# Patient Record
Sex: FEMALE | Race: WHITE | Employment: FULL TIME | URBAN - METROPOLITAN AREA
[De-identification: names, ages, dates, MRNs, and addresses within clinical notes are randomized per-mention and may not be internally consistent; named-entity substitution may affect disease eponyms.]

---

## 2017-01-20 ENCOUNTER — OFFICE VISIT (OUTPATIENT)
Dept: INTERNAL MEDICINE | Facility: CLINIC | Age: 52
End: 2017-01-20

## 2017-01-20 VITALS
TEMPERATURE: 98.1 F | BODY MASS INDEX: 24.42 KG/M2 | WEIGHT: 151.2 LBS | DIASTOLIC BLOOD PRESSURE: 79 MMHG | HEART RATE: 78 BPM | SYSTOLIC BLOOD PRESSURE: 125 MMHG | OXYGEN SATURATION: 94 %

## 2017-01-20 DIAGNOSIS — J20.8 ACUTE VIRAL BRONCHITIS: ICD-10-CM

## 2017-01-20 DIAGNOSIS — R05.9 COUGH: Primary | ICD-10-CM

## 2017-01-20 RX ORDER — CODEINE PHOSPHATE AND GUAIFENESIN 10; 100 MG/5ML; MG/5ML
1 SOLUTION ORAL EVERY 4 HOURS PRN
Qty: 120 ML | Refills: 0 | Status: SHIPPED | OUTPATIENT
Start: 2017-01-20 | End: 2017-08-01

## 2017-01-20 RX ORDER — ALBUTEROL SULFATE 90 UG/1
2 AEROSOL, METERED RESPIRATORY (INHALATION)
COMMUNITY
Start: 2017-01-15 | End: 2017-09-06

## 2017-01-20 RX ORDER — DEXTROMETHORPHAN HBR 15 MG/1
30 CAPSULE, LIQUID FILLED ORAL EVERY 8 HOURS PRN
Qty: 28 CAPSULE | Refills: 0 | Status: SHIPPED | OUTPATIENT
Start: 2017-01-20 | End: 2017-08-01

## 2017-01-20 ASSESSMENT — PAIN SCALES - GENERAL: PAINLEVEL: MILD PAIN (2)

## 2017-01-20 NOTE — PATIENT INSTRUCTIONS
Primary Care Center Medication Refill Request Information:  * Please contact your pharmacy regarding ANY request for medication refills.  ** [facility] Prescription Fax = [phone]  * Please allow 3 business days for routine medication refills.  * Please allow 5 business days for controlled substance medication refills.     Primary Care Center Test Result notification information:  *You will be notified with in 7-10 days of your appointment day regarding the results of your test.  If you are on MyChart you will be notified as soon as the provider has reviewed the results and signed off on them.    Acute Bronchitis  Your healthcare provider has told you that you have acute bronchitis. Bronchitis is infection or inflammation of the bronchial tubes (airways in the lungs). Normally, air moves easily in and out of the airways. Bronchitis narrows the airways, making it harder for air to flow in and out of the lungs. This causes symptoms such as shortness of breath, coughing, and wheezing. Bronchitis can be  acute  or  chronic.  Acute means the condition comes on quickly and goes away in a short time. Chronic means a condition lasts a long time and often comes back. Read on to learn more about acute bronchitis.    What causes acute bronchitis?  Acute bronchitis almost always starts as a viral respiratory infection, such as a cold or the flu. Certain factors make it more likely for a cold or flu to turn into bronchitis. These include being very young or very old or having a heart or lung problem. Cigarette smoking also makes bronchitis more likely.  When bronchitis develops, the airways become swollen. The airways may also become infected with bacteria. This is known as a secondary infection.  Diagnosing acute bronchitis  Your healthcare provider will examine you and ask about your symptoms and health history. You may also have a sputum culture to test the fluid in your lungs. Chest X-rays may be done to look for infection in  the lungs.  Treating acute bronchitis  Bronchitis usually clears up as the cold or flu goes away. You can help feel better faster by doing the following:    Take medicine as directed. You may be told to take ibuprofen or other over-the-counter medicines. These help relieve inflammation in your bronchial tubes. Your doctor may prescribe an inhaler to help open up the bronchial tubes. Most of the time, acute bronchitis is caused by a viral infection. Antibiotics are usually not prescribed for viral infections.    Drink plenty of fluids, such as water, juice, or warm soup. Fluids loosen mucus so that you can cough it up. This helps you breathe more easily. Fluids also prevent dehydration.    Make sure you get plenty of rest.    Do not smoke. Do not allow anyone else to smoke in your home.  Recovery and follow-up  Follow up with your doctor as you are told. You will likely feel better in a week or two. But a dry cough can linger beyond that time. Let your doctor know if you still have symptoms (other than a dry cough) after 2 weeks. If you re prone to getting bronchial infections, let your doctor know. And take steps to protect yourself from future infections. These steps include stopping smoking and avoiding tobacco smoke, washing your hands often, and getting a yearly flu shot.  When to call the doctor  Call the doctor if you have any of the following:    Fever of 100.4 F (38.0 C) higher    Symptoms that get worse, or new symptoms    Trouble breathing    Symptoms that don t start to improve within a week, or within 3 days of taking antibiotics     0364-0717 [employer]. [address] [place] [address], [place] [address]. All rights reserved. This information is not intended as a substitute for professional medical care. Always follow your healthcare professional's instructions.

## 2017-01-20 NOTE — NURSING NOTE
Chief Complaint   Patient presents with     Cough     Patient is here to discuss cough, went to urgent care on 1/15/17. Patient recieved antibiotics and still not feeling any better. Duration 2 weeks.      Leidy Severino LPN at 3:18 PM on 1/20/2017.

## 2017-01-20 NOTE — MR AVS SNAPSHOT
After Visit Summary   1/20/2017    Tatiana Paredes    MRN: [id number]           Patient Information     Date Of Birth          1965        Visit Information        Provider Department      1/20/2017 3:30 PM Jagruti Vela [facility]        Today's Diagnoses     Cough    -  1       Care Instructions    Primary Care Center Medication Refill Request Information:  * Please contact your pharmacy regarding ANY request for medication refills.  ** [facility] Prescription Fax = [phone]  * Please allow 3 business days for routine medication refills.  * Please allow 5 business days for controlled substance medication refills.     Primary Care Center Test Result notification information:  *You will be notified with in 7-10 days of your appointment day regarding the results of your test.  If you are on MyChart you will be notified as soon as the provider has reviewed the results and signed off on them.    Acute Bronchitis  Your healthcare provider has told you that you have acute bronchitis. Bronchitis is infection or inflammation of the bronchial tubes (airways in the lungs). Normally, air moves easily in and out of the airways. Bronchitis narrows the airways, making it harder for air to flow in and out of the lungs. This causes symptoms such as shortness of breath, coughing, and wheezing. Bronchitis can be  acute  or  chronic.  Acute means the condition comes on quickly and goes away in a short time. Chronic means a condition lasts a long time and often comes back. Read on to learn more about acute bronchitis.    What causes acute bronchitis?  Acute bronchitis almost always starts as a viral respiratory infection, such as a cold or the flu. Certain factors make it more likely for a cold or flu to turn into bronchitis. These include being very young or very old or having a heart or lung problem. Cigarette smoking also makes bronchitis more likely.  When bronchitis  develops, the airways become swollen. The airways may also become infected with bacteria. This is known as a secondary infection.  Diagnosing acute bronchitis  Your healthcare provider will examine you and ask about your symptoms and health history. You may also have a sputum culture to test the fluid in your lungs. Chest X-rays may be done to look for infection in the lungs.  Treating acute bronchitis  Bronchitis usually clears up as the cold or flu goes away. You can help feel better faster by doing the following:    Take medicine as directed. You may be told to take ibuprofen or other over-the-counter medicines. These help relieve inflammation in your bronchial tubes. Your doctor may prescribe an inhaler to help open up the bronchial tubes. Most of the time, acute bronchitis is caused by a viral infection. Antibiotics are usually not prescribed for viral infections.    Drink plenty of fluids, such as water, juice, or warm soup. Fluids loosen mucus so that you can cough it up. This helps you breathe more easily. Fluids also prevent dehydration.    Make sure you get plenty of rest.    Do not smoke. Do not allow anyone else to smoke in your home.  Recovery and follow-up  Follow up with your doctor as you are told. You will likely feel better in a week or two. But a dry cough can linger beyond that time. Let your doctor know if you still have symptoms (other than a dry cough) after 2 weeks. If you re prone to getting bronchial infections, let your doctor know. And take steps to protect yourself from future infections. These steps include stopping smoking and avoiding tobacco smoke, washing your hands often, and getting a yearly flu shot.  When to call the doctor  Call the doctor if you have any of the following:    Fever of 100.4 F (38.0 C) higher    Symptoms that get worse, or new symptoms    Trouble breathing    Symptoms that don t start to improve within a week, or within 3 days of taking antibiotics     [id number]  [employer]. [address] [place] [address], [place] [address]. All rights reserved. This information is not intended as a substitute for professional medical care. Always follow your healthcare professional's instructions.              Follow-ups after your visit        Your next 10 appointments already scheduled     Jan 20, 2017  6:35 PM   (Arrive by 6:20 PM)   XR CHEST 2 VIEWS with UCXR1   [facility] ([facility])    [address]  1st Floor  [place] [address] [phone]           Please bring a list of your current medicines to your exam. (Include vitamins, minerals and over-thecounter medicines.) Leave your valuables at home.  Tell your doctor if there is a chance you may be pregnant.  You do not need to do anything special for this exam.              Future tests that were ordered for you today     Open Future Orders        Priority Expected Expires Ordered    X-ray Chest 2 vws* Routine 1/20/2017 1/20/2018 1/20/2017            Who to contact     Please call your clinic at [phone] to:    Ask questions about your health    Make or cancel appointments    Discuss your medicines    Learn about your test results    Speak to your doctor   If you have compliments or concerns about an experience at your clinic, or if you wish to file a complaint, please contact [facility] Patient Relations at [phone] or email us at [email or web address]         Additional Information About Your Visit        MyChart Information     [employer] is an electronic gateway that provides easy, online access to your medical records. With [employer], you can request a clinic appointment, read your test results, renew a prescription or communicate with your care team.     To sign up for [employer] visit the website at [email or web address]   You will be asked to enter the access code listed below, as well as some personal information.  Please follow the directions to create your username and password.     Your access code is: [email or web address]  Expires: 3/5/2017  6:31 AM     Your access code will  in 90 days. If you need help or a new code, please contact your [facility] or call [phone] for assistance.        Care EveryWhere ID     This is your Care EveryWhere ID. This could be used by other organizations to access your [facility] medical records  [id number]        Your Vitals Were     Pulse Temperature Pulse Oximetry Breastfeeding?          78 98.1  F (36.7  C) (Oral) 94% No         Blood Pressure from Last 3 Encounters:   17 125/79   16 111/77   16 103/69    Weight from Last 3 Encounters:   17 68.584 kg (151 lb 3.2 oz)   16 64.864 kg (143 lb)   16 65.772 kg (145 lb)                 Today's Medication Changes          These changes are accurate as of: 17  3:52 PM.  If you have any questions, ask your nurse or doctor.               Start taking these medicines.        Dose/Directions    Dextromethorphan HBr 15 MG Caps   Used for:  Cough   Started by:  Jagruti Vela APRN CNP        Dose:  30 mg   Take 30 mg by mouth every 8 hours as needed   Quantity:  28 capsule   Refills:  0       guaiFENesin-codeine 100-10 MG/5ML Soln solution   Commonly known as:  ROBITUSSIN AC   Used for:  Cough   Started by:  Jagruti Vela APRN CNP        Dose:  1 tsp.   Take 5 mLs by mouth every 4 hours as needed for cough   Quantity:  120 mL   Refills:  0            Where to get your medicines      These medications were sent to [place] [address] [place] [address]     Phone:  [phone]    - Dextromethorphan HBr 15 MG Caps      Some of these will need a paper prescription and others can be bought over the counter.  Ask your nurse if you have questions.     Bring a paper prescription for each of these medications     - guaiFENesin-codeine 100-10 MG/5ML Soln solution             Primary Care Provider Office Phone # Fax #    Cruz Prather [phone]       [facility] [address] [address]        Thank you!     Thank you for choosing [facility]  for your care. Our goal is always to provide you with excellent care. Hearing back from our patients is one way we can continue to improve our services. Please take a few minutes to complete the written survey that you may receive in the mail after your visit with us. Thank you!             Your Updated Medication List - Protect others around you: Learn how to safely use, store and throw away your medicines at www.disposemymeds.org.          This list is accurate as of: 1/20/17  3:52 PM.  Always use your most recent med list.                   Brand Name Dispense Instructions for use    albuterol 108 (90 BASE) MCG/ACT Inhaler   Generic drug:  albuterol      Inhale 2 puffs into the lungs       Dextromethorphan HBr 15 MG Caps     28 capsule    Take 30 mg by mouth every 8 hours as needed       guaiFENesin-codeine 100-10 MG/5ML Soln solution    ROBITUSSIN AC    120 mL    Take 5 mLs by mouth every 4 hours as needed for cough       nitrofurantoin (macrocrystal-monohydrate) 100 MG capsule    MACROBID    14 capsule    Take 1 capsule (100 mg) by mouth daily       omeprazole 20 MG CR capsule    priLOSEC    180 capsule    Take 1 capsule (20 mg) by mouth 2 times daily       valACYclovir 500 MG tablet    VALTREX    28 tablet    Take 1 tablet (500 mg) by mouth 2 times daily

## 2017-01-20 NOTE — PROGRESS NOTES
Tatiana Paredes is a 51 year old female who comes in for    CC: Cough  HPI:    Had a fever to 100.8 on 1/15/17, went to Urgent Care for cough. Was taking Tylenol for sore throat last week, then once chest congestion began, was concerned she was masking a fever. Took Tylenol x3 days, did not take yesterday or today. Prescribed 5 days of prednisone--no improvement; albuterol prescribed, has not used this.  Still has cough with congestion and rattling in chest.  Only when breathing deeply--still coughing up large amounts of sputum, clear now (had been green).   Severe coughing overnight, keeping her awake. Takes an hour in the morning to clear up the cough. Better throughout the day.Still feels very tired, but unable to sleep because of the prednisone. No OTC meds.  Missed 4 days of work this week. Is drinking a lot of water/tea, almost 1 cup an hour. Denies ear pain, sore throat, +nasal congestion.    Other issues discussed today:     Patient Active Problem List   Diagnosis     External hemorrhoids     Dysphonia     Choroidal nevus of right eye     Myopia       Current Outpatient Prescriptions   Medication Sig Dispense Refill     omeprazole (PRILOSEC) 20 MG CR capsule Take 1 capsule (20 mg) by mouth 2 times daily 180 capsule 2     nitrofurantoin, macrocrystal-monohydrate, (MACROBID) 100 MG capsule Take 1 capsule (100 mg) by mouth daily 14 capsule 3     valACYclovir (VALTREX) 500 MG tablet Take 1 tablet (500 mg) by mouth 2 times daily 28 tablet 3     albuterol (ALBUTEROL) 108 (90 BASE) MCG/ACT Inhaler Inhale 2 puffs into the lungs           ALLERGIES: Review of patient's allergies indicates no known allergies.    PAST MEDICAL HX:   Past Medical History   Diagnosis Date     Menorrhagia      Hemorrhoids      Constipation      Breast pain, left 11/2013     Choroidal nevus of right eye        PAST SURGICAL HX:   Past Surgical History   Procedure Laterality Date     Hc hysteroscopy, surgical; w/ endometrial ablation,  any method  2011       IMMUNIZATION HX:   Immunization History   Administered Date(s) Administered     Hepatitis A Vac Ped/Adol-2 Dose 06/20/2008     Hepatitis B 02/21/2001, 03/28/2001, 08/16/2001     Influenza (H1N1) 02/04/2010     Influenza (IIV3) 11/17/2003, 11/18/2005, 10/30/2013, 10/03/2016     TDAP (BOOSTRIX AGES 10-64) 11/13/2013     Tetanus 01/07/2003     Varicella 02/10/1999, 03/17/1999       SOCIAL HX:   Social History     Social History Narrative    How much exercise per week? Not enough    How much calcium per day? Some in her diet       How much caffeine per day? 2 cups     How much vitamin D per day? Some in diet    Do you/your family wear seatbelts?  Yes    Do you/your family use safety helmets? N/A    Do you/your family use sunscreen? Yes    Do you/your family keep firearms in the home? No    Do you/your family have a smoke detector(s)? Yes        Do you feel safe in your home? Yes    Has anyone ever touched you in an unwanted manner? No                06/17/2015        Works at [employer].     .    Natali Powell MD                       ROS:   CONSTITUTIONAL: no fatigue, no unexpected change in weight  ENT: no ear problems, no mouth problems, no throat problems  RESP: +cough, no shortness of breath, no wheezing  CV: no chest pain, no palpitations, no new or worsening peripheral edema  GI: no nausea, no vomiting, no constipation, no diarrhea    OBJECTIVE:  /79 mmHg  Pulse 78  Temp(Src) 98.1  F (36.7  C) (Oral)  Wt 68.584 kg (151 lb 3.2 oz)  SpO2 94%  Breastfeeding? No   Wt Readings from Last 1 Encounters:   01/20/17 68.584 kg (151 lb 3.2 oz)     Constitutional: no distress, comfortable, pleasant, well-groomed  Eyes: anicteric, conjunctiva pink, normal extra-ocular movements   Ears, Nose and Throat: tympanic membranes pearly gray with positive light reflex, EACs clear bilaterally, nose clear and free of lesions, throat clear, mucosa pink and moist.   Neck: supple with full  range of motion, no thyromegaly, no lymphadenopathy   Cardiovascular: regular rate and rhythm, normal S1 and S2, no murmurs, rubs or gallops  Respiratory: good air movement bilaterally, expiratory wheezes in bilateral LL, fine crackles LLL, non-labored      ASSESSMENT/PLAN:    1. Cough    2. Acute viral bronchitis      Encouraged pt to use albuterol for cough and wheeze, will Xray to evaluate for pneumonia, as crackles present on exam. Recommended Robitussin AC and dextromethorphan to help with cough. Encouraged pushing fluids, rest, and good hand hygiene. Reviewed natural course of bronchitis, may take up to 4 weeks for cough to fully resolve. To seek emergency care if difficulty breathing or any CP.     FOLLOW UP: If not improving or if worsening    SERA Doll CNP

## 2017-07-22 ENCOUNTER — HEALTH MAINTENANCE LETTER (OUTPATIENT)
Age: 52
End: 2017-07-22

## 2017-08-01 ENCOUNTER — OFFICE VISIT (OUTPATIENT)
Dept: OTOLARYNGOLOGY | Facility: CLINIC | Age: 52
End: 2017-08-01

## 2017-08-01 DIAGNOSIS — J38.00 VOCAL CORD PARESIS: ICD-10-CM

## 2017-08-01 DIAGNOSIS — R49.0 DYSPHONIA: Primary | ICD-10-CM

## 2017-08-01 ASSESSMENT — PAIN SCALES - GENERAL: PAINLEVEL: NO PAIN (0)

## 2017-08-01 NOTE — LETTER
"8/1/2017     RE: Tatiana Paredes  [address] [facility] [address]     Dear Colleague,    Thank you for referring your patient, Tatiana Paredes, to the [facility] VOICE at [facility]. Please see a copy of my visit note below.    [facility] VOICE CLINIC  Jose Ramon Pisano Jr., M.D., F.A.C.S.  Amparo Brower M.D., M.P.H.  Radha Alford, Ph.D., CCC/SLP  Jada Lynn M.M. (voice), M.CARLOS., CCC/SLP  Calvin Tobias M.M. (voice), MFELIX., CCC/SLP    Patient: Tatiana Paredes  Date of Visit: 8/1/2017    CHIEF COMPLAINT: dysphonia    HISTORY  Tatiana Paredes was seen for initial voice evaluation and laryngeal examination in conjunction with a visit to Dr. Pisano.  Please refer to Dr. Pisano s dictation for additional history and impressions. Salient details of her history are as follows:    Ms. Paredes is a 51 year old female with a history of dysphonia.  She was also treated in 2014 for other dysphonia symptoms that appeared related to reflux and muscle tension dysphonia.    Symptoms began in January 2017, when she was experiencing a viral bronchitis with a severe cough and total voice loss for approx 1 week.      She was provided with an albuterol inhaler at the time of her bronchitis, which improved all of her symptoms except her voice quality.    CURRENT SYMPTOMS INCLUDE  VOICE    Rough    Pitch instability    Moments of an improved voice; voice \"comes and goes\"  COUGH/THROAT CLEARING    N/a; now resolved  SWALLOWING/THROAT SYMPTOMS    n/a  BREATHING    WNL    OTHER PERTINENT HISTORY    Please also refer to Dr. Pisano s dictation for additional history and impressions.  Past Medical History:   Diagnosis Date     Breast pain, left 11/2013     Choroidal nevus of right eye      Constipation      Hemorrhoids      Menorrhagia      Past Surgical History:   Procedure Laterality Date     HC HYSTEROSCOPY, SURGICAL; W/ ENDOMETRIAL ABLATION, ANY METHOD  2011     OBJECTIVE  PATIENT " "REPORTED MEASURES  Patient Supplied Answers To VHI Questionnaire  Voice Handicap Index (VHI-10) 8/1/2017   How often do you have any of the following symptoms:  Indigestion, heartburn, chest pain, stomach acid coming up, and/or tasting acid in your mouth or throat? Rarely   (F1) My voice makes it difficult for people to hear me. Sometimes   (F2) People have difficulty understanding me in a noisy room. Sometimes   (F8) My voice difficulties restrict my personal and social life. Sometimes   (F9) I feel left out of conversations because of my voice. Never   (F10) My voice problem causes me to lose income. Never   (P5) I feel as though I have to strain to produce voice. Sometimes   (P6) The clarity of my voice is unpredictable. Sometimes   (E4) My voice problem upsets me. Sometimes   (E6) My voice makes me feel handicapped. Sometimes   (P3) People ask, \"What's wrong with your voice?\" Sometimes   VHI Total Score 16       Patient Supplied Answers To CSI Questionnaire  Cough Severity Index (CSI) 8/1/2017   My cough is worse when I lie down. Never   My coughing problem causes me to restrict my personal and social life. Never   I tend to avoid places because of my cough problem. Never   I feel embarrassed because of my coughing problem. Never   People ask, \"What's wrong?\" because I cough a lot. Never   I run out of air when I cough. Never   My coughing problem affects my voice. Sometimes   My coughing problem limits my physical activity. Never   My coughing problem upsets me. Never   People ask me if I am sick because I cough a lot. Never   CSI Total Score 2   My cough is worse when I lie down -   My coughing problem causes me to restrict my personal and social life -   I tend to avoid places because of my cough problem -   I feel embarrassed because of my coughing problem -   People ask, ''What's wrong?'' because I cough a lot -   I run out of air when I cough -   My coughing problem affects my voice -   My coughing problem " limits my physical activity -   My coughing problem upsets me -   People ask me if I am sick because I cough a lot -   CSI Score -       Patient Supplied Answers To EAT Questionnaire  Eating Assessment Tool (EAT-10) 8/1/2017   My swallowing problem has caused me to lose weight. 0   My swallowing problem interferes with my ability to go out for meals. 0   Swallowing solids takes extra effort. 0   Swallowing pills takes extra effort. 0   Swallowing is painful. 0   The pleasure of eating is affected by my swallowing. 0   When I swallow food sticks in my throat. 2   I cough when I eat. 0   Swallowing is stressful. 0   How often do things go down the wrong way when you swallow? Never   Have you had pneumonia, bronchitis, or another severe lung infection in the last 6 months? Yes   EAT Total Score 2   My swallowing problem has caused me to lose weight -   My swallowing problem interferes with my ability to go out for meals -   Swallowing liquids takes extra effort -   Swallowing solids takes extra effort -   Swallowing pills takes extra effort -   Swallowing is painful -   The pleasure of eating is affected by my swallowing -   When I swallow food sticks in my throat -   I cough when I eat -   Swallowing is stressful -   EAT-10 -     PERCEPTUAL EVALUATION (CPT 70868)  POSTURE / TENSION:     upper body    neck and shoulders    BREATHING:     appears within normal limits and adequate     LARYNGEAL PALPATION:     firm musculature    tenderness of the thyrohyoid area    reduced thyrohyoid space    VOICE:    Roughness: Severe    Breathiness: Severe    Strain: Moderate to severe    Loudness    Conversational speech:  Mildly reduced    Projected speech:  Moderately reduced    Pitch:    Conversational speech:  Mild to moderately elevated due to strain    Pitch glide: limited upper and lower range    Resonance:    Conversational speech:  laryngeal pharyngeal resonance    Singing vs. Speech:  n/a    CAPE-V Overall Severity:   62/100;  Global Dysphonia Severity:  62/100    COUGH/THROAT CLEARING:    Not observed    THERAPY PROBES: Mild improvement was elicited with use of forward resonant stimuli, coordination of respiration and phonation and use of yawn sigh    LARYNGEAL EXAMINATION  Procedure: Flexible endoscopy with chip-tip technology with stroboscopy, right nostril; topical anesthesia with 3% Lidocaine and 0.25% phenylephrine was applied.   Performed by: Dr. Pisano   The laryngeal and pharyngeal structures were evaluated for gross appearance, mobility, function, and focal lesions / abnormalities of the associated mucosa.  Stroboscopy was warranted to evaluate closure, symmetry, and vibratory characteristics of the vocal folds.  All findings were within normal limits with the exception of the following salient features:     Mild pooling noted in the right piriform sinus.    The right vocal fold appears mildly reduced/sluggish in mobility and slightly bowed, with the right arytenoid cartilage resting in an anterior position; left vocal fold is WNL    Moderate to severe anterior-posterior constriction noted during connected speech tasks    Therapeutic probes: inhalation phonation improved glottic adduction.    Stroboscopy demonstrated:    Amplitude: mildly reduced on the right    Symmetry: chasing asymmetry    Phase: irregular    Primarily open phase dominant      Abducted view with reduced mobility of the right vocal fold and mild bowing.    The laryngeal exam was reviewed with Ms. Paredes, and I provided pertinent explanations, as well as written and oral information.    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  Review of the 2014 laryngeal exam demonstrated normal mobility of the right vocal fold.  We suspect that the onset of this weakness is connected to her history of  bronchitis in January.    Dysphonia/discomfort is compounded by the hyperfunction and imbalanced function of the intrinsic and extrinsic laryngeal musculature      Dysphonia is accounted for by the reduced mobility of the vocal fold, and resulting poor glottic closure  STIMULABILITY: results of therapy probes during perceptual and laryngeal evaluation demonstrate improvement with coordination of respiration and phonation, use of yawn sigh and use of inhalation phonation     Based on the results of the therapy probes, Dr. Pisano recommended a trial course of speech therapy.  He would like her to follow up in 3 months.  RECOMMENDATIONS:     A course of speech therapy is recommended to optimize vocal technique, improve voice quality and promote reduced discomfort, effort and fatigue.    She demonstrates a Good prognosis for improvement given adherence to therapeutic recommendations. Therapeutic     Positive indicators: motivation    Negative indicators: none    DURATION / FREQUENCY: 2 one-hour weekly sessions, followed by 2 bi-weekly sessions.  A total of 6-8 sessions may be necessary to resolve symptoms to within normal limits.    GOALS:  Patient goal:   1. To improve and maintain a healthy voice quality  2. To understand the problem and fix it as much as possible  3. To have a normal and acceptable voice quality    Long-term goal(s): In 6 months, Ms. Paredes will:  1. Report a week of typical vocal activities, in which dysphonia and discomfort do not exceed a level of 3 out of 10, 80% of the time     This treatment plan was developed with the patient who agreed with the recommendations.    TOTAL SERVICE:  EVALUATION OF VOICE AND RESONANCE: (10887): 60 minutes    NO CHARGE FACILITY FEE (31945)    Jada Lynn M.M. (voice), M.A., CCC/SLP  Speech-Language Pathologist  [facility]  [phone]

## 2017-08-01 NOTE — PROGRESS NOTES
HISTORY OF PRESENT ILLNESS: Tatiana Paredes is a 51 year old female with a history of Dysphonia.  I saw her last on 6/10/2014. At that time she had dysphonia following a prolonged use of her voice. We felt she had gastroesophageal reflux and a muscle tension dysphonia. She did quite well after therapy and did not have any voice problems until a bout of bronchitis in January and February 2017. She had a month of coughing. She lost her voice for a week then slowly got better in February 2017. Since that time she has had intermittent voice problems with 1 day her voice being normal and the next having a rough quality to her voice. She has had no changes in her swallow but is always had some difficulty with the last swallow of a medial. This has been going on for several years. Last month she noted that her voice worsened after extensive use of her voice and it has maintained its roughness since that time. She has vocal fatigue which is unchanged from the entirety of this year. She  does better at higher pitches and with improved breath support. With extended talking she does have anterior neck pain which will resolve in an hour to a day.      Last 2 Scores for Patient-Answered VHI Questionnaire  VHI Total Score 8/1/2017   VHI Total Score 16       Last 2 Scores for Patient-Answered CSI Questionnaire  CSI Total Score 8/1/2017   CSI Total Score 2         Last 2 Scores for Patient-Answered EAT Questionnaire  EAT Total Score 8/1/2017   EAT Total Score 2           PAST MEDICAL HISTORY:   Past Medical History:   Diagnosis Date     Breast pain, left 11/2013     Choroidal nevus of right eye      Constipation      Hemorrhoids      Menorrhagia        PAST SURGICAL HISTORY:   Past Surgical History:   Procedure Laterality Date     HC HYSTEROSCOPY, SURGICAL; W/ ENDOMETRIAL ABLATION, ANY METHOD  2011       FAMILY HISTORY:   Family History   Problem Relation Age of Onset     Breast Cancer Sister 52     Cancer - colorectal  Maternal Grandmother 70       SOCIAL HISTORY:   Social History   Substance Use Topics     Smoking status: Never Smoker     Smokeless tobacco: Never Used     Alcohol use 0.5 - 2.5 oz/week     1 - 5 Standard drinks or equivalent per week      Comment: 1-3 times a week       REVIEW OF SYSTEMS: Ten point review of systems was performed and is negative except for:   [facility] E[facility] ROS 8/1/2017   Constitutional Problems with sleep   Ears, Nose, Throat Hoarseness        ALLERGIES: Review of patient's allergies indicates no known allergies.    MEDICATIONS:   Current Outpatient Prescriptions   Medication Sig Dispense Refill     albuterol (ALBUTEROL) 108 (90 BASE) MCG/ACT Inhaler Inhale 2 puffs into the lungs       omeprazole (PRILOSEC) 20 MG CR capsule Take 1 capsule (20 mg) by mouth 2 times daily 180 capsule 2     nitrofurantoin, macrocrystal-monohydrate, (MACROBID) 100 MG capsule Take 1 capsule (100 mg) by mouth daily 14 capsule 3     valACYclovir (VALTREX) 500 MG tablet Take 1 tablet (500 mg) by mouth 2 times daily 28 tablet 3         PHYSICAL EXAMINATION:  She  is awake, alert and in no apparent distress.    Her tympanic membranes are clear and intact bilaterally. External auditory canals are clear.  Nasal exam shows a mild septal deviation without obstruction.  Examination of the oral cavity shows no suspicious lesions.  There is symmetric movement of the tongue and soft palate.    The oropharynx is clear.  Her neck is supple without significant adenopathy. There is no thyrohyoid muscle tenderness. Pulse is regular.  Upper airway is clear.  Cranial nerves II-XII are grossly intact.       PROCEDURE: A flexible laryngoscopy  was performed.  Informed consent was obtained and a time out was performed. 3% lidocaine and 0.25% phenylephrine was sprayed into the nasal cavity and allowed 3 to 5 minutes for effect. The scope was passed through the right sided nostril. Examination showed the vocal folds to be mobile and meet in the  midline.  No nodules, polyps or ulcerations are seen. There is consistent but mild bowing of the right vocal fold. This results in a persistent glottic gap at medium to low pitch. Her vocal quality improves at higher pitches.  There is mild inflammation or erythema of the supraglottic or glottic larynx.  With phonation there is moderatecontraction of the supraglottic larynx.  The hypopharynx is otherwise clear as is the subglottis. There is asymmetric pooling of saliva in the hypopharynx with the right sided pooling being greater than that seen on the left. Both sides clear fairly well with a swallow.    Video stroboscopy was done at varying pitches. A clear and consistent tone could not be obtained at low and medium pitches. At higher pitches a consistent tone could be obtained.  This showed good periodicity, but an asymmetric amplitude with the right vocal fold having a decreased amplitude compared to the left. There are no distinct mucosal breaks.                IMPRESSION: Right vocal fold weakness. This is new since the June 2014 exam which was reviewed today with the patient. There is also some mild right hypopharyngeal weakness based on salivary collection.     PLAN:At this point we are going to try a brief trial of speech therapy. Because of her symptoms returning in January I am going to date the onset of this difficulty at that time rather than a month ago. I would like to see her back in 3 months after completion of therapy to see if there has been any progression in either a good or bad direction in her exam or symptoms.

## 2017-08-01 NOTE — MR AVS SNAPSHOT
After Visit Summary   2017    Tatiana Paredes    MRN: [id number]           Patient Information     Date Of Birth          1965        Visit Information        Provider Department      2017 8:00 AM Jose Ramon Pisano MD [facility]        Today's Diagnoses     Dysphonia    -  1      Care Instructions    Plan of care:  Follow up with [facility] as discussed  Clinic contact information:  1. To schedule an appointment call [phone], option 1  2. To talk to the Triage RN call [phone], option 3  3. If you need to speak to Leila or get a message to your doctor on a Friday, call the triage RN  4. LeilaRN: [phone]  5. Surgery scheduling:      Mary Guerin: [phone]      Aurelia Walker: [phone]  6. Fax: [phone]  7. [phone]            Follow-ups after your visit        Follow-up notes from your care team     Return in about 3 months (around 2017).      Your next 10 appointments already scheduled     Aug 04, 2017  3:00 PM CDT   (Arrive by 2:45 PM)   RETURN SLP VOICE with ERIN Dan   [facility] ([facility])    [address] [place] [address] [phone]            Aug 08, 2017  8:00 AM CDT   (Arrive by 7:45 AM)   MA SCREENING BILATERAL W/ CHUCK with [email or web address]   [facility] ([facility] [place])    [address] [place] [address] [phone]           Do not use any powder, lotion or deodorant under your arms or on your breast. If you do, we will ask you to remove it before your exam.  Wear comfortable, two-piece clothing.  If you have any allergies, tell your care team.  Bring any previous mammograms from other facilities or have them mailed to the breast center.  Three-dimensional (3D) mammograms are available at [place] locations in [facility],  "and [place]. [facility] locations include [place] and [facility] [place] in [place].   Benefits of 3D mammograms include: - Improved rate of cancer detection - Decreases your chance of having to go back for more tests, which means fewer: - \"False-positive\" results (This means that there is an abnormal area but it isn't cancer.) - Invasive testing procedures, such as a biopsy or surgery - Can provide clearer images of the breast if you have dense breast tissue. 3D mammography is an optional exam that anyone can have with a 2D mammogram. It doesn't replace or take the place of a 2D mammogram. 2D mammograms remain an effective screening test for all women.  Not all insurance companies cover the cost of a 3D mammogram. Check with your insurance.            Aug 09, 2017 10:05 AM CDT   (Arrive by 9:50 AM)   Return Visit with Cruz Prather MD   [facility] ([facility])    [address] [place] [id number] [phone]            Aug 15, 2017  8:00 AM CDT   (Arrive by 7:45 AM)   RETURN SLP VOICE with ERIN Dan    [facility] ([facility])    [address] [place] [id number] [phone]            Aug 22, 2017  8:05 AM CDT   (Arrive by 7:50 AM)   PHYSICAL with Cruz Prather MD   [facility] ([facility])    [address] [place] [id number] [phone]            Aug 29, 2017  8:00 AM CDT   (Arrive by 7:45 AM)   RETURN SLP VOICE with ERIN Dan    [facility] ([facility])    [address] [place] [id number] [phone]            Sep 25, 2017  8:00 AM CDT   (Arrive by 7:45 AM)   RETURN SLP VOICE with ERIN Dan    [facility] ([facility])    [address] [place] [id number] "   [phone]            Oct 06, 2017  2:15 PM CDT   Annual Visit with Natail Powell MD   [facility] ([facility])    [facility] [address],[place] [address]  [address]  [facility] [address] [phone]            Oct 31, 2017  8:00 AM CDT   (Arrive by 7:45 AM)   Return Voice with Jose Ramon Pisano MD   [facility] ([facility] [place])    [address]  [address]  [facility] [address] [phone]              Who to contact     Please call your clinic at [phone] to:    Ask questions about your health    Make or cancel appointments    Discuss your medicines    Learn about your test results    Speak to your doctor   If you have compliments or concerns about an experience at your clinic, or if you wish to file a complaint, please contact [facility] Patient Relations at [phone] or email us at [email or web address]         Additional Information About Your Visit        [employer] Information     [employer] is an electronic gateway that provides easy, online access to your medical records. With [employer], you can request a clinic appointment, read your test results, renew a prescription or communicate with your care team.     To sign up for [employer] visit the website at [email or web address]   You will be asked to enter the access code listed below, as well as some personal information. Please follow the directions to create your username and password.     Your access code is: [email or web address]  Expires: 10/16/2017  6:31 AM     Your access code will  in 90 days. If you need help or a new code, please contact your [facility] or call [phone] for assistance.        Care EveryWhere ID     This is your Care EveryWhere ID. This could be used by other organizations to access your [place] medical records  [id number]         Blood  Pressure from Last 3 Encounters:   01/20/17 125/79   08/31/16 111/77   07/20/16 103/69    Weight from Last 3 Encounters:   01/20/17 68.6 kg (151 lb 3.2 oz)   08/31/16 64.9 kg (143 lb)   07/20/16 65.8 kg (145 lb)              We Performed the Following     IMAGESTREAM RECORDING ORDER     IMAGESTREAM RECORDING ORDER     LARYNGOSCOPY FLEX FIBEROPTIC, DIAGNOSTIC        Primary Care Provider Office Phone # Fax #    Cruz GAGNON MD Crescencio [phone]       [facility] [address] [facility] [address]        Equal Access to Services     [facility] HospitalCAITLYN : Hadii aad ku hadasho Soomaali, waaxda luqadaha, qaybta kaalmada adeegyada, waxerika damiann tunde moura. So [facility] [phone].    ATENCIÓN: Si habla español, tiene a cazares disposición servicios gratuitos de asistencia lingüística. Llame al [phone].    We comply with applicable federal civil rights laws and [place] laws. We do not discriminate on the basis of race, color, national origin, age, disability sex, sexual orientation or gender identity.            Thank you!     Thank you for choosing [facility]  for your care. Our goal is always to provide you with excellent care. Hearing back from our patients is one way we can continue to improve our services. Please take a few minutes to complete the written survey that you may receive in the mail after your visit with us. Thank you!             Your Updated Medication List - Protect others around you: Learn how to safely use, store and throw away your medicines at www.disposemymeds.org.          This list is accurate as of: 8/1/17  3:32 PM.  Always use your most recent med list.                   Brand Name Dispense Instructions for use Diagnosis    albuterol 108 (90 BASE) MCG/ACT Inhaler   Generic drug:  albuterol      Inhale 2 puffs into the lungs        nitroFURantoin (macrocrystal-monohydrate) 100 MG capsule    MACROBID    14 capsule    Take 1 capsule (100 mg)  by mouth daily    Frequent UTI       omeprazole 20 MG CR capsule    priLOSEC    180 capsule    Take 1 capsule (20 mg) by mouth 2 times daily    Gastroesophageal reflux disease with esophagitis       valACYclovir 500 MG tablet    VALTREX    28 tablet    Take 1 tablet (500 mg) by mouth 2 times daily    HSV (herpes simplex virus) anogenital infection

## 2017-08-01 NOTE — MR AVS SNAPSHOT
After Visit Summary   8/1/2017    Tatiana Paredes    MRN: [id number]           Patient Information     Date Of Birth          1965        Visit Information        Provider Department      8/1/2017 8:00 AM Jada Lynn SLP  [facility]        Today's Diagnoses     Dysphonia    -  1    Vocal cord paresis           Follow-ups after your visit        Your next 10 appointments already scheduled     Aug 09, 2017 10:05 AM CDT   (Arrive by 9:50 AM)   Return Visit with Cruz Prather MD   [facility] ([facility])    [address] [place] [address]   [phone]            Aug 15, 2017  8:00 AM CDT   (Arrive by 7:45 AM)   RETURN SLP VOICE with [facility] ([facility])    [address] [place] [address]   [phone]            Aug 22, 2017  8:05 AM CDT   (Arrive by 7:50 AM)   PHYSICAL with Cruz Prather MD   [facility] ([facility])    [address] [place] [address]   [phone]            Aug 29, 2017  8:00 AM CDT   (Arrive by 7:45 AM)   RETURN SLP VOICE with [facility] ([facility])    [address] [place] [address]   [phone]            Sep 25, 2017  8:00 AM CDT   (Arrive by 7:45 AM)   RETURN SLP VOICE with [facility] ([facility])    [address] [place] [address]   [phone]            Oct 06, 2017  2:15 PM CDT   Annual Visit with Natali Powell MD   [facility] ([facility])    [place] [facility] [address]  [address]  [facility] [address]   [phone]            Oct 31, 2017  8:00 AM CDT   (Arrive by 7:45 AM)   Return Voice with Jose Ramon  Gamal Pisano MD   [facility] ([facility])    [address] [address] [phone]              Who to contact     Please call your clinic at [phone] to:    Ask questions about your health    Make or cancel appointments    Discuss your medicines    Learn about your test results    Speak to your doctor   If you have compliments or concerns about an experience at your clinic, or if you wish to file a complaint, please contact [facility] Patient Relations at [phone] or email us at [email or web address]         Additional Information About Your Visit        ThumbAdhart Information     The Society[employer] is an electronic gateway that provides easy, online access to your medical records. With Courtan[employer], you can request a clinic appointment, read your test results, renew a prescription or communicate with your care team.     To sign up for [employer]anet visit the website at [email or web address]   You will be asked to enter the access code listed below, as well as some personal information. Please follow the directions to create your username and password.     Your access code is: [email or web address]  Expires: 10/16/2017  6:31 AM     Your access code will  in 90 days. If you need help or a new code, please contact your [facility] or call [phone] for assistance.        Care EveryWhere ID     This is your Care EveryWhere ID. This could be used by other organizations to access your [place] medical records  [id number]         Blood Pressure from Last 3 Encounters:   17 125/79   16 111/77   16 103/69    Weight from Last 3 Encounters:   17 68.6 kg (151 lb 3.2 oz)   16 64.9 kg (143 lb)   16 65.8 kg (145 lb)              We Performed the Following     C BEHAVIORAL & QUALITATIVE ANALYSIS VOICE AND RESONANCE        Primary Care Provider Office  Phone # Fax #    Cruz Prather [phone]       [facility] [address] [address]        Equal Access to Services     ELSIE TELLO : Hadrubia estrellita huffman ezrao Soestrella, waaxda luqadaha, qaybta kaalmada adeviraj, sarahy mujica laJennifercharlie moura. So [facility] [phone].    ATENCIÓN: Si habla español, tiene a cazares disposición servicios gratuitos de asistencia lingüística. Llame al [phone].    We comply with applicable federal civil rights laws and [place] laws. We do not discriminate on the basis of race, color, national origin, age, disability sex, sexual orientation or gender identity.            Thank you!     Thank you for choosing [facility]  for your care. Our goal is always to provide you with excellent care. Hearing back from our patients is one way we can continue to improve our services. Please take a few minutes to complete the written survey that you may receive in the mail after your visit with us. Thank you!             Your Updated Medication List - Protect others around you: Learn how to safely use, store and throw away your medicines at www.disposemymeds.org.          This list is accurate as of: 8/1/17 11:59 PM.  Always use your most recent med list.                   Brand Name Dispense Instructions for use Diagnosis    albuterol 108 (90 BASE) MCG/ACT Inhaler   Generic drug:  albuterol      Inhale 2 puffs into the lungs        nitroFURantoin (macrocrystal-monohydrate) 100 MG capsule    MACROBID    14 capsule    Take 1 capsule (100 mg) by mouth daily    Frequent UTI       omeprazole 20 MG CR capsule    priLOSEC    180 capsule    Take 1 capsule (20 mg) by mouth 2 times daily    Gastroesophageal reflux disease with esophagitis       valACYclovir 500 MG tablet    VALTREX    28 tablet    Take 1 tablet (500 mg) by mouth 2 times daily    HSV (herpes simplex virus) anogenital infection

## 2017-08-01 NOTE — LETTER
8/1/2017       RE: Tatiana Paredes  [address]  [facility] [address]     Dear Colleague,    Thank you for referring your patient, Tatiana Paredes, to the [facility] at [facility]. Please see a copy of my visit note below.      HISTORY OF PRESENT ILLNESS: Tatiana Paredes is a 51 year old female with a history of Dysphonia.  I saw her last on 6/10/2014. At that time she had dysphonia following a prolonged use of her voice. We felt she had gastroesophageal reflux and a muscle tension dysphonia. She did quite well after therapy and did not have any voice problems until a bout of bronchitis in January and February 2017. She had a month of coughing. She lost her voice for a week then slowly got better in February 2017. Since that time she has had intermittent voice problems with 1 day her voice being normal and the next having a rough quality to her voice. She has had no changes in her swallow but is always had some difficulty with the last swallow of a medial. This has been going on for several years. Last month she noted that her voice worsened after extensive use of her voice and it has maintained its roughness since that time. She has vocal fatigue which is unchanged from the entirety of this year. She  does better at higher pitches and with improved breath support. With extended talking she does have anterior neck pain which will resolve in an hour to a day.      Last 2 Scores for Patient-Answered VHI Questionnaire  VHI Total Score 8/1/2017   VHI Total Score 16       Last 2 Scores for Patient-Answered CSI Questionnaire  CSI Total Score 8/1/2017   CSI Total Score 2         Last 2 Scores for Patient-Answered EAT Questionnaire  EAT Total Score 8/1/2017   EAT Total Score 2           PAST MEDICAL HISTORY:   Past Medical History:   Diagnosis Date     Breast pain, left 11/2013     Choroidal nevus of right eye      Constipation       Hemorrhoids      Menorrhagia        PAST SURGICAL HISTORY:   Past Surgical History:   Procedure Laterality Date     HC HYSTEROSCOPY, SURGICAL; W/ ENDOMETRIAL ABLATION, ANY METHOD  2011       FAMILY HISTORY:   Family History   Problem Relation Age of Onset     Breast Cancer Sister 52     Cancer - colorectal Maternal Grandmother 70       SOCIAL HISTORY:   Social History   Substance Use Topics     Smoking status: Never Smoker     Smokeless tobacco: Never Used     Alcohol use 0.5 - 2.5 oz/week     1 - 5 Standard drinks or equivalent per week      Comment: 1-3 times a week       REVIEW OF SYSTEMS: Ten point review of systems was performed and is negative except for:   [facility] ENT ROS 8/1/2017   Constitutional Problems with sleep   Ears, Nose, Throat Hoarseness        ALLERGIES: Review of patient's allergies indicates no known allergies.    MEDICATIONS:   Current Outpatient Prescriptions   Medication Sig Dispense Refill     albuterol (ALBUTEROL) 108 (90 BASE) MCG/ACT Inhaler Inhale 2 puffs into the lungs       omeprazole (PRILOSEC) 20 MG CR capsule Take 1 capsule (20 mg) by mouth 2 times daily 180 capsule 2     nitrofurantoin, macrocrystal-monohydrate, (MACROBID) 100 MG capsule Take 1 capsule (100 mg) by mouth daily 14 capsule 3     valACYclovir (VALTREX) 500 MG tablet Take 1 tablet (500 mg) by mouth 2 times daily 28 tablet 3         PHYSICAL EXAMINATION:  She  is awake, alert and in no apparent distress.    Her tympanic membranes are clear and intact bilaterally. External auditory canals are clear.  Nasal exam shows a mild septal deviation without obstruction.  Examination of the oral cavity shows no suspicious lesions.  There is symmetric movement of the tongue and soft palate.    The oropharynx is clear.  Her neck is supple without significant adenopathy. There is no thyrohyoid muscle tenderness. Pulse is regular.  Upper airway is clear.  Cranial nerves II-XII are grossly intact.       PROCEDURE: A flexible laryngoscopy   was performed.  Informed consent was obtained and a time out was performed. 3% lidocaine and 0.25% phenylephrine was sprayed into the nasal cavity and allowed 3 to 5 minutes for effect. The scope was passed through the right sided nostril. Examination showed the vocal folds to be mobile and meet in the midline.  No nodules, polyps or ulcerations are seen. There is consistent but mild bowing of the right vocal fold. This results in a persistent glottic gap at medium to low pitch. Her vocal quality improves at higher pitches.  There is mild inflammation or erythema of the supraglottic or glottic larynx.  With phonation there is moderatecontraction of the supraglottic larynx.  The hypopharynx is otherwise clear as is the subglottis. There is asymmetric pooling of saliva in the hypopharynx with the right sided pooling being greater than that seen on the left. Both sides clear fairly well with a swallow.    Video stroboscopy was done at varying pitches. A clear and consistent tone could not be obtained at low and medium pitches. At higher pitches a consistent tone could be obtained.  This showed good periodicity, but an asymmetric amplitude with the right vocal fold having a decreased amplitude compared to the left. There are no distinct mucosal breaks.                IMPRESSION: Right vocal fold weakness. This is new since the June 2014 exam which was reviewed today with the patient. There is also some mild right hypopharyngeal weakness based on salivary collection.     PLAN:At this point we are going to try a brief trial of speech therapy. Because of her symptoms returning in January I am going to date the onset of this difficulty at that time rather than a month ago. I would like to see her back in 3 months after completion of therapy to see if there has been any progression in either a good or bad direction in her exam or symptoms.        Again, thank you for allowing me to participate in the care of your patient.       Sincerely,    Jose Ramon Pisano MD

## 2017-08-04 ENCOUNTER — OFFICE VISIT (OUTPATIENT)
Dept: OTOLARYNGOLOGY | Facility: CLINIC | Age: 52
End: 2017-08-04

## 2017-08-04 DIAGNOSIS — J38.00 VOCAL CORD PARESIS: ICD-10-CM

## 2017-08-04 DIAGNOSIS — R49.0 DYSPHONIA: Primary | ICD-10-CM

## 2017-08-04 NOTE — PROGRESS NOTES
After Visit Summary    Patient: Tatiana Paredes  Date of Visit: 2017    Breathing:    In the morning and evening (twice daily) for 2-5 minutes:   o Breathe while lying on your back with your face and knees up. Hands on tummy and chest.  Take a breath in with rounded lips and exhale with a  Sh    o Inhale  = Inflate; exhale = deflate  o 3x each: try breathin in/8 out, 5/10, 3/4  o Throughout the day (2-3x/day for just a couple minutes) check breathing while keeping shoulders relaxed (riding to and from school, etc.)    Voice:    Bubbles (straw in 1.5 to 2  of water) 4-6x/day for 1-2min:  o blow 10-15 seconds with no voice and keep bubbles consistent.  o 3x: blow bubbles and add a sustained  who  or an  oo  (B3-A3)  o 3x: blow bubbles and vary  who  gliding up and down             Up and down like a sine wave  o 3x: blow bubbles on a sustained/ varied pitch soft to loud to soft (messa di voce)    o 1-2x: Happy birthday bubbles (keep connected)  These exercises are great for:    *warm up / cool down - Part of the morning routing and before and after extended voice use.    *tissue mobilization exercise - Improving the condition and pliability of the vocal folds.    *Abdominal breathing and applying optimal breath flow to speech/singing.   *down load a piano terrell!       u  words (2-3 x per day)  o 5 words with use of arm or finger  o Breathe first and add a  yawn-sigh  shape to sound  o 3-1 (Eb3 to B3)     z  words  o hold onto the  z  at the beginning of the word  o 5-1, 3-1 (Eb3 to B3)    M + vowels - use CD for pitches    Jada Lynn M.M. (voice), M.A., CCC/SLP  Speech-Language Pathologist  [facility]  [phone]

## 2017-08-04 NOTE — MR AVS SNAPSHOT
After Visit Summary   8/4/2017    Tatiana Paredes    MRN: [id number]           Patient Information     Date Of Birth          1965        Visit Information        Provider Department      8/4/2017 3:00 PM Jada Lynn [facility]LP [facility]        Today's Diagnoses     Dysphonia    -  1    Vocal cord paresis           Follow-ups after your visit        Your next 10 appointments already scheduled     Aug 15, 2017  8:00 AM CDT   (Arrive by 7:45 AM)   RETURN SLP VOICE with [facility] ([facility])    [address] [place] [address]   [phone]            Aug 22, 2017  8:05 AM CDT   (Arrive by 7:50 AM)   PHYSICAL with Cruz Prather MD   [facility] ([facility])    [address] [place] [address]   [phone]            Aug 29, 2017  8:00 AM CDT   (Arrive by 7:45 AM)   RETURN SLP VOICE with [facility] ([facility])    [address] [place] [address]   [phone]            Sep 25, 2017  8:00 AM CDT   (Arrive by 7:45 AM)   RETURN SLP VOICE with [facility] ([facility])    [address] [place] [address]   [phone]            Oct 06, 2017  2:15 PM CDT   Annual Visit with Natali Powell MD   [facility] ([facility])    [place] [facility] [address]  [address]  [address]  [facility] [address]   [phone]            Oct 31, 2017  8:00 AM CDT   (Arrive by 7:45 AM)   Return Voice with Jose Ramon Pisano MD   [facility] ([facility])    [address] [place] [address]   [phone]              Future tests that were ordered for you today     Open Future Orders         Priority Expected Expires Ordered    Lipid panel reflex to direct LDL Routine  2018    CBC with platelets differential Routine 2017    Comprehensive metabolic panel Routine 2017            Who to contact     Please call your clinic at [phone] to:    Ask questions about your health    Make or cancel appointments    Discuss your medicines    Learn about your test results    Speak to your doctor   If you have compliments or concerns about an experience at your clinic, or if you wish to file a complaint, please contact [facility] Patient Relations at [phone] or email us at [email or web address]         Additional Information About Your Visit        [employer] Information     [employer] is an electronic gateway that provides easy, online access to your medical records. With [employer], you can request a clinic appointment, read your test results, renew a prescription or communicate with your care team.     To sign up for [employer] visit the website at [email or web address]   You will be asked to enter the access code listed below, as well as some personal information. Please follow the directions to create your username and password.     Your access code is: [email or web address]  Expires: 10/16/2017  6:31 AM     Your access code will  in 90 days. If you need help or a new code, please contact your [facility] or call [phone] for assistance.        Care EveryWhere ID     This is your Care EveryWhere ID. This could be used by other organizations to access your [place] medical records  [id number]         Blood Pressure from Last 3 Encounters:   17 112/74   17 125/79   16 111/77    Weight from Last 3 Encounters:   17 65.2 kg (143 lb 12.8 oz)   17 68.6 kg (151 lb 3.2 oz)   16 64.9 kg (143 lb)              We Performed the Following     SPEECH/HEARING  THERAPY, INDIVIDUAL        Primary Care Provider Office Phone # Fax #    Cruz Prather [phone] [address] [address]        Equal Access to Services     ELSIE TELLO : Hadrubia estrellita huffman ezrao Soestrella, waaxda luqadaha, qaybta kaalmada adeegyada, sarahy mujica laJennifercharlie moura. So [facility] [phone].    ATENCIÓN: Si habla español, tiene a cazares disposición servicios gratuitos de asistencia lingüística. Llame al [phone].    We comply with applicable federal civil rights laws and [place] laws. We do not discriminate on the basis of race, color, national origin, age, disability sex, sexual orientation or gender identity.            Thank you!     Thank you for choosing [facility]  for your care. Our goal is always to provide you with excellent care. Hearing back from our patients is one way we can continue to improve our services. Please take a few minutes to complete the written survey that you may receive in the mail after your visit with us. Thank you!             Your Updated Medication List - Protect others around you: Learn how to safely use, store and throw away your medicines at www.disposemymeds.org.          This list is accurate as of: 8/4/17 11:59 PM.  Always use your most recent med list.                   Brand Name Dispense Instructions for use Diagnosis    albuterol 108 (90 BASE) MCG/ACT Inhaler   Generic drug:  albuterol      Inhale 2 puffs into the lungs        nitroFURantoin (macrocrystal-monohydrate) 100 MG capsule    MACROBID    14 capsule    Take 1 capsule (100 mg) by mouth daily    Frequent UTI       omeprazole 20 MG CR capsule    priLOSEC    180 capsule    Take 1 capsule (20 mg) by mouth 2 times daily    Gastroesophageal reflux disease with esophagitis       valACYclovir 500 MG tablet    VALTREX    28 tablet    Take 1 tablet (500 mg) by mouth 2 times daily    HSV (herpes simplex virus) anogenital infection

## 2017-08-04 NOTE — LETTER
2017    RE: Tatiana Paredes  [address] [facility] [address]     Dear Colleague,    Thank you for referring your patient, Tatiana Paredes, to the [facility] VOICE at [facility]. Please see a copy of my visit note below.    After Visit Summary    Patient: Tatiana Paredes  Date of Visit: 2017    Breathing:    In the morning and evening (twice daily) for 2-5 minutes:   o Breathe while lying on your back with your face and knees up. Hands on tummy and chest.  Take a breath in with rounded lips and exhale with a  Sh    o Inhale  = Inflate; exhale = deflate  o 3x each: try breathin in/8 out, 5/10, 3/4  o Throughout the day (2-3x/day for just a couple minutes) check breathing while keeping shoulders relaxed (riding to and from school, etc.)    Voice:    Bubbles (straw in 1.5 to 2  of water) 4-6x/day for 1-2min:  o blow 10-15 seconds with no voice and keep bubbles consistent.  o 3x: blow bubbles and add a sustained  who  or an  oo  (B3-A3)  o 3x: blow bubbles and vary  who  gliding up and down             Up and down like a sine wave  o 3x: blow bubbles on a sustained/ varied pitch soft to loud to soft (messa di voce)    o 1-2x: Happy birthday bubbles (keep connected)  These exercises are great for:    *warm up / cool down - Part of the morning routing and before and after extended voice use.    *tissue mobilization exercise - Improving the condition and pliability of the vocal folds.    *Abdominal breathing and applying optimal breath flow to speech/singing.   *down load a piano terrell!       u  words (2-3 x per day)  o 5 words with use of arm or finger  o Breathe first and add a  yawn-sigh  shape to sound  o 3-1 (Eb3 to B3)     z  words  o hold onto the  z  at the beginning of the word  o 5-1, 3-1 (Eb3 to B3)    M + vowels - use CD for pitches    Jada Lynn M.M. (voice) MAshlynAAshlyn, CCC/SLP  Speech-Language Pathologist  [facility]  [facility]  [phone]              [facility]  Jose Ramon Pisano Jr., M.D., F.A.C.S.  Amparo Brower M.D., M.P.H.  Radha Alford, Ph.D., CCC/SLP  Jada Lynn M.M. (voice), M.A., CCC/SLP  Calvin Tobias M.M. (voice), M.A., CCC/SLP    [facility]  VOICE/SPEECH/BREATHING THERAPY PROGRESS REPORT    Patient: Tatiana Paredes  Date of Service: 8/4/2017    PROGRESS SINCE LAST SESSION  Ms. Paredes was seen for evaluation on 8/1.  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  Review of the 2014 laryngeal exam demonstrated normal mobility of the right vocal fold.  We suspect that the onset of this weakness is connected to her history of bronchitis in January.    Dysphonia/discomfort is compounded by the hyperfunction and imbalanced function of the intrinsic and extrinsic laryngeal musculature      Dysphonia is accounted for by the reduced mobility of the vocal fold, and resulting poor glottic closure  STIMULABILITY: results of therapy probes during perceptual and laryngeal evaluation demonstrate improvement with coordination of respiration and phonation, use of yawn sigh and use of inhalation phonation     Based on the results of the therapy probes, Dr. Pisano recommended a trial course of speech therapy.  He would like her to follow up in 3 months.  RECOMMENDATIONS:     A course of speech therapy is recommended to optimize vocal technique, improve voice quality and promote reduced discomfort, effort and fatigue.    She demonstrates a Good prognosis for improvement given adherence to therapeutic recommendations. Therapeutic     Positive indicators: motivation    Negative indicators: none    DURATION / FREQUENCY: 2 one-hour weekly sessions, followed by 2 bi-weekly sessions.  A total of 6-8 sessions may be necessary to resolve  symptoms to within normal limits.    Ms. Paredes presents today with the following:  VOICE:    Roughness: Severe    Breathiness: Severe    Strain: Moderate to severe    Loudness    Conversational speech:  Mildly reduced    Projected speech:  Moderately reduced    Pitch:    Conversational speech:  Mild to moderately elevated due to strain    Pitch glide: limited upper and lower range    THERAPEUTIC ACTIVITIES  Today Ms. Paredes participated in the following therapeutic activities:    [place] exercises for optimal respiratory mechanics for speech and singing.  o I provided explanation of the anatomy and physiology of respiration for speech and singing; she found this to be helpful  o she demonstrated clavicular/neck/shoulder involvement in inhalation  o demonstrated difficulty allowing abdominal relaxation for inhalation  o demonstrated acceptable abdominal relaxation for inhalation  o with guidance, learned improved abdominal relaxation for inhalation  o learned techniques for optimal airflow on exhalation  o practiced in prone and supine positions on the massage table; this was helpful  o [place] a respiratory pacing exercise; this was helpful.  o good learning, but will need practice  o acceptable  minimal  improvement in airflow and respiratory mechanics    [place] exercises to add phonation to the optimal flowing airstream.  o Semi-occluded vocal tract exercises with straw phonation with water resistance were most facilitating  o Instructed to use as a voice warm up, cool down, coordination of breath flow with phonation, and for tissue mobilization.  o good learning, but will need practice     [place] exercises for improved airflow during phonation.  o speech material with glides was facilitating  o Instructed at the word level.  o Instructed with a descending 3rd.  o learned techniques to reduce glottal rios and improve breath flow    [place] exercises to experience a more forward sensation during  phonation.  o speech material with /z/ and /m/ was facilitating  o able to recognize improvement in quality and comfort  o Instructed with a descending 5th, as well as a 3rd  o good learning, but will need practice    [place] concepts of an optimal regimen for practice.  o she should use an interval schedule of practice, with brief periods of practice frequently throughout each day  o [place] concepts of volitional practice to facilitate motor learning.    I provided an after visit summary to help facilitate practice.    An audio recording of today's therapeutic activities was provided, to facilitate practice.    IMPRESSIONS/GOALS/PLAN  Ms. Paredes had a productive session of therapy today, working on techniques/strategies/exercises that will help her achieve her goal of acceptable and comfortable voice use, secondary to a (right) vocal fold paresis, and dysphonia; allowing her to meet personal and professional vocal demands and fully engage in activities of daily living. She will continue to work on her exercises on a daily basis, and work on incorporating the techniques into her daily vocal activities.    Progress toward long-term goals:   Minimal at this point, as this is first session, but good learning today    Goals for this practice period:     practice all exercises according to instructions    incorporate techniques into daily vocal activities    maintain vigilance for vocal technique    Plan: I will see Ms. Paredes in 2 weeks to work on education, modification, and carryover of therapeutic activities to more complex phonatory tasks.    IMPRESSIONS: R49.0 (Dysphonia) and J38.00 (Vocal Cord Paresis)     TOTAL SERVICE TIME: 60 minutes  TREATMENT (11060): 60 minutes  NO CHARGE FACILITY FEE (32518)    Jada Lynn M.M. (voice) MAshlynA., CCC/SLP  Speech-Language Pathologist  [facility]  [phone]

## 2017-08-08 ENCOUNTER — RADIANT APPOINTMENT (OUTPATIENT)
Dept: MAMMOGRAPHY | Facility: CLINIC | Age: 52
End: 2017-08-08

## 2017-08-08 DIAGNOSIS — Z12.31 VISIT FOR SCREENING MAMMOGRAM: ICD-10-CM

## 2017-08-08 NOTE — PROGRESS NOTES
"[facility] VOICE CLINIC  Jose Ramon Pisano Jr., M.D., F.A.C.S.  Amparo Brower M.D., M.P.H.  Radha Alford, Ph.D., CCC/SLP  Jada Lynn M.M. (voice), MFELIX., CCC/SLP  Calvin Tobias M.M. (voice), MFELIX., CCC/SLP    Patient: Tatiana Paredes  Date of Visit: 8/1/2017    CHIEF COMPLAINT: dysphonia    HISTORY  Tatiana Paredes was seen for initial voice evaluation and laryngeal examination in conjunction with a visit to Dr. Pisano.  Please refer to Dr. Pisano s dictation for additional history and impressions. Salient details of her history are as follows:    Ms. Paredes is a 51 year old female with a history of dysphonia.  She was also treated in 2014 for other dysphonia symptoms that appeared related to reflux and muscle tension dysphonia.    Symptoms began in January 2017, when she was experiencing a viral bronchitis with a severe cough and total voice loss for approx 1 week.      She was provided with an albuterol inhaler at the time of her bronchitis, which improved all of her symptoms except her voice quality.    CURRENT SYMPTOMS INCLUDE  VOICE    Rough    Pitch instability    Moments of an improved voice; voice \"comes and goes\"  COUGH/THROAT CLEARING    N/a; now resolved  SWALLOWING/THROAT SYMPTOMS    n/a  BREATHING    WNL    OTHER PERTINENT HISTORY    Please also refer to Dr. Pisano s dictation for additional history and impressions.  Past Medical History:   Diagnosis Date     Breast pain, left 11/2013     Choroidal nevus of right eye      Constipation      Hemorrhoids      Menorrhagia      Past Surgical History:   Procedure Laterality Date      HYSTEROSCOPY, SURGICAL; W/ ENDOMETRIAL ABLATION, ANY METHOD  2011     OBJECTIVE  PATIENT REPORTED MEASURES  Patient Supplied Answers To VHI Questionnaire  Voice Handicap Index (VHI-10) 8/1/2017   How often do you have any of the following symptoms:  Indigestion, heartburn, chest pain, stomach acid coming up, and/or tasting acid in your mouth or throat? Rarely   (F1) My " "voice makes it difficult for people to hear me. Sometimes   (F2) People have difficulty understanding me in a noisy room. Sometimes   (F8) My voice difficulties restrict my personal and social life. Sometimes   (F9) I feel left out of conversations because of my voice. Never   (F10) My voice problem causes me to lose income. Never   (P5) I feel as though I have to strain to produce voice. Sometimes   (P6) The clarity of my voice is unpredictable. Sometimes   (E4) My voice problem upsets me. Sometimes   (E6) My voice makes me feel handicapped. Sometimes   (P3) People ask, \"What's wrong with your voice?\" Sometimes   VHI Total Score 16       Patient Supplied Answers To CSI Questionnaire  Cough Severity Index (CSI) 8/1/2017   My cough is worse when I lie down. Never   My coughing problem causes me to restrict my personal and social life. Never   I tend to avoid places because of my cough problem. Never   I feel embarrassed because of my coughing problem. Never   People ask, \"What's wrong?\" because I cough a lot. Never   I run out of air when I cough. Never   My coughing problem affects my voice. Sometimes   My coughing problem limits my physical activity. Never   My coughing problem upsets me. Never   People ask me if I am sick because I cough a lot. Never   CSI Total Score 2   My cough is worse when I lie down -   My coughing problem causes me to restrict my personal and social life -   I tend to avoid places because of my cough problem -   I feel embarrassed because of my coughing problem -   People ask, ''What's wrong?'' because I cough a lot -   I run out of air when I cough -   My coughing problem affects my voice -   My coughing problem limits my physical activity -   My coughing problem upsets me -   People ask me if I am sick because I cough a lot -   CSI Score -       Patient Supplied Answers To EAT Questionnaire  Eating Assessment Tool (EAT-10) 8/1/2017   My swallowing problem has caused me to lose weight. 0 "   My swallowing problem interferes with my ability to go out for meals. 0   Swallowing solids takes extra effort. 0   Swallowing pills takes extra effort. 0   Swallowing is painful. 0   The pleasure of eating is affected by my swallowing. 0   When I swallow food sticks in my throat. 2   I cough when I eat. 0   Swallowing is stressful. 0   How often do things go down the wrong way when you swallow? Never   Have you had pneumonia, bronchitis, or another severe lung infection in the last 6 months? Yes   EAT Total Score 2   My swallowing problem has caused me to lose weight -   My swallowing problem interferes with my ability to go out for meals -   Swallowing liquids takes extra effort -   Swallowing solids takes extra effort -   Swallowing pills takes extra effort -   Swallowing is painful -   The pleasure of eating is affected by my swallowing -   When I swallow food sticks in my throat -   I cough when I eat -   Swallowing is stressful -   EAT-10 -     PERCEPTUAL EVALUATION (CPT 36135)  POSTURE / TENSION:     upper body    neck and shoulders    BREATHING:     appears within normal limits and adequate     LARYNGEAL PALPATION:     firm musculature    tenderness of the thyrohyoid area    reduced thyrohyoid space    VOICE:    Roughness: Severe    Breathiness: Severe    Strain: Moderate to severe    Loudness    Conversational speech:  Mildly reduced    Projected speech:  Moderately reduced    Pitch:    Conversational speech:  Mild to moderately elevated due to strain    Pitch glide: limited upper and lower range    Resonance:    Conversational speech:  laryngeal pharyngeal resonance    Singing vs. Speech:  n/a    CAPE-V Overall Severity:  62/100;  Global Dysphonia Severity:  62/100    COUGH/THROAT CLEARING:    Not observed    THERAPY PROBES: Mild improvement was elicited with use of forward resonant stimuli, coordination of respiration and phonation and use of yawn sigh    LARYNGEAL EXAMINATION  Procedure: Flexible  endoscopy with chip-tip technology with stroboscopy, right nostril; topical anesthesia with 3% Lidocaine and 0.25% phenylephrine was applied.   Performed by: Dr. Pisano   The laryngeal and pharyngeal structures were evaluated for gross appearance, mobility, function, and focal lesions / abnormalities of the associated mucosa.  Stroboscopy was warranted to evaluate closure, symmetry, and vibratory characteristics of the vocal folds.  All findings were within normal limits with the exception of the following salient features:     Mild pooling noted in the right piriform sinus.    The right vocal fold appears mildly reduced/sluggish in mobility and slightly bowed, with the right arytenoid cartilage resting in an anterior position; left vocal fold is WNL    Moderate to severe anterior-posterior constriction noted during connected speech tasks    Therapeutic probes: inhalation phonation improved glottic adduction.    Stroboscopy demonstrated:    Amplitude: mildly reduced on the right    Symmetry: chasing asymmetry    Phase: irregular    Primarily open phase dominant      Abducted view with reduced mobility of the right vocal fold and mild bowing.    The laryngeal exam was reviewed with Ms. Paredes, and I provided pertinent explanations, as well as written and oral information.    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  Review of the 2014 laryngeal exam demonstrated normal mobility of the right vocal fold.  We suspect that the onset of this weakness is connected to her history of bronchitis in January.    Dysphonia/discomfort is compounded by the hyperfunction and imbalanced function of the intrinsic and extrinsic laryngeal musculature      Dysphonia is accounted for by the reduced mobility of the vocal fold, and resulting poor glottic closure  STIMULABILITY:  results of therapy probes during perceptual and laryngeal evaluation demonstrate improvement with coordination of respiration and phonation, use of yawn sigh and use of inhalation phonation     Based on the results of the therapy probes, Dr. Pisano recommended a trial course of speech therapy.  He would like her to follow up in 3 months.  RECOMMENDATIONS:     A course of speech therapy is recommended to optimize vocal technique, improve voice quality and promote reduced discomfort, effort and fatigue.    She demonstrates a Good prognosis for improvement given adherence to therapeutic recommendations. Therapeutic     Positive indicators: motivation    Negative indicators: none    DURATION / FREQUENCY: 2 one-hour weekly sessions, followed by 2 bi-weekly sessions.  A total of 6-8 sessions may be necessary to resolve symptoms to within normal limits.    GOALS:  Patient goal:   1. To improve and maintain a healthy voice quality  2. To understand the problem and fix it as much as possible  3. To have a normal and acceptable voice quality    Long-term goal(s): In 6 months, Ms. Paredes will:  1. Report a week of typical vocal activities, in which dysphonia and discomfort do not exceed a level of 3 out of 10, 80% of the time     This treatment plan was developed with the patient who agreed with the recommendations.    TOTAL SERVICE:  EVALUATION OF VOICE AND RESONANCE: (48757): 60 minutes    NO CHARGE FACILITY FEE (61404)    Jada Lynn M.M. (voice), M.A., CCC/SLP  Speech-Language Pathologist  [facility]  [phone]

## 2017-08-09 ENCOUNTER — OFFICE VISIT (OUTPATIENT)
Dept: INTERNAL MEDICINE | Facility: CLINIC | Age: 52
End: 2017-08-09

## 2017-08-09 VITALS
WEIGHT: 143.8 LBS | BODY MASS INDEX: 23.21 KG/M2 | HEART RATE: 80 BPM | DIASTOLIC BLOOD PRESSURE: 74 MMHG | SYSTOLIC BLOOD PRESSURE: 112 MMHG

## 2017-08-09 DIAGNOSIS — Z13.220 SCREENING CHOLESTEROL LEVEL: Primary | ICD-10-CM

## 2017-08-09 PROBLEM — J38.00 VOCAL CORD PARESIS: Status: ACTIVE | Noted: 2017-08-09

## 2017-08-09 ASSESSMENT — PAIN SCALES - GENERAL: PAINLEVEL: NO PAIN (0)

## 2017-08-09 NOTE — NURSING NOTE
Chief Complaint   Patient presents with     Hoarse     Patient here for problems with voice.     Tatiana Carvajal LPN at 9:55 AM on 8/9/2017.

## 2017-08-09 NOTE — MR AVS SNAPSHOT
After Visit Summary   8/9/2017    Tatiana Paredes    MRN: [id number]           Patient Information     Date Of Birth          1965        Visit Information        Provider Department      8/9/2017 10:05 AM Cruz Prather MD [facility]        Today's Diagnoses     Screening cholesterol level    -  1       Follow-ups after your visit        Your next 10 appointments already scheduled     Aug 15, 2017  8:00 AM CDT   (Arrive by 7:45 AM)   RETURN SLP VOICE with ERIN Dan    [facility] ([facility])    [address] [place] [address]   [phone]            Aug 22, 2017  8:05 AM CDT   (Arrive by 7:50 AM)   PHYSICAL with Cruz Prather MD   [facility] ([facility])    [address] [place] [address]   [phone]            Aug 29, 2017  8:00 AM CDT   (Arrive by 7:45 AM)   RETURN SLP VOICE with ERIN Dan    [facility] ([facility])    [address] [place] [address]   [phone]            Sep 25, 2017  8:00 AM CDT   (Arrive by 7:45 AM)   RETURN SLP VOICE with ERIN Dan    [facility] ([facility])    [address] [place] [address]   [phone]            Oct 06, 2017  2:15 PM CDT   Annual Visit with Natali Powell MD   [facility] ([facility])    [facility] [address]  [address]  [facility] [address]   [phone]            Oct 31, 2017  8:00 AM CDT   (Arrive by 7:45 AM)   Return Voice with Jose Ramon Pisano MD   [facility] ([facility])    [address] [place] [address]   [phone]              Future tests that were ordered for you today     Open Future  Orders        Priority Expected Expires Ordered    Lipid panel reflex to direct LDL Routine  2018    CBC with platelets differential Routine 2017    Comprehensive metabolic panel Routine 2017            Who to contact     Please call your clinic at [phone] to:    Ask questions about your health    Make or cancel appointments    Discuss your medicines    Learn about your test results    Speak to your doctor   If you have compliments or concerns about an experience at your clinic, or if you wish to file a complaint, please contact [facility] Patient Relations at [phone] or email us at [email or web address]         Additional Information About Your Visit        [employer] Information     Zeb[employer] is an electronic gateway that provides easy, online access to your medical records. With Zebit, you can request a clinic appointment, read your test results, renew a prescription or communicate with your care team.     To sign up for Z[employer] visit the website at [email or web address]   You will be asked to enter the access code listed below, as well as some personal information. Please follow the directions to create your username and password.     Your access code is: [email or web address]  Expires: 10/16/2017  6:31 AM     Your access code will  in 90 days. If you need help or a new code, please contact your [facility] or call [phone] for assistance.        Care EveryWhere ID     This is your Care EveryWhere ID. This could be used by other organizations to access your [place] medical records  [id number]        Your Vitals Were     Pulse Breastfeeding? BMI (Body Mass Index)             80 No 23.21 kg/m2          Blood Pressure from Last 3 Encounters:   17 112/74   17 125/79   16 111/77    Weight from Last 3 Encounters:   17 65.2 kg (143 lb 12.8 oz)   17  68.6 kg (151 lb 3.2 oz)   08/31/16 64.9 kg (143 lb)               Primary Care Provider Office Phone # Fax #    Cruz Prather [phone] [address] [address]        Equal Access to Services     JIMJUDY OMER : Hadii aad ku hadasho Soomaali, waaxda luqadaha, qaybta kaalmada adeegyada, waxay idiin hayaan adeeg guanako laJennifercharlie moura. So [facility] [phone].    ATENCIÓN: Si habla español, tiene a cazares disposición servicios gratuitos de asistencia lingüística. Llame al [phone].    We comply with applicable federal civil rights laws and [place] laws. We do not discriminate on the basis of race, color, national origin, age, disability sex, sexual orientation or gender identity.            Thank you!     Thank you for choosing [facility]  for your care. Our goal is always to provide you with excellent care. Hearing back from our patients is one way we can continue to improve our services. Please take a few minutes to complete the written survey that you may receive in the mail after your visit with us. Thank you!             Your Updated Medication List - Protect others around you: Learn how to safely use, store and throw away your medicines at www.disposemymeds.org.          This list is accurate as of: 8/9/17 10:31 AM.  Always use your most recent med list.                   Brand Name Dispense Instructions for use Diagnosis    albuterol 108 (90 BASE) MCG/ACT Inhaler   Generic drug:  albuterol      Inhale 2 puffs into the lungs        nitroFURantoin (macrocrystal-monohydrate) 100 MG capsule    MACROBID    14 capsule    Take 1 capsule (100 mg) by mouth daily    Frequent UTI       omeprazole 20 MG CR capsule    priLOSEC    180 capsule    Take 1 capsule (20 mg) by mouth 2 times daily    Gastroesophageal reflux disease with esophagitis       valACYclovir 500 MG tablet    VALTREX    28 tablet    Take 1 tablet (500 mg) by mouth 2 times daily    HSV (herpes  simplex virus) anogenital infection

## 2017-08-09 NOTE — PROGRESS NOTES
[facility] VOICE [facility]  Jose Ramon Pisano Jr., M.D., F.A.C.S.  Amparo Brower M.D., M.P.H.  Radha Alford, Ph.D., CCC/SLP  Jada Lynn M.M. (voice), M.CARLOS., CCC/SLP  Calvin Tobias M.M. (voice), M.CARLOS., [facility]/SLP    [facility] VOICE [facility]  VOICE/SPEECH/BREATHING THERAPY PROGRESS REPORT    Patient: Tatiana Paredes  Date of Service: 8/4/2017    PROGRESS SINCE LAST SESSION  Ms. Paredes was seen for evaluation on 8/1.  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  Review of the 2014 laryngeal exam demonstrated normal mobility of the right vocal fold.  We suspect that the onset of this weakness is connected to her history of bronchitis in January.    Dysphonia/discomfort is compounded by the hyperfunction and imbalanced function of the intrinsic and extrinsic laryngeal musculature      Dysphonia is accounted for by the reduced mobility of the vocal fold, and resulting poor glottic closure  STIMULABILITY: results of therapy probes during perceptual and laryngeal evaluation demonstrate improvement with coordination of respiration and phonation, use of yawn sigh and use of inhalation phonation     Based on the results of the therapy probes, Dr. Pisano recommended a trial course of speech therapy.  He would like her to follow up in 3 months.  RECOMMENDATIONS:     A course of speech therapy is recommended to optimize vocal technique, improve voice quality and promote reduced discomfort, effort and fatigue.    She demonstrates a Good prognosis for improvement given adherence to therapeutic recommendations. Therapeutic     Positive indicators: motivation    Negative indicators: none    DURATION / FREQUENCY: 2 one-hour weekly sessions, followed by 2 bi-weekly sessions.  A total of 6-8 sessions may be necessary to resolve symptoms to within normal limits.    Ms.  Reggie presents today with the following:  VOICE:    Roughness: Severe    Breathiness: Severe    Strain: Moderate to severe    Loudness    Conversational speech:  Mildly reduced    Projected speech:  Moderately reduced    Pitch:    Conversational speech:  Mild to moderately elevated due to strain    Pitch glide: limited upper and lower range    THERAPEUTIC ACTIVITIES  Today Ms. Paredes participated in the following therapeutic activities:    [place] exercises for optimal respiratory mechanics for speech and singing.  o I provided explanation of the anatomy and physiology of respiration for speech and singing; she found this to be helpful  o she demonstrated clavicular/neck/shoulder involvement in inhalation  o demonstrated difficulty allowing abdominal relaxation for inhalation  o demonstrated acceptable abdominal relaxation for inhalation  o with guidance, learned improved abdominal relaxation for inhalation  o learned techniques for optimal airflow on exhalation  o practiced in prone and supine positions on the massage table; this was helpful  o [place] a respiratory pacing exercise; this was helpful.  o good learning, but will need practice  o acceptable  minimal  improvement in airflow and respiratory mechanics    [place] exercises to add phonation to the optimal flowing airstream.  o Semi-occluded vocal tract exercises with straw phonation with water resistance were most facilitating  o Instructed to use as a voice warm up, cool down, coordination of breath flow with phonation, and for tissue mobilization.  o good learning, but will need practice     [place] exercises for improved airflow during phonation.  o speech material with glides was facilitating  o Instructed at the word level.  o Instructed with a descending 3rd.  o learned techniques to reduce glottal rios and improve breath flow    [place] exercises to experience a more forward sensation during phonation.  o speech material with /z/ and /m/ was  facilitating  o able to recognize improvement in quality and comfort  o Instructed with a descending 5th, as well as a 3rd  o good learning, but will need practice    [place] concepts of an optimal regimen for practice.  o she should use an interval schedule of practice, with brief periods of practice frequently throughout each day  o [place] concepts of volitional practice to facilitate motor learning.    I provided an after visit summary to help facilitate practice.    An audio recording of today's therapeutic activities was provided, to facilitate practice.    IMPRESSIONS/GOALS/PLAN  Ms. Paredes had a productive session of therapy today, working on techniques/strategies/exercises that will help her achieve her goal of acceptable and comfortable voice use, secondary to a (right) vocal fold paresis, and dysphonia; allowing her to meet personal and professional vocal demands and fully engage in activities of daily living. She will continue to work on her exercises on a daily basis, and work on incorporating the techniques into her daily vocal activities.    Progress toward long-term goals:   Minimal at this point, as this is first session, but good learning today    Goals for this practice period:     practice all exercises according to instructions    incorporate techniques into daily vocal activities    maintain vigilance for vocal technique    Plan: I will see Ms. Paredes in 2 weeks to work on education, modification, and carryover of therapeutic activities to more complex phonatory tasks.    IMPRESSIONS: R49.0 (Dysphonia) and J38.00 (Vocal Cord Paresis)     TOTAL SERVICE TIME: 60 minutes  TREATMENT (48029): 60 minutes  NO CHARGE FACILITY FEE (77958)    Jada Lynn M.M. (voice), M.A., CCC/SLP  Speech-Language Pathologist  [facility]  [phone]

## 2017-08-09 NOTE — PROGRESS NOTES
HPI  Tatiana Paredes is a 49 yo female  Comes in for follow up. She saw Dr. Pisano ENT 8/1/17. She is getting speech therapy. Last endoscopy 7/2015 and was normal.  We spent the visit discussing hoarse voice: post infectious cough (she had normal CXR 1/17), reflux, neurological.    Past Medical History:   Diagnosis Date     Breast pain, left 11/2013     Choroidal nevus of right eye      Constipation      Hemorrhoids      Menorrhagia      Current Outpatient Prescriptions   Medication     albuterol (ALBUTEROL) 108 (90 BASE) MCG/ACT Inhaler     omeprazole (PRILOSEC) 20 MG CR capsule     nitrofurantoin, macrocrystal-monohydrate, (MACROBID) 100 MG capsule     valACYclovir (VALTREX) 500 MG tablet     No current facility-administered medications for this visit.          Physical Exam  There were no vitals taken for this visit.  Exam:  Constitutional: healthy, alert and no distress  Cardiovascular: negative, PMI normal. No lifts, heaves, or thrills. RRR. No murmurs, clicks gallops or rub  Respiratory: negative, Good diaphragmatic excursion. Lungs clear  Gastrointestinal: Abdomen soft, non-tender. BS normal. No masses, organomegaly  : Deferred  Musculoskeletal: extremities normal- no gross deformities noted, gait normal and normal muscle tone  Skin: no suspicious lesions or rashes  Neurologic: Gait normal. Reflexes normal and symmetric. Sensation grossly WNL.  Psychiatric: mentation appears normal and affect normal/bright    Assessment/Plan    1. She had mammogram yesterday 8/8/17; [facility] annual exam 7/20/16 with Dr. Powell; she has follow up 10/6/17  2. Abdominal complaint; CT scan unremarkable 7/2/16. Ordered colonoscopy and MRCP and this was done 12/29/16 as unremarkable.  3. She remains on PPI for reflux; last EGD 7/15.  4. She saw Dr. Pisano ENT 8/1/17 for hoarse voice. We discussed options: repeat EGD with pH and manometry studies. Could alternatively do MRI brain/brain stem imaging (probably low yield). Will wait a  few months and I will see her back 11/17. She is getting speech therapy.   5. Placed future fasting lipids and basic labs.    Total time spent 25 minutes.  More than 50% of the time spent with Ms. Paredes on counseling / coordinating her care

## 2017-08-15 ENCOUNTER — OFFICE VISIT (OUTPATIENT)
Dept: OTOLARYNGOLOGY | Facility: CLINIC | Age: 52
End: 2017-08-15

## 2017-08-15 DIAGNOSIS — R49.0 DYSPHONIA: Primary | ICD-10-CM

## 2017-08-15 DIAGNOSIS — J38.00 VOCAL CORD PARESIS: ICD-10-CM

## 2017-08-15 NOTE — PROGRESS NOTES
[facility] VOICE [facility]  Jose Ramon Pisano Jr., M.D., F.A.C.S.  Amparo Brower M.D., M.P.H.  Radha Alford, Ph.D., CCC/SLP  Jada Lynn M.M. (voice), M.A., CCC/SLP  Calvin Tobias M.M. (voice), M.CARLOS., [facility]/SLP    [facility] VOICE [facility]  VOICE/SPEECH/BREATHING THERAPY PROGRESS REPORT    Patient: Tatiana Paredes  Date of Service: 8/15/2017    PROGRESS SINCE LAST SESSION  Ms. Paredes was last seen on 8/4/17. At that time, she worked on learning therapeutic activities to improve her voice quality and comfort, secondary to dysphonia and right vocal fold paresis; allowing  her to meet personal and professional vocal demands and fully engage in activities of daily living.    Regarding practice, Ms. Paredes reports the following:     Daily practice with and without the recording    The after visit summary is helpful to facilitate practice.    voice is improved during the exercises, but minimal carryover to spontaneous conversation    Voice issues are most noticeable while speaking to larger groups.    Ms. Paredes presents today with the following:  Voice quality:    Moderate variable roughness and breathiness    Mild to moderate intermittent strain    Diplophonia intermittent    THERAPEUTIC ACTIVITIES  Today Ms. Paredes participated in the following therapeutic activities:    Demonstrated previous exercises.  o demonstrated improved technique  o appropriate redirection provided  o instruction provided for increased level of complexity/difficulty    Exercises for optimal respiratory mechanics for speech and singing.  o she demonstrated clavicular/neck/shoulder involvement in inhalation  o with guidance, learned improved abdominal relaxation for inhalation  o learned techniques for optimal airflow on exhalation  o [place] a respiratory pacing exercise; this was helpful.  o acceptable improvement in airflow and respiratory mechanics    [place] exercises to add phonation to the optimal flowing airstream.  o Semi-occluded  vocal tract exercises with a /z/, and straw phonation with water resistance were most facilitating  o Instructed to use as a voice warm up, cool down, coordination of breath flow with phonation, and for tissue mobilization.  o good learning, but will need practice     [place] exercises for improved airflow during phonation.  o speech material with glides, easy onsets, and blending was facilitating  o Instructed at the word and phrase level.  o learned techniques to reduce glottal rios and improve breath flow    [place] exercises to experience a more forward sensation during phonation.  o speech material with nasal continuants was facilitating  o speech material that elicits a high, forward tongue position was facilitating  o able to recognize improvement in quality and comfort  o able to progress to level of sounds, words and phrases  o good learning, but will need practice    [place] concepts of an optimal regimen for practice.  o Discontinued glottic coup exercises for now  o she should use an interval schedule of practice, with brief periods of practice frequently throughout each day  o [place] concepts of volitional practice to facilitate motor learning.    I provided an after visit summary to help facilitate practice.    An audio recording of today's therapeutic activities was provided, to facilitate practice.    IMPRESSIONS/GOALS/PLAN  Ms. Paredes was seen for her 2nd session of therapy today, working on techniques/strategies/exercises that will help her achieve her goal of acceptable and comfortable voice use, secondary to a (right) vocal fold paresis, and dysphonia; allowing her to meet personal and professional vocal demands and fully engage in activities of daily living. She will continue to work on her exercises on a daily basis, and work on incorporating the techniques into her daily vocal activities.       Progress toward long-term goals:   Minimal at this point, as this is first session, but good  learning today     Goals for this practice period:     practice all exercises according to instructions    incorporate techniques into daily vocal activities    maintain vigilance for vocal technique     Plan: I will see Ms. Paredes in 2 weeks to work on education, modification, and carryover of therapeutic activities to more complex phonatory tasks.     IMPRESSIONS: R49.0 (Dysphonia) and J38.00 (Vocal Cord Paresis)      TOTAL SERVICE TIME: 60 minutes  TREATMENT (59733): 60 minutes  NO CHARGE FACILITY FEE (28798)     Jada Lynn M.M. (voice) MAshlynA., CCC/SLP  Speech-Language Pathologist  [facility]  [phone]

## 2017-08-15 NOTE — PROGRESS NOTES
"After Visit Summary    Patient: Tatiana Paredes  Date of Visit: 8/15/2017    Breathing:    In the morning and evening (twice daily) for 2-5 minutes:   o Seated or standing: Take a breath in with rounded lips and exhale with a  Shhh    o Inhale  = Inflate; exhale = deflate  o 3x each: try breathin in/8 out  o Throughout the day (2-3x/day for just a couple minutes) check breathing while keeping shoulders relaxed (riding to and from school, etc.)  Voice:    ZZZZzzzzzzzzzzzzzzzzzzzzzzzzz/Bubbles (straw in 1.5 to 2  of water):  o 3x: sustained  who  or an  oo , or \"ZZZ\" on a comfortable pitch  o 3x: blow bubbles and vary  who  gliding up and down             Up and down like a sine wave  o 3x: blow bubbles on a sustained/ varied pitch soft to loud to soft (messa di voce)    o 1-2x: Happy birthday bubbles (keep connected)  These exercises are great for:    *warm up / cool down - Part of the morning routing and before and after extended voice use.    *tissue mobilization exercise - Improving the condition and pliability of the vocal folds.    *Abdominal breathing and applying optimal breath flow to speech/singing.        u  words (2-3 x per day)  o 5 words with use of arm or finger  o Breathe first and add a  yawn-sigh  shape to sound    B and G Phrases: stop practice for now    Blending  o Complete the longer phrases. Use arm to help improve breath flow (only during practice)     M  + vowels     m  words  o 5 words     m  sentences  o 10-15 sentences       Jada Lynn M.M. (voice) MAshlynA., CCC/SLP  Speech-Language Pathologist  [facility]  [phone]        "

## 2017-08-15 NOTE — LETTER
8/15/2017       RE: Tatiana Paredes  [address] [place] [address]     Dear Colleague,    Thank you for referring your patient, Tatiana Paredes, to the [facility] VOICE at [facility]. Please see a copy of my visit note below.    [facility]  Jose Ramon Pisano Jr., M.D., F.A.C.S.  Amparo Brower M.D., M.P.H.  Radha Alford, Ph.D., CCC/SLP  Jada Lynn M.M. (voice), M.CARLOS., CCC/SLP  Calvin Tobias M.M. (voice), MFELIX., [facility]/SLP    [facility]  VOICE/SPEECH/BREATHING THERAPY PROGRESS REPORT    Patient: Tatiana Paredes  Date of Service: 8/15/2017    PROGRESS SINCE LAST SESSION  Ms. Paredes was last seen on 8/4/17. At that time, she worked on learning therapeutic activities to improve her voice quality and comfort, secondary to dysphonia and right vocal fold paresis; allowing  her to meet personal and professional vocal demands and fully engage in activities of daily living.    Regarding practice, Ms. Paredes reports the following:     Daily practice with and without the recording    The after visit summary is helpful to facilitate practice.    voice is improved during the exercises, but minimal carryover to spontaneous conversation    Voice issues are most noticeable while speaking to larger groups.    Ms. Paredes presents today with the following:  Voice quality:    Moderate variable roughness and breathiness    Mild to moderate intermittent strain    Diplophonia intermittent    THERAPEUTIC ACTIVITIES  Today Ms. Paredes participated in the following therapeutic activities:    Demonstrated previous exercises.  o demonstrated improved technique  o appropriate redirection provided  o instruction provided for increased level of complexity/difficulty    Exercises for optimal respiratory mechanics for speech and singing.  o she demonstrated clavicular/neck/shoulder involvement in inhalation  o with guidance, learned improved abdominal  relaxation for inhalation  o learned techniques for optimal airflow on exhalation  o [place] a respiratory pacing exercise; this was helpful.  o acceptable improvement in airflow and respiratory mechanics    [place] exercises to add phonation to the optimal flowing airstream.  o Semi-occluded vocal tract exercises with a /z/, and straw phonation with water resistance were most facilitating  o Instructed to use as a voice warm up, cool down, coordination of breath flow with phonation, and for tissue mobilization.  o good learning, but will need practice     [place] exercises for improved airflow during phonation.  o speech material with glides, easy onsets, and blending was facilitating  o Instructed at the word and phrase level.  o learned techniques to reduce glottal rios and improve breath flow    [place] exercises to experience a more forward sensation during phonation.  o speech material with nasal continuants was facilitating  o speech material that elicits a high, forward tongue position was facilitating  o able to recognize improvement in quality and comfort  o able to progress to level of sounds, words and phrases  o good learning, but will need practice    [place] concepts of an optimal regimen for practice.  o Discontinued glottic coup exercises for now  o she should use an interval schedule of practice, with brief periods of practice frequently throughout each day  o [place] concepts of volitional practice to facilitate motor learning.    I provided an after visit summary to help facilitate practice.    An audio recording of today's therapeutic activities was provided, to facilitate practice.    IMPRESSIONS/GOALS/PLAN  Ms. Paredes was seen for her 2nd session of therapy today, working on techniques/strategies/exercises that will help her achieve her goal of acceptable and comfortable voice use, secondary to a (right) vocal fold paresis, and dysphonia; allowing her to meet personal and professional vocal  "demands and fully engage in activities of daily living. She will continue to work on her exercises on a daily basis, and work on incorporating the techniques into her daily vocal activities.       Progress toward long-term goals:   Minimal at this point, as this is first session, but good learning today     Goals for this practice period:     practice all exercises according to instructions    incorporate techniques into daily vocal activities    maintain vigilance for vocal technique     Plan: I will see Ms. Paredes in 2 weeks to work on education, modification, and carryover of therapeutic activities to more complex phonatory tasks.     IMPRESSIONS: R49.0 (Dysphonia) and J38.00 (Vocal Cord Paresis)      TOTAL SERVICE TIME: 60 minutes  TREATMENT (21542): 60 minutes  NO CHARGE FACILITY FEE (64035)     Jada Lynn M.M. (voice), M.A., CCC/SLP  Speech-Language Pathologist  [facility]  [phone]      After Visit Summary    Patient: Tatiana Paredes  Date of Visit: 8/15/2017    Breathing:    In the morning and evening (twice daily) for 2-5 minutes:   o Seated or standing: Take a breath in with rounded lips and exhale with a  Shhh    o Inhale  = Inflate; exhale = deflate  o 3x each: try breathin in/8 out  o Throughout the day (2-3x/day for just a couple minutes) check breathing while keeping shoulders relaxed (riding to and from school, etc.)  Voice:    ZZZZzzzzzzzzzzzzzzzzzzzzzzzzz/Bubbles (straw in 1.5 to 2  of water):  o 3x: sustained  who  or an  oo , or \"ZZZ\" on a comfortable pitch  o 3x: blow bubbles and vary  who  gliding up and down             Up and down like a sine wave  o 3x: blow bubbles on a sustained/ varied pitch soft to loud to soft (messa di voce)    o 1-2x: Happy birthday bubbles (keep connected)  These exercises are great for:    *warm up / cool down - Part of the morning routing and before and after extended voice use.    *tissue mobilization exercise - Improving the condition and " pliability of the vocal folds.    *Abdominal breathing and applying optimal breath flow to speech/singing.        u  words (2-3 x per day)  o 5 words with use of arm or finger  o Breathe first and add a  yawn-sigh  shape to sound    B and G Phrases: stop practice for now    Blending  o Complete the longer phrases. Use arm to help improve breath flow (only during practice)     M  + vowels     m  words  o 5 words     m  sentences  o 10-15 sentences       Jada Lynn M.M. (voice), M.A., CCC/SLP  Speech-Language Pathologist  [facility]  [phone]

## 2017-08-15 NOTE — MR AVS SNAPSHOT
After Visit Summary   8/15/2017    Tatiana Paredes    MRN: [id number]           Patient Information     Date Of Birth          1965        Visit Information        Provider Department      8/15/2017 8:00 AM Jada Lynn SLP [facility]        Today's Diagnoses     Dysphonia    -  1    Vocal cord paresis           Follow-ups after your visit        Your next 10 appointments already scheduled     Aug 29, 2017  8:00 AM CDT   (Arrive by 7:45 AM)   RETURN SLP VOICE with ERIN Dan [facility] ([facility])    [address] [place] [id number] [phone]            Sep 25, 2017  8:00 AM CDT   (Arrive by 7:45 AM)   RETURN SLP VOICE with ERIN Dan [facility] ([facility])    [address] [place] [id number] [phone]            Oct 06, 2017  2:15 PM CDT   Annual Visit with Natali Powell MD   [facility] ([facility])    [place] [facility] [address]  [address],[place] [address]  [address]  [facility] [address]   [phone]            Oct 31, 2017  8:00 AM CDT   (Arrive by 7:45 AM)   Return Voice with Jose Ramon Pisano MD   [facility] ([facility] [place])    [address] [place] [id number] [phone]            Nov 06, 2017  8:05 AM CST   (Arrive by 7:50 AM)   PHYSICAL with Cruz Prather MD   [facility] ([facility])    [address] [place] [id number] [phone]              Who to contact     Please call your clinic at [phone] to:    Ask questions about your health    Make or cancel appointments    Discuss your medicines    Learn about your test results    Speak to your doctor   If you have compliments or concerns about an experience at your clinic, or if you wish to  file a complaint, please contact [facility] Patient Relations at [phone] or email us at [email or web address]         Additional Information About Your Visit        Design ClinicalsharHiFiKiddo Information     TrustEgg is an electronic gateway that provides easy, online access to your medical records. With TrustEgg, you can request a clinic appointment, read your test results, renew a prescription or communicate with your care team.     To sign up for TrustEgg visit the website at [email or web address]   You will be asked to enter the access code listed below, as well as some personal information. Please follow the directions to create your username and password.     Your access code is: [email or web address]  Expires: 10/16/2017  6:31 AM     Your access code will  in 90 days. If you need help or a new code, please contact your [facility] Clinic or call [phone] for assistance.        Care EveryWhere ID     This is your Care EveryWhere ID. This could be used by other organizations to access your [place] medical records  [id number]         Blood Pressure from Last 3 Encounters:   17 112/74   17 125/79   16 111/77    Weight from Last 3 Encounters:   17 65.2 kg (143 lb 12.8 oz)   17 68.6 kg (151 lb 3.2 oz)   16 64.9 kg (143 lb)              We Performed the Following     SPEECH/HEARING THERAPY, INDIVIDUAL        Primary Care Provider Office Phone # Fax #    Cruz Prather [phone] [address] [address]        Equal Access to Services     ELSIE TELLO : Hadrubia Spence, aydee olmstead, qatamara rappalsarahy singh. So [facility] [phone].    ATENCIÓN: Si habla español, tiene a cazares disposición servicios gratuitos de asistencia lingüística. Llame al [phone].    We comply with applicable federal civil rights laws and [place]  laws. We do not discriminate on the basis of race, color, national origin, age, disability sex, sexual orientation or gender identity.            Thank you!     Thank you for choosing [facility]  for your care. Our goal is always to provide you with excellent care. Hearing back from our patients is one way we can continue to improve our services. Please take a few minutes to complete the written survey that you may receive in the mail after your visit with us. Thank you!             Your Updated Medication List - Protect others around you: Learn how to safely use, store and throw away your medicines at www.disposemymeds.org.          This list is accurate as of: 8/15/17 11:59 PM.  Always use your most recent med list.                   Brand Name Dispense Instructions for use Diagnosis    albuterol 108 (90 BASE) MCG/ACT Inhaler   Generic drug:  albuterol      Inhale 2 puffs into the lungs        nitroFURantoin (macrocrystal-monohydrate) 100 MG capsule    MACROBID    14 capsule    Take 1 capsule (100 mg) by mouth daily    Frequent UTI       omeprazole 20 MG CR capsule    priLOSEC    180 capsule    Take 1 capsule (20 mg) by mouth 2 times daily    Gastroesophageal reflux disease with esophagitis       valACYclovir 500 MG tablet    VALTREX    28 tablet    Take 1 tablet (500 mg) by mouth 2 times daily    HSV (herpes simplex virus) anogenital infection

## 2017-08-29 ENCOUNTER — OFFICE VISIT (OUTPATIENT)
Dept: OTOLARYNGOLOGY | Facility: CLINIC | Age: 52
End: 2017-08-29

## 2017-08-29 DIAGNOSIS — J38.00 VOCAL CORD PARESIS: ICD-10-CM

## 2017-08-29 DIAGNOSIS — R49.0 DYSPHONIA: Primary | ICD-10-CM

## 2017-08-29 NOTE — MR AVS SNAPSHOT
After Visit Summary   8/29/2017    Tatiana Paredes    MRN: [id number]           Patient Information     Date Of Birth          1965        Visit Information        Provider Department      8/29/2017 8:00 AM Jada Lynn SLP [facility] [employer] [facility]        Today's Diagnoses     Dysphonia    -  1    Vocal cord paresis           Follow-ups after your visit        Your next 10 appointments already scheduled     Sep 25, 2017  8:00 AM CDT   (Arrive by 7:45 AM)   RETURN SLP VOICE with ERIN Dan   [facility] ([facility])    [address] [place] [id number] [phone]            Oct 06, 2017  2:15 PM CDT   Annual Visit with Natali Powell MD   [facility] ([facility])    [place] [facility] [address]  [address],[place] [address]  [address]  [facility] [address]   [phone]            Oct 31, 2017  8:00 AM CDT   (Arrive by 7:45 AM)   Return Voice with Jose Ramon Pisano MD   [facility] ([facility])    [address] [place] [id number] [phone]            Nov 06, 2017  8:05 AM CST   (Arrive by 7:50 AM)   PHYSICAL with Cruz Prather MD   [facility] ([facility])    [address] [place] [id number] [phone]              Who to contact     Please call your clinic at [phone] to:    Ask questions about your health    Make or cancel appointments    Discuss your medicines    Learn about your test results    Speak to your doctor   If you have compliments or concerns about an experience at your clinic, or if you wish to file a complaint, please contact [facility] Patient Relations at [phone] or email us at [email or web address]         Additional Information About Your Visit        MyChart Information      Upward Mobility is an electronic gateway that provides easy, online access to your medical records. With Upward Mobility, you can request a clinic appointment, read your test results, renew a prescription or communicate with your care team.     To sign up for Upward Mobility visit the website at [email or web address]   You will be asked to enter the access code listed below, as well as some personal information. Please follow the directions to create your username and password.     Your access code is: [email or web address]  Expires: 10/16/2017  6:31 AM     Your access code will  in 90 days. If you need help or a new code, please contact your [facility] Clinic or call [phone] for assistance.        Care EveryWhere ID     This is your Care EveryWhere ID. This could be used by other organizations to access your [place] medical records  [id number]         Blood Pressure from Last 3 Encounters:   17 112/74   17 125/79   16 111/77    Weight from Last 3 Encounters:   17 65.2 kg (143 lb 12.8 oz)   17 68.6 kg (151 lb 3.2 oz)   16 64.9 kg (143 lb)              We Performed the Following     SPEECH/HEARING THERAPY, INDIVIDUAL        Primary Care Provider Office Phone # Fax #    Cruz Prather [phone] [address] [address]        Equal Access to Services     ELSIE TELLO : Hadii aad ku hadasho Soomaali, waaxda luqadaha, qaybta kaalmada adeegyada, sarahy moura. So [facility] [phone].    ATENCIÓN: Si habla español, tiene a cazares disposición servicios gratuitos de asistencia lingüística. Llame al [phone].    We comply with applicable federal civil rights laws and [place] laws. We do not discriminate on the basis of race, color, national origin, age, disability sex, sexual orientation or gender identity.            Thank you!     Thank you for choosing [email or web address]  for your care. Our goal is  always to provide you with excellent care. Hearing back from our patients is one way we can continue to improve our services. Please take a few minutes to complete the written survey that you may receive in the mail after your visit with us. Thank you!             Your Updated Medication List - Protect others around you: Learn how to safely use, store and throw away your medicines at www.disposemymeds.org.          This list is accurate as of: 8/29/17 11:59 PM.  Always use your most recent med list.                   Brand Name Dispense Instructions for use Diagnosis    nitroFURantoin (macrocrystal-monohydrate) 100 MG capsule    MACROBID    14 capsule    Take 1 capsule (100 mg) by mouth daily    Frequent UTI       omeprazole 20 MG CR capsule    priLOSEC    180 capsule    Take 1 capsule (20 mg) by mouth 2 times daily    Gastroesophageal reflux disease with esophagitis       PROAIR  (90 BASE) MCG/ACT Inhaler   Generic drug:  albuterol      Inhale 2 puffs into the lungs        valACYclovir 500 MG tablet    VALTREX    28 tablet    Take 1 tablet (500 mg) by mouth 2 times daily    HSV (herpes simplex virus) anogenital infection

## 2017-08-29 NOTE — LETTER
8/29/2017       RE: Tatiana Paredes  [address]  [facility] [address]     Dear Colleague,    Thank you for referring your patient, Tatiana Paredes, to the [facility] VOICE at [facility]. Please see a copy of my visit note below.    [facility] VOICE [facility]  Jose Ramon Pisano Jr., M.D., F.A.C.S.  Amparo Brower M.D., M.P.H.  Radha Alford, Ph.D., CCC/SLP  Jada Lynn M.M. (voice), M.CARLOS., CCC/SLP  Calvin Tobias M.M. (voice), MFELIX., [facility]/SLP    [facility]  VOICE/SPEECH/BREATHING THERAPY PROGRESS REPORT    Patient: Tatiana Paredes  Date of Service: 8/29/2017    PROGRESS SINCE LAST SESSION  Ms. Paredes was last seen on 8/15. At that time, she worked on learning therapeutic activities to improve her voice quality and comfort, secondary to dysphonia and a vocal cord paresis; allowing  her to meet personal and professional vocal demands and fully engage in activities of daily living.    Regarding practice, Ms. Paredes reports the following:     Daily practice with and without the recording    The after visit summary is helpful to facilitate practice.    voice is improved during the exercises, but minimal carryover to spontaneous conversation    Ms. Paredes also states that:    Believes that her voice is a little louder    Using amplification, when needed (10-15 people).    Likes [employer] personal amplifier ($20); rechargable battery and is small.    Practice 1x per day; wants to practice more    No interest currently in pursuing [employer]; she reaches her 1 year elizabeth in     Going to [employer] soon     Ms. Paredes presents today with the following:  Voice quality:    Moderate variable roughness and breathiness    Mild to moderate intermittent strain    Diplophonia intermittent    Moments of a clear voice quality during many voice exercises and chanting.    THERAPEUTIC ACTIVITIES  Today Ms. Paredes participated in the following therapeutic  activities:    Demonstrated previous exercises.  o demonstrated improved technique  o appropriate redirection provided  o instruction provided for increased level of complexity/difficulty    [place] exercises to add phonation to the optimal flowing airstream.  o Semi-occluded vocal tract exercises with a lip or tongue trill, as well as a /m/ were most facilitating, in addition to straw phonation with water resistance.  o Instructed to use as a voice warm up, cool down, coordination of breath flow with phonation, and for tissue mobilization.  o good learning, but will need practice     [place] exercises for improved airflow during phonation.  o speech material with glides was facilitating  o Instructed at the word and phrase level.  o learned techniques to reduce glottal rios and improve breath flow    [place] exercises to experience a more forward sensation during phonation.  o speech material with nasal continuants was facilitating  o speech material that elicits a high, forward tongue position was facilitating  o able to recognize improvement in quality and comfort  o able to progress to level of sentences and a paragraph  o good learning, but will need practice    [place] exercises for improving length of utterance with reduced effort for optimal carryover.  o Instructed with phrases of increasing length, and progressed to paragraphs of increasing length.    [place] concepts of an optimal regimen for practice.  o she should use an interval schedule of practice, with brief periods of practice frequently throughout each day  o [place] concepts of volitional practice to facilitate motor learning.    I provided an after visit summary to help facilitate practice.    An audio recording of today's therapeutic activities was provided, to facilitate practice.    IMPRESSIONS/GOALS/PLAN  Ms. Paredes was seen for her 2nd session of therapy today, working on techniques/strategies/exercises that will help her achieve her goal  of acceptable and comfortable voice use, secondary to a (right) vocal fold paresis, and dysphonia; allowing her to meet personal and professional vocal demands and fully engage in activities of daily living. She will continue to work on her exercises on a daily basis, and work on incorporating the techniques into her daily vocal activities. We discussed her progress today and whether it would be beneficial to proceed with a temporary vocal fold augmentation.  Ms. Paredes does not wish to pursue more invasive options beyond speech therapy at this time, and believes that with increased practice, she will be able to experience further improvement.  We agreed to continue speech therapy.      Progress toward long-term goals:   Minimal at this point, as this is first session, but good learning today      Goals for this practice period:     practice all exercises according to instructions    incorporate techniques into daily vocal activities    maintain vigilance for vocal technique      Plan: I will see Ms. Paredes in 2 weeks to work on education, modification, and carryover of therapeutic activities to more complex phonatory tasks.      IMPRESSIONS: R49.0 (Dysphonia) and J38.00 (Vocal Cord Paresis)       TOTAL SERVICE TIME: 60 minutes  TREATMENT (53826): 60 minutes  NO CHARGE FACILITY FEE (58049)      Jada Lynn M.M. (voice), M.A., CCC/SLP  Speech-Language Pathologist  [facility]  [phone]      After Visit Summary    Patient: Tatiana Paredes  Date of Visit: 8/29/2017    Voice:    Lip or tongue trill/ Zzzz/ Hum/ Bubbles (straw in 1.5 to 2  of water) 4x/day 1-2min:  o 3x: sustained  who  or an  oo  (Bb3 )  o 3x: gliding up and down             Up and down like a sine wave  o 3x: sustained or varied pitch soft to loud to soft (messa di voce)    o 1-2x: Happy birthday bubbles (keep connected)  These exercises are great for:    *warm up / cool down - Part of the morning routing and before and after extended  "voice use.    *tissue mobilization exercise - Improving the condition and pliability of the vocal folds.    *Abdominal breathing and applying optimal breath flow to speech/singing.        u  words (2-3 x per day)  o 5 words with use of arm or finger  o Chant (Bb3)     Spacious speech phrases  (2-3x per day)  o First column  o Chant, then speak with a \"rainbow\" shape for intonation     m  sentences (2-3x per day)    Pick 5 short and 5 longer prases.    Chant, then speak with a \"rainbow\" shape for intonation    Daniella mariner Passage(1x per day): Chant, then speak    Instructions for Life  o 5 short and then 5 longer ones -chant, then speak      I know an old lady - paragraphs 1-3 (chant/singing)    Jada Lynn M.M. (voice) M.A., CCC/SLP  Speech-Language Pathologist  [facility]  [phone]    "

## 2017-08-29 NOTE — PROGRESS NOTES
"After Visit Summary    Patient: Tatiana Paredes  Date of Visit: 8/29/2017    Voice:    Lip or tongue trill/ Zzzz/ Hum/ Bubbles (straw in 1.5 to 2  of water) 4x/day 1-2min:  o 3x: sustained  who  or an  oo  (Bb3 )  o 3x: gliding up and down             Up and down like a sine wave  o 3x: sustained or varied pitch soft to loud to soft (messa di voce)    o 1-2x: Happy birthday bubbles (keep connected)  These exercises are great for:    *warm up / cool down - Part of the morning routing and before and after extended voice use.    *tissue mobilization exercise - Improving the condition and pliability of the vocal folds.    *Abdominal breathing and applying optimal breath flow to speech/singing.        u  words (2-3 x per day)  o 5 words with use of arm or finger  o Chant (Bb3)     Spacious speech phrases  (2-3x per day)  o First column  o Chant, then speak with a \"rainbow\" shape for intonation     m  sentences (2-3x per day)    Pick 5 short and 5 longer prases.    Chant, then speak with a \"rainbow\" shape for intonation    Daniella mariner Passage(1x per day): Chant, then speak    Instructions for Life  o 5 short and then 5 longer ones -chant, then speak      I know an old lady - paragraphs 1-3 (chant/singing)    Jada Lynn M.M. (voice), M.A., CCC/SLP  Speech-Language Pathologist  [facility]  [phone]              "

## 2017-08-29 NOTE — PROGRESS NOTES
[facility] VOICE [facility]  Jose Ramon Pisano Jr., M.D., F.A.C.S.  Amparo Brower M.D., M.P.H.  Radha Alford, Ph.D., CCC/SLP  Jada Lynn M.M. (voice), M.A., CCC/SLP  Calvin Tobias M.M. (voice), M.CARLOS., [facility]/SLP    [facility] VOICE [facility]  VOICE/SPEECH/BREATHING THERAPY PROGRESS REPORT    Patient: Tatiana Paredes  Date of Service: 8/29/2017    PROGRESS SINCE LAST SESSION  Ms. Paredes was last seen on 8/15. At that time, she worked on learning therapeutic activities to improve her voice quality and comfort, secondary to dysphonia and a vocal cord paresis; allowing  her to meet personal and professional vocal demands and fully engage in activities of daily living.    Regarding practice, Ms. Paredes reports the following:     Daily practice with and without the recording    The after visit summary is helpful to facilitate practice.    voice is improved during the exercises, but minimal carryover to spontaneous conversation    Ms. Paredes also states that:    Believes that her voice is a little louder    Using amplification, when needed (10-15 people).    Likes [employer] personal amplifier ($20); rechargable battery and is small.    Practice 1x per day; wants to practice more    No interest currently in pursuing [employer]; she reaches her 1 year elizabeth in     Going to [employer] soon     Ms. Paredes presents today with the following:  Voice quality:    Moderate variable roughness and breathiness    Mild to moderate intermittent strain    Diplophonia intermittent    Moments of a clear voice quality during many voice exercises and chanting.    THERAPEUTIC ACTIVITIES  Today Ms. Paredes participated in the following therapeutic activities:    Demonstrated previous exercises.  o demonstrated improved technique  o appropriate redirection provided  o instruction provided for increased level of complexity/difficulty    [place] exercises to add phonation to the optimal flowing airstream.  o Semi-occluded vocal tract exercises with a lip or  tongue trill, as well as a /m/ were most facilitating, in addition to straw phonation with water resistance.  o Instructed to use as a voice warm up, cool down, coordination of breath flow with phonation, and for tissue mobilization.  o good learning, but will need practice     [place] exercises for improved airflow during phonation.  o speech material with glides was facilitating  o Instructed at the word and phrase level.  o learned techniques to reduce glottal rios and improve breath flow    [place] exercises to experience a more forward sensation during phonation.  o speech material with nasal continuants was facilitating  o speech material that elicits a high, forward tongue position was facilitating  o able to recognize improvement in quality and comfort  o able to progress to level of sentences and a paragraph  o good learning, but will need practice    [place] exercises for improving length of utterance with reduced effort for optimal carryover.  o Instructed with phrases of increasing length, and progressed to paragraphs of increasing length.    [place] concepts of an optimal regimen for practice.  o she should use an interval schedule of practice, with brief periods of practice frequently throughout each day  o [place] concepts of volitional practice to facilitate motor learning.    I provided an after visit summary to help facilitate practice.    An audio recording of today's therapeutic activities was provided, to facilitate practice.    IMPRESSIONS/GOALS/PLAN  Ms. Paredes was seen for her 2nd session of therapy today, working on techniques/strategies/exercises that will help her achieve her goal of acceptable and comfortable voice use, secondary to a (right) vocal fold paresis, and dysphonia; allowing her to meet personal and professional vocal demands and fully engage in activities of daily living. She will continue to work on her exercises on a daily basis, and work on incorporating the techniques  into her daily vocal activities. We discussed her progress today and whether it would be beneficial to proceed with a temporary vocal fold augmentation.  Ms. Paredes does not wish to pursue more invasive options beyond speech therapy at this time, and believes that with increased practice, she will be able to experience further improvement.  We agreed to continue speech therapy.      Progress toward long-term goals:   Minimal at this point, as this is first session, but good learning today      Goals for this practice period:     practice all exercises according to instructions    incorporate techniques into daily vocal activities    maintain vigilance for vocal technique      Plan: I will see Ms. Paredes in 2 weeks to work on education, modification, and carryover of therapeutic activities to more complex phonatory tasks.      IMPRESSIONS: R49.0 (Dysphonia) and J38.00 (Vocal Cord Paresis)       TOTAL SERVICE TIME: 60 minutes  TREATMENT (69381): 60 minutes  NO CHARGE FACILITY FEE (36776)      Jada Lynn M.M. (voice), M.A., CCC/SLP  Speech-Language Pathologist  [facility]  [phone]

## 2017-09-06 ENCOUNTER — OFFICE VISIT (OUTPATIENT)
Dept: OBGYN | Facility: CLINIC | Age: 52
End: 2017-09-06
Attending: STUDENT IN AN ORGANIZED HEALTH CARE EDUCATION/TRAINING PROGRAM
Payer: COMMERCIAL

## 2017-09-06 VITALS
DIASTOLIC BLOOD PRESSURE: 74 MMHG | HEIGHT: 66 IN | HEART RATE: 81 BPM | BODY MASS INDEX: 23.82 KG/M2 | SYSTOLIC BLOOD PRESSURE: 120 MMHG | WEIGHT: 148.2 LBS

## 2017-09-06 DIAGNOSIS — Z01.419 ENCOUNTER FOR GYNECOLOGICAL EXAMINATION WITHOUT ABNORMAL FINDING: ICD-10-CM

## 2017-09-06 DIAGNOSIS — Z12.4 SCREENING FOR MALIGNANT NEOPLASM OF CERVIX: ICD-10-CM

## 2017-09-06 DIAGNOSIS — N89.8 VAGINAL ITCHING: ICD-10-CM

## 2017-09-06 DIAGNOSIS — R10.2 VAGINAL PAIN: Primary | ICD-10-CM

## 2017-09-06 PROCEDURE — 87086 URINE CULTURE/COLONY COUNT: CPT | Performed by: STUDENT IN AN ORGANIZED HEALTH CARE EDUCATION/TRAINING PROGRAM

## 2017-09-06 PROCEDURE — G0145 SCR C/V CYTO,THINLAYER,RESCR: HCPCS | Performed by: STUDENT IN AN ORGANIZED HEALTH CARE EDUCATION/TRAINING PROGRAM

## 2017-09-06 PROCEDURE — 87088 URINE BACTERIA CULTURE: CPT | Performed by: STUDENT IN AN ORGANIZED HEALTH CARE EDUCATION/TRAINING PROGRAM

## 2017-09-06 PROCEDURE — 99212 OFFICE O/P EST SF 10 MIN: CPT | Mod: ZF

## 2017-09-06 PROCEDURE — 87624 HPV HI-RISK TYP POOLED RSLT: CPT | Performed by: STUDENT IN AN ORGANIZED HEALTH CARE EDUCATION/TRAINING PROGRAM

## 2017-09-06 PROCEDURE — 87186 SC STD MICRODIL/AGAR DIL: CPT | Performed by: STUDENT IN AN ORGANIZED HEALTH CARE EDUCATION/TRAINING PROGRAM

## 2017-09-06 NOTE — LETTER
"9/6/2017       RE: Tatiana Paredes  [address]  [facility] [address]     Dear Colleague,    Thank you for referring your patient, Tatiana Paredes, to the [facility] at [facility]. Please see a copy of my visit note below.    S Clinic     S: 52yo w/ PMH of menorrhagia s/p endometrial ablation in 2011, constipation and external hemorrhoids. She is here for complaints of vaginal itching and discomfort. Started Monday, itching external vulva. Going out of country tmr so wanted to get checked out. Has had yeast infection long time ago. Did have some pain with urination yesterday but none today. Has had UTI in past with hematuria and severe dysuria so was nervous it might get worse prior to travel. Overall symptoms are much better today.    LMP: unsure, irreg spotting since ablation in 2011., has had spotting this summer. Menarche age 15  Pap smears:  10/2/2009  11/29/2010  4/11/2012  4/26/2013  5/21/2014   NIL ASC-US (A) NIL NIL NIL   HPV 11/29/2010 - neg    O: /74 (BP Location: Left arm, Patient Position: Chair)  Pulse 81  Ht 1.676 m (5' 6\")  Wt 67.2 kg (148 lb 3.2 oz)  Breastfeeding? No  BMI 23.92 kg/m2    Gen: sitting up, well groomed, NAD  Abd: soft, nontender  Pelvic: Normal appearing external genitalia without lesions. Sitching at posterior fourchette of mucosal surface without excoriation or lesions. Minimal physiologic white discharge, no odor. Vaginal mucosa pink and well rugated. Cervix nulliparous without lesions, small blood at external os.    Wet prep: negative yeast, negative clue cells, negative trich. pH 4.5. Neg whiff test  UA: Neg LE, neg nitrite, trace blood, Sp grav 1.010    A/P: Ms. Paredes is a 52yo with vaginal itching and negative wet prep, may be due to local irritant vs atrophy. Discussed symptomatic treatment with hydrocortisone ointment vs topical estrogen and patient is reassured that she doesn't " have an infection that is going to get worse and therefore doesn't want anything.   - Pap w/ HPV collected, okay with letter for results  - UCx sent    Debo Connors MD  OB Gyn PGY2  09/06/17    The Patient was seen in [facility] by DEBO CONNORS.  I reviewed the history & exam. Assessment and plan were jointly made.    Nano Morley MD

## 2017-09-06 NOTE — PROGRESS NOTES
"[facility]     S: 52yo w/ PMH of menorrhagia s/p endometrial ablation in 2011, constipation and external hemorrhoids. She is here for complaints of vaginal itching and discomfort. Started Monday, itching external vulva. Going out of country tmr so wanted to get checked out. Has had yeast infection long time ago. Did have some pain with urination yesterday but none today. Has had UTI in past with hematuria and severe dysuria so was nervous it might get worse prior to travel. Overall symptoms are much better today.    LMP: unsure, irreg spotting since ablation in 2011., has had spotting this summer. Menarche age 15  Pap smears:  10/2/2009  11/29/2010  4/11/2012  4/26/2013  5/21/2014   NIL ASC-US (A) NIL NIL NIL   HPV 11/29/2010 - neg    O: /74 (BP Location: Left arm, Patient Position: Chair)  Pulse 81  Ht 1.676 m (5' 6\")  Wt 67.2 kg (148 lb 3.2 oz)  Breastfeeding? No  BMI 23.92 kg/m2    Gen: sitting up, well groomed, NAD  Abd: soft, nontender  Pelvic: Normal appearing external genitalia without lesions. Sitching at posterior fourchette of mucosal surface without excoriation or lesions. Minimal physiologic white discharge, no odor. Vaginal mucosa pink and well rugated. Cervix nulliparous without lesions, small blood at external os.    Wet prep: negative yeast, negative clue cells, negative trich. pH 4.5. Neg whiff test  UA: Neg LE, neg nitrite, trace blood, Sp grav 1.010    A/P: Ms. Paredes is a 52yo with vaginal itching and negative wet prep, may be due to local irritant vs atrophy. Discussed symptomatic treatment with hydrocortisone ointment vs topical estrogen and patient is reassured that she doesn't have an infection that is going to get worse and therefore doesn't want anything.   - Pap w/ HPV collected, okay with letter for results  - UCx sent    Debo Connors MD  OB Gyn PGY2  09/06/17    The Patient was seen in [facility] by DEBO CONNORS.  I reviewed the history & exam. " Assessment and plan were jointly made.    Nano Morley MD

## 2017-09-06 NOTE — MR AVS SNAPSHOT
After Visit Summary   9/6/2017    Tatiana Paredes    MRN: [id number]           Patient Information     Date Of Birth          1965        Visit Information        Provider Department      9/6/2017 3:15 PM Evelyne Connors MD [facility]        Today's Diagnoses     Vaginal pain    -  1    Screening for malignant neoplasm of cervix        Encounter for gynecological examination without abnormal finding        Vaginal itching           Follow-ups after your visit        Follow-up notes from your care team     Return if symptoms worsen or fail to improve, for and at scheduled annual appt in October.      Your next 10 appointments already scheduled     Sep 25, 2017  8:00 AM CDT   (Arrive by 7:45 AM)   RETURN SLP VOICE with ERIN Dan   [facility] Voice ([facility])    [address] [place] [address] [id number] [phone]            Oct 06, 2017  2:15 PM CDT   Annual Visit with Natali Powell MD   [facility] ([facility])    [facility] [address]  [address] [address]  [address] [facility] [address] [phone]            Oct 31, 2017  8:00 AM CDT   (Arrive by 7:45 AM)   Return Voice with Jose Ramon Pisano MD   [facility] Ear Nose and Throat ([facility])    [address] [place] [id number] [phone]            Nov 06, 2017  8:05 AM CST   (Arrive by 7:50 AM)   PHYSICAL with Cruz Prather MD   [facility] Primary Care Clinic ([facility])    [address] [place] [id number] [phone]              Who to contact     Please call your clinic at [phone] to:    Ask questions about your health    Make or cancel appointments    Discuss your medicines    Learn about your test results    Speak to your doctor   If you have compliments or concerns about an  "experience at your clinic, or if you wish to file a complaint, please contact [facility] Patient Relations at [phone] or email us at [email or web address]         Additional Information About Your Visit        [employer] Information     [employer] is an electronic gateway that provides easy, online access to your medical records. With [employer], you can request a clinic appointment, read your test results, renew a prescription or communicate with your care team.     To sign up for [employer] visit the website at [email or web address]   You will be asked to enter the access code listed below, as well as some personal information. Please follow the directions to create your username and password.     Your access code is: [email or web address]  Expires: 10/16/2017  6:31 AM     Your access code will  in 90 days. If you need help or a new code, please contact your [facility] or call [phone] for assistance.        Care EveryWhere ID     This is your Care EveryWhere ID. This could be used by other organizations to access your [place] medical records  [id number]        Your Vitals Were     Pulse Height Breastfeeding? BMI (Body Mass Index)          81 1.676 m (5' 6\") No 23.92 kg/m2         Blood Pressure from Last 3 Encounters:   17 120/74   17 112/74   17 125/79    Weight from Last 3 Encounters:   17 67.2 kg (148 lb 3.2 oz)   17 65.2 kg (143 lb 12.8 oz)   17 68.6 kg (151 lb 3.2 oz)              We Performed the Following     HPV High Risk Types DNA Cervical     Pap imaged thin layer screen with HPV - recommended age 30 - 65 years (select HPV order below)     Pap Smear Exam [] Do Not Remove     Urinalysis chemical screen POCT     Urine Culture Aerobic Bacterial        Primary Care Provider Office Phone # Fax #    Cruz Prather [phone]       [address] [address]   "      Equal Access to Services     [facility] Center[facility]N : Hadii aad ku hadrosariojuan diego Spence, wanathaliada ludennisecelestineha, qatamara tamelajiasarahy villagomez. So [facility] [phone].    ATENCIÓN: Si habla español, tiene a cazares disposición servicios gratuitos de asistencia lingüística. Llame al [phone].    We comply with applicable federal civil rights laws and [place] laws. We do not discriminate on the basis of race, color, national origin, age, disability sex, sexual orientation or gender identity.            Thank you!     Thank you for choosing [facility]  for your care. Our goal is always to provide you with excellent care. Hearing back from our patients is one way we can continue to improve our services. Please take a few minutes to complete the written survey that you may receive in the mail after your visit with us. Thank you!             Your Updated Medication List - Protect others around you: Learn how to safely use, store and throw away your medicines at www.disposemymeds.org.          This list is accurate as of: 9/6/17 11:59 PM.  Always use your most recent med list.                   Brand Name Dispense Instructions for use Diagnosis    nitroFURantoin (macrocrystal-monohydrate) 100 MG capsule    MACROBID    14 capsule    Take 1 capsule (100 mg) by mouth daily    Frequent UTI       omeprazole 20 MG CR capsule    priLOSEC    180 capsule    Take 1 capsule (20 mg) by mouth 2 times daily    Gastroesophageal reflux disease with esophagitis       valACYclovir 500 MG tablet    VALTREX    28 tablet    Take 1 tablet (500 mg) by mouth 2 times daily    HSV (herpes simplex virus) anogenital infection

## 2017-09-06 NOTE — NURSING NOTE
Chief Complaint   Patient presents with     Vaginal Problem     Vaginal itching.       See TARUN Strong 9/6/2017

## 2017-09-08 LAB
COPATH REPORT: NORMAL
PAP: NORMAL

## 2017-09-09 LAB
BACTERIA SPEC CULT: ABNORMAL
Lab: ABNORMAL
SPECIMEN SOURCE: ABNORMAL

## 2017-09-12 LAB
FINAL DIAGNOSIS: NORMAL
HPV HR 12 DNA CVX QL NAA+PROBE: NEGATIVE
HPV16 DNA SPEC QL NAA+PROBE: NEGATIVE
HPV18 DNA SPEC QL NAA+PROBE: NEGATIVE
SPECIMEN DESCRIPTION: NORMAL

## 2017-09-25 ENCOUNTER — OFFICE VISIT (OUTPATIENT)
Dept: OTOLARYNGOLOGY | Facility: CLINIC | Age: 52
End: 2017-09-25

## 2017-09-25 DIAGNOSIS — R49.0 DYSPHONIA: Primary | ICD-10-CM

## 2017-09-25 DIAGNOSIS — J38.00 VOCAL CORD PARESIS: ICD-10-CM

## 2017-09-25 NOTE — MR AVS SNAPSHOT
After Visit Summary   9/25/2017    Tatiana Paredes    MRN: [id number]           Patient Information     Date Of Birth          1965        Visit Information        Provider Department      9/25/2017 8:00 AM Jada Lynn SLP [facility] Voice        Today's Diagnoses     Dysphonia    -  1    Vocal cord paresis           Follow-ups after your visit        Your next 10 appointments already scheduled     Oct 06, 2017  2:15 PM CDT   Annual Visit with Natali Powell MD   [facility] ([facility])    [facility] [address]  [address]  [address]  [facility] [address]   [phone]            Oct 31, 2017  8:00 AM CDT   (Arrive by 7:45 AM)   Return Voice with Jose Ramon Pisano MD   [facility] Ear Nose and Throat ([facility])    [address] [place] [address]  [address] [facility] [address] [phone]            Nov 06, 2017  8:05 AM CST   (Arrive by 7:50 AM)   PHYSICAL with Cruz Prather MD   [facility] Primary Care Clinic ([facility])    [address] [place] [address]  [address] [facility] [address] [phone]              Who to contact     Please call your clinic at [phone] to:    Ask questions about your health    Make or cancel appointments    Discuss your medicines    Learn about your test results    Speak to your doctor   If you have compliments or concerns about an experience at your clinic, or if you wish to file a complaint, please contact [facility] Patient Relations at [phone] or email us at [email or web address]         Additional Information About Your Visit        MyChart Information     [employer] is an electronic gateway that provides easy, online access to your medical records. With [employer], you can request a clinic appointment, read your test results, renew a prescription or communicate with your care team.     To sign up  for MyChart visit the website at [email or web address]   You will be asked to enter the access code listed below, as well as some personal information. Please follow the directions to create your username and password.     Your access code is: [email or web address]  Expires: 10/16/2017  6:31 AM     Your access code will  in 90 days. If you need help or a new code, please contact your [facility] or call [phone] for assistance.        Care EveryWhere ID     This is your Care EveryWhere ID. This could be used by other organizations to access your [place] medical records  [id number]         Blood Pressure from Last 3 Encounters:   17 120/74   17 112/74   17 125/79    Weight from Last 3 Encounters:   17 67.2 kg (148 lb 3.2 oz)   17 65.2 kg (143 lb 12.8 oz)   17 68.6 kg (151 lb 3.2 oz)              We Performed the Following     SPEECH/HEARING THERAPY, INDIVIDUAL        Primary Care Provider Office Phone # Fax #    Crzu Prather [phone]       [address] [address]        Equal Access to Services     ELSIE TELLO : Hadii aad ku hadasho Soomaali, waaxda luqadaha, qaybta kaalmada adeegyada, waxay idiin hayjanesn tunde moura. So [facility] [phone].    ATENCIÓN: Si habla español, tiene a cazares disposición servicios gratuitos de asistencia lingüística. Llame al [phone].    We comply with applicable federal civil rights laws and [place] laws. We do not discriminate on the basis of race, color, national origin, age, disability sex, sexual orientation or gender identity.            Thank you!     Thank you for choosing [facility]  for your care. Our goal is always to provide you with excellent care. Hearing back from our patients is one way we can continue to improve our services. Please take a few minutes to complete the written survey that you may receive in the mail after your visit with  us. Thank you!             Your Updated Medication List - Protect others around you: Learn how to safely use, store and throw away your medicines at www.disposemymeds.org.          This list is accurate as of: 9/25/17  9:42 AM.  Always use your most recent med list.                   Brand Name Dispense Instructions for use Diagnosis    nitroFURantoin (macrocrystal-monohydrate) 100 MG capsule    MACROBID    14 capsule    Take 1 capsule (100 mg) by mouth daily    Frequent UTI       omeprazole 20 MG CR capsule    priLOSEC    180 capsule    Take 1 capsule (20 mg) by mouth 2 times daily    Gastroesophageal reflux disease with esophagitis       valACYclovir 500 MG tablet    VALTREX    28 tablet    Take 1 tablet (500 mg) by mouth 2 times daily    HSV (herpes simplex virus) anogenital infection

## 2017-09-25 NOTE — PROGRESS NOTES
[facility] VOICE [facility]  Jose Ramon Pisano Jr., M.D., F.A.C.S.  Amparo Brower M.D., M.P.H.  Rahda Alford, Ph.D., CCC/SLP  Jada Lynn M.M. (voice), M.A., CCC/SLP  Calvin Tobias M.M. (voice), M.A., [facility]/SLP    [facility] VOICE [facility]  VOICE/SPEECH/BREATHING THERAPY PROGRESS REPORT    Patient: Tatiana Paredes  Date of Service: 9/25/2017    IMPRESSIONS/GOALS/PLAN  Ms. Paredes was seen for her 3rd session of therapy today, working on techniques/strategies/exercises that will help her achieve her goal of acceptable and comfortable voice use, secondary to a (right) vocal fold paresis, and dysphonia; allowing her to meet personal and professional vocal demands and fully engage in activities of daily living. She will continue to work on her exercises on a daily basis, and work on incorporating the techniques into her daily vocal activities.  We discussed that based on her progress, she demonstrates good improvement without the need for more invasive treatment.  Ms. Paredes would like to avoid more invasive options, if possible, and hopes that increased practice (now that she is back from her vacation), will help her experience further improvement.          PROGRESS SINCE LAST SESSION  Ms. Paredes was last seen on 8/29. At that time, she worked on learning therapeutic activities to improve her voice quality and comfort, secondary to dysphonia and a vocal cord paresis; allowing  her to meet personal and professional vocal demands and fully engage in activities of daily living.    Regarding practice, Ms. Paredes reports the following:     irregular practice with and without the recording while on a recent trip to [facility]    the recording is not helpful    The after visit summary is helpful to facilitate practice.    voice is improved during the exercises, but minimal carryover to spontaneous conversation    she does not entirely understand how to do the exercises, and would like clarification    she experiences improvement  in fatigue and discomfort while doing the exercises    Ms. Paredes also states that:    She did not notice a change until 3 days ago; voice is louder.    Voice is consistent.    Returned from [facility] on Thursday evening.    Has follow up with Dr. Pisano at the end of October.     Ms. Paredes presents today with the following:  Voice quality:    Mild to moderate roughness; voice quality worsened after 25-30 minutes of speaking    THERAPEUTIC ACTIVITIES  Today Ms. Paredes participated in the following therapeutic activities:    Demonstrated previous exercises.  o demonstrated improved technique  o appropriate redirection provided  o instruction provided for increased level of complexity/difficulty    Exercises for improved airflow during phonation.  o speech material with easy onsets tended to cause a back focus today.  o Instructed at the word and phrase level.  o learned techniques to reduce glottal rios and improve breath flow    Exercises to experience a more forward sensation during phonation.  o speech material with nasal continuants was facilitating  o speech material that elicits a high, forward tongue position was facilitating  o able to recognize improvement in quality and comfort  o able to progress to level of phrases and a paragraph  o good learning, but will need practice    [place] concepts of an optimal regimen for practice.  o she should use an interval schedule of practice, with brief periods of practice frequently throughout each day  o [place]au concepts of volitional practice to facilitate motor learning.    I provided an after visit summary to help facilitate practice.    An audio recording of today's therapeutic activities was provided, to facilitate practice.    IMPRESSIONS/GOALS/PLAN  Ms. Paredes was seen for her 3rd session of therapy today, working on techniques/strategies/exercises that will help her achieve her goal of acceptable and comfortable voice use, secondary to a (right) vocal fold  paresis, and dysphonia; allowing her to meet personal and professional vocal demands and fully engage in activities of daily living. She will continue to work on her exercises on a daily basis, and work on incorporating the techniques into her daily vocal activities.  We discussed that based on her progress, she demonstrates good improvement without the need for more invasive treatment.  Ms. Paredes would like to avoid more invasive options, if possible, and hopes that increased practice (now that she is back from her vacation), will help her experience further improvement.        Progress toward long-term goals:   Minimal at this point, as this is first session, but good learning today      Goals for this practice period:     practice all exercises according to instructions    incorporate techniques into daily vocal activities    maintain vigilance for vocal technique      Plan: I will see Ms. Paredes in 4 weeks during her follow up with Dr. Pisano      IMPRESSIONS: R49.0 (Dysphonia) and J38.00 (Vocal Cord Paresis)       TOTAL SERVICE TIME: 60 minutes  TREATMENT (25783): 60 minutes  NO CHARGE FACILITY FEE (90795)      Jada Lynn M.M. (voice) MFELIX., CCC/SLP  Speech-Language Pathologist  [facility]  [phone]

## 2017-09-25 NOTE — LETTER
9/25/2017       RE: Tatiana Paredes  [address] [facility] [address]     Dear Colleague,    Thank you for referring your patient, Tatiana Paredes, to the [facility] VOICE at [facility]. Please see a copy of my visit note below.    [facility]  Jose Ramon Pisano Jr., M.D., F.A.C.S.  Amparo Brower M.D., M.P.H.  Radha Alford, Ph.D., CCC/SLP  Jada Lynn M.M. (voice), M.A., CCC/SLP  Calvin Tobias M.M. (voice), MFELIX., [facility]/SLP    [facility]  VOICE/SPEECH/BREATHING THERAPY PROGRESS REPORT    Patient: Tatiana Paredes  Date of Service: 9/25/2017    IMPRESSIONS/GOALS/PLAN  Ms. Paredes was seen for her 3rd session of therapy today, working on techniques/strategies/exercises that will help her achieve her goal of acceptable and comfortable voice use, secondary to a (right) vocal fold paresis, and dysphonia; allowing her to meet personal and professional vocal demands and fully engage in activities of daily living. She will continue to work on her exercises on a daily basis, and work on incorporating the techniques into her daily vocal activities.  We discussed that based on her progress, she demonstrates good improvement without the need for more invasive treatment.  Ms. Paredes would like to avoid more invasive options, if possible, and hopes that increased practice (now that she is back from her vacation), will help her experience further improvement.          PROGRESS SINCE LAST SESSION  Ms. Paredes was last seen on 8/29. At that time, she worked on learning therapeutic activities to improve her voice quality and comfort, secondary to dysphonia and a vocal cord paresis; allowing  her to meet personal and professional vocal demands and fully engage in activities of daily living.    Regarding practice, Ms. Paredes reports the following:     irregular practice with and without the recording while on a recent trip to [facility]    the recording  is not helpful    The after visit summary is helpful to facilitate practice.    voice is improved during the exercises, but minimal carryover to spontaneous conversation    she does not entirely understand how to do the exercises, and would like clarification    she experiences improvement in fatigue and discomfort while doing the exercises    Ms. Paredes also states that:    She did not notice a change until 3 days ago; voice is louder.    Voice is consistent.    Returned from [facility] on Thursday evening.    Has follow up with Dr. Pisano at the end of October.     Ms. Paredes presents today with the following:  Voice quality:    Mild to moderate roughness; voice quality worsened after 25-30 minutes of speaking    THERAPEUTIC ACTIVITIES  Today Ms. Paredes participated in the following therapeutic activities:    Demonstrated previous exercises.  o demonstrated improved technique  o appropriate redirection provided  o instruction provided for increased level of complexity/difficulty    Exercises for improved airflow during phonation.  o speech material with easy onsets tended to cause a back focus today.  o Instructed at the word and phrase level.  o learned techniques to reduce glottal rios and improve breath flow    Exercises to experience a more forward sensation during phonation.  o speech material with nasal continuants was facilitating  o speech material that elicits a high, forward tongue position was facilitating  o able to recognize improvement in quality and comfort  o able to progress to level of phrases and a paragraph  o good learning, but will need practice    [place] concepts of an optimal regimen for practice.  o she should use an interval schedule of practice, with brief periods of practice frequently throughout each day  o [place] concepts of volitional practice to facilitate motor learning.    I provided an after visit summary to help facilitate practice.    An audio recording of today's  therapeutic activities was provided, to facilitate practice.    IMPRESSIONS/GOALS/PLAN  Ms. Paredes was seen for her 3rd session of therapy today, working on techniques/strategies/exercises that will help her achieve her goal of acceptable and comfortable voice use, secondary to a (right) vocal fold paresis, and dysphonia; allowing her to meet personal and professional vocal demands and fully engage in activities of daily living. She will continue to work on her exercises on a daily basis, and work on incorporating the techniques into her daily vocal activities.  We discussed that based on her progress, she demonstrates good improvement without the need for more invasive treatment.  Ms. Paredes would like to avoid more invasive options, if possible, and hopes that increased practice (now that she is back from her vacation), will help her experience further improvement.        Progress toward long-term goals:   Minimal at this point, as this is first session, but good learning today      Goals for this practice period:     practice all exercises according to instructions    incorporate techniques into daily vocal activities    maintain vigilance for vocal technique      Plan: I will see Ms. Paredes in 4 weeks during her follow up with Dr. Pisano      IMPRESSIONS: R49.0 (Dysphonia) and J38.00 (Vocal Cord Paresis)       TOTAL SERVICE TIME: 60 minutes  TREATMENT (82657): 60 minutes  NO CHARGE FACILITY FEE (27403)      Jada Lynn M.M. (voice) MTANVI, CCC/SLP  Speech-Language Pathologist  [facility]  [phone]    After Visit Summary    Patient: Tatiana Paredes  Date of Visit: 9/25/2017    Voice:    Lip or tongue trill/ Zzzz/ Hum/ Bubbles (straw in 1.5 to 2  of water) 4x/day 1-2min:    3x: sustained  who  or an  oo  (Bb3 )    3x: gliding up and down             Up and down like a sine wave    3x: sustained or varied pitch soft to loud to soft (messa di voce)      1-2x: Happy birthday bubbles (keep  "connected)  These exercises are great for:                                                    *warm up / cool down - Part of the morning routing and before and after extended voice use.                                                    *tissue mobilization exercise - Improving the condition and pliability of the vocal folds.                                                    *Abdominal breathing and applying optimal breath flow to speech/singing.   2-3x/day     M  + vowels     m  words  o 5-10 words \"Mmmmmm\"     m  sentences  o 5-10 phrases \"shape your phrases\"      Daniella mariner Passage  o 1-2x/day     Jada Lynn M.M. (voice), M.A., CCC/SLP  Speech-Language Pathologist  [facility]  [phone]              "

## 2017-09-25 NOTE — PROGRESS NOTES
"After Visit Summary    Patient: Tatiana Paredes  Date of Visit: 9/25/2017    Voice:    Lip or tongue trill/ Zzzz/ Hum/ Bubbles (straw in 1.5 to 2  of water) 4x/day 1-2min:    3x: sustained  who  or an  oo  (Bb3 )    3x: gliding up and down             Up and down like a sine wave    3x: sustained or varied pitch soft to loud to soft (messa di voce)      1-2x: Happy birthday bubbles (keep connected)  These exercises are great for:                                                    *warm up / cool down - Part of the morning routing and before and after extended voice use.                                                    *tissue mobilization exercise - Improving the condition and pliability of the vocal folds.                                                    *Abdominal breathing and applying optimal breath flow to speech/singing.   2-3x/day     M  + vowels     m  words  o 5-10 words \"Mmmmmm\"     m  sentences  o 5-10 phrases \"shape your phrases\"      Daniella mariner Passage  o 1-2x/day     Jada Lynn M.M. (voice) M.A., CCC/SLP  Speech-Language Pathologist  [facility]  [phone]        "

## 2017-10-06 ENCOUNTER — OFFICE VISIT (OUTPATIENT)
Dept: OBGYN | Facility: CLINIC | Age: 52
End: 2017-10-06
Attending: OBSTETRICS & GYNECOLOGY
Payer: COMMERCIAL

## 2017-10-06 DIAGNOSIS — N60.02 BREAST CYST, LEFT: Primary | ICD-10-CM

## 2017-10-06 NOTE — MR AVS SNAPSHOT
After Visit Summary   10/6/2017    Tatiana Paredes    MRN: [id number]           Patient Information     Date Of Birth          1965        Visit Information        Provider Department      10/6/2017 2:15 PM Natali Powell MD [facility]        Today's Diagnoses     Breast cyst, left    -  1       Follow-ups after your visit        Your next 10 appointments already scheduled     Oct 31, 2017  8:00 AM CDT   (Arrive by 7:45 AM)   Return Voice with Jose Ramon Pisano MD   [facility] Nose [facility] Throat ([facility] [place])    [address] [place] [address] [phone]           This is a multi-disciplinary care team visit as patients with your type of problem are usually seen by a team of an MD and a Speech-Language Pathologist (who is a specialist in disorders of the voice, throat, and breathing).  Please plan about 2 hours for your visit, which will likely include Laryngeal Function Studies, a Voice/Swallow/Breathing Evaluation, and an Endoscopic Laryngeal Examination to provide a comprehensive evaluation.  Please check with your insurance company to ensure you are covered for these services. - It is important to know that if you are evaluated and/or treated by both a physician and a speech pathologist during your visit, your billing will reflect the input that you receive from both providers as separate professionals. Although most insurance plans do cover these services, we encourage you to contact your insurance company prior to your visit to determine whether there are any coverage limitations that might affect you financially. - Billing/procedure codes that are frequently associated with visits to our clinic include (but are not limited to) the ones listed below. Most patients will not need all of these items, but it may be useful to ask your insurance company's patient . 10883: Flexible  fiberoptic laryngoscopy, 73500: Laryngoscopy; flexible or rigid fiberoptic, with stroboscopy, 89208: Flexible endoscopic evaluation of swallowing, 65652: Laryngeal function aerodynamic evaluation, 97410: Evaluation of Voice and Resonance, 18663: Speech pathology treatment for voice, speech, communication, 01291: Speech pathology treatment for swallowing/oral function for feeding - If you have any concerns or questions, or if you would prefer not to have a speech pathologist involved in your visit, please contact our Clinic Coordinator at [phone], at least 24 hours prior to your appointment.            Nov 06, 2017  8:05 AM CST   (Arrive by 7:50 AM)   PHYSICAL with Cruz Prather MD   [facility] ([facility] [place])    [address] [place] [address] [phone]              Who to contact     Please call your clinic at [phone] to:    Ask questions about your health    Make or cancel appointments    Discuss your medicines    Learn about your test results    Speak to your doctor   If you have compliments or concerns about an experience at your clinic, or if you wish to file a complaint, please contact [facility] Patient Relations at [phone] or email us at [email or web address]         Additional Information About Your Visit        [email or web address] Information     Vaxart is an electronic gateway that provides easy, online access to your medical records. With Vaxart, you can request a clinic appointment, read your test results, renew a prescription or communicate with your care team.     To sign up for Vaxart visit the website at [email or web address]   You will be asked to enter the access code listed below, as well as some personal information. Please follow the directions to create your username and password.     Your access code is: [email or web address]  Expires: 10/16/2017  6:31 AM     Your access code  will  in 90 days. If you need help or a new code, please contact your [facility] or call [phone] for assistance.        Care EveryWhere ID     This is your Care EveryWhere ID. This could be used by other organizations to access your [place] medical records  [id number]         Blood Pressure from Last 3 Encounters:   17 120/74   17 112/74   17 125/79    Weight from Last 3 Encounters:   17 67.2 kg (148 lb 3.2 oz)   17 65.2 kg (143 lb 12.8 oz)   17 68.6 kg (151 lb 3.2 oz)               Primary Care Provider Office Phone # Fax #    Cruz Prather [phone] [address] [address]        Equal Access to Services     [facility]: Hadii aad ku hadasho Soomaali, waaxda luqadaha, qaybta kaalmada adeegyada, waxay celsoin hayaan tunde machuca . So [facility] [phone].    ATENCIÓN: Si habla español, tiene a cazares disposición servicios gratuitos de asistencia lingüística. Llame al [phone].    We comply with applicable federal civil rights laws and [place] laws. We do not discriminate on the basis of race, color, national origin, age, disability, sex, sexual orientation, or gender identity.            Thank you!     Thank you for choosing [facility]  for your care. Our goal is always to provide you with excellent care. Hearing back from our patients is one way we can continue to improve our services. Please take a few minutes to complete the written survey that you may receive in the mail after your visit with us. Thank you!             Your Updated Medication List - Protect others around you: Learn how to safely use, store and throw away your medicines at www.disposemymeds.org.          This list is accurate as of: 10/6/17 11:59 PM.  Always use your most recent med list.                   Brand Name Dispense Instructions for use Diagnosis    nitroFURantoin  (macrocrystal-monohydrate) 100 MG capsule    MACROBID    14 capsule    Take 1 capsule (100 mg) by mouth daily    Frequent UTI       omeprazole 20 MG CR capsule    priLOSEC    180 capsule    Take 1 capsule (20 mg) by mouth 2 times daily    Gastroesophageal reflux disease with esophagitis       valACYclovir 500 MG tablet    VALTREX    28 tablet    Take 1 tablet (500 mg) by mouth 2 times daily    HSV (herpes simplex virus) anogenital infection

## 2017-10-06 NOTE — LETTER
10/6/2017       RE: Tatiana Paredes  [address]  [facility] [address]     Dear Colleague,    Thank you for referring your patient, Tatiana Paredes, to the [facility] at [facility]. Please see a copy of my visit note below.    52yo P1 with new onset left breast mass and pain since her annual a few weeks ago.   States has history of breast cysts.     No other symptoms.     Patient Active Problem List   Diagnosis     External hemorrhoids     Dysphonia     Choroidal nevus of right eye     Myopia     Valgus deformity of great toe     Instability of knee joint     Pes planus     Pain of foot     Vocal cord paresis     Past Medical History:   Diagnosis Date     Breast pain, left 11/2013     Choroidal nevus of right eye      Constipation      Hemorrhoids      Menorrhagia      Past Surgical History:   Procedure Laterality Date     HC HYSTEROSCOPY, SURGICAL; W/ ENDOMETRIAL ABLATION, ANY METHOD  2011     Exam:  Constitutional: healthy, alert and no distress  Breast: right breast without mass, lesion or tenderness.   Left breast: 3x4cm mobile tender, centrally located mass consistent with cyst.   No LAD bilaterally.     Recent mammo 8/2017 wnl    A: breast cyst  P: u/s and consult at breast center.         Again, thank you for allowing me to participate in the care of your patient.      Sincerely,    Natali Powell MD

## 2017-10-10 NOTE — PROGRESS NOTES
50yo P1 with new onset left breast mass and pain since her annual a few weeks ago.   States has history of breast cysts.     No other symptoms.     Patient Active Problem List   Diagnosis     External hemorrhoids     Dysphonia     Choroidal nevus of right eye     Myopia     Valgus deformity of great toe     Instability of knee joint     Pes planus     Pain of foot     Vocal cord paresis     Past Medical History:   Diagnosis Date     Breast pain, left 11/2013     Choroidal nevus of right eye      Constipation      Hemorrhoids      Menorrhagia      Past Surgical History:   Procedure Laterality Date     HC HYSTEROSCOPY, SURGICAL; W/ ENDOMETRIAL ABLATION, ANY METHOD  2011     Exam:  Constitutional: healthy, alert and no distress  Breast: right breast without mass, lesion or tenderness.   Left breast: 3x4cm mobile tender, centrally located mass consistent with cyst.   No LAD bilaterally.     Recent mammo 8/2017 wnl    A: breast cyst  P: u/s and consult at breast center.   Natali Powell

## 2017-10-17 DIAGNOSIS — K21.00 GASTROESOPHAGEAL REFLUX DISEASE WITH ESOPHAGITIS: ICD-10-CM

## 2017-10-17 NOTE — TELEPHONE ENCOUNTER
omeprazole (PRILOSEC) 20 MG CR capsule      Last Written Prescription Date:  12/28/2016  Last Fill Quantity: 180,   # refills: 2  Last Office Visit : 8/9/2017  Future Office visit:  11/6/2017

## 2017-10-31 ENCOUNTER — OFFICE VISIT (OUTPATIENT)
Dept: OTOLARYNGOLOGY | Facility: CLINIC | Age: 52
End: 2017-10-31

## 2017-10-31 DIAGNOSIS — R49.0 DYSPHONIA: Primary | ICD-10-CM

## 2017-10-31 DIAGNOSIS — Z13.220 SCREENING CHOLESTEROL LEVEL: ICD-10-CM

## 2017-10-31 DIAGNOSIS — J38.00 VOCAL CORD PARESIS: ICD-10-CM

## 2017-10-31 LAB
ALBUMIN SERPL-MCNC: 4.1 G/DL (ref 3.4–5)
ALP SERPL-CCNC: 58 U/L (ref 40–150)
ALT SERPL W P-5'-P-CCNC: 26 U/L (ref 0–50)
ANION GAP SERPL CALCULATED.3IONS-SCNC: 8 MMOL/L (ref 3–14)
AST SERPL W P-5'-P-CCNC: 20 U/L (ref 0–45)
BASOPHILS # BLD AUTO: 0 10E9/L (ref 0–0.2)
BASOPHILS NFR BLD AUTO: 0.5 %
BILIRUB SERPL-MCNC: 0.4 MG/DL (ref 0.2–1.3)
BUN SERPL-MCNC: 14 MG/DL (ref 7–30)
CALCIUM SERPL-MCNC: 8.7 MG/DL (ref 8.5–10.1)
CHLORIDE SERPL-SCNC: 106 MMOL/L (ref 94–109)
CHOLEST SERPL-MCNC: 165 MG/DL
CO2 SERPL-SCNC: 25 MMOL/L (ref 20–32)
CREAT SERPL-MCNC: 0.65 MG/DL (ref 0.52–1.04)
DIFFERENTIAL METHOD BLD: NORMAL
EOSINOPHIL # BLD AUTO: 0.1 10E9/L (ref 0–0.7)
EOSINOPHIL NFR BLD AUTO: 1.1 %
ERYTHROCYTE [DISTWIDTH] IN BLOOD BY AUTOMATED COUNT: 13 % (ref 10–15)
GFR SERPL CREATININE-BSD FRML MDRD: >90 ML/MIN/1.7M2
GLUCOSE SERPL-MCNC: 96 MG/DL (ref 70–99)
HCT VFR BLD AUTO: 38.2 % (ref 35–47)
HDLC SERPL-MCNC: 71 MG/DL
HGB BLD-MCNC: 12.6 G/DL (ref 11.7–15.7)
IMM GRANULOCYTES # BLD: 0 10E9/L (ref 0–0.4)
IMM GRANULOCYTES NFR BLD: 0.4 %
LDLC SERPL CALC-MCNC: 78 MG/DL
LYMPHOCYTES # BLD AUTO: 2.1 10E9/L (ref 0.8–5.3)
LYMPHOCYTES NFR BLD AUTO: 28.2 %
MCH RBC QN AUTO: 29.9 PG (ref 26.5–33)
MCHC RBC AUTO-ENTMCNC: 33 G/DL (ref 31.5–36.5)
MCV RBC AUTO: 91 FL (ref 78–100)
MONOCYTES # BLD AUTO: 0.7 10E9/L (ref 0–1.3)
MONOCYTES NFR BLD AUTO: 9.3 %
NEUTROPHILS # BLD AUTO: 4.5 10E9/L (ref 1.6–8.3)
NEUTROPHILS NFR BLD AUTO: 60.5 %
NONHDLC SERPL-MCNC: 93 MG/DL
NRBC # BLD AUTO: 0 10*3/UL
NRBC BLD AUTO-RTO: 0 /100
PLATELET # BLD AUTO: 276 10E9/L (ref 150–450)
POTASSIUM SERPL-SCNC: 4 MMOL/L (ref 3.4–5.3)
PROT SERPL-MCNC: 7.2 G/DL (ref 6.8–8.8)
RBC # BLD AUTO: 4.21 10E12/L (ref 3.8–5.2)
SODIUM SERPL-SCNC: 139 MMOL/L (ref 133–144)
TRIGL SERPL-MCNC: 76 MG/DL
WBC # BLD AUTO: 7.4 10E9/L (ref 4–11)

## 2017-10-31 RX ORDER — ALBUTEROL SULFATE 90 UG/1
AEROSOL, METERED RESPIRATORY (INHALATION)
Refills: 0 | COMMUNITY
Start: 2017-01-15 | End: 2017-11-06

## 2017-10-31 NOTE — MR AVS SNAPSHOT
After Visit Summary   10/31/2017    Tatiana Paredes    MRN: [id number]           Patient Information     Date Of Birth          1965        Visit Information        Provider Department      10/31/2017 8:00 AM Jose Ramon Pisano MD [facility] Nose [facility] Thro[facility]        Today's Diagnoses     Dysphonia    -  1    Vocal cord paresis          Care Instructions    Plan of care:  Follow up with Dr Pisano and [facility] as discussed  Clinic contact information:  1. To schedule an appointment call [phone], option 1  2. To talk to the Triage RN call [phone], option 3  3. If you need to speak to Leila or get a message to your doctor on a Friday, call the triage RN  4. LeilaRN: [phone]  5. Surgery scheduling:      Mary Guerin: [phone]      Aurelia Walker: [phone]  6. Fax: [phone]  7. [phone]            Follow-ups after your visit        Follow-up notes from your care team     Return in about 3 months (around 2018).      Your next 10 appointments already scheduled     2017  8:05 AM CST   (Arrive by 7:50 AM)   PHYSICAL with Cruz Prather MD   [facility] ([facility])    [address] [place] [address] [phone]              Who to contact     Please call your clinic at [phone] to:    Ask questions about your health    Make or cancel appointments    Discuss your medicines    Learn about your test results    Speak to your doctor   If you have compliments or concerns about an experience at your clinic, or if you wish to file a complaint, please contact [facility] Patient Relations at [phone] or email us at [email or web address]         Additional Information About Your Visit        MyChart Information     [email or web address] is an electronic gateway that provides easy, online access to your medical records. With [email or web address], you  can request a clinic appointment, read your test results, renew a prescription or communicate with your care team.     To sign up for Scoop[employer]ake visit the website at [email or web address]   You will be asked to enter the access code listed below, as well as some personal information. Please follow the directions to create your username and password.     Your access code is: [email or web address]  Expires: 1/15/2018  6:31 AM     Your access code will  in 90 days. If you need help or a new code, please contact your [facility] or call [phone] for assistance.        Care EveryWhere ID     This is your Care EveryWhere ID. This could be used by other organizations to access your [place] medical records  [id number]         Blood Pressure from Last 3 Encounters:   17 120/74   17 112/74   17 125/79    Weight from Last 3 Encounters:   17 67.2 kg (148 lb 3.2 oz)   17 65.2 kg (143 lb 12.8 oz)   17 68.6 kg (151 lb 3.2 oz)              We Performed the Following     IMAGESTREAM RECORDING ORDER     LARYNGOSCOPY FLEX FIBEROPTIC, DIAGNOSTIC        Primary Care Provider Office Phone # Fax #    Cruz Prather [phone] [address] [address]        Equal Access to Services     [facility]: Hadii aad ku hadasho Soomaali, waaxda luqadaha, qaybta kaalmada adeegyada, sarahy houser haycharlie machuca . So [facility] [phone].    ATENCIÓN: Si habla español, tiene a cazares disposición servicios gratuitos de asistencia lingüística. Llame al [phone].    We comply with applicable federal civil rights laws and [place] laws. We do not discriminate on the basis of race, color, national origin, age, disability, sex, sexual orientation, or gender identity.            Thank you!     Thank you for choosing [facility]  for your care. Our goal is always to provide you with excellent care. Hearing back  from our patients is one way we can continue to improve our services. Please take a few minutes to complete the written survey that you may receive in the mail after your visit with us. Thank you!             Your Updated Medication List - Protect others around you: Learn how to safely use, store and throw away your medicines at www.disposemymeds.org.          This list is accurate as of: 10/31/17 11:59 PM.  Always use your most recent med list.                   Brand Name Dispense Instructions for use Diagnosis    nitroFURantoin (macrocrystal-monohydrate) 100 MG capsule    MACROBID    14 capsule    Take 1 capsule (100 mg) by mouth daily    Frequent UTI       omeprazole 20 MG CR capsule    priLOSEC    180 capsule    Take 1 capsule (20 mg) by mouth 2 times daily    Gastroesophageal reflux disease with esophagitis       valACYclovir 500 MG tablet    VALTREX    28 tablet    Take 1 tablet (500 mg) by mouth 2 times daily    HSV (herpes simplex virus) anogenital infection       VENTOLIN  (90 BASE) MCG/ACT Inhaler   Generic drug:  albuterol      INHALE 2 PUFFS EVERY 4 (FOUR) HOURS AS NEEDED.

## 2017-10-31 NOTE — PROGRESS NOTES
[facility]  Jose Ramon Pisano Jr., M.D., F.A.C.S.  Amparo Brower M.D., M.P.H.  Radha Alford, Ph.D., CCC/SLP  Jada Lynn M.M. (voice), M.A., CCC/SLP  Calvin Tobias M.M. (voice), MFELIX., [facility]/SLP    [facility]  VOICE FOLLOW-UP    Patient: Tatiana Paredes  Date of Service: 10/31/2017    HISTORY  PATIENT INFORMATION  Tatiana Paredes was seen for brief consultation in conjunction with a visit to Dr. Pisano today.  Please refer to Dr. Pisano s dictation for a more complete history and impressions, as skilled speech services were not warranted today.  Her voice symptoms have remained stable, and she is currently satisfied with her status.  She considers July 2017 to be the start date of her worst voice symptoms, rather than February.  Dr. Pisano agrees that no surgical intervention is warranted at this time.  We have agreed that she would benefit from a continued course of speech therapy at an extended intervals.  She will pursue a session in January/ February 2018, just prior to her 3 month follow up with Dr. Pisano.  She was provided with handout related to Laryngopharyngeal Reflux Disease.    DIAGNOSIS/REASON FOR REFERRAL  Dysphonia/ Evaluate, perform laryngeal exam, treat as appropriate    No charge for today s session; charges will be billed at the completion of the evaluation  NO CHARGE FACILITY FEE (38930)    IMPRESSIONS: R49.0 (Dysphonia) and J38.00 (Vocal Cord Paresis)     Jada Lynn M.M. (voice), M.A., CCC/SLP  Speech-Language Pathologist  [facility]  [phone]

## 2017-10-31 NOTE — PATIENT INSTRUCTIONS
Plan of care:  Follow up with Dr Pisano and [facility] as discussed  Clinic contact information:  1. To schedule an appointment call [phone], option 1  2. To talk to the Triage RN call [phone], option 3  3. If you need to speak to Leila or get a message to your doctor on a Friday, call the triage RN  4. LeilaRN: [phone]  5. Surgery scheduling:      Mary Guerin: [phone]      Aurelia Walker: [phone]  6. Fax: [phone]  7. [phone]

## 2017-10-31 NOTE — LETTER
10/31/2017       RE: Tatiana Paredes  [address]  [facility] [address]     Dear Colleague,    Thank you for referring your patient, Tatiana Paredes, to the [facility] EAR NOSE AND THROAT at [facility]. Please see a copy of my visit note below.        HISTORY OF PRESENT ILLNESS: Tatiana Paredes is a 51 year old female with a history of Dysphonia.  I saw her on 6/10/2014. At that time she had dysphonia following a prolonged use of her voice. We felt she had gastroesophageal reflux and a muscle tension dysphonia. She did quite well after therapy and did not have any voice problems until a bout of bronchitis in January and February 2017. She had a month of coughing. She lost her voice for a week then slowly got better in February 2017. Since that time she has had intermittent voice problems with 1 day her voice being normal and the next having a rough quality to her voice. She has had no changes in her swallow but is always had some difficulty with the last swallow of a meal. This has been going on for several years. In July she noted that her voice worsened after extensive use of her voice and it has maintained its roughness since that time. She has vocal fatigue which is unchanged from the entirety of this year. She  does better at higher pitches and with improved breath support. I saw her on August 1, 2017. At that time there was consistent but mild bowing of the right vocal fold and pooling of saliva in the right hypopharynx. Full motion was seen over the right vocal fold and no excessive dilatation or decrease in tone in the right hypopharynx was present. She has undergone speech therapy and she feels her voice has improved. She'll have occasional breaks in her vocal quality but this is short lived and she has been able to talk for an hour at a time without excessive vocal fatigue. She feels her voice has improved from a 2 out of 10 to a 4 out of 10.  She denies any swallowing difficulties except for the last amount of food that she does eat tends to catch. If she lies down after eating she will have a sensation of reflux. She denies any new symptoms of incoordination or difficulty walking or difficulty using her hands. She has no articulation difficulties.    Last 2 Scores for Patient-Answered VHI Questionnaire  VHI Total Score 8/1/2017 10/31/2017   VHI Total Score 16 13       Last 2 Scores for Patient-Answered CSI Questionnaire  CSI Total Score 8/1/2017   CSI Total Score 2         Last 2 Scores for Patient-Answered EAT Questionnaire  EAT Total Score 8/1/2017   EAT Total Score 2           PAST MEDICAL HISTORY:   Past Medical History:   Diagnosis Date     Breast pain, left 11/2013     Choroidal nevus of right eye      Constipation      Dysphonia      Gastroesophageal reflux disease      Hemorrhoids      Hoarseness      Menorrhagia      Migraines        PAST SURGICAL HISTORY:   Past Surgical History:   Procedure Laterality Date     [facility] HYSTEROSCOPY, SURGICAL; W/ ENDOMETRIAL ABLATION, ANY METHOD  2011       FAMILY HISTORY:   Family History   Problem Relation Age of Onset     Breast Cancer Sister 52     Cancer - colorectal Maternal Grandmother 70     Migraines Mother        SOCIAL HISTORY:   Social History   Substance Use Topics     Smoking status: Never Smoker     Smokeless tobacco: Never Used     Alcohol use 0.5 - 2.5 oz/week      Comment: 1-3 times a week       REVIEW OF SYSTEMS: Ten point review of systems was performed and is negative except for:   [facility] ENT ROS 10/31/2017   Constitutional -   Neurology Headache   Ears, Nose, Throat Nasal congestion or drainage        ALLERGIES: Review of patient's allergies indicates no known allergies.    MEDICATIONS:   Current Outpatient Prescriptions   Medication Sig Dispense Refill     VENTOLIN  (90 BASE) MCG/ACT Inhaler INHALE 2 PUFFS EVERY 4 (FOUR) HOURS AS NEEDED.  0     omeprazole (PRILOSEC) 20 MG CR capsule Take 1  capsule (20 mg) by mouth 2 times daily 180 capsule 2     nitrofurantoin, macrocrystal-monohydrate, (MACROBID) 100 MG capsule Take 1 capsule (100 mg) by mouth daily 14 capsule 3     valACYclovir (VALTREX) 500 MG tablet Take 1 tablet (500 mg) by mouth 2 times daily 28 tablet 3         PHYSICAL EXAMINATION:  She  is awake, alert and in no apparent distress.    Her tympanic membranes are clear and intact bilaterally. External auditory canals are clear.  Nasal exam shows a mild septal deviation without obstruction.  Examination of the oral cavity shows no suspicious lesions.  There is symmetric movement of the tongue and soft palate.    The oropharynx is clear.  Her neck is supple without significant adenopathy.  Pulse is regular.  Upper airway is clear.  Cranial nerves II-XII are grossly intact.       PROCEDURE: A flexible laryngoscopy  was performed.  Informed consent was obtained and a time out was performed. 3% lidocaine and 0.25% phenylephrine was sprayed into the nasal cavity and allowed 3 to 5 minutes for effect. The scope was passed through the right sided nostril. Examination showed the vocal folds to be mobile and meet in the midline.  No nodules, polyps or ulcerations are seen. There is consistent but mild bowing of the right vocal fold. This results in a persistent glottic gap at medium to low pitch. Her vocal quality improves at higher pitches.  There is mild inflammation or erythema of the supraglottic or glottic larynx.  With phonation there is moderatecontraction of the supraglottic larynx.  The hypopharynx is otherwise clear as is the subglottis. There is asymmetric pooling of saliva in the hypopharynx with the right sided pooling being greater than that seen on the left.       IMPRESSION: On comparing her exam today and out of 8/1/2017, the 2 exams appear stable. She's had improvement in her vocal quality and no findings of deterioration in her examination.    PLAN: Our plan is to  continue to work with  her voice exercises I will see her back in 3 months time again to monitor her for any deterioration. She is not interested in the vocal fold injection at this time as her symptoms are quite tolerable for her. After thorough discussion she would like to date her deterioration of vocal quality beginning with July 2017.       Again, thank you for allowing me to participate in the care of your patient.      Sincerely,    Jose Ramon Pisano MD

## 2017-10-31 NOTE — MR AVS SNAPSHOT
After Visit Summary   10/31/2017    Tatiana Paredes    MRN: [id number]           Patient Information     Date Of Birth          1965        Visit Information        Provider Department      10/31/2017 8:00 AM Jada Lynn SLP M [facility] Voice        Today's Diagnoses     Dysphonia    -  1    Vocal cord paresis           Follow-ups after your visit        Your next 10 appointments already scheduled     2017  8:05 AM CST   (Arrive by 7:50 AM)   PHYSICAL with MD BARRETT Rivera [facility] ([facility])    [address] [place] [id number] [phone]              Who to contact     Please call your clinic at [phone] to:    Ask questions about your health    Make or cancel appointments    Discuss your medicines    Learn about your test results    Speak to your doctor   If you have compliments or concerns about an experience at your clinic, or if you wish to file a complaint, please contact [facility] Patient Relations at [phone] or email us at [email or web address]         Additional Information About Your Visit        MyChart Information     [employer] is an electronic gateway that provides easy, online access to your medical records. With [employer], you can request a clinic appointment, read your test results, renew a prescription or communicate with your care team.     To sign up for [employer] visit the website at [email or web address]   You will be asked to enter the access code listed below, as well as some personal information. Please follow the directions to create your username and password.     Your access code is: [email or web address]  Expires: 1/15/2018  6:31 AM     Your access code will  in 90 days. If you need help or a new code, please contact your [facility] Clinic or call [phone] for assistance.        Care EveryWhere ID      This is your Care EveryWhere ID. This could be used by other organizations to access your [facility] medical records  [id number]         Blood Pressure from Last 3 Encounters:   09/06/17 120/74   08/09/17 112/74   01/20/17 125/79    Weight from Last 3 Encounters:   09/06/17 67.2 kg (148 lb 3.2 oz)   08/09/17 65.2 kg (143 lb 12.8 oz)   01/20/17 68.6 kg (151 lb 3.2 oz)              Today, you had the following     No orders found for display       Primary Care Provider Office Phone # Fax #    Cruz Prather [phone]       [address] [address]        Equal Access to Services     ELSIE TELLO : Hadii aad ku hadasho Soomaali, waaxda luqadaha, qaybta kaalmada adeegyada, sarahy machuca . So [facility] [phone].    ATENCIÓN: Si habla español, tiene a cazares disposición servicios gratuitos de asistencia lingüística. [facility] [phone].    We comply with applicable federal civil rights laws and [place] laws. We do not discriminate on the basis of race, color, national origin, age, disability, sex, sexual orientation, or gender identity.            Thank you!     Thank you for choosing [facility]  for your care. Our goal is always to provide you with excellent care. Hearing back from our patients is one way we can continue to improve our services. Please take a few minutes to complete the written survey that you may receive in the mail after your visit with us. Thank you!             Your Updated Medication List - Protect others around you: Learn how to safely use, store and throw away your medicines at www.disposemymeds.org.          This list is accurate as of: 10/31/17 10:55 AM.  Always use your most recent med list.                   Brand Name Dispense Instructions for use Diagnosis    nitroFURantoin (macrocrystal-monohydrate) 100 MG capsule    MACROBID    14 capsule    Take 1 capsule (100 mg) by mouth daily    Frequent UTI       omeprazole 20 MG  CR capsule    priLOSEC    180 capsule    Take 1 capsule (20 mg) by mouth 2 times daily    Gastroesophageal reflux disease with esophagitis       valACYclovir 500 MG tablet    VALTREX    28 tablet    Take 1 tablet (500 mg) by mouth 2 times daily    HSV (herpes simplex virus) anogenital infection       VENTOLIN  (90 BASE) MCG/ACT Inhaler   Generic drug:  albuterol      INHALE 2 PUFFS EVERY 4 (FOUR) HOURS AS NEEDED.

## 2017-10-31 NOTE — LETTER
10/31/2017       RE: Tatiana Paredes  [address] [facility] [address]     Dear Colleague,    Thank you for referring your patient, Tatiana Paredes, to the [facility] VOICE at [facility]. Please see a copy of my visit note below.    [facility]  Jose Ramon Pisano Jr., M.D., F.A.C.S.  Amparo Brower M.D., M.P.H.  Radha Alford, Ph.D., CCC/SLP  Jada Lynn M.M. (voice), M.A., CCC/SLP  Calvin Tobias M.M. (voice), M.A., CCC/SLP    [facility]  VOICE FOLLOW-UP    Patient: Tatiana Paredes  Date of Service: 10/31/2017    HISTORY  PATIENT INFORMATION  Tatiana Paredes was seen for brief consultation in conjunction with a visit to Dr. Pisano today.  Please refer to Dr. Pisano s dictation for a more complete history and impressions, as skilled speech services were not warranted today.  Her voice symptoms have remained stable, and she is currently satisfied with her status.  She considers July 2017 to be the start date of her worst voice symptoms, rather than February.  Dr. Pisano agrees that no surgical intervention is warranted at this time.  We have agreed that she would benefit from a continued course of speech therapy at an extended intervals.  She will pursue a session in January/ February 2018, just prior to her 3 month follow up with Dr. Pisano.  She was provided with handout related to Laryngopharyngeal Reflux Disease.    DIAGNOSIS/REASON FOR REFERRAL  Dysphonia/ Evaluate, perform laryngeal exam, treat as appropriate    No charge for today s session; charges will be billed at the completion of the evaluation  NO CHARGE FACILITY FEE (36544)    IMPRESSIONS: R49.0 (Dysphonia) and J38.00 (Vocal Cord Paresis)     Jada Lynn M.M. (voice), MTANVI, CCC/SLP  Speech-Language Pathologist  [facility]  [phone]

## 2017-10-31 NOTE — PROGRESS NOTES
HISTORY OF PRESENT ILLNESS: Tatiana Paredes is a 51 year old female with a history of Dysphonia.  I saw her on 6/10/2014. At that time she had dysphonia following a prolonged use of her voice. We felt she had gastroesophageal reflux and a muscle tension dysphonia. She did quite well after therapy and did not have any voice problems until a bout of bronchitis in January and February 2017. She had a month of coughing. She lost her voice for a week then slowly got better in February 2017. Since that time she has had intermittent voice problems with 1 day her voice being normal and the next having a rough quality to her voice. She has had no changes in her swallow but is always had some difficulty with the last swallow of a meal. This has been going on for several years. In July she noted that her voice worsened after extensive use of her voice and it has maintained its roughness since that time. She has vocal fatigue which is unchanged from the entirety of this year. She  does better at higher pitches and with improved breath support. I saw her on August 1, 2017. At that time there was consistent but mild bowing of the right vocal fold and pooling of saliva in the right hypopharynx. Full motion was seen over the right vocal fold and no excessive dilatation or decrease in tone in the right hypopharynx was present. She has undergone speech therapy and she feels her voice has improved. She'll have occasional breaks in her vocal quality but this is short lived and she has been able to talk for an hour at a time without excessive vocal fatigue. She feels her voice has improved from a 2 out of 10 to a 4 out of 10. She denies any swallowing difficulties except for the last amount of food that she does eat tends to catch. If she lies down after eating she will have a sensation of reflux. She denies any new symptoms of incoordination or difficulty walking or difficulty using her hands. She has no articulation  difficulties.    Last 2 Scores for Patient-Answered VHI Questionnaire  VHI Total Score 8/1/2017 10/31/2017   VHI Total Score 16 13       Last 2 Scores for Patient-Answered CSI Questionnaire  CSI Total Score 8/1/2017   CSI Total Score 2         Last 2 Scores for Patient-Answered EAT Questionnaire  EAT Total Score 8/1/2017   EAT Total Score 2           PAST MEDICAL HISTORY:   Past Medical History:   Diagnosis Date     Breast pain, left 11/2013     Choroidal nevus of right eye      Constipation      Dysphonia      Gastroesophageal reflux disease      Hemorrhoids      Hoarseness      Menorrhagia      Migraines        PAST SURGICAL HISTORY:   Past Surgical History:   Procedure Laterality Date     [facility] HYSTEROSCOPY, SURGICAL; W/ ENDOMETRIAL ABLATION, ANY METHOD  2011       FAMILY HISTORY:   Family History   Problem Relation Age of Onset     Breast Cancer Sister 52     Cancer - colorectal Maternal Grandmother 70     Migraines Mother        SOCIAL HISTORY:   Social History   Substance Use Topics     Smoking status: Never Smoker     Smokeless tobacco: Never Used     Alcohol use 0.5 - 2.5 oz/week      Comment: 1-3 times a week       REVIEW OF SYSTEMS: Ten point review of systems was performed and is negative except for:   [facility] ENT ROS 10/31/2017   Constitutional -   Neurology Headache   Ears, Nose, Throat Nasal congestion or drainage        ALLERGIES: Review of patient's allergies indicates no known allergies.    MEDICATIONS:   Current Outpatient Prescriptions   Medication Sig Dispense Refill     VENTOLIN  (90 BASE) MCG/ACT Inhaler INHALE 2 PUFFS EVERY 4 (FOUR) HOURS AS NEEDED.  0     omeprazole (PRILOSEC) 20 MG CR capsule Take 1 capsule (20 mg) by mouth 2 times daily 180 capsule 2     nitrofurantoin, macrocrystal-monohydrate, (MACROBID) 100 MG capsule Take 1 capsule (100 mg) by mouth daily 14 capsule 3     valACYclovir (VALTREX) 500 MG tablet Take 1 tablet (500 mg) by mouth 2 times daily 28 tablet 3         PHYSICAL  EXAMINATION:  She  is awake, alert and in no apparent distress.    Her tympanic membranes are clear and intact bilaterally. External auditory canals are clear.  Nasal exam shows a mild septal deviation without obstruction.  Examination of the oral cavity shows no suspicious lesions.  There is symmetric movement of the tongue and soft palate.    The oropharynx is clear.  Her neck is supple without significant adenopathy.  Pulse is regular.  Upper airway is clear.  Cranial nerves II-XII are grossly intact.       PROCEDURE: A flexible laryngoscopy  was performed.  Informed consent was obtained and a time out was performed. 3% lidocaine and 0.25% phenylephrine was sprayed into the nasal cavity and allowed 3 to 5 minutes for effect. The scope was passed through the right sided nostril. Examination showed the vocal folds to be mobile and meet in the midline.  No nodules, polyps or ulcerations are seen. There is consistent but mild bowing of the right vocal fold. This results in a persistent glottic gap at medium to low pitch. Her vocal quality improves at higher pitches.  There is mild inflammation or erythema of the supraglottic or glottic larynx.  With phonation there is moderatecontraction of the supraglottic larynx.  The hypopharynx is otherwise clear as is the subglottis. There is asymmetric pooling of saliva in the hypopharynx with the right sided pooling being greater than that seen on the left.       IMPRESSION: On comparing her exam today and out of 8/1/2017, the 2 exams appear stable. She's had improvement in her vocal quality and no findings of deterioration in her examination.    PLAN: Our plan is to  continue to work with her voice exercises I will see her back in 3 months time again to monitor her for any deterioration. She is not interested in the vocal fold injection at this time as her symptoms are quite tolerable for her. After thorough discussion she would like to date her deterioration of vocal quality  beginning with July 2017.

## 2017-11-06 ENCOUNTER — TELEPHONE (OUTPATIENT)
Dept: GASTROENTEROLOGY | Facility: CLINIC | Age: 52
End: 2017-11-06

## 2017-11-06 ENCOUNTER — OFFICE VISIT (OUTPATIENT)
Dept: INTERNAL MEDICINE | Facility: CLINIC | Age: 52
End: 2017-11-06

## 2017-11-06 VITALS
OXYGEN SATURATION: 99 % | HEART RATE: 76 BPM | BODY MASS INDEX: 21.72 KG/M2 | DIASTOLIC BLOOD PRESSURE: 72 MMHG | WEIGHT: 143.3 LBS | SYSTOLIC BLOOD PRESSURE: 109 MMHG | HEIGHT: 68 IN

## 2017-11-06 DIAGNOSIS — R49.0 HOARSE VOICE QUALITY: ICD-10-CM

## 2017-11-06 DIAGNOSIS — K21.9 GASTROESOPHAGEAL REFLUX DISEASE WITHOUT ESOPHAGITIS: Primary | ICD-10-CM

## 2017-11-06 ASSESSMENT — PAIN SCALES - GENERAL: PAINLEVEL: NO PAIN (0)

## 2017-11-06 NOTE — PATIENT INSTRUCTIONS
[facility]: [phone]     [facility] Medication Refill Request Information:  * Please contact your pharmacy regarding ANY request for medication refills.  ** [facility] Prescription Fax = [phone]  * Please allow 3 business days for routine medication refills.  * Please allow 5 business days for controlled substance medication refills.     Primary Care Center Test Result notification information:  *You will be notified with in 7-10 days of your appointment day regarding the results of your test.  If you are on MyChart you will be notified as soon as the provider has reviewed the results and signed off on them.  Radiology (Imaging Center) [phone]  Radiology (Main Hospital) [phone] (2nd Floor [facility])    Gastroenterology [phone]

## 2017-11-06 NOTE — PROGRESS NOTES
"  HPI  Tatiana Paredes is a 51 yo female  Comes in for physical  up. She saw Dr. Pisano, ENT 8/1/17 and again 10/31/17. She is getting speech therapy. Last endoscopy 7/2015 and was normal.  She had normal CXR 1/17. She still has hoarse voice. She does not smoke nor abuse EtOH. No change in family history. No other HEENT, cardiopulmonary, abdominal, , GYN, neurological, systemic, psychiatric, skin, vascular, lymphatic, musculoskeletal complaints.     Past Medical History:   Diagnosis Date     Breast pain, left 11/2013     Choroidal nevus of right eye      Constipation      Dysphonia      Gastroesophageal reflux disease      Hemorrhoids      Hoarseness      Menorrhagia      Migraines      Current Outpatient Prescriptions   Medication     omeprazole (PRILOSEC) 20 MG CR capsule     nitrofurantoin, macrocrystal-monohydrate, (MACROBID) 100 MG capsule     valACYclovir (VALTREX) 500 MG tablet     No current facility-administered medications for this visit.          Physical Exam  Ht 1.715 m (5' 7.5\")  Wt 65 kg (143 lb 4.8 oz)  BMI 22.11 kg/m2  Exam:  Constitutional: healthy, alert and no distress  Cardiovascular: negative, PMI normal. No lifts, heaves, or thrills. RRR. No murmurs, clicks gallops or rub  Respiratory: negative, Good diaphragmatic excursion. Lungs clear  Gastrointestinal: Abdomen soft, non-tender. BS normal. No masses, organomegaly  : Deferred  Musculoskeletal: extremities normal- no gross deformities noted, gait normal and normal muscle tone  Skin: no suspicious lesions or rashes  Neurologic: Gait normal. Reflexes normal and symmetric. Sensation grossly WNL.  Psychiatric: mentation appears normal and affect normal/bright  Breast:  L breast with approx. 1.2 cm x 1 cm resolving bruise. No tenderness, no drainage. Slight induration.     Assessment/Plan    1. She had mammogram  8/8/17; [facility] and follow up breast imaging 10/16/17.  Annual exam 9/6/17. She also saw   Dr. Powell GYN 10/6/17 for breast mass  2. " Abdominal complaint; CT scan unremarkable 7/2/16. Ordered colonoscopy and MRCP and this was done 12/29/16 as unremarkable.  3. She remains on PPI for reflux; last EGD 7/15.  4. She saw Dr. Pisano, ENT 8/1/17 and again 10/31/17 for hoarse voice. She is getting speech therapy. Will repeat EGD and get MRI brain and MRI neck and have her follow up with me in a month or so  5. Fasting lipids done 10/3/17  6. Immunizations up to date.

## 2017-11-06 NOTE — MR AVS SNAPSHOT
After Visit Summary   11/6/2017    Tatiana Paredes    MRN: [id number]           Patient Information     Date Of Birth          1965        Visit Information        Provider Department      11/6/2017 8:05 AM Cruz Prather MD [facility]        Today's Diagnoses     Gastroesophageal reflux disease without esophagitis    -  1    Hoarse voice quality          Care Instructions    Primary [facility] Center: [phone]     Primary Care Center Medication Refill Request Information:  * Please contact your pharmacy regarding ANY request for medication refills.  ** [facility] Prescription Fax = [phone]  * Please allow 3 business days for routine medication refills.  * Please allow 5 business days for controlled substance medication refills.     Primary Care Center Test Result notification information:  *You will be notified with in 7-10 days of your appointment day regarding the results of your test.  If you are on MyChart you will be notified as soon as the provider has reviewed the results and signed off on them.  Radiology ([facility]) [phone]  Radiology ([facility]) [phone] (2nd Floor [facility])    Gastroenterology [phone]            Follow-ups after your visit        Additional Services     GI Procedure Referral       Last Lab Result: Creatinine (mg/dL)       Date                     Value                 10/31/2017               0.65             ----------  Body mass index is 22.11 kg/(m^2).     Needed:  No  Language:  English    Patient will be contacted to schedule procedure.     Please be aware that coverage of these services is subject to the terms and limitations of your health insurance plan.  Call member services at your health plan with any benefit or coverage questions.  Any procedures must be performed at a [facility] facility OR coordinated by your clinic's referral office.    Please bring the following with you to your  appointment:    (1) Any X-Rays, CTs or MRIs which have been performed.  Contact the facility where they were done to arrange for  prior to your scheduled appointment.    (2) List of current medications   (3) This referral request   (4) Any documents/labs given to you for this referral                  Future tests that were ordered for you today     Open Future Orders        Priority Expected Expires Ordered    MRI Brain w & w/o contrast Routine  2018    MR Soft Tissue Neck w/o & w Contrast Routine  2018            Who to contact     Please call your clinic at [phone] to:    Ask questions about your health    Make or cancel appointments    Discuss your medicines    Learn about your test results    Speak to your doctor   If you have compliments or concerns about an experience at your clinic, or if you wish to file a complaint, please contact [facility] Patient Relations at [phone] or email us at [email or web address]         Additional Information About Your Visit        [employer] Information     [employer] is an electronic gateway that provides easy, online access to your medical records. With [employer], you can request a clinic appointment, read your test results, renew a prescription or communicate with your care team.     To sign up for [employer] visit the website at [email or web address]   You will be asked to enter the access code listed below, as well as some personal information. Please follow the directions to create your username and password.     Your access code is: [email or web address]  Expires: 1/15/2018  5:31 AM     Your access code will  in 90 days. If you need help or a new code, please contact your [facility] or call [phone] for assistance.        Care EveryWhere ID     This is your Care EveryWhere ID. This could be used by other organizations to access your [facility]  "records  [id number]        Your Vitals Were     Pulse Height Pulse Oximetry BMI (Body Mass Index)          76 1.715 m (5' 7.5\") 99% 22.11 kg/m2         Blood Pressure from Last 3 Encounters:   11/06/17 109/72   09/06/17 120/74   08/09/17 112/74    Weight from Last 3 Encounters:   11/06/17 65 kg (143 lb 4.8 oz)   09/06/17 67.2 kg (148 lb 3.2 oz)   08/09/17 65.2 kg (143 lb 12.8 oz)              We Performed the Following     GI Procedure Referral          Today's Medication Changes          These changes are accurate as of: 11/6/17  8:52 AM.  If you have any questions, ask your nurse or doctor.               Stop taking these medicines if you haven't already. Please contact your care team if you have questions.     VENTOLIN  (90 BASE) MCG/ACT Inhaler   Generic drug:  albuterol                    Primary Care Provider Office Phone # Fax #    Cruz Prather [phone]        [address] [address]        Equal Access to Services     [facility]: Hadii estrellita almeida Soestrella, waaxda lutwyla, qahernandezta kaalmatomas mathur, sarahy machuca . So [facility] [phone].    ATENCIÓN: Si habla español, tiene a cazares disposición servicios gratuitos de asistencia lingüística. LlSumma [facility] [phone].    We comply with applicable federal civil rights laws and [place] laws. We do not discriminate on the basis of race, color, national origin, age, disability, sex, sexual orientation, or gender identity.            Thank you!     Thank you for choosing [facility]  for your care. Our goal is always to provide you with excellent care. Hearing back from our patients is one way we can continue to improve our services. Please take a few minutes to complete the written survey that you may receive in the mail after your visit with us. Thank you!             Your Updated Medication List - Protect others around you: Learn how to safely use, store and throw " away your medicines at www.disposemymeds.org.          This list is accurate as of: 11/6/17  8:52 AM.  Always use your most recent med list.                   Brand Name Dispense Instructions for use Diagnosis    nitroFURantoin (macrocrystal-monohydrate) 100 MG capsule    MACROBID    14 capsule    Take 1 capsule (100 mg) by mouth daily    Frequent UTI       omeprazole 20 MG CR capsule    priLOSEC    180 capsule    Take 1 capsule (20 mg) by mouth 2 times daily    Gastroesophageal reflux disease with esophagitis       valACYclovir 500 MG tablet    VALTREX    28 tablet    Take 1 tablet (500 mg) by mouth 2 times daily    HSV (herpes simplex virus) anogenital infection

## 2017-11-10 ENCOUNTER — TELEPHONE (OUTPATIENT)
Dept: GASTROENTEROLOGY | Facility: OUTPATIENT CENTER | Age: 52
End: 2017-11-10

## 2017-11-14 ENCOUNTER — TELEPHONE (OUTPATIENT)
Dept: GASTROENTEROLOGY | Facility: OUTPATIENT CENTER | Age: 52
End: 2017-11-14

## 2017-11-14 NOTE — TELEPHONE ENCOUNTER
Patient taking any blood thinners ? no    Heart disease ? denies    Lung disease ?denies      Sleep apnea ?denies    Diabetic ?denies    Kidney disease ?denies    Dialysis ? n/a    Electronic implanted medical devices ?denies    Are you taking any narcotic pain medication ? no  What is your daily dosage ?    PTSD ? n/a    Prep instructions reviewed with patient ? Instructions,  policy, conscious sedation plan reviewed. Advised patient to have someone stay with her post exam    Pharmacy : n/a    Indication for procedure : Gastroesophageal reflux disease without esophagitis [K21.9]     Referring provider :Cruz Prather MD

## 2017-11-17 ENCOUNTER — DOCUMENTATION ONLY (OUTPATIENT)
Dept: GASTROENTEROLOGY | Facility: OUTPATIENT CENTER | Age: 52
End: 2017-11-17

## 2017-11-17 ENCOUNTER — TRANSFERRED RECORDS (OUTPATIENT)
Dept: HEALTH INFORMATION MANAGEMENT | Facility: CLINIC | Age: 52
End: 2017-11-17

## 2017-11-21 LAB — COPATH REPORT: NORMAL

## 2017-12-05 ENCOUNTER — TELEPHONE (OUTPATIENT)
Dept: INTERNAL MEDICINE | Facility: CLINIC | Age: 52
End: 2017-12-05

## 2017-12-05 NOTE — TELEPHONE ENCOUNTER
----- Message from Jose Ramon Pisano MD sent at 12/5/2017  8:50 AM CST -----  Sorry for delay in response. I remember starting a response and then getting interrupted and forgot to follow up.  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 Findings have been consistent over the two visits I have had with her. She has full motion, just bowed vocal folds. I will see her in two months.  She may have progressive disease but I have not seen it yet in her.  ----- Message -----     From: Cruz Prather MD     Sent: 11/30/2017   9:23 AM       To: Jose Ramon Pisano MD    Dear [facility];    Your saw Ms. Paredes 10/31/17. She had MRI neck 11/20/17 with some R sided findings. I doubt this has any clinical significance but please take a look at the MRI.     Thanks, Abebe HYDE

## 2017-12-11 ENCOUNTER — OFFICE VISIT (OUTPATIENT)
Dept: INTERNAL MEDICINE | Facility: CLINIC | Age: 52
End: 2017-12-11

## 2017-12-11 VITALS
HEART RATE: 91 BPM | SYSTOLIC BLOOD PRESSURE: 115 MMHG | DIASTOLIC BLOOD PRESSURE: 73 MMHG | WEIGHT: 144.9 LBS | RESPIRATION RATE: 16 BRPM | BODY MASS INDEX: 22.36 KG/M2

## 2017-12-11 DIAGNOSIS — R49.0 HOARSE VOICE QUALITY: Primary | ICD-10-CM

## 2017-12-11 ASSESSMENT — PAIN SCALES - GENERAL: PAINLEVEL: NO PAIN (0)

## 2017-12-11 NOTE — NURSING NOTE
Chief Complaint   Patient presents with     Results     Patient states she is here to discuss results of her mri     JOSELYNRONAL MARTIN at 1:58 PM on 12/11/2017.

## 2017-12-11 NOTE — MR AVS SNAPSHOT
After Visit Summary   12/11/2017    Tatiana Paredes    MRN: [id number]           Patient Information     Date Of Birth          1965        Visit Information        Provider Department      12/11/2017 2:05 PM Cruz Prather MD [facility]        Today's Diagnoses     Hoarse voice quality    -  1      Care Instructions    [facility]: [phone]     [facility] Medication Refill Request Information:  * Please contact your pharmacy regarding ANY request for medication refills.  ** [facility] Prescription Fax = [phone]  * Please allow 3 business days for routine medication refills.  * Please allow 5 business days for controlled substance medication refills.     Primary Care Center Test Result notification information:  *You will be notified with in 7-10 days of your appointment day regarding the results of your test.  If you are on MyChart you will be notified as soon as the provider has reviewed the results and signed off on them.            Follow-ups after your visit        Your next 10 appointments already scheduled     Jan 02, 2018  8:15 AM CST   (Arrive by 8:00 AM)   Return Visit with Jose Ramon Pisano MD   [facility] Ear Nose and Throat ([facility] [place])    [address] [place] [id number] [phone]           This is a multi-disciplinary care team visit as patients with your type of problem are usually seen by a team of an MD and a Speech-Language Pathologist (who is a specialist in disorders of the voice, throat, and breathing).  Please plan about 2 hours for your visit, which will likely include Laryngeal Function Studies, a Voice/Swallow/Breathing Evaluation, and an Endoscopic Laryngeal Examination to provide a comprehensive evaluation.  Please check with your insurance company to ensure you are covered for these services. - It is important to know that if you are evaluated and/or treated by  both a physician and a speech pathologist during your visit, your billing will reflect the input that you receive from both providers as separate professionals. Although most insurance plans do cover these services, we encourage you to contact your insurance company prior to your visit to determine whether there are any coverage limitations that might affect you financially. - Billing/procedure codes that are frequently associated with visits to our clinic include (but are not limited to) the ones listed below. Most patients will not need all of these items, but it may be useful to ask your insurance company's patient . 20011: Flexible fiberoptic laryngoscopy, 46769: Laryngoscopy; flexible or rigid fiberoptic, with stroboscopy, 26751: Flexible endoscopic evaluation of swallowing, 25766: Laryngeal function aerodynamic evaluation, 93403: Evaluation of Voice and Resonance, 37803: Speech pathology treatment for voice, speech, communication, 96920: Speech pathology treatment for swallowing/oral function for feeding - If you have any concerns or questions, or if you would prefer not to have a speech pathologist involved in your visit, please contact our Clinic Coordinator at [phone], at least 24 hours prior to your appointment.              Who to contact     Please call your clinic at [phone] to:    Ask questions about your health    Make or cancel appointments    Discuss your medicines    Learn about your test results    Speak to your doctor   If you have compliments or concerns about an experience at your clinic, or if you wish to file a complaint, please contact [facility] Patient Relations at [phone] or email us at [email or web address]         Additional Information About Your Visit        [employer] Information     [employer] is an electronic gateway that provides easy, online access to your medical records. With [employer], you can request a  clinic appointment, read your test results, renew a prescription or communicate with your care team.     To sign up for [employer] visit the website at [email or web address]   You will be asked to enter the access code listed below, as well as some personal information. Please follow the directions to create your username and password.     Your access code is: [email or web address]  Expires: 1/15/2018  5:31 AM     Your access code will  in 90 days. If you need help or a new code, please contact your [facility] or call [phone] for assistance.        Care EveryWhere ID     This is your Care EveryWhere ID. This could be used by other organizations to access your [facility] medical records  [id number]        Your Vitals Were     Pulse Respirations BMI (Body Mass Index)             91 16 22.36 kg/m2          Blood Pressure from Last 3 Encounters:   17 115/73   17 109/72   17 120/74    Weight from Last 3 Encounters:   17 65.7 kg (144 lb 14.4 oz)   17 65 kg (143 lb 4.8 oz)   17 67.2 kg (148 lb 3.2 oz)              Today, you had the following     No orders found for display       Primary Care Provider Office Phone # Fax #    Cruz Prather [phone] [address] [address]        Equal Access to Services     [facility] : Hadii estrellita munguiao Soestrella, waaxda luqadaha, qaybta kaalmada tundeyada, sarahy moura. So [facility] [phone].    ATENCIÓN: Si habla español, tiene a cazares disposición servicios gratuitos de asistencia lingüística. Llame al [phone].    We comply with applicable federal civil rights laws and [place] laws. We do not discriminate on the basis of race, color, national origin, age, disability, sex, sexual orientation, or gender identity.            Thank you!     Thank you for choosing [facility]  for your care. Our goal is always to  provide you with excellent care. Hearing back from our patients is one way we can continue to improve our services. Please take a few minutes to complete the written survey that you may receive in the mail after your visit with us. Thank you!             Your Updated Medication List - Protect others around you: Learn how to safely use, store and throw away your medicines at www.disposemymeds.org.          This list is accurate as of: 12/11/17  4:48 PM.  Always use your most recent med list.                   Brand Name Dispense Instructions for use Diagnosis    nitroFURantoin (macrocrystal-monohydrate) 100 MG capsule    MACROBID    14 capsule    Take 1 capsule (100 mg) by mouth daily    Frequent UTI       omeprazole 20 MG CR capsule    priLOSEC    180 capsule    Take 1 capsule (20 mg) by mouth 2 times daily    Gastroesophageal reflux disease with esophagitis       valACYclovir 500 MG tablet    VALTREX    28 tablet    Take 1 tablet (500 mg) by mouth 2 times daily    HSV (herpes simplex virus) anogenital infection

## 2017-12-11 NOTE — PATIENT INSTRUCTIONS
[facility]: [phone]     [facility] Medication Refill Request Information:  * Please contact your pharmacy regarding ANY request for medication refills.  ** [facility] Fax = [phone]  * Please allow 3 business days for routine medication refills.  * Please allow 5 business days for controlled substance medication refills.     [facility] Test Result notification information:  *You will be notified with in 7-10 days of your appointment day regarding the results of your test.  If you are on MyChart you will be notified as soon as the provider has reviewed the results and signed off on them.

## 2017-12-11 NOTE — PROGRESS NOTES
Rooming Note  Patient is up to date with all health maintenance items    JOSELYN MARTIN at 2:00 PM on 12/11/2017.      QUINCY Paredes is a 51 yo female  Comes in for physical  up. She saw Dr. Pisano, ENT 8/1/17 and again 10/31/17. She is getting speech therapy. Last endoscopy 7/2015 and was normal.  She had normal CXR 1/17. She still has hoarse voice. She does not smoke nor abuse EtOH. No change in family history. No other HEENT, cardiopulmonary, abdominal, , GYN, neurological, systemic, psychiatric, skin, vascular, lymphatic, musculoskeletal complaints.     Past Medical History:   Diagnosis Date     Breast pain, left 11/2013     Choroidal nevus of right eye      Constipation      Dysphonia      Gastroesophageal reflux disease      Hemorrhoids      Hoarseness      Menorrhagia      Migraines      Current Outpatient Prescriptions   Medication     omeprazole (PRILOSEC) 20 MG CR capsule     nitrofurantoin, macrocrystal-monohydrate, (MACROBID) 100 MG capsule     valACYclovir (VALTREX) 500 MG tablet     No current facility-administered medications for this visit.          Physical Exam  /73  Pulse 91  Resp 16  Wt 65.7 kg (144 lb 14.4 oz)  BMI 22.36 kg/m2  Exam:  Constitutional: healthy, alert and no distress  Cardiovascular: negative, PMI normal. No lifts, heaves, or thrills. RRR. No murmurs, clicks gallops or rub  Respiratory: negative, Good diaphragmatic excursion. Lungs clear  Gastrointestinal: Abdomen soft, non-tender. BS normal. No masses, organomegaly  : Deferred  Musculoskeletal: extremities normal- no gross deformities noted, gait normal and normal muscle tone  Skin: no suspicious lesions or rashes  Neurologic: Gait normal. Reflexes normal and symmetric. Sensation grossly WNL.  Psychiatric: mentation appears normal and affect normal/bright      Assessment/Plan    1. She had mammogram  8/8/17; [facility] and follow up breast imaging 10/16/17.  Annual exam 9/6/17. She also saw   Dr. Powell GYN 10/6/17  for breast mass  2. Abdominal complaint; CT scan unremarkable 7/2/16. Ordered colonoscopy and MRCP and this was done 12/29/16 as unremarkable.  3. She remains on PPI for reflux; last EGD 7/15.  4. She saw Dr. Pisano ENT 8/1/17 and again 10/31/17 for hoarse voice. She is getting speech therapy. EGD done 11/17/17 and she is on H2 blocker.   MRI brain and MRI neck 11/20/17 showing R sided vocal cord findings. She has follow up 1/2/2018 with Dr. Pisano, ENT  5. Fasting lipids done 10/3/17  6. Immunizations up to date.    Total time spent 25 minutes.  More than 50% of the time spent with Ms. Paredes on counseling / coordinating her care

## 2018-01-02 ENCOUNTER — OFFICE VISIT (OUTPATIENT)
Dept: OTOLARYNGOLOGY | Facility: CLINIC | Age: 53
End: 2018-01-02
Payer: COMMERCIAL

## 2018-01-02 DIAGNOSIS — R49.0 DYSPHONIA: Primary | ICD-10-CM

## 2018-01-02 DIAGNOSIS — J38.01 VOCAL FOLD PARESIS, RIGHT: ICD-10-CM

## 2018-01-02 DIAGNOSIS — J01.00 ACUTE NON-RECURRENT MAXILLARY SINUSITIS: ICD-10-CM

## 2018-01-02 RX ORDER — AMOXICILLIN 875 MG
875 TABLET ORAL 2 TIMES DAILY
Qty: 20 TABLET | Refills: 0 | Status: SHIPPED | OUTPATIENT
Start: 2018-01-02 | End: 2018-04-16

## 2018-01-02 RX ORDER — AMOXICILLIN 875 MG
875 TABLET ORAL 2 TIMES DAILY
Qty: 20 TABLET | Refills: 0 | Status: SHIPPED | OUTPATIENT
Start: 2018-01-02 | End: 2018-01-02

## 2018-01-02 ASSESSMENT — PAIN SCALES - GENERAL: PAINLEVEL: NO PAIN (0)

## 2018-01-02 NOTE — MR AVS SNAPSHOT
After Visit Summary   2018    Tatiana Paredes    MRN: [id number]           Patient Information     Date Of Birth          1965        Visit Information        Provider Department      2018 8:15 AM Jose Ramon Pisano MD [facility] Ear Nose and Throat        Today's Diagnoses     Dysphonia    -  1    Acute non-recurrent maxillary sinusitis        Vocal fold paresis, right          Care Instructions    Plan of care:  Amoxicillin x 10 days  Saline spray-use liberally, every 3-4 hours  Follow up with Dr Pisano in 3 months for a vocal cord check  Clinic contact information:  1. To schedule an appointment call [phone], option 1  2. To talk to the Triage RN call [phone], option 3  3. If you need to speak to Leila or get a message to your doctor on a Friday, call the triage RN  4. LeilaRN: [phone]  5. Surgery scheduling:      Mary Guerin: [phone]      Aurelia Walker: [phone]  6. Fax: [phone]  7. [phone]            Follow-ups after your visit        Follow-up notes from your care team     Return in about 3 months (around 2018).      Who to contact     Please call your clinic at [phone] to:    Ask questions about your health    Make or cancel appointments    Discuss your medicines    Learn about your test results    Speak to your doctor   If you have compliments or concerns about an experience at your clinic, or if you wish to file a complaint, please contact [facility] Patient Relations at [phone] or email us at [email or web address]         Additional Information About Your Visit        [employer] Information     [employer] is an electronic gateway that provides easy, online access to your medical records. With [employer], you can request a clinic appointment, read your test results, renew a prescription or communicate with your care team.     To sign up for [employer] visit the website at  [email or web address]   You will be asked to enter the access code listed below, as well as some personal information. Please follow the directions to create your username and password.     Your access code is: [email or web address]  Expires: 1/15/2018  5:31 AM     Your access code will  in 90 days. If you need help or a new code, please contact your [facility] or call [phone] for assistance.        Care EveryWhere ID     This is your Care EveryWhere ID. This could be used by other organizations to access your [place] medical records  [id number]         Blood Pressure from Last 3 Encounters:   17 115/73   17 109/72   17 120/74    Weight from Last 3 Encounters:   17 65.7 kg (144 lb 14.4 oz)   17 65 kg (143 lb 4.8 oz)   17 67.2 kg (148 lb 3.2 oz)              We Performed the Following     IMAGESTREAM RECORDING ORDER     LARYNGOSCOPY FLEX FIBEROPTIC, DIAGNOSTIC          Today's Medication Changes          These changes are accurate as of: 18 11:59 PM.  If you have any questions, ask your nurse or doctor.               Start taking these medicines.        Dose/Directions    amoxicillin 875 MG tablet   Commonly known as:  AMOXIL   Used for:  Acute non-recurrent maxillary sinusitis   Started by:  Jose Ramon Pisano MD        Dose:  875 mg   Take 1 tablet (875 mg) by mouth 2 times daily   Quantity:  20 tablet   Refills:  0            Where to get your medicines      Some of these will need a paper prescription and others can be bought over the counter.  Ask your nurse if you have questions.     Bring a paper prescription for each of these medications     amoxicillin 875 MG tablet                Primary Care Provider Office Phone # Fax #    Cruz Prather [phone]       [address] [address]        Equal Access to Services     ELSIE TELLO AH: aydee Rodriguez,  leon vicente sinansarahy cali celsoin hayaan adeeg kharash la'aan ah. [facility] [phone].    ATENCIÓN: Si roslyn malave, tiene a cazares disposición servicios gratuitos de asistencia lingüística. Alaina al [phone].    We comply with applicable federal civil rights laws and [place] laws. We do not discriminate on the basis of race, color, national origin, age, disability, sex, sexual orientation, or gender identity.            Thank you!     Thank you for choosing [facility] EAR NOSE AND THROAT  for your care. Our goal is always to provide you with excellent care. Hearing back from our patients is one way we can continue to improve our services. Please take a few minutes to complete the written survey that you may receive in the mail after your visit with us. Thank you!             Your Updated Medication List - Protect others around you: Learn how to safely use, store and throw away your medicines at www.disposemymeds.org.          This list is accurate as of: 1/2/18 11:59 PM.  Always use your most recent med list.                   Brand Name Dispense Instructions for use Diagnosis    amoxicillin 875 MG tablet    AMOXIL    20 tablet    Take 1 tablet (875 mg) by mouth 2 times daily    Acute non-recurrent maxillary sinusitis       nitroFURantoin (macrocrystal-monohydrate) 100 MG capsule    MACROBID    14 capsule    Take 1 capsule (100 mg) by mouth daily    Frequent UTI       omeprazole 20 MG CR capsule    priLOSEC    180 capsule    Take 1 capsule (20 mg) by mouth 2 times daily    Gastroesophageal reflux disease with esophagitis       ranitidine 300 MG tablet    ZANTAC     Take 300 mg by mouth At Bedtime        valACYclovir 500 MG tablet    VALTREX    28 tablet    Take 1 tablet (500 mg) by mouth 2 times daily    HSV (herpes simplex virus) anogenital infection

## 2018-01-02 NOTE — LETTER
1/2/2018       RE: Tatiana Paredes  [address]  [place] [address]     Dear Colleague,    Thank you for referring your patient, Tatiana Paredes, to the [facility] EAR NO[facility] TH[facility] at [facility] Please see a copy of my visit note below.      HISTORY OF PRESENT ILLNESS: Tatiana Paredes is a 52 year old female with a history of Dysphonia.  I saw her on 6/10/2014. At that time she had dysphonia following a prolonged use of her voice. We felt she had gastroesophageal reflux and a muscle tension dysphonia. She did quite well after therapy and did not have any voice problems until a bout of bronchitis in January and February 2017. She had a month of coughing. She lost her voice for a week then slowly got better in February 2017. Since that time she has had intermittent voice problems with 1 day her voice being normal and the next having a rough quality to her voice. She has had no changes in her swallow but is always had some difficulty with the last swallow of a meal. This has been going on for several years. In July she noted that her voice worsened after extensive use of her voice and it has maintained its roughness since that time. She has vocal fatigue which is unchanged from the entirety of this year. She  does better at higher pitches and with improved breath support. I saw her on August 1, 2017. At that time there was consistent but mild bowing of the right vocal fold and pooling of saliva in the right hypopharynx. Full motion was seen over the right vocal fold and no excessive dilatation or decrease in tone in the right hypopharynx was present. She has undergone speech therapy and she feels her voice has improved. She'll have occasional breaks in her vocal quality but this is short lived and she has been able to talk for an hour at a time without excessive vocal fatigue. She feels her voice has improved from a 2 out of 10 to a 4 out of 10. She  denies any swallowing difficulties except for the last amount of food that she does eat tends to catch. If she lies down after eating she will have a sensation of reflux. She denies any new symptoms of incoordination or difficulty walking or difficulty using her hands. She has no articulation difficulties.    On our last exam on 10/31/2017 she felt like her symptoms were stable to slightly improving.  Her exam was also stable.  Since last seen her she has had an MRI of the brain which was normal and an MRI of the neck which was normal except for enlargement of the right ventricle and mild anterior rotation of the arytenoid.  No mass lesions were seen either within the neck or specifically within the larynx.  She again feels her voice is improved rating it now is 6 out of 10 where before it was 10 out of 10.  She is able to talk for longer periods of time and again denies any problems with swallowing.    As an aside, she is been having increased nasal drainage and left-sided facial pressure over the last 3 weeks.        Last 2 Scores for Patient-Answered VHI Questionnaire  VHI Total Score 10/31/2017 1/2/2018   VHI Total Score 13 12       Last 2 Scores for Patient-Answered CSI Questionnaire  CSI Total Score 8/1/2017 1/2/2018   CSI Total Score 2 6         Last 2 Scores for Patient-Answered EAT Questionnaire  EAT Total Score 8/1/2017 1/2/2018   EAT Total Score 2 1           PAST MEDICAL HISTORY:   Past Medical History:   Diagnosis Date     Breast pain, left 11/2013     Choroidal nevus of right eye      Constipation      Dysphonia      Gastroesophageal reflux disease      Hemorrhoids      Hoarseness      Menorrhagia      Migraines        PAST SURGICAL HISTORY:   Past Surgical History:   Procedure Laterality Date     HC HYSTEROSCOPY, SURGICAL; W/ ENDOMETRIAL ABLATION, ANY METHOD  2011       FAMILY HISTORY:   Family History   Problem Relation Age of Onset     Breast Cancer Sister 52     Cancer - colorectal Maternal  Grandmother 70     Migraines Mother        SOCIAL HISTORY:   Social History   Substance Use Topics     Smoking status: Never Smoker     Smokeless tobacco: Never Used     Alcohol use 0.5 - 2.5 oz/week      Comment: 1-3 times a week       REVIEW OF SYSTEMS: Ten point review of systems was performed and is negative except for:   [facility] ROS 1/2/2018   Constitutional -   Neurology -   Ears, Nose, Throat Sore throat        ALLERGIES: Review of patient's allergies indicates no known allergies.    MEDICATIONS:   Current Outpatient Prescriptions   Medication Sig Dispense Refill     ranitidine (ZANTAC) 300 MG tablet Take 300 mg by mouth At Bedtime       amoxicillin (AMOXIL) 875 MG tablet Take 1 tablet (875 mg) by mouth 2 times daily 20 tablet 0     nitrofurantoin, macrocrystal-monohydrate, (MACROBID) 100 MG capsule Take 1 capsule (100 mg) by mouth daily 14 capsule 3     valACYclovir (VALTREX) 500 MG tablet Take 1 tablet (500 mg) by mouth 2 times daily 28 tablet 3     omeprazole (PRILOSEC) 20 MG CR capsule Take 1 capsule (20 mg) by mouth 2 times daily (Patient not taking: Reported on 1/2/2018) 180 capsule 2         PHYSICAL EXAMINATION:  She  is awake, alert and in no apparent distress.    Her tympanic membranes are clear and intact bilaterally. External auditory canals are clear.  Nasal exam shows a mild septal deviation without obstruction. Examination of the oral cavity shows no suspicious lesions.  There is symmetric movement of the tongue and soft palate.    The oropharynx is clear.  Her neck is supple without significant adenopathy.  Pulse is regular.  Upper airway is clear.  Cranial nerves II-XII are grossly intact.   No lingual fasciculations are present.  There is good hand strength there is good finger coordination bilaterally.  There is no dysarthria of the lips tip of tongue or base of tongue.    PROCEDURE: A flexible laryngoscopy  was performed.  Informed consent was obtained and a time out was performed. 3%  lidocaine and 0.25% phenylephrine was sprayed into the nasal cavity and allowed 3 to 5 minutes for effect. The scope was passed through the left sided nostril. There is some thickened drainage coming from the middle meatus draining down the left nasal gutter seen on endoscopy.  .  Examination showed the vocal folds to be mobile and meet in the midline.  No nodules, polyps or ulcerations are seen.  There is significant atrophy of the right vocal fold and a subsequent enlargement of the right ventricle.  Fasciculations are seen in the right thyroid arytenoid muscle.  With phonation glottic closure is actually improved compared to more recent examinations.  This results in a persistent glottic gap at medium to low pitch. Her vocal quality improves at higher pitches.  There is mild inflammation or erythema of the supraglottic or glottic larynx.  With phonation there is moderatecontraction of the supraglottic larynx.  There is mild asymmetry of the hypopharyngeal space with the right piriform sinus being more dilated than the left.  There is residual saliva collection which is difficult to clear from the hypopharynx.  No mass lesions in the hypopharynx are present.  The immediate subglottic area is clear.     Right vocal fold atrophy        IMPRESSION/PLAN: Worsening right vocal fold atrophy with bowing of the right vocal fold but full motion of the right vocal fold and intermittent complete glottic closure.  This is associated with voice improvement.  No other neurologic signs could be found on my brief examination.  Her MRI is negative except for the findings as noted above.  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    She also has a left maxillary sinus infection.  This is acute as the MRI did not pick this up in November.  She has had symptoms for approximately 3 weeks and I will give her a prescription for amoxicillin to help her with this.  I will have her follow up approximately 3 months to examine her  larynx.  Given the very focal nature of the neurologic deficit I will review with Dr. Prather where there are any further neurologic workup for this isolated finding is worthwhile.      Again, thank you for allowing me to participate in the care of your patient.      Sincerely,    Jose Ramon Pisano MD

## 2018-01-02 NOTE — NURSING NOTE
Chief Complaint   Patient presents with     RECHECK     Follow up  for Dysphonia      Unable to obtain vitals     Dionicio Chanel

## 2018-01-02 NOTE — PATIENT INSTRUCTIONS
Plan of care:  Amoxicillin x 10 days  Saline spray-use liberally, every 3-4 hours  Follow up with Dr Pisano in 3 months for a vocal cord check  Clinic contact information:  1. To schedule an appointment call [phone], option 1  2. To talk to the Triage RN call [phone], option 3  3. If you need to speak to Leila or get a message to your doctor on a Friday, call the triage RN  4. LeilaRN: [phone]  5. Surgery scheduling:      Mary Guerin: [phone]      Aurelia Walker: [phone]  6. Fax: [phone]  7. [phone]

## 2018-01-02 NOTE — PROGRESS NOTES
HISTORY OF PRESENT ILLNESS: Tatiana Paredes is a 52 year old female with a history of Dysphonia.  I saw her on 6/10/2014. At that time she had dysphonia following a prolonged use of her voice. We felt she had gastroesophageal reflux and a muscle tension dysphonia. She did quite well after therapy and did not have any voice problems until a bout of bronchitis in January and February 2017. She had a month of coughing. She lost her voice for a week then slowly got better in February 2017. Since that time she has had intermittent voice problems with 1 day her voice being normal and the next having a rough quality to her voice. She has had no changes in her swallow but is always had some difficulty with the last swallow of a meal. This has been going on for several years. In July she noted that her voice worsened after extensive use of her voice and it has maintained its roughness since that time. She has vocal fatigue which is unchanged from the entirety of this year. She  does better at higher pitches and with improved breath support. I saw her on August 1, 2017. At that time there was consistent but mild bowing of the right vocal fold and pooling of saliva in the right hypopharynx. Full motion was seen over the right vocal fold and no excessive dilatation or decrease in tone in the right hypopharynx was present. She has undergone speech therapy and she feels her voice has improved. She'll have occasional breaks in her vocal quality but this is short lived and she has been able to talk for an hour at a time without excessive vocal fatigue. She feels her voice has improved from a 2 out of 10 to a 4 out of 10. She denies any swallowing difficulties except for the last amount of food that she does eat tends to catch. If she lies down after eating she will have a sensation of reflux. She denies any new symptoms of incoordination or difficulty walking or difficulty using her hands. She has no articulation  difficulties.    On our last exam on 10/31/2017 she felt like her symptoms were stable to slightly improving.  Her exam was also stable.  Since last seen her she has had an MRI of the brain which was normal and an MRI of the neck which was normal except for enlargement of the right ventricle and mild anterior rotation of the arytenoid.  No mass lesions were seen either within the neck or specifically within the larynx.  She again feels her voice is improved rating it now is 6 out of 10.  She is able to talk for longer periods of time and again denies any problems with swallowing.    As an aside, she is been having increased nasal drainage and left-sided facial pressure over the last 3 weeks.        Last 2 Scores for Patient-Answered VHI Questionnaire  VHI Total Score 10/31/2017 1/2/2018   VHI Total Score 13 12       Last 2 Scores for Patient-Answered CSI Questionnaire  CSI Total Score 8/1/2017 1/2/2018   CSI Total Score 2 6         Last 2 Scores for Patient-Answered EAT Questionnaire  EAT Total Score 8/1/2017 1/2/2018   EAT Total Score 2 1           PAST MEDICAL HISTORY:   Past Medical History:   Diagnosis Date     Breast pain, left 11/2013     Choroidal nevus of right eye      Constipation      Dysphonia      Gastroesophageal reflux disease      Hemorrhoids      Hoarseness      Menorrhagia      Migraines        PAST SURGICAL HISTORY:   Past Surgical History:   Procedure Laterality Date     HC HYSTEROSCOPY, SURGICAL; W/ ENDOMETRIAL ABLATION, ANY METHOD  2011       FAMILY HISTORY:   Family History   Problem Relation Age of Onset     Breast Cancer Sister 52     Cancer - colorectal Maternal Grandmother 70     Migraines Mother        SOCIAL HISTORY:   Social History   Substance Use Topics     Smoking status: Never Smoker     Smokeless tobacco: Never Used     Alcohol use 0.5 - 2.5 oz/week      Comment: 1-3 times a week       REVIEW OF SYSTEMS: Ten point review of systems was performed and is negative except for:   [facility]  ROS 1/2/2018   Constitutional -   Neurology -   Ears, Nose, Throat Sore throat        ALLERGIES: Review of patient's allergies indicates no known allergies.    MEDICATIONS:   Current Outpatient Prescriptions   Medication Sig Dispense Refill     ranitidine (ZANTAC) 300 MG tablet Take 300 mg by mouth At Bedtime       amoxicillin (AMOXIL) 875 MG tablet Take 1 tablet (875 mg) by mouth 2 times daily 20 tablet 0     nitrofurantoin, macrocrystal-monohydrate, (MACROBID) 100 MG capsule Take 1 capsule (100 mg) by mouth daily 14 capsule 3     valACYclovir (VALTREX) 500 MG tablet Take 1 tablet (500 mg) by mouth 2 times daily 28 tablet 3     omeprazole (PRILOSEC) 20 MG CR capsule Take 1 capsule (20 mg) by mouth 2 times daily (Patient not taking: Reported on 1/2/2018) 180 capsule 2         PHYSICAL EXAMINATION:  She  is awake, alert and in no apparent distress.    Her tympanic membranes are clear and intact bilaterally. External auditory canals are clear.  Nasal exam shows a mild septal deviation without obstruction. Examination of the oral cavity shows no suspicious lesions.  There is symmetric movement of the tongue and soft palate.    The oropharynx is clear.  Her neck is supple without significant adenopathy.  Pulse is regular.  Upper airway is clear.  Cranial nerves II-XII are grossly intact.   No lingual fasciculations are present.  There is good hand strength there is good finger coordination bilaterally.  There is no dysarthria of the lips tip of tongue or base of tongue.    PROCEDURE: A flexible laryngoscopy  was performed.  Informed consent was obtained and a time out was performed. 3% lidocaine and 0.25% phenylephrine was sprayed into the nasal cavity and allowed 3 to 5 minutes for effect. The scope was passed through the left sided nostril. There is some thickened drainage coming from the middle meatus draining down the left nasal gutter seen on endoscopy.  .  Examination showed the vocal folds to be mobile and meet  in the midline.  No nodules, polyps or ulcerations are seen.  There is significant atrophy of the right vocal fold and a subsequent enlargement of the right ventricle.  Fasciculations are seen in the right thyroid arytenoid muscle.  With phonation glottic closure is actually improved compared to more recent examinations.  This results in a persistent glottic gap at medium to low pitch. Her vocal quality improves at higher pitches.  There is mild inflammation or erythema of the supraglottic or glottic larynx.  With phonation there is moderatecontraction of the supraglottic larynx.  There is mild asymmetry of the hypopharyngeal space with the right piriform sinus being more dilated than the left.  There is residual saliva collection which is difficult to clear from the hypopharynx.  No mass lesions in the hypopharynx are present.  The immediate subglottic area is clear.     Right vocal fold atrophy        IMPRESSION/PLAN: Worsening right vocal fold atrophy with bowing of the right vocal fold but full motion of the right vocal fold and intermittent complete glottic closure.  This is associated with voice improvement.  No other neurologic signs could be found on my brief examination.  Her MRI is negative except for the findings as noted above.  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    She also has a left maxillary sinus infection.  This is acute as the MRI did not pick this up in November.  She has had symptoms for approximately 3 weeks and I will give her a prescription for amoxicillin to help her with this.  I will have her follow up approximately 3 months to examine her larynx.  Given the very focal nature of the neurologic deficit I will review with Dr. Prather where there are any further neurologic workup for this isolated finding is worthwhile.

## 2018-03-25 ENCOUNTER — TELEPHONE (OUTPATIENT)
Dept: INTERNAL MEDICINE | Facility: CLINIC | Age: 53
End: 2018-03-25

## 2018-03-25 DIAGNOSIS — Q31.8 VOCAL CORD ANOMALY: Primary | ICD-10-CM

## 2018-03-25 NOTE — TELEPHONE ENCOUNTER
Placed Neurology referral this encounter      Dear Tatiana;    Dr. Pisano, ENT wanted me to follow up with you regarding any persistent symptoms. He recommends if you have ongoing complaints to see the Neurology doctors, I placed a referral for this today and you can call [phone] to schedule this appointment.    CLEMENTINA Prather MD

## 2018-04-16 ENCOUNTER — OFFICE VISIT (OUTPATIENT)
Dept: NEUROLOGY | Facility: CLINIC | Age: 53
End: 2018-04-16
Payer: COMMERCIAL

## 2018-04-16 VITALS
BODY MASS INDEX: 22.9 KG/M2 | DIASTOLIC BLOOD PRESSURE: 78 MMHG | SYSTOLIC BLOOD PRESSURE: 138 MMHG | HEIGHT: 67 IN | WEIGHT: 145.9 LBS | HEART RATE: 91 BPM

## 2018-04-16 DIAGNOSIS — R47.1 DYSARTHRIA: ICD-10-CM

## 2018-04-16 DIAGNOSIS — R47.1 DYSARTHRIA: Primary | ICD-10-CM

## 2018-04-16 ASSESSMENT — PAIN SCALES - GENERAL: PAINLEVEL: NO PAIN (0)

## 2018-04-16 NOTE — LETTER
4/16/2018       RE: Tatiana Paredes  [address]  [facility] [address]     Dear Colleague,    Thank you for referring your patient, Tatiana Paredes, to the [facility] NEUROLOGY at [facility]. Please see a copy of my visit note below.        [facility]    Tatiana Paredes MRN# [id number]   Age: 52 year old YOB: 1965     Requesting physician: Cruz Torres     Chief Complaint:  Referred by Dr Prather for neurological assessment in regards to visualized vocal cord atrophy    History of Present Illness:    Tatiana is a 52-year-old right handed woman with a history of migraines who presents with a five year history of intermittent hoarseness. She first noted having problems with having a weak/hoarse voice back in 2013. At that time, the problem was attributed to GERD problems and she was started on omeprazole and ranitidine. Tatiana felt that the GERD medications did completely help the problem. However, vocal therapy and some lifestyle changes, such as using a humidifier eventually made the problem remit for a couple of years. In January 2017, Tatiana had a episode of bronchitis requiring albuterol that exacerbated the problem. She did not require hospitalization at that time. However, shortly after the bronchitis Tatiana completely lost her voice for a couple of weeks. Since then Tatiana has intermittently experienced times with having a hoarse, raspy voice. At the present time, she is experiencing a weak, hoarse voice, which she denies any provoking factors such as recent illness or medication change. She is unable to yell or raise her voice and people objectively notice her voice worsening at times. Tatiana does feel that the problem is worse later in the day as her voice gets more hoarse and quiet. She denies SOB, dysphagia, coughing, double vision, numbness, or joint pain.     Tatiana has been seeing ENT for this problem, who  performed a laryngoscopy and noted vocal cord atrophy and paralysis on the right. ENT wanted a second opinion to rule out any neurological pathology that could be contributing to Tatiana's symptoms.  MRI scan of the brain has been performed and is normal.  Images were reviewed with the patient    Patient has a history of migraines. However, this problem has been well managed through avoiding triggers.Tatiana has no history of metabolic or autoimmune disease. No family history of autoimmune diseases.    The HPI was scribed by Yina Lewis MS3 on behalf of Dr. Margarita Foster.     I have reviewed and edited the HPI a confirmed information personally. Margarita Foster MD        Past Medical History:   Diagnosis Date     Breast pain, left 11/2013     Choroidal nevus of right eye      Constipation      Dysphonia      Gastroesophageal reflux disease      Hemorrhoids      Hoarseness      Menorrhagia      Migraines        Patient Active Problem List   Diagnosis     External hemorrhoids     Dysphonia     Choroidal nevus of right eye     Myopia     Valgus deformity of great toe     Instability of knee joint     Pes planus     Pain of foot     Vocal cord paresis       Past Surgical History:   Procedure Laterality Date     HC HYSTEROSCOPY, SURGICAL; W/ ENDOMETRIAL ABLATION, ANY METHOD  2011       Social History     Social History     Marital status:      Spouse name: N/A     Number of children: N/A     Years of education: N/A     Occupational History     Not on file.     Social History Main Topics     Smoking status: Never Smoker     Smokeless tobacco: Never Used     Alcohol use 0.5 - 2.5 oz/week      Comment: 1-3 times a week     Drug use: No     Sexual activity: Yes     Partners: Male     Birth control/ protection: Condom     Other Topics Concern      Service No     Blood Transfusions No     Caffeine Concern No     Occupational Exposure No     Hobby Hazards No     Sleep Concern No     Stress Concern No     Weight  "Concern No     Special Diet No     Back Care No     Exercise Yes     Bike Helmet Yes     Seat Belt Yes     Self-Exams Yes     Social History Narrative    How much exercise per week? Not enough    How much calcium per day? Some in her diet       How much caffeine per day? 2 cups     How much vitamin D per day? Some in diet    Do you/your family wear seatbelts?  Yes    Do you/your family use safety helmets? N/A    Do you/your family use sunscreen? Yes    Do you/your family keep firearms in the home? No    Do you/your family have a smoke detector(s)? Yes        Do you feel safe in your home? Yes    Has anyone ever touched you in an unwanted manner? No        06/17/2015        Works at [employer].     .    Natali Powell MD                       Family History   Problem Relation Age of Onset     Breast Cancer Sister 52     Cancer - colorectal Maternal Grandmother 70     Migraines Mother        Current Outpatient Prescriptions   Medication Sig     ranitidine (ZANTAC) 300 MG tablet Take 300 mg by mouth At Bedtime     valACYclovir (VALTREX) 500 MG tablet Take 1 tablet (500 mg) by mouth 2 times daily (Patient taking differently: Take 500 mg by mouth 2 times daily as needed )     nitrofurantoin, macrocrystal-monohydrate, (MACROBID) 100 MG capsule Take 1 capsule (100 mg) by mouth daily (Patient taking differently: Take 100 mg by mouth daily as needed )     No current facility-administered medications for this visit.        ROS: Please see HPI all other systems review and negative.    Physical Examination:  /78 (BP Location: Left arm, Patient Position: Chair, Cuff Size: Adult Small)  Pulse 91  Ht 1.702 m (5' 7\")  Wt 66.2 kg (145 lb 14.4 oz)  BMI 22.85 kg/m2  General Appearance:  The patient is well-groomed and cooperative with examination.  She is in no acute distress  Neurological Examination  Cognition: oriented x3, attention and recall intact. No aphasia or dysarthria she has a hoarse " voice  Cranial Nerves: 2-12 intact slight asymmetry of pupils but both are equally reactive to light.  This normalizes after being in a dim room. Funduscopic examination is normal with sharp disc margins bilaterally.   General Motor Survey: Normal muscle bulk, tone and strength in all four ext. No tremor no fasciculations  Coordination: Finger to nose and heel knee shin normal bilaterally. Normal alternating movements.  Reflexes: Upper and lower extremity reflexes are within normal limits (+2) and bilaterally symmetric.   Sensory Examination:   Vibration: normal in all four ext   Pinprick:normal in all four ext   Joint Position Sense: normal in all four ext   Light Touch: normal in all four ext  Gait: Normal gait which is stable on turns. Normal arm swing. Romberg negative.    Cardiovascular Examination:  Heart is regular in rate and rhythm to auscultation. No significant murmurs. No carotid bruits. No significant peripheral edema. Pedal pulses are palpable bilaterally.     Musculoskeletal Examination:  Neck is supple with full range of motion. No tenderness to palpitations.      Investigations:  Images reviewed with patient  MR BRAIN W/O & W CONTRAST 11/20/2017 6:09 PM     Impression: No acute normality. Normal appearance of brainstem and  visualized cranial nerve cisternal segments.     I have personally reviewed the examination and initial interpretation  and I agree with the findings.     IAM MCCLURE MD    Impression/Recommendations:  1.  Right vocal cord atrophy/weakness  The patient has a hoarse voice related to right vocal cord atrophy.  There is no evidence of an underlying neurologic disorder on examination the remainder of her neurological examination is normal.  MRI scan of the brain is also normal.  In that setting the only thing we have not assess for his myasthenia gravis which I think is unlikely as it typically does not cause atrophy but in the setting of symptoms getting worse throughout the day  I think it would warrant checking antibodies for myasthenia gravis.  I will write to the patient with those test results.    I tried to reassure the patient today that there is no evidence of underlying neurologic disease.  This appointment was quite stressful for her and she thinks her symptoms became worse prior to the appointment mainly just due to worry.    Again, thank you for allowing me to participate in the care of your patient.      Sincerely,    Margarita Foster MD

## 2018-04-16 NOTE — MR AVS SNAPSHOT
After Visit Summary   4/16/2018    Tatiana Paredes    MRN: [id number]           Patient Information     Date Of Birth          1965        Visit Information        Provider Department      4/16/2018 8:00 AM Margarita Foster MD [facility] Neurology        Today's Diagnoses     Dysarthria    -  1       Follow-ups after your visit        Follow-up notes from your care team     Write patient with results      Your next 10 appointments already scheduled     May 01, 2018  8:15 AM CDT   (Arrive by 8:00 AM)   Return Visit with Jose Ramon Pisano MD   [facility] ([facility] [place])    [address]  4th Floor  [facility] [address] [phone]           This is a multi-disciplinary care team visit as patients with your type of problem are usually seen by a team of an MD and a Speech-Language Pathologist (who is a specialist in disorders of the voice, throat, and breathing).  Please plan about 2 hours for your visit, which will likely include Laryngeal Function Studies, a Voice/Swallow/Breathing Evaluation, and an Endoscopic Laryngeal Examination to provide a comprehensive evaluation.  Please check with your insurance company to ensure you are covered for these services. - It is important to know that if you are evaluated and/or treated by both a physician and a speech pathologist during your visit, your billing will reflect the input that you receive from both providers as separate professionals. Although most insurance plans do cover these services, we encourage you to contact your insurance company prior to your visit to determine whether there are any coverage limitations that might affect you financially. - Billing/procedure codes that are frequently associated with visits to our clinic include (but are not limited to) the ones listed below. Most patients will not need all of these items, but it may be useful to ask your insurance  company's patient . 36289: Flexible fiberoptic laryngoscopy, 82726: Laryngoscopy; flexible or rigid fiberoptic, with stroboscopy, 00712: Flexible endoscopic evaluation of swallowing, 95895: Laryngeal function aerodynamic evaluation, 60161: Evaluation of Voice and Resonance, 36462: Speech pathology treatment for voice, speech, communication, 33377: Speech pathology treatment for swallowing/oral function for feeding - If you have any concerns or questions, or if you would prefer not to have a speech pathologist involved in your visit, please contact our Clinic Coordinator at [phone], at least 24 hours prior to your appointment.              Future tests that were ordered for you today     Open Future Orders        Priority Expected Expires Ordered    Acetylcholine modulating antibody Routine  2019    Acetylcholine receptor binding Routine  2019            Who to contact     Please call your clinic at [phone] to:    Ask questions about your health    Make or cancel appointments    Discuss your medicines    Learn about your test results    Speak to your doctor            Additional Information About Your Visit        [employer] Information     [employer] is an electronic gateway that provides easy, online access to your medical records. With [employer], you can request a clinic appointment, read your test results, renew a prescription or communicate with your care team.     To sign up for [employer] visit the website at [email or web address]   You will be asked to enter the access code listed below, as well as some personal information. Please follow the directions to create your username and password.     Your access code is: [email or web address]  Expires: 2018  6:31 AM     Your access code will  in 90 days. If you need help or a new code, please contact your [facility] or call [phone] for assistance.        Care  "EveryWhere ID     This is your Care EveryWhere ID. This could be used by other organizations to access your [facility] medical records  [id number]        Your Vitals Were     Pulse Height BMI (Body Mass Index)             91 1.702 m (5' 7\") 22.85 kg/m2          Blood Pressure from Last 3 Encounters:   04/16/18 138/78   12/11/17 115/73   11/06/17 109/72    Weight from Last 3 Encounters:   04/16/18 66.2 kg (145 lb 14.4 oz)   12/11/17 65.7 kg (144 lb 14.4 oz)   11/06/17 65 kg (143 lb 4.8 oz)              We Performed the Following     Acetylcholine receptor blocking biju     MuSK Antibody          Today's Medication Changes          These changes are accurate as of 4/16/18  8:56 AM.  If you have any questions, ask your nurse or doctor.               These medicines have changed or have updated prescriptions.        Dose/Directions    nitroFURantoin (macrocrystal-monohydrate) 100 MG capsule   Commonly known as:  MACROBID   This may have changed:    - when to take this  - reasons to take this   Used for:  Frequent UTI        Dose:  100 mg   Take 1 capsule (100 mg) by mouth daily   Quantity:  14 capsule   Refills:  3       valACYclovir 500 MG tablet   Commonly known as:  VALTREX   This may have changed:    - when to take this  - reasons to take this   Used for:  HSV (herpes simplex virus) anogenital infection        Dose:  500 mg   Take 1 tablet (500 mg) by mouth 2 times daily   Quantity:  28 tablet   Refills:  3                Primary Care Provider Office Phone # Fax #    Cruz Prather [phone]       [address] [address]        Equal Access to Services     [facility] AH: Chito Spence, wanathaliada aysha, qaybta kaalsarahy singh. So [facility] [phone].    ATENCIÓN: Si habla español, tiene a cazares disposición servicios gratuitos de asistencia lingüística. Llame al [phone].    We comply with applicable federal civil " rights laws and [place] laws. We do not discriminate on the basis of race, color, national origin, age, disability, sex, sexual orientation, or gender identity.            Thank you!     Thank you for choosing [facility] NEUROLOGY  for your care. Our goal is always to provide you with excellent care. Hearing back from our patients is one way we can continue to improve our services. Please take a few minutes to complete the written survey that you may receive in the mail after your visit with us. Thank you!             Your Updated Medication List - Protect others around you: Learn how to safely use, store and throw away your medicines at www.disposemymeds.org.          This list is accurate as of 4/16/18  8:56 AM.  Always use your most recent med list.                   Brand Name Dispense Instructions for use Diagnosis    nitroFURantoin (macrocrystal-monohydrate) 100 MG capsule    MACROBID    14 capsule    Take 1 capsule (100 mg) by mouth daily    Frequent UTI       ranitidine 300 MG tablet    ZANTAC     Take 300 mg by mouth At Bedtime        valACYclovir 500 MG tablet    VALTREX    28 tablet    Take 1 tablet (500 mg) by mouth 2 times daily    HSV (herpes simplex virus) anogenital infection

## 2018-04-16 NOTE — PROGRESS NOTES
[facility]    Tatiana Paredes MRN# [id number]   Age: 52 year old YOB: 1965     Requesting physician: Cruz Torres     Chief Complaint:  Referred by Dr Prather for neurological assessment in regards to visualized vocal cord atrophy    History of Present Illness:    Tatiana is a 52-year-old right handed woman with a history of migraines who presents with a five year history of intermittent hoarseness. She first noted having problems with having a weak/hoarse voice back in 2013. At that time, the problem was attributed to GERD problems and she was started on omeprazole and ranitidine. Tatiana felt that the GERD medications did completely help the problem. However, vocal therapy and some lifestyle changes, such as using a humidifier eventually made the problem remit for a couple of years. In January 2017, Tatiana had a episode of bronchitis requiring albuterol that exacerbated the problem. She did not require hospitalization at that time. However, shortly after the bronchitis Tatiana completely lost her voice for a couple of weeks. Since then Tatiana has intermittently experienced times with having a hoarse, raspy voice. At the present time, she is experiencing a weak, hoarse voice, which she denies any provoking factors such as recent illness or medication change. She is unable to yell or raise her voice and people objectively notice her voice worsening at times. Tatiana does feel that the problem is worse later in the day as her voice gets more hoarse and quiet. She denies SOB, dysphagia, coughing, double vision, numbness, or joint pain.     Tatiana has been seeing ENT for this problem, who performed a laryngoscopy and noted vocal cord atrophy and paralysis on the right. ENT wanted a second opinion to rule out any neurological pathology that could be contributing to Tatiana's symptoms.  MRI scan of the brain has been performed and is normal.  Images were reviewed with the  patient    Patient has a history of migraines. However, this problem has been well managed through avoiding triggers.Tatiana has no history of metabolic or autoimmune disease. No family history of autoimmune diseases.    The HPI was scribed by Yina Lewis MS3 on behalf of Dr. Margarita Foster.     I have reviewed and edited the HPI a confirmed information personally. Margarita Foster MD        Past Medical History:   Diagnosis Date     Breast pain, left 11/2013     Choroidal nevus of right eye      Constipation      Dysphonia      Gastroesophageal reflux disease      Hemorrhoids      Hoarseness      Menorrhagia      Migraines        Patient Active Problem List   Diagnosis     External hemorrhoids     Dysphonia     Choroidal nevus of right eye     Myopia     Valgus deformity of great toe     Instability of knee joint     Pes planus     Pain of foot     Vocal cord paresis       Past Surgical History:   Procedure Laterality Date     HC HYSTEROSCOPY, SURGICAL; W/ ENDOMETRIAL ABLATION, ANY METHOD  2011       Social History     Social History     Marital status:      Spouse name: N/A     Number of children: N/A     Years of education: N/A     Occupational History     Not on file.     Social History Main Topics     Smoking status: Never Smoker     Smokeless tobacco: Never Used     Alcohol use 0.5 - 2.5 oz/week      Comment: 1-3 times a week     Drug use: No     Sexual activity: Yes     Partners: Male     Birth control/ protection: Condom     Other Topics Concern      Service No     Blood Transfusions No     Caffeine Concern No     Occupational Exposure No     Hobby Hazards No     Sleep Concern No     Stress Concern No     Weight Concern No     Special Diet No     Back Care No     Exercise Yes     Bike Helmet Yes     Seat Belt Yes     Self-Exams Yes     Social History Narrative    How much exercise per week? Not enough    How much calcium per day? Some in her diet       How much caffeine per day? 2 cups     How  "much vitamin D per day? Some in diet    Do you/your family wear seatbelts?  Yes    Do you/your family use safety helmets? N/A    Do you/your family use sunscreen? Yes    Do you/your family keep firearms in the home? No    Do you/your family have a smoke detector(s)? Yes        Do you feel safe in your home? Yes    Has anyone ever touched you in an unwanted manner? No        06/17/2015        Works at [employer].     .    Natali Powell MD                       Family History   Problem Relation Age of Onset     Breast Cancer Sister 52     Cancer - colorectal Maternal Grandmother 70     Migraines Mother        Current Outpatient Prescriptions   Medication Sig     ranitidine (ZANTAC) 300 MG tablet Take 300 mg by mouth At Bedtime     valACYclovir (VALTREX) 500 MG tablet Take 1 tablet (500 mg) by mouth 2 times daily (Patient taking differently: Take 500 mg by mouth 2 times daily as needed )     nitrofurantoin, macrocrystal-monohydrate, (MACROBID) 100 MG capsule Take 1 capsule (100 mg) by mouth daily (Patient taking differently: Take 100 mg by mouth daily as needed )     No current facility-administered medications for this visit.        ROS: Please see HPI all other systems review and negative.    Physical Examination:  /78 (BP Location: Left arm, Patient Position: Chair, Cuff Size: Adult Small)  Pulse 91  Ht 1.702 m (5' 7\")  Wt 66.2 kg (145 lb 14.4 oz)  BMI 22.85 kg/m2  General Appearance:  The patient is well-groomed and cooperative with examination.  She is in no acute distress  Neurological Examination  Cognition: oriented x3, attention and recall intact. No aphasia or dysarthria she has a hoarse voice  Cranial Nerves: 2-12 intact slight asymmetry of pupils but both are equally reactive to light.  This normalizes after being in a dim room. Funduscopic examination is normal with sharp disc margins bilaterally.   General Motor Survey: Normal muscle bulk, tone and strength in all four ext. No " tremor no fasciculations  Coordination: Finger to nose and heel knee shin normal bilaterally. Normal alternating movements.  Reflexes: Upper and lower extremity reflexes are within normal limits (+2) and bilaterally symmetric.   Sensory Examination:   Vibration: normal in all four ext   Pinprick:normal in all four ext   Joint Position Sense: normal in all four ext   Light Touch: normal in all four ext  Gait: Normal gait which is stable on turns. Normal arm swing. Romberg negative.    Cardiovascular Examination:  Heart is regular in rate and rhythm to auscultation. No significant murmurs. No carotid bruits. No significant peripheral edema. Pedal pulses are palpable bilaterally.     Musculoskeletal Examination:  Neck is supple with full range of motion. No tenderness to palpitations.      Investigations:  Images reviewed with patient  MR BRAIN W/O & W CONTRAST 11/20/2017 6:09 PM     Impression: No acute normality. Normal appearance of brainstem and  visualized cranial nerve cisternal segments.     I have personally reviewed the examination and initial interpretation  and I agree with the findings.     IAM MCCLURE MD    Impression/Recommendations:  1.  Right vocal cord atrophy/weakness  The patient has a hoarse voice related to right vocal cord atrophy.  There is no evidence of an underlying neurologic disorder on examination the remainder of her neurological examination is normal.  MRI scan of the brain is also normal.  In that setting the only thing we have not assess for his myasthenia gravis which I think is unlikely as it typically does not cause atrophy but in the setting of symptoms getting worse throughout the day I think it would warrant checking antibodies for myasthenia gravis.  I will write to the patient with those test results.    I tried to reassure the patient today that there is no evidence of underlying neurologic disease.  This appointment was quite stressful for her and she thinks her symptoms  became worse prior to the appointment mainly just due to worry.    Margarita Foster MD FAAN  Department of Neurology

## 2018-04-18 LAB
ACHR BIND AB SER-SCNC: 0 NMOL/L (ref 0–0.4)
ACHR MOD AB/ACHR TOTAL SFR SER: 0 %

## 2018-04-19 LAB — ACHR BLOCK AB/ACHR TOTAL SFR SER: 15 % (ref 0–26)

## 2018-04-23 ENCOUNTER — CARE COORDINATION (OUTPATIENT)
Dept: NEUROLOGY | Facility: CLINIC | Age: 53
End: 2018-04-23

## 2018-04-23 LAB — MUSK AB SER-SCNC: 0 NMOL/L (ref 0–0.02)

## 2018-05-01 ENCOUNTER — OFFICE VISIT (OUTPATIENT)
Dept: OTOLARYNGOLOGY | Facility: CLINIC | Age: 53
End: 2018-05-01
Payer: COMMERCIAL

## 2018-05-01 VITALS — WEIGHT: 145 LBS | HEIGHT: 67 IN | BODY MASS INDEX: 22.76 KG/M2

## 2018-05-01 DIAGNOSIS — R49.0 DYSPHONIA: Primary | ICD-10-CM

## 2018-05-01 DIAGNOSIS — J38.00 VOCAL CORD PARESIS: ICD-10-CM

## 2018-05-01 ASSESSMENT — PAIN SCALES - GENERAL: PAINLEVEL: NO PAIN (0)

## 2018-05-01 NOTE — LETTER
5/1/2018       RE: Tatiana Paredes  [address] [facility] [address]     Dear Colleague,    Thank you for referring your patient, Tatiana Paredes, to the [facility] VOICE at [facility]. Please see a copy of my visit note below.    [facility]  Jose Ramon Pisano Jr., M.D., F.A.C.S.  Amparo Brower M.D., M.P.H.  Radha Alford, Ph.D., CCC/SLP  Jada Lynn M.M. (voice), M.A., CCC/SLP  Calvin Tobias M.M. (voice), M.A., CCC/SLP    [facility]  VOICE/SPEECH/BREATHING THERAPY  CLINICAL FOLLOW-UP AND LARYNGEAL EXAMINATION REPORT    Clinician: Jada Lynn M.M. (voice), M.A., CCC/SLP  Seen in conjunction with: Dr. Pisano  Referring physician:  Norris  Patient: Tatiana Paredes  Date of Visit: 5/1/2018    HISTORY  PATIENT INFORMATION  Tatiana Paredes was seen for follow-up in conjunction with a visit to  Dr. Pisano today.  Please also refer to Dr. Pisano's dictation.  She was last seen by me on 10/31/17; however we deemed that no skilled intervention was necessary at that time.    PROGRESS SINCE LAST VISIT  Ms. Paredes reports:    Voice quality has worsened and is now more consistently poor.    Recently received the results from Dr. Foster, which were negative for a neurological pathology contributing to her symptoms.    PROGRESS ON LONG-TERM GOALS: limited; presents today to discuss next steps.    CURRENT/ ONGOING SYMPTOMS INCLUDE  VOICE    Variable poor voice quality; occasionally her voice can still experience an improved quality.  For example, she had a day a couple weekends ago, where she experienced an acceptable voice quality for most of the day, before fatiguing in the evening.    COUGH/THROAT CLEARING    Denies issues    SWALLOWING    Denies issues    RESPIRATION    Denies issues    OBJECTIVE FINDINGS  PERCEPTUAL EVALUATION (CPT 86594)  POSTURE / TENSION:     WNL    BREATHING:     appears within normal limits and adequate     phonation is  "not consistently coordinated with respiration    LARYNGEAL PALPATION:     supple musculature    VOICE:    Roughness: Mild to moderate Intermittent    Breathiness: Moderate Intermittent    Strain: Mild Intermittent     Intermittent mild diplophojnia    Loudness    Conversational speech:  Mildly reduced    Projected speech:  Mild to moderately reduced    Pitch:    Conversational speech:  WNL    Pitch glide: Limited upper and lower extremes.    Resonance:    Conversational speech:  laryngeal pharyngeal resonance    Singing vs. Speech:  Improved while singing    CAPE-V Overall Severity:  45/100    COUGH/THROAT CLEARING:    Not observed    Fundamental frequency Metrics     /a/ mean F0 = 253 Hz (SD = 2.31 Hz)     /i/ mean F0 = 263 Hz (SD = 1.39 Hz)     [place] Mean f0 = 119 Hz (SD 40.96 Hz)     [place]:         Min F0 = 79 Hz        Max F0 = 483 Hz        Range = 405 Hz        Cepstral Measures     CPPS /a/ = 21.98 dB     CPPS /i/ = 24.06 dB     CPPS \"all voiced\" = 20.04 dB     AVQI (v.3.01) = 4.93    Additional Measures     Harmonic to Noise Ratio /a/ = 25.83 dB     Harmonic to Noise Ratio: [place] = 12.92 dB     Jitter (local) /a/ = 0.563 %     [place]immer (local) /a/ = 3.342 %      AERO EVALUATION 65032 - Completed prior to any surgical intervention  Institution: [facility]  Name:   Address:  Date: May 01, 2018, Tue  Gender: Not Specified  Age: Not Specified  Diagnosis:   Acct. No.:   FILE:   Client Comments:   Exam Comments:   Normative Data: Female 40-59    Parameter Result Units Norm. Mean Norm. Std Dev Comment    Vital Capacity       Expiratory Volume 3.36 Liters 3.01 0.70   Comfortable Sustained Phonation       Mean SPL 85.40 dB 77.53 3.90   Mean Pitch 227.19 Hz 197.70 23.92   Peak Expiratory Airflow 0.210 Lit/Sec 0.21 0.09   Mean Expiratory Airflow 0.106 Lit/Sec 0.15 0.07   Expiratory Volume 1.00 Liters 0.82 0.41   Voicing Efficiency       Mean SPL 80.89 dB 75.64 4.23   Mean " Pitch 200.77 Hz 183.58 21.74   Peak Air Pressure 8.56 cm H2O 7.49 2.64   Mean Peak Air Pressure 6.83 cm H2O 5.76 1.51   Mean Expiratory Airflow 0.034 Lit/Sec     Mean Airflow During Voicing 0.037 Lit/Sec 0.13 0.06   Aerodynamic Power 0.023 roque 0.08 0.05   Aerodynamic Resistance 198.74 cm H2O/(l/s) 48.50 26.28   Acoustic Ohms 202.67 ds/cm5 49.46 26.80   Aerodynamic Efficiency 768.17 ppm 59.84 24.81   Running Speech       Mean SPL 64.71 dB     SPL Range 40.67 dB     Mean Pitch 194.63 Hz     Pitch Range 192.96 Hz     Peak Expiratory Airflow 0.796 Lit/Sec     Mean Expiratory Airflow 0.206 Lit/Sec     Expiratory Volume 0.60 Liters     Mean Airflow During Voicing 0.154 Lit/Sec     Peak Inspiratory Airflow -0.390 Lit/Sec     Inspiratory Volume -0.05 Liters     Running Speech (Repeated)       Mean SPL 70.04 dB     SPL Range 48.01 dB     Mean Pitch 190.62 Hz     Pitch Range 251.47 Hz     Peak Expiratory Airflow 0.622 Lit/Sec     Mean Expiratory Airflow 0.177 Lit/Sec     Expiratory Volume 3.79 Liters     Mean Airflow During Voicing 0.161 Lit/Sec     Peak Inspiratory Airflow -1.846 Lit/Sec     Inspiratory Volume -2.71 Liters       LARYNGEAL EXAMINATION  Procedure: Flexible endoscopy with chip-tip technology with stroboscopy, right nostril; topical anesthesia with 3% Lidocaine and 0.25% phenylephrine was applied.   Performed by: Jaad Lynn M.M. (voice), MAshlynAAshlyn, CCC/SLP   The laryngeal and pharyngeal structures were evaluated for gross appearance, mobility, function, and focal lesions / abnormalities of the associated mucosa.  Stroboscopy was warranted to evaluate closure, symmetry, and vibratory characteristics of the vocal folds.  All findings were within normal limits with the exception of the following salient features:     The right vocal fold demonstrates mildly sluggish movement during abduction, and there is also mild atrophy along the vibratory margin.    The left vocal fold is WNL    Glottic adduction is mildly  weak, incomplete due to right vocal fold atrophy.    Moderate AP supraglottic constriction during connected speech tasks    THERAPY PROBES: Improvement was elicited with coordination of respiration and phonation, use of glottic coup to promote vocal fold closure, use of yawn sigh and use of inhalation phonation    The addition of stroboscopy allowed evaluation of the mucosal wave:    Amplitude: right: mildly increased ; left: WNL.     Symmetry: intermittent symmetry.    Closure pattern: 1/2 spindle-shaped and incomplete.     Closure plane: at glottic level.     Phase distribution: slightly open-phase predominant.     The laryngeal exam was reviewed with Ms. Paredes, and I provided pertinent explanations, as well as written and oral information.    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 Dysphonia/discomfort is compounded by the hyperfunction and imbalanced function of the intrinsic and extrinsic laryngeal musculature  .  Dr. Pisano offered a trial of a temporary vocal fold augmentation with Cymetra.  She is amenable to this recommendation.  I provided her with printed information regarding the procedure, and encouraged her to use her exercises and speak after the treatment.  We agreed to have a post surgical session approximately one week after the Cymetra injection.    STIMULABILITY: results of therapy probes during perceptual and laryngeal evaluation demonstrate improvement with coordination of respiration and phonation, use of glottic coup to promote vocal fold closure and use of yawn sigh    RECOMMENDATIONS:     A course of speech therapy is recommended to optimize vocal technique, improve voice quality, promote reduced discomfort, effort and fatigue and optimize surgical results.    She demonstrates a Good prognosis for improvement given adherence to  therapeutic recommendations.     Positive indicators: positive response to therapy probes diagnosis is known to respond to treatment high level of comittment good awareness of patterns of use previously positive response to behavioral therapy    Negative indicators: none    DURATION / FREQUENCY:  1 one-hour post-surgical session.    GOALS:  Patient goal:   1. To understand the problem and fix it as much as possible  2. To have a normal and acceptable voice quality    Long-term goal(s): In 6 months, Ms. Paredes will:  Report a speaking voice quality that is acceptable to her, 80% of the time  Report resolution of dysphonia, and use of optimal voice quality to meet personal, social, and professional needs, 80% of the time during a typical week of vocal activities    This treatment plan was developed with the patient who agreed with the recommendations.    TOTAL SERVICE TIME: 75 minutes  EVALUATION OF VOICE AND RESONANCE: (51998): 15 minutes    ENDOSCOPIC LARYNGEAL EXAMINATION WITH STROBOSCOPY (87280): 30 minutes  NO CHARGE FACILITY FEE (24310)   AERO-ACOUSTIC TESTING 28475 - 30 min    Jada Lynn M.M. (voice), M.A., CCC/SLP  Speech-Language Pathologist  Certificate of Vocology  [facility]  [phone]  [email or web address]  Prounouns: she/her

## 2018-05-01 NOTE — NURSING NOTE
Chief Complaint   Patient presents with     RECHECK     follow up voice issues     Hoa Kim Medical Assistant

## 2018-05-01 NOTE — LETTER
5/1/2018       RE: Tatiana Paredes  [address]  [facility] [address]     Dear Colleague,    Thank you for referring your patient, Tatiana Paredes, to the [facility] EAR NOSE AND THROAT at [facility]. Please see a copy of my visit note below.    HISTORY OF PRESENT ILLNESS: Tatiana Paredes is a 52 year old female with a history of Dysphonia.  I saw her on 6/10/2014. At that time she had dysphonia following a prolonged use of her voice. We felt she had gastroesophageal reflux and a muscle tension dysphonia. She did quite well after therapy and did not have any voice problems until a bout of bronchitis in January and February 2017. She had a month of coughing. She lost her voice for a week then slowly got better in February 2017. Since that time she has had intermittent voice problems with 1 day her voice being normal and the next having a rough quality to her voice. She has had no changes in her swallow but is always had some difficulty with the last swallow of a meal. This has been going on for several years. In July she noted that her voice worsened after extensive use of her voice and it has maintained its roughness since that time. She has vocal fatigue which is unchanged from the entirety of this year. She  does better at higher pitches and with improved breath support. I saw her on August 1, 2017. At that time there was consistent but mild bowing of the right vocal fold and pooling of saliva in the right hypopharynx. Full motion was seen over the right vocal fold and no excessive dilatation or decrease in tone in the right hypopharynx was present. She has undergone speech therapy and she feels her voice has improved. She'll have occasional breaks in her vocal quality but this is short lived and she has been able to talk for an hour at a time without excessive vocal fatigue. She feels her voice has improved from a 2 out of 10 to a 4 out of 10. She  denies any swallowing difficulties except for the last amount of food that she does eat tends to catch. If she lies down after eating she will have a sensation of reflux. She denies any new symptoms of incoordination or difficulty walking or difficulty using her hands. She has no articulation difficulties.    On our last exam on January 2, 2018 she had some atrophy of the right vocal fold with subsequent enlargement of the right ventricle.  Fasciculations were seen in the right thyroid arytenoid muscle.  Because of these findings a neurology consult was obtained.      Ms. La is seen in follow-up from our March visit.  She did see Dr. Foster in neurology.  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  Laboratory tests were negative, MRI scan of the brain was also normal.  Antibodies for myasthenia gravis was also within normal limits.  Her swallowing is stable and her upper airway is stable.  She feels her voice has deteriorated slightly.  She has no other neurologic symptoms.      Last 2 Scores for Patient-Answered VHI Questionnaire  VHI Total Score 1/2/2018 5/1/2018   VHI Total Score 12 18       Last 2 Scores for Patient-Answered CSI Questionnaire  CSI Total Score 8/1/2017 1/2/2018   CSI Total Score 2 6         Last 2 Scores for Patient-Answered EAT Questionnaire  EAT Total Score 8/1/2017 1/2/2018   EAT Total Score 2 1           PAST MEDICAL HISTORY:   Past Medical History:   Diagnosis Date     Breast pain, left 11/2013     Choroidal nevus of right eye      Constipation      Dysphonia      Gastroesophageal reflux disease      Hemorrhoids      Hoarseness      Menorrhagia      Migraines        PAST SURGICAL HISTORY:   Past Surgical History:   Procedure Laterality Date      HYSTEROSCOPY, SURGICAL; W/ ENDOMETRIAL ABLATION, ANY METHOD  2011       FAMILY HISTORY:   Family History   Problem Relation Age of Onset     Breast Cancer Sister 52     Cancer  - colorectal Maternal Grandmother 70     Migraines Mother        SOCIAL HISTORY:   Social History   Substance Use Topics     Smoking status: Never Smoker     Smokeless tobacco: Never Used     Alcohol use 0.5 - 2.5 oz/week      Comment: 1-3 times a week       REVIEW OF SYSTEMS: Ten point review of systems was performed and is negative except for:   [facility] ROS 5/1/2018   Constitutional -   Neurology -   Ears, Nose, Throat Hoarseness        ALLERGIES: Review of patient's allergies indicates no known allergies.    MEDICATIONS:   Current Outpatient Prescriptions   Medication Sig Dispense Refill     nitrofurantoin, macrocrystal-monohydrate, (MACROBID) 100 MG capsule Take 1 capsule (100 mg) by mouth daily (Patient taking differently: Take 100 mg by mouth daily as needed ) 14 capsule 3     ranitidine (ZANTAC) 300 MG tablet Take 300 mg by mouth At Bedtime       valACYclovir (VALTREX) 500 MG tablet Take 1 tablet (500 mg) by mouth 2 times daily (Patient taking differently: Take 500 mg by mouth 2 times daily as needed ) 28 tablet 3         PHYSICAL EXAMINATION:  She  is awake, alert and in no apparent distress.    Her tympanic membranes are clear and intact bilaterally. External auditory canals are clear.  Nasal exam shows a mild septal deviation without obstruction. Examination of the oral cavity shows no suspicious lesions.  There is symmetric movement of the tongue and soft palate.    The oropharynx is clear.  Her neck is supple without significant adenopathy.  Pulse is regular.  Upper airway is clear.  Cranial nerves II-XII are grossly intact.   No lingual fasciculations are present.  There is good hand strength there is good finger coordination bilaterally.  There is no dysarthria of the lips tip of tongue or base of tongue.     PROCEDURE: A flexible laryngoscopy  was performed by our speech-language pathologist and reviewed by myself..  Informed consent was obtained and a time out was performed. 3% lidocaine and 0.25%  phenylephrine was sprayed into the nasal cavity and allowed 3 to 5 minutes for effect. The scope was passed through the right sided nostril. Examination showed the left vocal fold to be fully mobile.  The right vocal fold had reduced mobility although did at different times show full range of motion.  On many room repetitive and phonation tasks the right vocal fold remained in the midline.  No nodules, polyps or ulcerations are seen.  There is atrophy of the right vocal fold and bowing of the right vocal fold this appears to be less prominent that was seen on our 1/2/2018 examination.  There is a small persistent glottic gap present during phonation.  There is mild inflammation or erythema of the supraglottic or glottic larynx.  With phonation there is moderatecontraction of the supraglottic larynx.  The hypopharynx is otherwise clear as is the subglottis.           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  She has a negative neurologic evaluation and workup.  I did offer her a Cymetra injection into the right vocal fold.  I advised her that this has no long-term consequences either good or bad.  There is an 80% success rate with bulking of the vocal folds.  After thorough discussion she is interested in scheduling this and she we will find a time during the procedure portion of the clinic to get this done.    Again, thank you for allowing me to participate in the care of your patient.      Sincerely,    Jose Ramon Pisano MD

## 2018-05-01 NOTE — PROGRESS NOTES
[facility] VOICE [facility]  Jose Ramon Pisano Jr., M.D., F.A.C.S.  Amparo Brower M.D., M.P.H.  Radha Alford, Ph.D., CCC/SLP  Jada Lynn M.M. (voice), M.A., CCC/SLP  Calvin Tobias M.M. (voice), M.A., CCC/SLP    [facility] VOICE [facility]  VOICE/SPEECH/BREATHING THERAPY  CLINICAL FOLLOW-UP AND LARYNGEAL EXAMINATION REPORT    Clinician: Jada Lynn M.M. (voice), M.A., CCC/SLP  Seen in conjunction with: Dr. Pisano  Referring physician:  Norris  Patient: Tatiana Paredes  Date of Visit: 5/1/2018    HISTORY  PATIENT INFORMATION  Tatiana Paredes was seen for follow-up in conjunction with a visit to  Dr. Pisano today.  Please also refer to Dr. Pisano's dictation.  She was last seen by me on 10/31/17; however we deemed that no skilled intervention was necessary at that time.    PROGRESS SINCE LAST VISIT  Ms. Paredes reports:    Voice quality has worsened and is now more consistently poor.    Recently received the results from Dr. Foster, which were negative for a neurological pathology contributing to her symptoms.    PROGRESS ON LONG-TERM GOALS: limited; presents today to discuss next steps.    CURRENT/ ONGOING SYMPTOMS INCLUDE  VOICE    Variable poor voice quality; occasionally her voice can still experience an improved quality.  For example, she had a day a couple weekends ago, where she experienced an acceptable voice quality for most of the day, before fatiguing in the evening.    COUGH/THROAT CLEARING    Denies issues    SWALLOWING    Denies issues    RESPIRATION    Denies issues    OBJECTIVE FINDINGS  PERCEPTUAL EVALUATION (CPT 34272)  POSTURE / TENSION:     WNL    BREATHING:     appears within normal limits and adequate     phonation is not consistently coordinated with respiration    LARYNGEAL PALPATION:     supple musculature    VOICE:    Roughness: Mild to moderate Intermittent    Breathiness: Moderate Intermittent    Strain: Mild Intermittent     Intermittent mild diplophojnia    Loudness    Conversational speech:   "Mildly reduced    Projected speech:  Mild to moderately reduced    Pitch:    Conversational speech:  WNL    Pitch glide: Limited upper and lower extremes.    Resonance:    Conversational speech:  laryngeal pharyngeal resonance    Singing vs. Speech:  Improved while singing    CAPE-V Overall Severity:  45/100    COUGH/THROAT CLEARING:    Not observed    Fundamental frequency Metrics     /a/ mean F0 = 253 Hz (SD = 2.31 Hz)     /i/ mean F0 = 263 Hz (SD = 1.39 Hz)     [place] Mean f0 = 119 Hz (SD 40.96 Hz)     [place]:         Min F0 = 79 Hz        Max F0 = 483 Hz        Range = 405 Hz        Cepstral Measures     CPPS /a/ = 21.98 dB     CPPS /i/ = 24.06 dB     CPPS \"all voiced\" = 20.04 dB     AVQI (v.3.01) = 4.93    Additional Measures     Harmonic to Noise Ratio /a/ = 25.83 dB     Harmonic to Noise Ratio: [place] = 12.92 dB     Jitter (local) /a/ = 0.563 %     [place] (local) /a/ = 3.342 %      AERO EVALUATION 94272 - Completed prior to any surgical intervention  Institution: [facility]  Name:   Address:  Date: May 01, 2018, Tue  Gender: Not Specified  Age: Not Specified  Diagnosis:   Acct. No.:   FILE:   Client Comments:   Exam Comments:   Normative Data: Female 40-59    Parameter Result Units Norm. Mean Norm. Std Dev Comment    Vital Capacity       Expiratory Volume 3.36 Liters 3.01 0.70   Comfortable Sustained Phonation       Mean SPL 85.40 dB 77.53 3.90   Mean Pitch 227.19 Hz 197.70 23.92   Peak Expiratory Airflow 0.210 Lit/Sec 0.21 0.09   Mean Expiratory Airflow 0.106 Lit/Sec 0.15 0.07   Expiratory Volume 1.00 Liters 0.82 0.41   Voicing Efficiency       Mean SPL 80.89 dB 75.64 4.23   Mean Pitch 200.77 Hz 183.58 21.74   Peak Air Pressure 8.56 cm H2O 7.49 2.64   Mean Peak Air Pressure 6.83 cm H2O 5.76 1.51   Mean Expiratory Airflow 0.034 Lit/Sec     Mean Airflow During Voicing 0.037 Lit/Sec 0.13 0.06   Aerodynamic Power 0.023 roque 0.08 0.05   Aerodynamic Resistance 198.74 cm " H2O/(l/s) 48.50 26.28   Acoustic Ohms 202.67 ds/cm5 49.46 26.80   Aerodynamic Efficiency 768.17 ppm 59.84 24.81   Running Speech       Mean SPL 64.71 dB     SPL Range 40.67 dB     Mean Pitch 194.63 Hz     Pitch Range 192.96 Hz     Peak Expiratory Airflow 0.796 Lit/Sec     Mean Expiratory Airflow 0.206 Lit/Sec     Expiratory Volume 0.60 Liters     Mean Airflow During Voicing 0.154 Lit/Sec     Peak Inspiratory Airflow -0.390 Lit/Sec     Inspiratory Volume -0.05 Liters     Running Speech (Repeated)       Mean SPL 70.04 dB     SPL Range 48.01 dB     Mean Pitch 190.62 Hz     Pitch Range 251.47 Hz     Peak Expiratory Airflow 0.622 Lit/Sec     Mean Expiratory Airflow 0.177 Lit/Sec     Expiratory Volume 3.79 Liters     Mean Airflow During Voicing 0.161 Lit/Sec     Peak Inspiratory Airflow -1.846 Lit/Sec     Inspiratory Volume -2.71 Liters       LARYNGEAL EXAMINATION  Procedure: Flexible endoscopy with chip-tip technology with stroboscopy, right nostril; topical anesthesia with 3% Lidocaine and 0.25% phenylephrine was applied.   Performed by: Jada Lynn M.M. (voice), MAshlynAAshlyn, CCC/SLP   The laryngeal and pharyngeal structures were evaluated for gross appearance, mobility, function, and focal lesions / abnormalities of the associated mucosa.  Stroboscopy was warranted to evaluate closure, symmetry, and vibratory characteristics of the vocal folds.  All findings were within normal limits with the exception of the following salient features:     The right vocal fold demonstrates mildly sluggish movement during abduction, and there is also mild atrophy along the vibratory margin.    The left vocal fold is WNL    Glottic adduction is mildly weak, incomplete due to right vocal fold atrophy.    Moderate AP supraglottic constriction during connected speech tasks    THERAPY PROBES: Improvement was elicited with coordination of respiration and phonation, use of glottic coup to promote vocal fold closure, use of yawn sigh and use of  inhalation phonation    The addition of stroboscopy allowed evaluation of the mucosal wave:    Amplitude: right: mildly increased ; left: WNL.     Symmetry: intermittent symmetry.    Closure pattern: 1/2 spindle-shaped and incomplete.     Closure plane: at glottic level.     Phase distribution: slightly open-phase predominant.     The laryngeal exam was reviewed with Ms. Paredes, and I provided pertinent explanations, as well as written and oral information.    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 Dysphonia/discomfort is compounded by the hyperfunction and imbalanced function of the intrinsic and extrinsic laryngeal musculature  .  Dr. Pisano offered a trial of a temporary vocal fold augmentation with Cymetra.  She is amenable to this recommendation.  I provided her with printed information regarding the procedure, and encouraged her to use her exercises and speak after the treatment.  We agreed to have a post surgical session approximately one week after the Cymetra injection.    STIMULABILITY: results of therapy probes during perceptual and laryngeal evaluation demonstrate improvement with coordination of respiration and phonation, use of glottic coup to promote vocal fold closure and use of yawn sigh    RECOMMENDATIONS:     A course of speech therapy is recommended to optimize vocal technique, improve voice quality, promote reduced discomfort, effort and fatigue and optimize surgical results.    She demonstrates a Good prognosis for improvement given adherence to therapeutic recommendations.     Positive indicators: positive response to therapy probes diagnosis is known to respond to treatment high level of comittment good awareness of patterns of use previously positive response to behavioral therapy    Negative indicators: none    DURATION / FREQUENCY:  1  one-hour post-surgical session.    GOALS:  Patient goal:   1. To understand the problem and fix it as much as possible  2. To have a normal and acceptable voice quality    Long-term goal(s): In 6 months, Ms. Paredes will:  Report a speaking voice quality that is acceptable to her, 80% of the time  Report resolution of dysphonia, and use of optimal voice quality to meet personal, social, and professional needs, 80% of the time during a typical week of vocal activities    This treatment plan was developed with the patient who agreed with the recommendations.    TOTAL SERVICE TIME: 75 minutes  EVALUATION OF VOICE AND RESONANCE: (75092): 15 minutes    ENDOSCOPIC LARYNGEAL EXAMINATION WITH STROBOSCOPY (60498): 30 minutes  NO CHARGE FACILITY FEE (30470)   AERO-ACOUSTIC TESTING 83264 - 30 min    Jada Lynn M.M. (voice), M.A., CCC/SLP  Speech-Language Pathologist  Certificate of Vocology  [facility]  [phone]  [email or web address]  Prounouns: she/her

## 2018-05-01 NOTE — MR AVS SNAPSHOT
After Visit Summary   5/1/2018    Tatiana Paredes    MRN: [id number]           Patient Information     Date Of Birth          1965        Visit Information        Provider Department      5/1/2018 8:15 AM Jada Lynn SLP [facility] Voice        Today's Diagnoses     Dysphonia    -  1    Vocal cord paresis           Follow-ups after your visit        Your next 10 appointments already scheduled     May 17, 2018 12:30 PM CDT   (Arrive by 12:15 PM)   Return Botox with MD BARRETT [facility] N[facility] ([facility])    [address] [place] [id number] [phone]           This is a multi-disciplinary care team visit as patients with your type of problem are usually seen by a team of an MD and a Speech-Language Pathologist (who is a specialist in disorders of the voice, throat, and breathing).  Please plan about 2 hours for your visit, which will likely include Laryngeal Function Studies, a Voice/Swallow/Breathing Evaluation, and an Endoscopic Laryngeal Examination to provide a comprehensive evaluation.  Please check with your insurance company to ensure you are covered for these services. - It is important to know that if you are evaluated and/or treated by both a physician and a speech pathologist during your visit, your billing will reflect the input that you receive from both providers as separate professionals. Although most insurance plans do cover these services, we encourage you to contact your insurance company prior to your visit to determine whether there are any coverage limitations that might affect you financially. - Billing/procedure codes that are frequently associated with visits to our clinic include (but are not limited to) the ones listed below. Most patients will not need all of these items, but it may be useful to ask your insurance company's patient . 63025: Flexible  fiberoptic laryngoscopy, 75577: Laryngoscopy; flexible or rigid fiberoptic, with stroboscopy, 55436: Flexible endoscopic evaluation of swallowing, 87713: Laryngeal function aerodynamic evaluation, 29262: Evaluation of Voice and Resonance, 60084: Speech pathology treatment for voice, speech, communication, 59461: Speech pathology treatment for swallowing/oral function for feeding - If you have any concerns or questions, or if you would prefer not to have a speech pathologist involved in your visit, please contact our Clinic Coordinator at [phone], at least 24 hours prior to your appointment.            May 23, 2018  8:00 AM CDT   (Arrive by 7:45 AM)   Return Visit with ERIN Dan   [facility] ([facility])    [address] [place] [address] [phone]              Who to contact     Please call your clinic at [phone] to:    Ask questions about your health    Make or cancel appointments    Discuss your medicines    Learn about your test results    Speak to your doctor            Additional Information About Your Visit        [employer] Platform Information     [employer] Platform is an electronic gateway that provides easy, online access to your medical records. With [employer] Platform, you can request a clinic appointment, read your test results, renew a prescription or communicate with your care team.     To sign up for [employer] Platform visit the website at [email or web address]   You will be asked to enter the access code listed below, as well as some personal information. Please follow the directions to create your username and password.     Your access code is: [email or web address]  Expires: 2018  6:31 AM     Your access code will  in 90 days. If you need help or a new code, please contact your [facility] or call [phone] for assistance.        Care EveryWhere ID     This is your Care EveryWhere ID. This could be used by other  organizations to access your [facility] medical records  [id number]         Blood Pressure from Last 3 Encounters:   04/16/18 138/78   12/11/17 115/73   11/06/17 109/72    Weight from Last 3 Encounters:   05/01/18 65.8 kg (145 lb)   04/16/18 66.2 kg (145 lb 14.4 oz)   12/11/17 65.7 kg (144 lb 14.4 oz)              We Performed the Following     C BEHAVIORAL & QUALITATIVE ANALYSIS VOICE AND RESONANCE     LARYNGEAL FUNCTION STUDIES     LARYNGOSCOPY FLEX/RIGID W STROBOSCOPY          Today's Medication Changes          These changes are accurate as of 5/1/18 10:46 AM.  If you have any questions, ask your nurse or doctor.               These medicines have changed or have updated prescriptions.        Dose/Directions    nitroFURantoin (macrocrystal-monohydrate) 100 MG capsule   Commonly known as:  MACROBID   This may have changed:    - when to take this  - reasons to take this   Used for:  Frequent UTI        Dose:  100 mg   Take 1 capsule (100 mg) by mouth daily   Quantity:  14 capsule   Refills:  3       valACYclovir 500 MG tablet   Commonly known as:  VALTREX   This may have changed:    - when to take this  - reasons to take this   Used for:  HSV (herpes simplex virus) anogenital infection        Dose:  500 mg   Take 1 tablet (500 mg) by mouth 2 times daily   Quantity:  28 tablet   Refills:  3                Primary Care Provider Office Phone # Fax #    Cruz Prather [phone]       [address] [address]        Equal Access to Services     ELSIE TELLO : Chito munguiao Soestrella, waaxda luqadaha, qaybta kaalmada adeegyada, waxerika mik moura. So [facility] [phone].    ATENCIÓN: Si habla elsiañol, tiene a cazares disposición servicios gratuitos de asistencia lingüística. Llame al [phone].    We comply with applicable federal civil rights laws and [place] laws. We do not discriminate on the basis of race, color, national origin, age, disability,  sex, sexual orientation, or gender identity.            Thank you!     Thank you for choosing  [facility]ICE  for your care. Our goal is always to provide you with excellent care. Hearing back from our patients is one way we can continue to improve our services. Please take a few minutes to complete the written survey that you may receive in the mail after your visit with us. Thank you!             Your Updated Medication List - Protect others around you: Learn how to safely use, store and throw away your medicines at www.disposemymeds.org.          This list is accurate as of 5/1/18 10:46 AM.  Always use your most recent med list.                   Brand Name Dispense Instructions for use Diagnosis    nitroFURantoin (macrocrystal-monohydrate) 100 MG capsule    MACROBID    14 capsule    Take 1 capsule (100 mg) by mouth daily    Frequent UTI       ranitidine 300 MG tablet    ZANTAC     Take 300 mg by mouth At Bedtime        valACYclovir 500 MG tablet    VALTREX    28 tablet    Take 1 tablet (500 mg) by mouth 2 times daily    HSV (herpes simplex virus) anogenital infection

## 2018-05-01 NOTE — PROGRESS NOTES
HISTORY OF PRESENT ILLNESS: Tatiana Paredes is a 52 year old female with a history of Dysphonia.  I saw her on 6/10/2014. At that time she had dysphonia following a prolonged use of her voice. We felt she had gastroesophageal reflux and a muscle tension dysphonia. She did quite well after therapy and did not have any voice problems until a bout of bronchitis in January and February 2017. She had a month of coughing. She lost her voice for a week then slowly got better in February 2017. Since that time she has had intermittent voice problems with 1 day her voice being normal and the next having a rough quality to her voice. She has had no changes in her swallow but is always had some difficulty with the last swallow of a meal. This has been going on for several years. In July she noted that her voice worsened after extensive use of her voice and it has maintained its roughness since that time. She has vocal fatigue which is unchanged from the entirety of this year. She  does better at higher pitches and with improved breath support. I saw her on August 1, 2017. At that time there was consistent but mild bowing of the right vocal fold and pooling of saliva in the right hypopharynx. Full motion was seen over the right vocal fold and no excessive dilatation or decrease in tone in the right hypopharynx was present. She has undergone speech therapy and she feels her voice has improved. She'll have occasional breaks in her vocal quality but this is short lived and she has been able to talk for an hour at a time without excessive vocal fatigue. She feels her voice has improved from a 2 out of 10 to a 4 out of 10. She denies any swallowing difficulties except for the last amount of food that she does eat tends to catch. If she lies down after eating she will have a sensation of reflux. She denies any new symptoms of incoordination or difficulty walking or difficulty using her hands. She has no articulation  difficulties.    On our last exam on January 2, 2018 she had some atrophy of the right vocal fold with subsequent enlargement of the right ventricle.  Fasciculations were seen in the right thyroid arytenoid muscle.  Because of these findings a neurology consult was obtained.      Ms. La is seen in follow-up from our March visit.  She did see Dr. Foster in neurology.  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  Laboratory tests were negative, MRI scan of the brain was also normal.  Antibodies for myasthenia gravis was also within normal limits.  Her swallowing is stable and her upper airway is stable.  She feels her voice has deteriorated slightly.  She has no other neurologic symptoms.      Last 2 Scores for Patient-Answered VHI Questionnaire  VHI Total Score 1/2/2018 5/1/2018   VHI Total Score 12 18       Last 2 Scores for Patient-Answered CSI Questionnaire  CSI Total Score 8/1/2017 1/2/2018   CSI Total Score 2 6         Last 2 Scores for Patient-Answered EAT Questionnaire  EAT Total Score 8/1/2017 1/2/2018   EAT Total Score 2 1           PAST MEDICAL HISTORY:   Past Medical History:   Diagnosis Date     Breast pain, left 11/2013     Choroidal nevus of right eye      Constipation      Dysphonia      Gastroesophageal reflux disease      Hemorrhoids      Hoarseness      Menorrhagia      Migraines        PAST SURGICAL HISTORY:   Past Surgical History:   Procedure Laterality Date     HC HYSTEROSCOPY, SURGICAL; W/ ENDOMETRIAL ABLATION, ANY METHOD  2011       FAMILY HISTORY:   Family History   Problem Relation Age of Onset     Breast Cancer Sister 52     Cancer - colorectal Maternal Grandmother 70     Migraines Mother        SOCIAL HISTORY:   Social History   Substance Use Topics     Smoking status: Never Smoker     Smokeless tobacco: Never Used     Alcohol use 0.5 - 2.5 oz/week      Comment: 1-3 times a week       REVIEW OF SYSTEMS: Ten point review  of systems was performed and is negative except for:    ENT ROS 5/1/2018   Constitutional -   Neurology -   Ears, Nose, Throat Hoarseness        ALLERGIES: Review of patient's allergies indicates no known allergies.    MEDICATIONS:   Current Outpatient Prescriptions   Medication Sig Dispense Refill     nitrofurantoin, macrocrystal-monohydrate, (MACROBID) 100 MG capsule Take 1 capsule (100 mg) by mouth daily (Patient taking differently: Take 100 mg by mouth daily as needed ) 14 capsule 3     ranitidine (ZANTAC) 300 MG tablet Take 300 mg by mouth At Bedtime       valACYclovir (VALTREX) 500 MG tablet Take 1 tablet (500 mg) by mouth 2 times daily (Patient taking differently: Take 500 mg by mouth 2 times daily as needed ) 28 tablet 3         PHYSICAL EXAMINATION:  She  is awake, alert and in no apparent distress.    Her tympanic membranes are clear and intact bilaterally. External auditory canals are clear.  Nasal exam shows a mild septal deviation without obstruction. Examination of the oral cavity shows no suspicious lesions.  There is symmetric movement of the tongue and soft palate.    The oropharynx is clear.  Her neck is supple without significant adenopathy.  Pulse is regular.  Upper airway is clear.  Cranial nerves II-XII are grossly intact.   No lingual fasciculations are present.  There is good hand strength there is good finger coordination bilaterally.  There is no dysarthria of the lips tip of tongue or base of tongue.     PROCEDURE: A flexible laryngoscopy  was performed by our speech-language pathologist and reviewed by myself..  Informed consent was obtained and a time out was performed. 3% lidocaine and 0.25% phenylephrine was sprayed into the nasal cavity and allowed 3 to 5 minutes for effect. The scope was passed through the right sided nostril. Examination showed the left vocal fold to be fully mobile.  The right vocal fold had reduced mobility although did at different times show full range of  motion.  On many room repetitive and phonation tasks the right vocal fold remained in the midline.  No nodules, polyps or ulcerations are seen.  There is atrophy of the right vocal fold and bowing of the right vocal fold this appears to be less prominent that was seen on our 1/2/2018 examination.  There is a small persistent glottic gap present during phonation.  There is mild inflammation or erythema of the supraglottic or glottic larynx.  With phonation there is moderatecontraction of the supraglottic larynx.  The hypopharynx is otherwise clear as is the subglottis.           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  She has a negative neurologic evaluation and workup.  I did offer her a Cymetra injection into the right vocal fold.  I advised her that this has no long-term consequences either good or bad.  There is an 80% success rate with bulking of the vocal folds.  After thorough discussion she is interested in scheduling this and she we will find a time during the procedure portion of the clinic to get this done.

## 2018-05-01 NOTE — MR AVS SNAPSHOT
After Visit Summary   2018    Tatiana Paredes    MRN: [id number]           Patient Information     Date Of Birth          1965        Visit Information        Provider Department      2018 8:15 AM Jose Ramon Pisano MD [facility] Ear Nose and Throat        Today's Diagnoses     Dysphonia    -  1    Vocal cord paresis          Care Instructions    Plan of care:  Follow up with Dr Pisano on  at 1230 for a cymetra injection  Clinic contact information:  1. To schedule an appointment call [phone], option 1  2. To talk to the Triage RN call [phone], option 3  3. If you need to speak to Leila or get a message to your doctor on a Friday, call the triage RN  4. LeilaRN: [phone]  5. Surgery scheduling:      Mary Guerin: [phone]      Aurelia Walker: [phone]  6. Fax: [phone]  7. [phone]            Follow-ups after your visit        Additional Services     [facility] REFERRAL       SPEECH-LANGUAGE PATHOLOGY SERVICE(S) REQUESTED:  Evaluate and treat  Laryngoscopy; add Stroboscopy as needed for assessment of vibratory characteristics of vocal fold mucosa in evaluation of dysphonia; add Flexible Endoscopic Evaluation of Swallowing as needed for evaluation of swallow when dysphagia is suspected; if patient is unable to tolerate laryngoscopy, may use 3% lidocaine/0.25% phenylephrine or 20% oral anesthetic benzocaine as needed, but not for evaluation of swallow.    Radha Alford, Ph.D., CCC/SLP  Speech-Language Pathologist  Director, [facility]  [phone]    Jada Lynn M.M., M.A., CCC/SLP  Speech-Language Pathologist  [facility]  [phone]    Allison Alpers, M.A., CCC/SLP  Speech-Language Pathologist  [facility]    Matthieu Tobias M.M., M.A., CCC/SLP  Speech-Language Pathologist  [facility]                  Follow-up notes from your care team     Return for to secheudle VF injection.      Your  next 10 appointments already scheduled     May 17, 2018 12:30 PM CDT   (Arrive by 12:15 PM)   Return Botox with Jose Ramon Pisano MD   [facility] Ear Nose and Throat ([facility] [place])    [address] [id number] [phone]           This is a multi-disciplinary care team visit as patients with your type of problem are usually seen by a team of an MD and a Speech-Language Pathologist (who is a specialist in disorders of the voice, throat, and breathing).  Please plan about 2 hours for your visit, which will likely include Laryngeal Function Studies, a Voice/Swallow/Breathing Evaluation, and an Endoscopic Laryngeal Examination to provide a comprehensive evaluation.  Please check with your insurance company to ensure you are covered for these services. - It is important to know that if you are evaluated and/or treated by both a physician and a speech pathologist during your visit, your billing will reflect the input that you receive from both providers as separate professionals. Although most insurance plans do cover these services, we encourage you to contact your insurance company prior to your visit to determine whether there are any coverage limitations that might affect you financially. - Billing/procedure codes that are frequently associated with visits to our clinic include (but are not limited to) the ones listed below. Most patients will not need all of these items, but it may be useful to ask your insurance company's patient . 23002: Flexible fiberoptic laryngoscopy, 55596: Laryngoscopy; flexible or rigid fiberoptic, with stroboscopy, 69438: Flexible endoscopic evaluation of swallowing, 39579: Laryngeal function aerodynamic evaluation, 87999: Evaluation of Voice and Resonance, 68865: Speech pathology treatment for voice, speech, communication, 47973: Speech pathology treatment for swallowing/oral function for feeding - If  "you have any concerns or questions, or if you would prefer not to have a speech pathologist involved in your visit, please contact our Clinic Coordinator at [phone], at least 24 hours prior to your appointment.            May 23, 2018  8:00 AM CDT   (Arrive by 7:45 AM)   Return Visit with ERIN Dan   [facility] ([facility])    [address] [facility]  [address] [facility] [address] [phone]              Who to contact     Please call your clinic at [phone] to:    Ask questions about your health    Make or cancel appointments    Discuss your medicines    Learn about your test results    Speak to your doctor            Additional Information About Your Visit        [employer] Information     [employer] is an electronic gateway that provides easy, online access to your medical records. With [employer], you can request a clinic appointment, read your test results, renew a prescription or communicate with your care team.     To sign up for [employer] visit the website at [email or web address]   You will be asked to enter the access code listed below, as well as some personal information. Please follow the directions to create your username and password.     Your access code is: [email or web address]  Expires: 2018  6:31 AM     Your access code will  in 90 days. If you need help or a new code, please contact your [facility] or call [phone] for assistance.        Care EveryWhere ID     This is your Care EveryWhere ID. This could be used by other organizations to access your [place] medical records  [id number]        Your Vitals Were     Height BMI (Body Mass Index)                1.702 m (5' 7.01\") 22.7 kg/m2           Blood Pressure from Last 3 Encounters:   18 138/78   17 115/73   17 109/72    Weight from Last 3 Encounters:   18 65.8 kg (145 lb)   18 66.2 kg (145 lb 14.4 oz)   17 65.7 kg (144 " lb 14.4 oz)              We Performed the Following     IMAGESTREAM RECORDING ORDER     [facility] REFERRAL          Today's Medication Changes          These changes are accurate as of 5/1/18  1:39 PM.  If you have any questions, ask your nurse or doctor.               These medicines have changed or have updated prescriptions.        Dose/Directions    nitroFURantoin (macrocrystal-monohydrate) 100 MG capsule   Commonly known as:  MACROBID   This may have changed:    - when to take this  - reasons to take this   Used for:  Frequent UTI        Dose:  100 mg   Take 1 capsule (100 mg) by mouth daily   Quantity:  14 capsule   Refills:  3       valACYclovir 500 MG tablet   Commonly known as:  VALTREX   This may have changed:    - when to take this  - reasons to take this   Used for:  HSV (herpes simplex virus) anogenital infection        Dose:  500 mg   Take 1 tablet (500 mg) by mouth 2 times daily   Quantity:  28 tablet   Refills:  3                Primary Care Provider Office Phone # Fax #    Cruz Prather -[phone]       [address] [address]        Equal Access to Services     [facility]: Hadii estrellita huffman hadasho Soomaali, waaxda luqadaha, qaybta kaalmada adeegyatomas, sarahy machuca . So [facility] [phone].    ATENCIÓN: Si habla español, tiene a cazares disposición servicios gratuitos de asistencia lingüística. Llame al [phone].    We comply with applicable federal civil rights laws and [place] laws. We do not discriminate on the basis of race, color, national origin, age, disability, sex, sexual orientation, or gender identity.            Thank you!     Thank you for choosing [facility] E[facility] NOSE [facility] TH[facility]AT  for your care. Our goal is always to provide you with excellent care. Hearing back from our patients is one way we can continue to improve our services. Please take a few minutes to complete the written survey that you may receive in  the mail after your visit with us. Thank you!             Your Updated Medication List - Protect others around you: Learn how to safely use, store and throw away your medicines at www.disposemymeds.org.          This list is accurate as of 5/1/18  1:39 PM.  Always use your most recent med list.                   Brand Name Dispense Instructions for use Diagnosis    nitroFURantoin (macrocrystal-monohydrate) 100 MG capsule    MACROBID    14 capsule    Take 1 capsule (100 mg) by mouth daily    Frequent UTI       ranitidine 300 MG tablet    ZANTAC     Take 300 mg by mouth At Bedtime        valACYclovir 500 MG tablet    VALTREX    28 tablet    Take 1 tablet (500 mg) by mouth 2 times daily    HSV (herpes simplex virus) anogenital infection

## 2018-05-16 ENCOUNTER — TELEPHONE (OUTPATIENT)
Dept: OTOLARYNGOLOGY | Facility: CLINIC | Age: 53
End: 2018-05-16

## 2018-05-16 NOTE — TELEPHONE ENCOUNTER
[facility]    Phone Message    May a detailed message be left on voicemail: yes    Reason for Call: Other: Patient called and she is coming down with a cold and wonders if she should post pone appt till she is better, he  has also been ill with bad cough and she worries she will be like him and is unsure of injection and having a deep cough.  Please call to advise      Action Taken: Message routed to:  [facility] ([facility] ENT

## 2018-05-22 ENCOUNTER — OFFICE VISIT (OUTPATIENT)
Dept: FAMILY MEDICINE | Facility: CLINIC | Age: 53
End: 2018-05-22
Payer: COMMERCIAL

## 2018-05-22 VITALS
TEMPERATURE: 97.8 F | HEART RATE: 68 BPM | HEIGHT: 67 IN | DIASTOLIC BLOOD PRESSURE: 81 MMHG | OXYGEN SATURATION: 99 % | SYSTOLIC BLOOD PRESSURE: 121 MMHG | RESPIRATION RATE: 16 BRPM | WEIGHT: 148 LBS | BODY MASS INDEX: 23.23 KG/M2

## 2018-05-22 DIAGNOSIS — R07.0 THROAT PAIN: Primary | ICD-10-CM

## 2018-05-22 LAB — S PYO AG THROAT QL IA.RAPID: NORMAL

## 2018-05-22 ASSESSMENT — ANXIETY QUESTIONNAIRES
IF YOU CHECKED OFF ANY PROBLEMS ON THIS QUESTIONNAIRE, HOW DIFFICULT HAVE THESE PROBLEMS MADE IT FOR YOU TO DO YOUR WORK, TAKE CARE OF THINGS AT HOME, OR GET ALONG WITH OTHER PEOPLE: NOT DIFFICULT AT ALL
2. NOT BEING ABLE TO STOP OR CONTROL WORRYING: NOT AT ALL
6. BECOMING EASILY ANNOYED OR IRRITABLE: NOT AT ALL
3. WORRYING TOO MUCH ABOUT DIFFERENT THINGS: NOT AT ALL
5. BEING SO RESTLESS THAT IT IS HARD TO SIT STILL: NOT AT ALL
1. FEELING NERVOUS, ANXIOUS, OR ON EDGE: NOT AT ALL
GAD7 TOTAL SCORE: 0
7. FEELING AFRAID AS IF SOMETHING AWFUL MIGHT HAPPEN: NOT AT ALL

## 2018-05-22 ASSESSMENT — PATIENT HEALTH QUESTIONNAIRE - PHQ9: 5. POOR APPETITE OR OVEREATING: NOT AT ALL

## 2018-05-22 NOTE — MR AVS SNAPSHOT
After Visit Summary   5/22/2018    Tatiana Paredes    MRN: [id number]           Patient Information     Date Of Birth          1965        Visit Information        Provider Department      5/22/2018 10:00 AM Leila Louis APRN CNP [facility]        Today's Diagnoses     Throat pain    -  1      Care Instructions    Sore throat and hoarseness  Continue warm fluids with honey and lemon   May try Robitussin DM or similar at bedtime to promote sleep. This includes a cough suppressant and an expectorant  Seek care if worsening symptoms, new onset fever, chills, inability to swallow, eat or drink or shortness of breath.   Exam today does not show major chest congestion or throat findings.   Rapid strep test is negative. Throat culture is pending. Will notify if positive  Follow with ENT as planned  Maintain hydration           Follow-ups after your visit        Your next 10 appointments already scheduled     May 31, 2018 12:00 PM CDT   (Arrive by 11:45 AM)   Procedure with Jose Ramon Pisano MD   [facility] Ear Nose and Throat ([facility] and [facility] [place])    [address] [place] [address] [phone]           This is a multi-disciplinary care team visit as patients with your type of problem are usually seen by a team of an MD and a Speech-Language Pathologist (who is a specialist in disorders of the voice, throat, and breathing).  Please plan about 2 hours for your visit, which will likely include Laryngeal Function Studies, a Voice/Swallow/Breathing Evaluation, and an Endoscopic Laryngeal Examination to provide a comprehensive evaluation.  Please check with your insurance company to ensure you are covered for these services. - It is important to know that if you are evaluated and/or treated by both a physician and a speech pathologist during your visit, your billing will reflect the input that you receive from both providers as  separate professionals. Although most insurance plans do cover these services, we encourage you to contact your insurance company prior to your visit to determine whether there are any coverage limitations that might affect you financially. - Billing/procedure codes that are frequently associated with visits to our clinic include (but are not limited to) the ones listed below. Most patients will not need all of these items, but it may be useful to ask your insurance company's patient . 00160: Flexible fiberoptic laryngoscopy, 33040: Laryngoscopy; flexible or rigid fiberoptic, with stroboscopy, 98858: Flexible endoscopic evaluation of swallowing, 81165: Laryngeal function aerodynamic evaluation, 38883: Evaluation of Voice and Resonance, 38348: Speech pathology treatment for voice, speech, communication, 28760: Speech pathology treatment for swallowing/oral function for feeding - If you have any concerns or questions, or if you would prefer not to have a speech pathologist involved in your visit, please contact our Clinic Coordinator at [phone], at least 24 hours prior to your appointment.            Jun 11, 2018  3:00 PM CDT   (Arrive by 2:45 PM)   Return Visit with ERIN Dan   [facility] ([email or web address] and [facility] [place])    [address] [place] [address] [phone]              Who to contact     Please call your clinic at [phone] to:    Ask questions about your health    Make or cancel appointments    Discuss your medicines    Learn about your test results    Speak to your doctor            Additional Information About Your Visit        MyChart Information     [employer] is an electronic gateway that provides easy, online access to your medical records. With [facility], you can request a clinic appointment, read your test results, renew a prescription or communicate with your care team.     To sign up for [employer] visit the website at  "[email or web address]   You will be asked to enter the access code listed below, as well as some personal information. Please follow the directions to create your username and password.     Your access code is: [email or web address]  Expires: 2018  6:31 AM     Your access code will  in 90 days. If you need help or a new code, please contact your [facility] or call [phone] for assistance.        Care EveryWhere ID     This is your Care EveryWhere ID. This could be used by other organizations to access your [place] medical records  [id number]        Your Vitals Were     Pulse Temperature Respirations Height Pulse Oximetry Breastfeeding?    68 97.8  F (36.6  C) (Oral) 16 5' 7.4\" (171.2 cm) 99% No    BMI (Body Mass Index)                   22.91 kg/m2            Blood Pressure from Last 3 Encounters:   18 121/81   18 138/78   17 115/73    Weight from Last 3 Encounters:   18 148 lb (67.1 kg)   18 145 lb (65.8 kg)   18 145 lb 14.4 oz (66.2 kg)              We Performed the Following     Strep Culture (GABS)     Strep Screen Rapid (Group) ([facility])          Today's Medication Changes          These changes are accurate as of 18 10:21 AM.  If you have any questions, ask your nurse or doctor.               These medicines have changed or have updated prescriptions.        Dose/Directions    nitroFURantoin (macrocrystal-monohydrate) 100 MG capsule   Commonly known as:  MACROBID   This may have changed:    - when to take this  - reasons to take this   Used for:  Frequent UTI        Dose:  100 mg   Take 1 capsule (100 mg) by mouth daily   Quantity:  14 capsule   Refills:  3       valACYclovir 500 MG tablet   Commonly known as:  VALTREX   This may have changed:    - when to take this  - reasons to take this   Used for:  HSV (herpes simplex virus) anogenital infection        Dose:  500 mg   Take 1 tablet (500 mg) by mouth 2 times " daily   Quantity:  28 tablet   Refills:  3                Primary Care Provider Office Phone # Fax #    Cruz Prather -[phone]       [address] [address]        Equal Access to Services     ELSIE TELLO : Hadii estrellita huffman ezrao Soomaali, waaxda luqadaha, qaybta kaalmada adeegyada, sarahy damiann tunde mujica laJennifercharlie moura. So [facility] [phone].    ATENCIÓN: Si habla español, tiene a cazares disposición servicios gratuitos de asistencia lingüística. Llame al [phone].    We comply with applicable federal civil rights laws and [place] laws. We do not discriminate on the basis of race, color, national origin, age, disability, sex, sexual orientation, or gender identity.            Thank you!     Thank you for choosing [facility]  for your care. Our goal is always to provide you with excellent care. Hearing back from our patients is one way we can continue to improve our services. Please take a few minutes to complete the written survey that you may receive in the mail after your visit with us. Thank you!             Your Updated Medication List - Protect others around you: Learn how to safely use, store and throw away your medicines at www.disposemymeds.org.          This list is accurate as of 5/22/18 10:21 AM.  Always use your most recent med list.                   Brand Name Dispense Instructions for use Diagnosis    nitroFURantoin (macrocrystal-monohydrate) 100 MG capsule    MACROBID    14 capsule    Take 1 capsule (100 mg) by mouth daily    Frequent UTI       ranitidine 300 MG tablet    ZANTAC     Take 300 mg by mouth At Bedtime        valACYclovir 500 MG tablet    VALTREX    28 tablet    Take 1 tablet (500 mg) by mouth 2 times daily    HSV (herpes simplex virus) anogenital infection

## 2018-05-22 NOTE — NURSING NOTE
"52 year old  Chief Complaint   Patient presents with     Pharyngitis     Sore throat x Wed, last week. Lost voice yesterday.        Blood pressure 121/81, pulse 68, temperature 97.8  F (36.6  C), temperature source Oral, resp. rate 16, height 5' 7.4\" (171.2 cm), weight 148 lb (67.1 kg), SpO2 99 %, not currently breastfeeding. Body mass index is 22.91 kg/(m^2).  BP completed using cuff size:    Patient Active Problem List   Diagnosis     External hemorrhoids     Dysphonia     Choroidal nevus of right eye     Myopia     Valgus deformity of great toe     Instability of knee joint     Pes planus     Pain of foot     Vocal cord paresis       Wt Readings from Last 2 Encounters:   05/22/18 148 lb (67.1 kg)   05/01/18 145 lb (65.8 kg)     BP Readings from Last 3 Encounters:   05/22/18 121/81   04/16/18 138/78   12/11/17 115/73       No Known Allergies    Current Outpatient Prescriptions   Medication     nitrofurantoin, macrocrystal-monohydrate, (MACROBID) 100 MG capsule     ranitidine (ZANTAC) 300 MG tablet     valACYclovir (VALTREX) 500 MG tablet     No current facility-administered medications for this visit.        Social History   Substance Use Topics     Smoking status: Never Smoker     Smokeless tobacco: Never Used     Alcohol use 0.5 - 2.5 oz/week      Comment: 1-3 times a week       Health Maintenance Due   Topic Date Due     HIV SCREEN (SYSTEM ASSIGNED)  11/03/1984       Lab Results   Component Value Date    PAP NIL 09/06/2017       Last eye exam @EYE@    Last dental exam [unfilled]    Recent Labs   Lab Test  10/31/17   0855  06/21/16   0927  06/15/15   0956   12/02/10   0825   LDL  78  91  74   < >  91   HDL  71  71  64   < >  65   TRIG  76  91  73   < >  75   ALT  26  17  19   < >   --    CR  0.65  0.76  0.65   < >   --    GFRESTIMATED  >90  80  >90  Non  GFR Calc     < >   --    GFRESTBLACK  >90  >90  African American GFR Calc    >90   GFR Calc     < >   --    ALBUMIN  4.1  4.2  3.8 "   < >   --    POTASSIUM  4.0  4.1  3.8   < >   --    TSH   --    --    --    --   1.92    < > = values in this interval not displayed.       PHQ-2 ( 1999 Pfizer) 5/22/2018 5/1/2018   Q1: Little interest or pleasure in doing things 0 0   Q2: Feeling down, depressed or hopeless 0 0   PHQ-2 Score 0 0   Q1: Little interest or pleasure in doing things - -   Q2: Feeling down, depressed or hopeless - -   PHQ-2 Score - -       PHQ-9 SCORE 6/17/2015 5/22/2018   Total Score 4 -   Total Score - 0       RODERICK-7 SCORE 7/20/2016 5/22/2018   Total Score 4 (minimal anxiety) -   Total Score - 0       No flowsheet data found.    Jessica Quarles CMA  May 22, 2018 9:56 AM

## 2018-05-22 NOTE — PATIENT INSTRUCTIONS
Sore throat and hoarseness  Continue warm fluids with honey and lemon   May try Robitussin DM or similar at bedtime to promote sleep. This includes a cough suppressant and an expectorant  Seek care if worsening symptoms, new onset fever, chills, inability to swallow, eat or drink or shortness of breath.   Exam today does not show major chest congestion or throat findings.   Rapid strep test is negative. Throat culture is pending. Will notify if positive  Follow with ENT as planned  Maintain hydration

## 2018-05-22 NOTE — PROGRESS NOTES
HPI:       Tatiana Paredes is a 52 year old who presents for the following  Patient presents with:  Pharyngitis: Sore throat x Wed, last week. Lost voice yesterday.     52 year-old female presents with 1 week history of URI symptoms. Onset was 7 days ago with sore throat, then developed stuffy nose for which she has used nasal saline drops for partial improvement. Cough started yesterday and is productive white sputum. Denies chest congestion, SOB, or wheezing. Does have some post nasal drainage. Has tried warm teas and saline gargles for symptoms. Unable to sleep last night. Sore throat is better today. Denies fever, chills, night sweats. Hoarseness started last night.  had similar symptoms last week. She has long history of recurrent hoarseness. She has been treated for reflux in the past. In 2017 had bronchitis and lost voice for extended period. Has recently had bronchoscopy and voice therapy and has been evaluated by neuro. Has sluggish right vocal cord and variable voice volume. Denies reflux symptoms. Will have procedure next week for augmentation of right vocal cord with subsequent implant planned for fall 2018.     Problem, Medication and Allergy Lists were   reviewed and are current.     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 Provider to review       Instability of knee joint 06/05/2012     Priority: Medium         Current Outpatient Prescriptions   Medication Sig Dispense Refill     nitrofurantoin,  macrocrystal-monohydrate, (MACROBID) 100 MG capsule Take 1 capsule (100 mg) by mouth daily (Patient taking differently: Take 100 mg by mouth daily as needed ) 14 capsule 3     ranitidine (ZANTAC) 300 MG tablet Take 300 mg by mouth At Bedtime       valACYclovir (VALTREX) 500 MG tablet Take 1 tablet (500 mg) by mouth 2 times daily (Patient taking differently: Take 500 mg by mouth 2 times daily as needed ) 28 tablet 3       No Known Allergies  Patient is   a new patient to this clinic and so  I reviewed/updated the Past Medical History, the Family History and the Social History. ,   Past Medical History:   Diagnosis Date     Breast pain, left 11/2013     Choroidal nevus of right eye      Constipation      Dysphonia      Gastroesophageal reflux disease      Hemorrhoids      Hoarseness      Menorrhagia      Migraines    ,   Family History     Problem (# of Occurrences) Relation (Name,Age of Onset)    Breast Cancer (1) Sister (52)    Cancer - colorectal (1) Maternal Grandmother (70)    Migraines (1) Mother       and   Social History     Social History     Marital status:      Spouse name: N/A     Number of children: N/A     Years of education: N/A     Social History Main Topics     Smoking status: Never Smoker     Smokeless tobacco: Never Used     Alcohol use 0.5 - 2.5 oz/week      Comment: 1-3 times a week     Drug use: No     Sexual activity: Yes     Partners: Male     Birth control/ protection: Condom     Other Topics Concern      Service No     Blood Transfusions No     Caffeine Concern No     Occupational Exposure No     Hobby Hazards No     Sleep Concern No     Stress Concern No     Weight Concern No     Special Diet No     Back Care No     Exercise Yes     Bike Helmet Yes     Seat Belt Yes     Self-Exams Yes     Social History Narrative    How much exercise per week? Not enough    How much calcium per day? Some in her diet       How much caffeine per day? 2 cups     How much vitamin D per day? Some  "in diet    Do you/your family wear seatbelts?  Yes    Do you/your family use safety helmets? N/A    Do you/your family use sunscreen? Yes    Do you/your family keep firearms in the home? No    Do you/your family have a smoke detector(s)? Yes        Do you feel safe in your home? Yes    Has anyone ever touched you in an unwanted manner? No        06/17/2015        Works at [employer].     .    Natali Powell MD                            Review of Systems:   Review of Systems   GEN: denies fever, weight changes, malaise. Appetite OK. Taking fluids  ENT: denies ear pain, other symptoms as per HPI.   RESP: as per HPI  CV: denies chest pain, irregular heart beat  GI: negative for nausea, reflux symptoms.         Physical Exam:   Patient Vitals for the past 24 hrs:   BP Temp Temp src Pulse Resp SpO2 Height Weight   05/22/18 0955 121/81 97.8  F (36.6  C) Oral 68 16 99 % 5' 7.4\" (171.2 cm) 148 lb (67.1 kg)     Body mass index is 22.91 kg/(m^2).  Vitals were reviewed and were normal     Physical Exam  GEN: alert female, frequent loose cough throughout visit.  HEENT: eyes clear, MAYITO. Ears: TMs: RTM dull gray with diffuse LR. LTM gray with crisp LR.  Nose: slight deviation of septum to right. Uniformly mildly erythematous turbinates with scant white nasal discharge.  OP: mild erythema, no tonsillar enlargement or exudate; moist  LYMPH: tenderness along submandibular nodes, but not palpabe.    Neck supple.   LUNGS: clear to auscltation. Frequent loose cough. No crackles or wheezes or rhonchi Spo2 99%  CV: HRRR, S1, S2, no MRG      Results:      Results from the last 24 hours  Results for orders placed or performed in visit on 05/22/18 (from the past 24 hour(s))   Strep Screen Rapid (Group) ([facility])   Result Value Ref Range    Rapid Strep A Screen NEG Negative     Assessment and Plan     1. Throat pain  - Strep Screen Rapid (Group) ([facility])  - Strep Culture (GABS)  Comfort measures: "   Continue warm fluids with honey and lemon   May try Robitussin DM or similar at bedtime to promote sleep. This includes a cough suppressant and an expectorant  Seek care if worsening symptoms, new onset fever, chills, inability to swallow, eat or drink or shortness of breath.   Exam today does not show major chest congestion or throat findings.   Rapid strep test is negative. Throat culture is pending. Will notify if positive  Follow with ENT as planned  Maintain hydration     There are no discontinued medications.  Options for treatment and follow-up care were reviewed with the patient. Tatiana Paredes  engaged in the decision making process and verbalized understanding of the options discussed and agreed with the final plan.    SERA House CNP

## 2018-05-23 ASSESSMENT — ANXIETY QUESTIONNAIRES: GAD7 TOTAL SCORE: 0

## 2018-05-23 ASSESSMENT — PATIENT HEALTH QUESTIONNAIRE - PHQ9: SUM OF ALL RESPONSES TO PHQ QUESTIONS 1-9: 0

## 2018-05-24 LAB
BACTERIA SPEC CULT: NORMAL
SPECIMEN SOURCE: NORMAL

## 2018-05-31 ENCOUNTER — CARE COORDINATION (OUTPATIENT)
Dept: OTOLARYNGOLOGY | Facility: CLINIC | Age: 53
End: 2018-05-31

## 2018-06-25 ENCOUNTER — TELEPHONE (OUTPATIENT)
Dept: OTOLARYNGOLOGY | Facility: CLINIC | Age: 53
End: 2018-06-25

## 2018-07-05 ENCOUNTER — OFFICE VISIT (OUTPATIENT)
Dept: OTOLARYNGOLOGY | Facility: CLINIC | Age: 53
End: 2018-07-05
Payer: COMMERCIAL

## 2018-07-05 VITALS — BODY MASS INDEX: 23.46 KG/M2 | HEIGHT: 66 IN | WEIGHT: 146 LBS

## 2018-07-05 DIAGNOSIS — R49.0 DYSPHONIA: Primary | ICD-10-CM

## 2018-07-05 DIAGNOSIS — M54.2 NECK PAIN: ICD-10-CM

## 2018-07-05 DIAGNOSIS — J38.00 VOCAL CORD PARESIS: ICD-10-CM

## 2018-07-05 RX ORDER — HYDROCODONE BITARTRATE AND ACETAMINOPHEN 5; 325 MG/1; MG/1
1 TABLET ORAL EVERY 4 HOURS PRN
Qty: 10 TABLET | Refills: 0 | Status: SHIPPED | OUTPATIENT
Start: 2018-07-05 | End: 2018-08-22

## 2018-07-05 ASSESSMENT — PAIN SCALES - GENERAL: PAINLEVEL: NO PAIN (0)

## 2018-07-05 NOTE — PATIENT INSTRUCTIONS
Plan of care:  Follow up with Jada Lynn, SLP in 1-2 weeks for post cymetra check  Today you have had an injection of collagen (Cymetra) into your vocal cords.     Guidelines:  1. Nothing to eat or drink for one hour after you are discharged.  2. Start with a small sip of water and if that goes ok, you can eat and drink normally.  3. Your voice will probably sound a little tight or rough for the first day or two, and then become clearer and freer sounding as the absorption takes place.  You re also likely to notice the lack of the  leaky  feeling.    4. The positive effects from the collagen/Cymetra usually lasts 2-3 months  5. If you would like to have another injection, call the clinic and ask to speak to Leila, the RN who works with Dr Pisano. The injections are usually done on Thursday's, between 12-.  6. If you are interested in discussing surgery (as Dr Pisano explained) or scheduling a surgery you can schedule a regular return visit.    Clinic contact information:  1. To schedule an appointment call [phone], option 1  2. To talk to the Triage RN call [phone], option 1  3. If you need to speak to Leila or get a message to your doctor on a Friday, call the triage RN  4. HAILEY Dee: [phone]  5. Surgery scheduling:      Mary Guerin: [phone]      Aurelia Walker: [phone]  6. Fax: [phone]  7. [phone]

## 2018-07-05 NOTE — LETTER
7/5/2018       RE: Tatiana Paredes  [address]  [facility] [address]     Dear Colleague,    Thank you for referring your patient, Tatiana Paredes, to the [facility] E[facility] at [facility]. Please see a copy of my visit note below.      HISTORY OF PRESENT ILLNESS: Tatiana Paredes is a 52 year old female with a history of  Dysphonia.  I saw her on 6/10/2014. At that time she had dysphonia following a prolonged use of her voice. We felt she had gastroesophageal reflux and a muscle tension dysphonia. She did quite well after therapy and did not have any voice problems until a bout of bronchitis in January and February 2017. She had a month of coughing. She lost her voice for a week then slowly got better in February 2017. Since that time she has had intermittent voice problems with 1 day her voice being normal and the next having a rough quality to her voice. She has had no changes in her swallow but is always had some difficulty with the last swallow of a meal. This has been going on for several years. In July she noted that her voice worsened after extensive use of her voice and it has maintained its roughness since that time. She has vocal fatigue which is unchanged from the entirety of this year. She  does better at higher pitches and with improved breath support. I saw her on August 1, 2017. At that time there was consistent but mild bowing of the right vocal fold and pooling of saliva in the right hypopharynx. Full motion was seen over the right vocal fold and no excessive dilatation or decrease in tone in the right hypopharynx was present. She has undergone speech therapy and she feels her voice has improved. She'll have occasional breaks in her vocal quality but this is short lived and she has been able to talk for an hour at a time without excessive vocal fatigue. She feels her voice has improved from a 2 out of 10 to a 4 out of 10. She  denies any swallowing difficulties except for the last amount of food that she does eat tends to catch. If she lies down after eating she will have a sensation of reflux. She denies any new symptoms of incoordination or difficulty walking or difficulty using her hands. She has no articulation difficulties.     On our last exam on January 2, 2018 she had some atrophy of the right vocal fold with subsequent enlargement of the right ventricle.  Fasciculations were seen in the right thyroid arytenoid muscle.  Because of these findings a neurology consult was obtained. She did see Dr. Foster in neurology.  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  Laboratory tests were negative, MRI scan of the brain was also normal.  Antibodies for myasthenia gravis was also within normal limits.    I last saw her on May 1, 2018.  At that time she felt her voice had deteriorated slightly.  She had no other neurologic symptoms.  Examination showed reduced mobility of the right vocal fold but at other times had  a full range of motion.  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  We discussed the possibility of a Cymetra injection of the right vocal fold.  I advised her that this had no long-term consequences.  I predicted an 80% success rate with bulking of the vocal folds.  She felt that this would be reasonable and she is here to discuss this and potentially proceed with a Cymetra injection into the right vocal fold.    Last 2 Scores for Patient-Answered VHI Questionnaire  VHI Total Score 1/2/2018 5/1/2018   VHI Total Score 12 18       Last 2 Scores for Patient-Answered CSI Questionnaire  CSI Total Score 8/1/2017 1/2/2018   CSI Total Score 2 6         Last 2 Scores for Patient-Answered EAT Questionnaire  EAT Total Score 8/1/2017 1/2/2018   EAT Total Score 2 1           PAST MEDICAL HISTORY:   Past Medical History:   Diagnosis Date     Breast pain, left  11/2013     Choroidal nevus of right eye      Constipation      Dysphonia      Gastroesophageal reflux disease      Hemorrhoids      Hoarseness      Menorrhagia      Migraines        PAST SURGICAL HISTORY:   Past Surgical History:   Procedure Laterality Date     [facility] HYSTEROSCOPY, SURGICAL; W/ ENDOMETRIAL ABLATION, ANY METHOD  2011       FAMILY HISTORY:   Family History   Problem Relation Age of Onset     Breast Cancer Sister 52     Cancer - colorectal Maternal Grandmother 70     Migraines Mother        SOCIAL HISTORY:   Social History   Substance Use Topics     Smoking status: Never Smoker     Smokeless tobacco: Never Used     Alcohol use 0.5 - 2.5 oz/week      Comment: 1-3 times a week       REVIEW OF SYSTEMS: Ten point review of systems was performed and is negative except for:   [facility] ROS 5/1/2018   Constitutional -   Neurology -   Ears, Nose, Throat Hoarseness        ALLERGIES: Review of patient's allergies indicates no known allergies.    MEDICATIONS:   Current Outpatient Prescriptions   Medication Sig Dispense Refill     nitrofurantoin, macrocrystal-monohydrate, (MACROBID) 100 MG capsule Take 1 capsule (100 mg) by mouth daily (Patient taking differently: Take 100 mg by mouth daily as needed ) 14 capsule 3     ranitidine (ZANTAC) 300 MG tablet Take 300 mg by mouth At Bedtime       valACYclovir (VALTREX) 500 MG tablet Take 1 tablet (500 mg) by mouth 2 times daily (Patient taking differently: Take 500 mg by mouth 2 times daily as needed ) 28 tablet 3         PHYSICAL EXAMINATION:  She  is awake, alert and in no apparent distress.    Her tympanic membranes are clear and intact bilaterally. External auditory canals are clear.  Nasal exam shows a mild septal deviation without obstruction. Examination of the oral cavity shows no suspicious lesions.  There is symmetric movement of the tongue and soft palate.    The oropharynx is clear.  Her neck is supple without significant adenopathy.  Pulse is regular.  Upper  airway is clear.  Cranial nerves II-XII are grossly intact.   No lingual fasciculations are present.  There is good hand strength there is good finger coordination bilaterally.  There is no dysarthria of the lips tip of tongue or base of tongue.     PROCEDURE: A flexible laryngoscopy  was performed by our speech-language pathologist and reviewed by myself..  Informed consent was obtained and a time out was performed. 3% lidocaine and 0.25% phenylephrine was sprayed into the nasal cavity and allowed 3 to 5 minutes for effect. The scope was passed through the right sided nostril. Examination showed the left vocal fold to be fully mobile.  The right vocal fold had reduced mobility although did at different times show full range of motion.  On many room repetitive and phonation tasks the right vocal fold remained in the midline.  No nodules, polyps or ulcerations are seen.  There is atrophy of the right vocal fold and bowing of the right vocal fold. There is a small persistent glottic gap present during phonation.  There is mild inflammation or erythema of the supraglottic or glottic larynx.  With phonation there is moderatecontraction of the supraglottic larynx.  The hypopharynx is otherwise clear as is the subglottis.     After a thorough discussion we decided to proceed with a Cymetra injection.    PROCEDURE NOTE:     After local preparation with an alcohol swab, 1% lidocaine with 1:100,000 epinephrine was injected into the area of the cricothyroid membrane and the right sidethyroid ala. This was given approximately five minutes to take effect. During this time, the Cymetra was reconstituted with saline and  prepared for injection. After this process was completed, a fiberoptic scope was then placed through the nostril, visualizing the larynx on a video monitor. A 23-gauge needle was passed through the cricothyroid membrane into the right sidevocal fold. Approximately 5 cc of Cymetra was injected into the vocal fold.  Good medialization with a strong voice and cough post injection. Following the injection, the needle was removed. The fiberoptic scope showed that there was an adequate airway and a convex character to the injected vocal fold where it had been concave previously. The scope was removed, and the procedure was completed. The patient tolerated the procedure well and left our clinic after a short observation.   The lot number for the Cymetra [id number], the expiration date is December 2019.    PLAN: I will have  her follow up in approximately  3 months time.      Again, thank you for allowing me to participate in the care of your patient.      Sincerely,    Jose Ramon Pisano MD

## 2018-07-05 NOTE — PROGRESS NOTES
HISTORY OF PRESENT ILLNESS: Tatiana Paredes is a 52 year old female with a history of  Dysphonia.  I saw her on 6/10/2014. At that time she had dysphonia following a prolonged use of her voice. We felt she had gastroesophageal reflux and a muscle tension dysphonia. She did quite well after therapy and did not have any voice problems until a bout of bronchitis in January and February 2017. She had a month of coughing. She lost her voice for a week then slowly got better in February 2017. Since that time she has had intermittent voice problems with 1 day her voice being normal and the next having a rough quality to her voice. She has had no changes in her swallow but is always had some difficulty with the last swallow of a meal. This has been going on for several years. In July she noted that her voice worsened after extensive use of her voice and it has maintained its roughness since that time. She has vocal fatigue which is unchanged from the entirety of this year. She  does better at higher pitches and with improved breath support. I saw her on August 1, 2017. At that time there was consistent but mild bowing of the right vocal fold and pooling of saliva in the right hypopharynx. Full motion was seen over the right vocal fold and no excessive dilatation or decrease in tone in the right hypopharynx was present. She has undergone speech therapy and she feels her voice has improved. She'll have occasional breaks in her vocal quality but this is short lived and she has been able to talk for an hour at a time without excessive vocal fatigue. She feels her voice has improved from a 2 out of 10 to a 4 out of 10. She denies any swallowing difficulties except for the last amount of food that she does eat tends to catch. If she lies down after eating she will have a sensation of reflux. She denies any new symptoms of incoordination or difficulty walking or difficulty using her hands. She has no articulation  difficulties.     On our last exam on January 2, 2018 she had some atrophy of the right vocal fold with subsequent enlargement of the right ventricle.  Fasciculations were seen in the right thyroid arytenoid muscle.  Because of these findings a neurology consult was obtained. She did see Dr. Foster in neurology.  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  Laboratory tests were negative, MRI scan of the brain was also normal.  Antibodies for myasthenia gravis was also within normal limits.    I last saw her on May 1, 2018.  At that time she felt her voice had deteriorated slightly.  She had no other neurologic symptoms.  Examination showed reduced mobility of the right vocal fold but at other times had  a full range of motion.  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  We discussed the possibility of a Cymetra injection of the right vocal fold.  I advised her that this had no long-term consequences.  I predicted an 80% success rate with bulking of the vocal folds.  She felt that this would be reasonable and she is here to discuss this and potentially proceed with a Cymetra injection into the right vocal fold.    Last 2 Scores for Patient-Answered VHI Questionnaire  VHI Total Score 1/2/2018 5/1/2018   VHI Total Score 12 18       Last 2 Scores for Patient-Answered CSI Questionnaire  CSI Total Score 8/1/2017 1/2/2018   CSI Total Score 2 6         Last 2 Scores for Patient-Answered EAT Questionnaire  EAT Total Score 8/1/2017 1/2/2018   EAT Total Score 2 1           PAST MEDICAL HISTORY:   Past Medical History:   Diagnosis Date     Breast pain, left 11/2013     Choroidal nevus of right eye      Constipation      Dysphonia      Gastroesophageal reflux disease      Hemorrhoids      Hoarseness      Menorrhagia      Migraines        PAST SURGICAL HISTORY:   Past Surgical History:   Procedure Laterality Date     HC HYSTEROSCOPY, SURGICAL; W/  ENDOMETRIAL ABLATION, ANY METHOD  2011       FAMILY HISTORY:   Family History   Problem Relation Age of Onset     Breast Cancer Sister 52     Cancer - colorectal Maternal Grandmother 70     Migraines Mother        SOCIAL HISTORY:   Social History   Substance Use Topics     Smoking status: Never Smoker     Smokeless tobacco: Never Used     Alcohol use 0.5 - 2.5 oz/week      Comment: 1-3 times a week       REVIEW OF SYSTEMS: Ten point review of systems was performed and is negative except for:   [facility] ROS 5/1/2018   Constitutional -   Neurology -   Ears, Nose, Throat Hoarseness        ALLERGIES: Review of patient's allergies indicates no known allergies.    MEDICATIONS:   Current Outpatient Prescriptions   Medication Sig Dispense Refill     nitrofurantoin, macrocrystal-monohydrate, (MACROBID) 100 MG capsule Take 1 capsule (100 mg) by mouth daily (Patient taking differently: Take 100 mg by mouth daily as needed ) 14 capsule 3     ranitidine (ZANTAC) 300 MG tablet Take 300 mg by mouth At Bedtime       valACYclovir (VALTREX) 500 MG tablet Take 1 tablet (500 mg) by mouth 2 times daily (Patient taking differently: Take 500 mg by mouth 2 times daily as needed ) 28 tablet 3         PHYSICAL EXAMINATION:  She  is awake, alert and in no apparent distress.    Her tympanic membranes are clear and intact bilaterally. External auditory canals are clear.  Nasal exam shows a mild septal deviation without obstruction. Examination of the oral cavity shows no suspicious lesions.  There is symmetric movement of the tongue and soft palate.    The oropharynx is clear.  Her neck is supple without significant adenopathy.  Pulse is regular.  Upper airway is clear.  Cranial nerves II-XII are grossly intact.   No lingual fasciculations are present.  There is good hand strength there is good finger coordination bilaterally.  There is no dysarthria of the lips tip of tongue or base of tongue.     PROCEDURE: A flexible laryngoscopy  was  performed by our speech-language pathologist and reviewed by myself..  Informed consent was obtained and a time out was performed. 3% lidocaine and 0.25% phenylephrine was sprayed into the nasal cavity and allowed 3 to 5 minutes for effect. The scope was passed through the right sided nostril. Examination showed the left vocal fold to be fully mobile.  The right vocal fold had reduced mobility although did at different times show full range of motion.  On many room repetitive and phonation tasks the right vocal fold remained in the midline.  No nodules, polyps or ulcerations are seen.  There is atrophy of the right vocal fold and bowing of the right vocal fold. There is a small persistent glottic gap present during phonation.  There is mild inflammation or erythema of the supraglottic or glottic larynx.  With phonation there is moderatecontraction of the supraglottic larynx.  The hypopharynx is otherwise clear as is the subglottis.     After a thorough discussion we decided to proceed with a Cymetra injection.    PROCEDURE NOTE:     After local preparation with an alcohol swab, 1% lidocaine with 1:100,000 epinephrine was injected into the area of the cricothyroid membrane and the right sidethyroid ala. This was given approximately five minutes to take effect. During this time, the Cymetra was reconstituted with saline and  prepared for injection. After this process was completed, a fiberoptic scope was then placed through the nostril, visualizing the larynx on a video monitor. A 23-gauge needle was passed through the cricothyroid membrane into the right sidevocal fold. Approximately 0.5 cc of Cymetra was injected into the vocal fold. Good medialization with a strong voice and cough post injection. Following the injection, the needle was removed. The fiberoptic scope showed that there was an adequate airway and a convex character to the injected vocal fold where it had been concave previously. The scope was removed,  and the procedure was completed. The patient tolerated the procedure well and left our clinic after a short observation.   The lot number for the Cymetra [id number], the expiration date is December 2019.    PLAN: I will have  her follow up in approximately  3 months time.

## 2018-07-05 NOTE — MR AVS SNAPSHOT
After Visit Summary   2018    Tatiana Paredes    MRN: [id number]           Patient Information     Date Of Birth          1965        Visit Information        Provider Department      2018 12:00 PM Jose Ramon Pisano MD [facility] Nose [facility]        Today's Diagnoses     Dysphonia    -  1    Neck pain        Vocal cord paresis          Care Instructions    Plan of care:  Follow up with Jada Lynn, SLP in 1-2 weeks for post cymetra check  Today you have had an injection of collagen (Cymetra) into your vocal cords.     Guidelines:  1. Nothing to eat or drink for one hour after you are discharged.  2. Start with a small sip of water and if that goes ok, you can eat and drink normally.  3. Your voice will probably sound a little tight or rough for the first day or two, and then become clearer and freer sounding as the absorption takes place.  You re also likely to notice the lack of the  leaky  feeling.    4. The positive effects from the collagen/Cymetra usually lasts 2-3 months  5. If you would like to have another injection, call the clinic and ask to speak to Leila, the RN who works with Dr Pisano. The injections are usually done on Thursday's, between 12-.  6. If you are interested in discussing surgery (as Dr Pisano explained) or scheduling a surgery you can schedule a regular return visit.    Clinic contact information:  1. To schedule an appointment call [phone], option 1  2. To talk to the Triage RN call [phone], option 1  3. If you need to speak to Leila or get a message to your doctor on a Friday, call the triage RN  4. LeilaRN: [phone]  5. Surgery scheduling:      Mary Guerin: [phone]      Aurelia Walker: [phone]  6. Fax: [phone]  7. [phone]            Follow-ups after your visit        Follow-up notes from your care team     Return in about 3 months (around 10/5/2018).      Your next 10 appointments already scheduled  "    2018  2:00 PM CDT   (Arrive by 1:45 PM)   RETURN SLP VOICE with ERIN Dan   [facility] ([facility])    [address] [address] [phone]              Who to contact     Please call your clinic at [phone] to:    Ask questions about your health    Make or cancel appointments    Discuss your medicines    Learn about your test results    Speak to your doctor            Additional Information About Your Visit        [employer] Information     [email or web address] is an electronic gateway that provides easy, online access to your medical records. With [email or web address], you can request a clinic appointment, read your test results, renew a prescription or communicate with your care team.     To sign up for [email or web address] visit the website at [email or web address]   You will be asked to enter the access code listed below, as well as some personal information. Please follow the directions to create your username and password.     Your access code is: [email or web address]  Expires: 2018 10:52 AM     Your access code will  in 90 days. If you need help or a new code, please contact your [facility] or call [phone] for assistance.        Care EveryWhere ID     This is your Care EveryWhere ID. This could be used by other organizations to access your [facility] medical records  [id number]        Your Vitals Were     Height BMI (Body Mass Index)                1.676 m (5' 6\") 23.57 kg/m2           Blood Pressure from Last 3 Encounters:   18 121/81   18 138/78   17 115/73    Weight from Last 3 Encounters:   18 66.2 kg (146 lb)   18 67.1 kg (148 lb)   18 65.8 kg (145 lb)              We Performed the Following     HC CHEMODENERVATION MUSCLE LARYNX BILAT W/EMG     IMAGESTREAM RECORDING ORDER          Today's Medication Changes          These changes are accurate as of 18  1:04 PM.  If you " have any questions, ask your nurse or doctor.               Start taking these medicines.        Dose/Directions    HYDROcodone-acetaminophen 5-325 MG per tablet   Commonly known as:  NORCO   Used for:  Neck pain   Started by:  Jose Ramon Pisano MD        Dose:  1 tablet   Take 1 tablet by mouth every 4 hours as needed for moderate to severe pain   Quantity:  10 tablet   Refills:  0         These medicines have changed or have updated prescriptions.        Dose/Directions    nitroFURantoin (macrocrystal-monohydrate) 100 MG capsule   Commonly known as:  MACROBID   This may have changed:    - when to take this  - reasons to take this   Used for:  Frequent UTI        Dose:  100 mg   Take 1 capsule (100 mg) by mouth daily   Quantity:  14 capsule   Refills:  3       valACYclovir 500 MG tablet   Commonly known as:  VALTREX   This may have changed:    - when to take this  - reasons to take this   Used for:  HSV (herpes simplex virus) anogenital infection        Dose:  500 mg   Take 1 tablet (500 mg) by mouth 2 times daily   Quantity:  28 tablet   Refills:  3            Where to get your medicines      Some of these will need a paper prescription and others can be bought over the counter.  Ask your nurse if you have questions.     Bring a paper prescription for each of these medications     HYDROcodone-acetaminophen 5-325 MG per tablet               Information about OPIOIDS     PRESCRIPTION OPIOIDS: WHAT YOU NEED TO KNOW   We gave you an opioid (narcotic) pain medicine. It is important to manage your pain, but opioids are not always the best choice. You should first try all the other options your care team gave you. Take this medicine for as short a time (and as few doses) as possible.     These medicines have risks:    DO NOT drive when on new or higher doses of pain medicine. These medicines can affect your alertness and reaction times, and you could be arrested for driving under the influence (DUI). If you need  to use opioids long-term, talk to your care team about driving.    DO NOT operate heave machinery    DO NOT do any other dangerous activities while taking these medicines.     DO NOT drink any alcohol while taking these medicines.      If the opioid prescribed includes acetaminophen, DO NOT take with any other medicines that contain acetaminophen. Read all labels carefully. Look for the word  acetaminophen  or  Tylenol.  Ask your pharmacist if you have questions or are unsure.    You can get addicted to pain medicines, especially if you have a history of addiction (chemical, alcohol or substance dependence). Talk to your care team about ways to reduce this risk.    Store your pills in a secure place, locked if possible. We will not replace any lost or stolen medicine. If you don t finish your medicine, please throw away (dispose) as directed by your pharmacist. The [place] Pollution Control Agency has more information about safe disposal: https://www.pca.[facility].mn.us/living-green/managing-unwanted-medications.     All opioids tend to cause constipation. Drink plenty of water and eat foods that have a lot of fiber, such as fruits, vegetables, prune juice, apple juice and high-fiber cereal. Take a laxative (Miralax, milk of magnesia, Colace, Senna) if you don t move your bowels at least every other day.          Primary Care Provider Office Phone # Fax #    Cruz Prather [phone]       [address] [address]        Equal Access to Services     ELSIE TELLO : Hadii aad ku hadasho Soestrella, waaxda luqadaha, qaybta kaalmada aderonnellyada, sarahy machuca . So [facility] [phone].    ATENCIÓN: Si habla español, tiene a cazares disposición servicios gratuitos de asistencia lingüística. Llame al [phone].    We comply with applicable federal civil rights laws and [place] laws. We do not discriminate on the basis of race, color, national origin, age,  disability, sex, sexual orientation, or gender identity.            Thank you!     Thank you for choosing [facility] E[facility] NOSE AND THROAT  for your care. Our goal is always to provide you with excellent care. Hearing back from our patients is one way we can continue to improve our services. Please take a few minutes to complete the written survey that you may receive in the mail after your visit with us. Thank you!             Your Updated Medication List - Protect others around you: Learn how to safely use, store and throw away your medicines at www.disposemymeds.org.          This list is accurate as of 7/5/18  1:04 PM.  Always use your most recent med list.                   Brand Name Dispense Instructions for use Diagnosis    HYDROcodone-acetaminophen 5-325 MG per tablet    NORCO    10 tablet    Take 1 tablet by mouth every 4 hours as needed for moderate to severe pain    Neck pain       nitroFURantoin (macrocrystal-monohydrate) 100 MG capsule    MACROBID    14 capsule    Take 1 capsule (100 mg) by mouth daily    Frequent UTI       ranitidine 300 MG tablet    ZANTAC     Take 300 mg by mouth At Bedtime        valACYclovir 500 MG tablet    VALTREX    28 tablet    Take 1 tablet (500 mg) by mouth 2 times daily    HSV (herpes simplex virus) anogenital infection

## 2018-07-05 NOTE — NURSING NOTE
"[facility] EAR NOSE AND THROAT  [address] [address]  Dept: [phone]  ______________________________________________________________________________    Patient: Tatiana Paredes   : 1965   MRN: [id number]   2018    INVASIVE PROCEDURE SAFETY CHECKLIST    Date: 2018   Procedure:Cymetra injection  Patient Name: Tatiana Paredes  MRN: [id number]  YOB: 1965    Action: Complete sections as appropriate. Any discrepancy results in a HARD COPY until resolved.     PRE PROCEDURE:  Patient ID verified with 2 identifiers (name and  or MRN): Yes  Procedure and site verified with patient/designee (when able): Yes  Accurate consent documentation in medical record: Yes  H&P (or appropriate assessment) documented in medical record: NA  H&P must be up to 20 days prior to procedure and updates within 24 hours of procedure as applicable: {NA YES NO, EXPLAIN:353181::\"no  Relevant diagnostic and radiology test results appropriately labeled and displayed as applicable: no  Procedure site(s) marked with provider initials: {NA YES NO, EXPLAIN:479462:no    TIMEOUT:  Time-Out performed immediately prior to starting procedure, including verbal and active participation of all team members addressing the following:Yes  * Correct patient identify  * Confirmed that the correct side and site are marked  * An accurate procedure consent form  * Agreement on the procedure to be done  * Correct patient position  * Relevant images and results are properly labeled and appropriately displayed  * The need to administer antibiotics or fluids for irrigation purposes during the procedure as applicable   * Safety precautions based on patient history or medication use    DURING PROCEDURE: Verification of correct person, site, and procedures any time the responsibility for care of the patient is transferred to another member of the care team.               "

## 2018-07-05 NOTE — NURSING NOTE
"Chief Complaint   Patient presents with     Procedure     cymetra injection     Height 1.676 m (5' 6\"), weight 66.2 kg (146 lb), not currently breastfeeding.    Cortez Carmona    "

## 2018-08-22 ENCOUNTER — OFFICE VISIT (OUTPATIENT)
Dept: INTERNAL MEDICINE | Facility: CLINIC | Age: 53
End: 2018-08-22
Payer: COMMERCIAL

## 2018-08-22 VITALS
RESPIRATION RATE: 16 BRPM | HEART RATE: 73 BPM | DIASTOLIC BLOOD PRESSURE: 75 MMHG | BODY MASS INDEX: 23.95 KG/M2 | WEIGHT: 148.4 LBS | SYSTOLIC BLOOD PRESSURE: 116 MMHG

## 2018-08-22 DIAGNOSIS — Z00.00 ROUTINE HISTORY AND PHYSICAL EXAMINATION OF ADULT: Primary | ICD-10-CM

## 2018-08-22 ASSESSMENT — PAIN SCALES - GENERAL: PAINLEVEL: NO PAIN (0)

## 2018-08-22 NOTE — NURSING NOTE
Chief Complaint   Patient presents with     Physical     pt is here for an annual physical       Inessa Arzate CMA at 3:10 PM on 8/22/2018   Location Indication Override (Is Already Calculated Based On Selected Body Location): Area M

## 2018-08-22 NOTE — LETTER
Patient:  Tatiana Paredes  :   1965  MRN:     [id number]        Ms. Tatiana Paredes  [address] [address]        2018    Dear Ms. Paredes,    Thank you for choosing the [facility] for your healthcare needs.  We appreciate the opportunity to serve you.    The following are your recent test results.     Your cholesterol panel looks fine    Results for orders placed or performed in visit on 18   Lipid panel reflex to direct LDL Fasting   Result Value Ref Range    Cholesterol 199 <200 mg/dL    Triglycerides 94 <150 mg/dL    HDL Cholesterol 72 >49 mg/dL    LDL Cholesterol Calculated 108 (H) <100 mg/dL    Non HDL Cholesterol 127 <130 mg/dL     Please contact your provider if you have any questions or concerns.  We look forward to serving your needs in the future.      Sincerely,    CLEMENTINA Prather

## 2018-08-22 NOTE — PROGRESS NOTES
HPI  Tatiana Pareeds is a 53 yo female  Comes in for physical  up. She follows with Dr. Pisano, ENT for voice changes and is getting vocal cord injections. Last visit 7/5/2018. She is getting speech therapy. Last endoscopy 7/2015 and was normal.  She had normal CXR 1/17.  She does not smoke nor abuse EtOH. No change in family history. No other HEENT, cardiopulmonary, abdominal, , GYN, neurological, systemic, psychiatric, skin, vascular, lymphatic, musculoskeletal complaints. She does not smoke nor abuse EtOH.     Past Medical History:   Diagnosis Date     Breast pain, left 11/2013     Choroidal nevus of right eye      Constipation      Dysphonia      Gastroesophageal reflux disease      Hemorrhoids      Hoarseness      Menorrhagia      Migraines      Current Outpatient Prescriptions   Medication     nitrofurantoin, macrocrystal-monohydrate, (MACROBID) 100 MG capsule     ranitidine (ZANTAC) 300 MG tablet     valACYclovir (VALTREX) 500 MG tablet     No current facility-administered medications for this visit.          Physical Exam  /75  Pulse 73  Resp 16  Wt 67.3 kg (148 lb 6.4 oz)  Breastfeeding? No  BMI 23.95 kg/m2  Exam:  Constitutional: healthy, alert and no distress  Cardiovascular: RRR, S1, S2, No MRG  Respiratory: negative, Good diaphragmatic excursion. Lungs clear  Gastrointestinal: Abdomen soft, non-tender. BS normal. No masses, organomegaly  : Deferred  Musculoskeletal: extremities normal- no gross deformities noted, gait normal and normal muscle tone  Skin: no suspicious lesions or rashes  Neurologic: Gait normal. Reflexes normal and symmetric. Sensation grossly WNL.  Psychiatric: mentation appears normal and affect normal/bright      Assessment/Plan    1. She had mammogram  8/8/17; [facility] and follow up breast imaging 10/16/17.  Annual exam 9/6/17. She also saw   Dr. Powell GYN 10/6/17 for breast mass. Mammogram scheduled for 8/27/2018.  2. Previous abdominal complaints; CT scan unremarkable  7/2/16. Ordered colonoscopy and MRCP and this was done 12/29/16 as unremarkable.  3. She remains on PPI for reflux; last EGD 7/15.  4. She continues to follow with  Dr. Pisano, ENT  for hoarse voice. She is getting speech therapy. EGD done 11/17/17 and she is on H2 blocker.   MRI brain and MRI neck 11/20/17 showing R sided vocal cord findings.   5. Fasting lipids done 10/3/17; ordered future today  6. Check with insurance regarding new Shingles vaccine  7. Seen by Dr. Foster Neurology 4/16/2018 to assess any neurological issues with vocal cord findings.

## 2018-08-22 NOTE — PATIENT INSTRUCTIONS
Primary Care Center [phone] ([facility], 4th Floor N)     Breast Center ([facility]) [phone] ([facility] 2nd Floor)  Breast Center ([facility]) [phone] ([address])     Please call [phone] to schedule lab appointment.     Primary Care Center Phone Number [phone]  Primary Care Center Medication Refill Request Information:  * Please contact your pharmacy regarding ANY request for medication refills.  ** [facility] Prescription Fax = [phone]  * Please allow 3 business days for routine medication refills.  * Please allow 5 business days for controlled substance medication refills.     Primary Care Center Test Result notification information:  *You will be notified with in 7-10 days of your appointment day regarding the results of your test.  If you are on MyChart you will be notified as soon as the provider has reviewed the results and signed off on them.

## 2018-08-22 NOTE — MR AVS SNAPSHOT
After Visit Summary   8/22/2018    Tatiana Paredes    MRN: [id number]           Patient Information     Date Of Birth          1965        Visit Information        Provider Department      8/22/2018 3:05 PM Cruz Prather MD [facility]        Care Instructions    Primary Care Center [phone] ([facility], 4th Floor N)     Breast Center ([facility]) [phone] ([facility] 2nd Floor)  Breast Center ([facility]) [phone] ([address])     Please call [phone] to schedule lab appointment.     Primary Care Center Phone Number [phone]  Primary Care Center Medication Refill Request Information:  * Please contact your pharmacy regarding ANY request for medication refills.  ** [facility] Prescription Fax = [phone]  * Please allow 3 business days for routine medication refills.  * Please allow 5 business days for controlled substance medication refills.     Primary Care Center Test Result notification information:  *You will be notified with in 7-10 days of your appointment day regarding the results of your test.  If you are on MyChart you will be notified as soon as the provider has reviewed the results and signed off on them.                  Follow-ups after your visit        Your next 10 appointments already scheduled     Aug 27, 2018  8:15 AM CDT   MA SCREENING BILATERAL W/ CHUCK with [id number]   [facility] ([facility])    [address] [place] [address]  2nd Floor  [facility] [address] [phone]           Three-dimensional (3D) mammograms are available at [place] locations in [facility], [facility], [facility], [place], [place], and [place]. [facility] locations include [place] and [facility] Center in [place]. Benefits of 3D mammograms include: - Improved rate of cancer detection - Decreases your chance of having to go back for more tests, which means fewer: -  "\"False-positive\" results (This means that there is an abnormal area but it isn't cancer.) - Invasive testing procedures, such as a biopsy or surgery - Can provide clearer images of the breast if you have dense breast tissue. 3D mammography is an optional exam that anyone can have with a 2D mammogram. It doesn't replace or take the place of a 2D mammogram. 2D mammograms remain an effective screening test for all women.  Not all insurance companies cover the cost of a 3D mammogram. Check with your insurance.              Who to contact     Please call your clinic at [phone] to:    Ask questions about your health    Make or cancel appointments    Discuss your medicines    Learn about your test results    Speak to your doctor            Additional Information About Your Visit        Care EveryWhere ID     This is your Care EveryWhere ID. This could be used by other organizations to access your [place] medical records  [id number]        Your Vitals Were     Pulse Respirations Breastfeeding? BMI (Body Mass Index)          73 16 No 23.95 kg/m2         Blood Pressure from Last 3 Encounters:   08/22/18 116/75   05/22/18 121/81   04/16/18 138/78    Weight from Last 3 Encounters:   08/22/18 67.3 kg (148 lb 6.4 oz)   07/05/18 66.2 kg (146 lb)   05/22/18 67.1 kg (148 lb)              Today, you had the following     No orders found for display         Today's Medication Changes          These changes are accurate as of 8/22/18  4:02 PM.  If you have any questions, ask your nurse or doctor.               These medicines have changed or have updated prescriptions.        Dose/Directions    nitroFURantoin (macrocrystal-monohydrate) 100 MG capsule   Commonly known as:  MACROBID   This may have changed:    - when to take this  - reasons to take this   Used for:  Frequent UTI        Dose:  100 mg   Take 1 capsule (100 mg) by mouth daily   Quantity:  14 capsule   Refills:  3       valACYclovir 500 MG tablet   Commonly known as: "  VALTREX   This may have changed:    - when to take this  - reasons to take this   Used for:  HSV (herpes simplex virus) anogenital infection        Dose:  500 mg   Take 1 tablet (500 mg) by mouth 2 times daily   Quantity:  28 tablet   Refills:  3         Stop taking these medicines if you haven't already. Please contact your care team if you have questions.     HYDROcodone-acetaminophen 5-325 MG per tablet   Commonly known as:  NORCO   Stopped by:  Cruz Prather MD                    Primary Care Provider Office Phone # Fax #    Cruz Prather [phone] [address] [address]        Equal Access to Services     [facility]: Hadii estrellita huffman hadasho Soomaali, waaxda luqadaha, qaybta kaalmada aderonnellyada, sarahy machuca . So [facility] [phone].    ATENCIÓN: Si habla español, tiene a cazares disposición servicios gratuitos de asistencia lingüística. [facility] [phone].    We comply with applicable federal civil rights laws and [place] laws. We do not discriminate on the basis of race, color, national origin, age, disability, sex, sexual orientation, or gender identity.            Thank you!     Thank you for choosing [facility]  for your care. Our goal is always to provide you with excellent care. Hearing back from our patients is one way we can continue to improve our services. Please take a few minutes to complete the written survey that you may receive in the mail after your visit with us. Thank you!             Your Updated Medication List - Protect others around you: Learn how to safely use, store and throw away your medicines at www.disposemymeds.org.          This list is accurate as of 8/22/18  4:02 PM.  Always use your most recent med list.                   Brand Name Dispense Instructions for use Diagnosis    nitroFURantoin (macrocrystal-monohydrate) 100 MG capsule    MACROBID    14 capsule    Take 1 capsule (100  mg) by mouth daily    Frequent UTI       ranitidine 300 MG tablet    ZANTAC     Take 300 mg by mouth At Bedtime        valACYclovir 500 MG tablet    VALTREX    28 tablet    Take 1 tablet (500 mg) by mouth 2 times daily    HSV (herpes simplex virus) anogenital infection

## 2018-08-27 ENCOUNTER — APPOINTMENT (OUTPATIENT)
Dept: LAB | Facility: CLINIC | Age: 53
End: 2018-08-27
Payer: COMMERCIAL

## 2018-08-27 ENCOUNTER — RADIANT APPOINTMENT (OUTPATIENT)
Dept: MAMMOGRAPHY | Facility: CLINIC | Age: 53
End: 2018-08-27
Payer: COMMERCIAL

## 2018-08-27 DIAGNOSIS — Z12.31 VISIT FOR SCREENING MAMMOGRAM: ICD-10-CM

## 2018-08-27 LAB
CHOLEST SERPL-MCNC: 199 MG/DL
HDLC SERPL-MCNC: 72 MG/DL
LDLC SERPL CALC-MCNC: 108 MG/DL
NONHDLC SERPL-MCNC: 127 MG/DL
TRIGL SERPL-MCNC: 94 MG/DL

## 2018-10-24 ENCOUNTER — OFFICE VISIT (OUTPATIENT)
Dept: INTERNAL MEDICINE | Facility: CLINIC | Age: 53
End: 2018-10-24
Payer: COMMERCIAL

## 2018-10-24 VITALS
HEART RATE: 89 BPM | DIASTOLIC BLOOD PRESSURE: 82 MMHG | BODY MASS INDEX: 24.05 KG/M2 | WEIGHT: 149 LBS | SYSTOLIC BLOOD PRESSURE: 143 MMHG

## 2018-10-24 DIAGNOSIS — G44.89 OTHER HEADACHE SYNDROME: Primary | ICD-10-CM

## 2018-10-24 ASSESSMENT — PAIN SCALES - GENERAL: PAINLEVEL: NO PAIN (0)

## 2018-10-24 NOTE — PATIENT INSTRUCTIONS
Prior to receiving the vaccine, we recommend that you call your insurance carrier and ask them the following questions:    1) Does my insurance cover the Shingrix vaccine and administration of the vaccine?  2) What is my co-pay or deductible for the vaccine?  3) Is there a cost difference if I receive the vaccine at my doctors office or a pharmacy?    The clinic cannot determine your insurance benefits. Please call your insurance provider prior to scheduling an appointment to receive the Shingrix vaccine. If you decide the receive your vaccine at the Primary Care Center, please call [phone] and request a nurse only appointment for the Shingrix vaccine. The vaccine comes in 2 doses. Your second dose should be 2-6 months from your first Shingrix injection.     Imaging [phone] [facility] [place]  [phone] [facility]  [phone] [facility]

## 2018-10-24 NOTE — MR AVS SNAPSHOT
After Visit Summary   10/24/2018    Tatiana Paredes    MRN: [id number]           Patient Information     Date Of Birth          1965        Visit Information        Provider Department      10/24/2018 3:05 PM Cruz Prather MD [facility]        Today's Diagnoses     Other headache syndrome    -  1      Care Instructions    Prior to receiving the vaccine, we recommend that you call your insurance carrier and ask them the following questions:    1) Does my insurance cover the Shingrix vaccine and administration of the vaccine?  2) What is my co-pay or deductible for the vaccine?  3) Is there a cost difference if I receive the vaccine at my doctors office or a pharmacy?    The clinic cannot determine your insurance benefits. Please call your insurance provider prior to scheduling an appointment to receive the Shingrix vaccine. If you decide the receive your vaccine at the Primary Care Center, please call [phone] and request a nurse only appointment for the Shingrix vaccine. The vaccine comes in 2 doses. Your second dose should be 2-6 months from your first Shingrix injection.     Imaging [phone] [facility] [place]  [phone] [facility]  [phone] [facility]            Follow-ups after your visit        Future tests that were ordered for you today     Open Future Orders        Priority Expected Expires Ordered    MRI Brain w & w/o contrast Routine  10/24/2019 10/24/2018            Who to contact     Please call your clinic at [phone] to:    Ask questions about your health    Make or cancel appointments    Discuss your medicines    Learn about your test results    Speak to your doctor            Additional Information About Your Visit        Care EveryWhere ID     This is your Care EveryWhere ID. This could be used by other organizations to access your [place] medical records  [id number]        Your Vitals Were      Pulse BMI (Body Mass Index)                89 24.05 kg/m2           Blood Pressure from Last 3 Encounters:   10/24/18 143/82   08/22/18 116/75   05/22/18 121/81    Weight from Last 3 Encounters:   10/24/18 67.6 kg (149 lb)   08/22/18 67.3 kg (148 lb 6.4 oz)   07/05/18 66.2 kg (146 lb)                 Today's Medication Changes          These changes are accurate as of 10/24/18  3:58 PM.  If you have any questions, ask your nurse or doctor.               These medicines have changed or have updated prescriptions.        Dose/Directions    nitroFURantoin (macrocrystal-monohydrate) 100 MG capsule   Commonly known as:  MACROBID   This may have changed:    - when to take this  - reasons to take this   Used for:  Frequent UTI        Dose:  100 mg   Take 1 capsule (100 mg) by mouth daily   Quantity:  14 capsule   Refills:  3       valACYclovir 500 MG tablet   Commonly known as:  VALTREX   This may have changed:    - when to take this  - reasons to take this   Used for:  HSV (herpes simplex virus) anogenital infection        Dose:  500 mg   Take 1 tablet (500 mg) by mouth 2 times daily   Quantity:  28 tablet   Refills:  3                Primary Care Provider Office Phone # Fax #    Cruz Prather [phone] [address] [address]        Equal Access to Services     [facility]: Chito munguiao Soestrella, waaxda luqadaha, qaybta kaalmada kevan, sarahy moura. So [facility] [phone].    ATENCIÓN: Si habla español, tiene a cazares disposición servicios gratuitos de asistencia lingüística. Llame al [phone].    We comply with applicable federal civil rights laws and [place] laws. We do not discriminate on the basis of race, color, national origin, age, disability, sex, sexual orientation, or gender identity.            Thank you!     Thank you for choosing [facility]  for your care. Our goal is always to provide you with  excellent care. Hearing back from our patients is one way we can continue to improve our services. Please take a few minutes to complete the written survey that you may receive in the mail after your visit with us. Thank you!             Your Updated Medication List - Protect others around you: Learn how to safely use, store and throw away your medicines at www.disposemymeds.org.          This list is accurate as of 10/24/18  3:58 PM.  Always use your most recent med list.                   Brand Name Dispense Instructions for use Diagnosis    nitroFURantoin (macrocrystal-monohydrate) 100 MG capsule    MACROBID    14 capsule    Take 1 capsule (100 mg) by mouth daily    Frequent UTI       ranitidine 300 MG tablet    ZANTAC     Take 300 mg by mouth At Bedtime        valACYclovir 500 MG tablet    VALTREX    28 tablet    Take 1 tablet (500 mg) by mouth 2 times daily    HSV (herpes simplex virus) anogenital infection

## 2018-10-24 NOTE — NURSING NOTE
Chief Complaint   Patient presents with     Headache     Has been having headaches daily.      Menopausal Sx     Possibl hot flashes.       Jessica Briones LPN at 3:00 PM on October 24, 2018

## 2018-10-24 NOTE — PROGRESS NOTES
HPI  Tatiana Paredes is a 51 yo female  Comes in for follow up today. She has some new HA's and possible mild increase BP. She states her mother had HA's and mild increase BP and takes a BP medication and feels better. She denies any neurological sxs. She states some hot feeling as well last few months. She has not had menstrual cycle because of past endometrial ablation. She has mild bottom of R foot tingling. No injury.  She follows with Dr. Pisano, ENT for voice changes and is getting vocal cord injections. Last visit 7/5/2018. She is getting speech therapy. Last endoscopy 7/2015 and was normal.  She had normal CXR 1/17.  She does not smoke nor abuse EtOH. No change in family history. No other HEENT, cardiopulmonary, abdominal, , GYN, neurological, systemic, psychiatric, skin, vascular, lymphatic, musculoskeletal complaints. She does not smoke nor abuse EtOH.     Past Medical History:   Diagnosis Date     Breast pain, left 11/2013     Choroidal nevus of right eye      Constipation      Dysphonia      Gastroesophageal reflux disease      Hemorrhoids      Hoarseness      Menorrhagia      Migraines      Current Outpatient Prescriptions   Medication     nitrofurantoin, macrocrystal-monohydrate, (MACROBID) 100 MG capsule     ranitidine (ZANTAC) 300 MG tablet     valACYclovir (VALTREX) 500 MG tablet     No current facility-administered medications for this visit.          Physical Exam  There were no vitals taken for this visit.  Exam:  Constitutional: healthy, alert and no distress  Cardiovascular: RRR, S1, S2, No MRG. No B carotid bruits. No scalp tenderness to palpation. No temporal bruits.   Respiratory: negative, Good diaphragmatic excursion. Lungs clear  Gastrointestinal: Abdomen soft, non-tender. BS normal. No masses, organomegaly  : Deferred  Musculoskeletal: extremities normal- no gross deformities noted, gait normal and normal muscle tone  Skin: no suspicious lesions or rashes  Neurologic: Gait normal.  Reflexes normal and symmetric. Sensation grossly WNL. Her R foot has goo sensation and not tender  Psychiatric: mentation appears normal and affect normal/bright      Assessment/Plan    1. She had mammogram  8/8/17; [facility] and follow up breast imaging 10/16/17.  Annual exam 9/6/17. She also saw   Dr. Powell GYN 10/6/17 for breast mass. Mammogram scheduled for 8/27/2018.  2. Previous abdominal complaints; CT scan unremarkable 7/2/16. Ordered colonoscopy and MRCP and this was done 12/29/16 as unremarkable.  3. She remains on PPI for reflux; last EGD 7/15.  4. She continues to follow with  Dr. Pisano, ENT  for hoarse voice. She is getting speech therapy. EGD done 11/17/17 and she is on H2 blocker.   MRI brain and MRI neck 11/20/17 showing R sided vocal cord findings.   5. Fasting lipids done 10/3/17; ordered future today  6. Check with insurance regarding new Shingles vaccine  7. Seen by Dr. Foster Neurology 4/16/2018 to assess any neurological issues with vocal cord findings.   8. Ordered MRI/MRA for new HA's and new B ear sounds.  9. Could consider checking FSH, LH, estradiol to assess for menopausal status  10. If R foot sxs. Worsen consider EMG and/or lumbar MRI.

## 2018-11-07 ENCOUNTER — RADIANT APPOINTMENT (OUTPATIENT)
Dept: MRI IMAGING | Facility: CLINIC | Age: 53
End: 2018-11-07
Attending: INTERNAL MEDICINE
Payer: COMMERCIAL

## 2018-11-07 DIAGNOSIS — G44.89 OTHER HEADACHE SYNDROME: ICD-10-CM

## 2018-11-07 RX ORDER — GADOBUTROL 604.72 MG/ML
7.5 INJECTION INTRAVENOUS ONCE
Status: COMPLETED | OUTPATIENT
Start: 2018-11-07 | End: 2018-11-07

## 2018-11-07 RX ADMIN — GADOBUTROL 7.5 ML: 604.72 INJECTION INTRAVENOUS at 19:47

## 2018-11-07 NOTE — LETTER
Patient:  Tatiana Paredes  :   1965  MRN:     [id number]        Ms. Tatiana Paredes  [address] [address]        2018    Dear Ms. Reggie,    Thank you for choosing the [facility] for your healthcare needs.  We appreciate the opportunity to serve you.    The following are your recent test results.     Results for orders placed or performed in visit on 18   MR Brain for Stroke Cmpl w/o & w Contras    Narrative    MRI brain without and with contrast  MRA of the head without contrast  Neck MRA without and with contrast    Provided History:  new headaches. Also unusual sounds in her B ears.  Please do MRI brain and MRA  of B carotids and vertebrals. Do MRA all  intracranial vessels; Other headache syndrome.  ICD-10: Other headache syndrome    Comparison:    Technique:   Brain MRI:  Axial diffusion, FLAIR, T2-weighted, susceptibility, and  coronal T1-weighted images were obtained without intravenous contrast.  Following intravenous gadolinium-based contrast administration, axial  and coronal T1-weighted images were obtained.    Head MRA: 3D time-of-flight MRA of the [place] was performed  without intravenous contrast.  Neck MRA:  Limited non contrast 2DTOF images were obtained of the  mid-cervical region. Following intravenous gadolinium-based contrast  administration, a contrast enhanced MRA of the neck/cervical vessels  was performed.  Three-dimensional reconstructions of the neck and head MRA were  created, which were reviewed by the radiologist.    Dose: 7.5mL Gadavist    Findings:   Brain MRI: Cerebellar tonsils are mildly low, 5 mm below the basion  opisthion line. Axial diffusion weighted images demonstrate no  definite acute infarct. On the FLAIR images, there is nonspecific mild  periventricular T2-hyperintensity, which are most likely related to  chronic small vessel ischemic disease. There is no  definite  intracranial hemorrhage on susceptibility images. Ventricles are  proportionate to the cerebral sulci. Contrast-enhanced images of the  brain demonstrate no abnormal intra- or extra-axial enhancement.    Head MRA demonstrates no definite aneurysm or stenosis of the major  intracranial arteries. Hypoplastic distal V4 segment of the right  vertebral artery.    Neck MRA demonstrates patent major cervical arteries. Mild  irregularity of the proximal left internal carotid artery, without  significant stenosis. The normal distal right internal carotid artery  measures 4 mm. The normal distal left internal carotid artery measures  4 mm. Antegrade flow in the major cervical vasculature.      Impression    Impression:  1. No acute abnormality. Essentially normal brain MRI with continued  mildly low cerebellar tonsils.  2. No abnormal enhancing lesions intracranially.  3. Head MRA demonstrates no definite aneurysm or stenosis of the major  intracranial arteries.  4. Neck MRA demonstrates patent major cervical arteries.    CRUZ MCCLURE MD       Dear Tatiana;    Your brain MRI looks fine      Please contact your provider if you have any questions or concerns.  We look forward to serving your needs in the future.      Sincerely,      Cruz Prather MD/jeanette

## 2018-11-08 NOTE — DISCHARGE INSTRUCTIONS
MRI Contrast Discharge Instructions    The IV contrast you received today will pass out of your body in your  urine. This will happen in the next 24 hours. You will not feel this process.  Your urine will not change color.    Drink at least 4 extra glasses of water or juice today (unless your doctor  has restricted your fluids). This reduces the stress on your kidneys.  You may take your regular medicines.    If you are on dialysis: It is best to have dialysis today.    If you have a reaction: Most reactions happen right away. If you have  any new symptoms after leaving the hospital (such as hives or swelling),  call your hospital at the correct number below. Or call your family doctor.  If you have breathing distress or wheezing, call 911.    Special instructions: ***    I have read and understand the above information.    Signature:______________________________________ Date:___________    Staff:__________________________________________ Date:___________     Time:__________    [facility] Departments:    ___[place]: [phone]  ___[facility]: [phone]  ___[place]: [phone] ___[facility]: [phone]  ___[facility]: [phone]  ___[facility]: [phone]  ___[phone]  ___[facility]: [phone]  ___[phone]

## 2018-12-03 ENCOUNTER — OFFICE VISIT (OUTPATIENT)
Dept: INTERNAL MEDICINE | Facility: CLINIC | Age: 53
End: 2018-12-03
Payer: COMMERCIAL

## 2018-12-03 VITALS
SYSTOLIC BLOOD PRESSURE: 112 MMHG | WEIGHT: 148 LBS | HEART RATE: 78 BPM | BODY MASS INDEX: 23.89 KG/M2 | RESPIRATION RATE: 16 BRPM | DIASTOLIC BLOOD PRESSURE: 73 MMHG

## 2018-12-03 DIAGNOSIS — Z28.9 DELAYED VACCINATION: Primary | ICD-10-CM

## 2018-12-03 ASSESSMENT — PAIN SCALES - GENERAL: PAINLEVEL: NO PAIN (0)

## 2018-12-03 NOTE — NURSING NOTE
Patient states Shingle vaccine covers.  Administered Shingle Shingrix vaccine (see Immunizations in Chart Review). Patient tolerated well.  Piotr Acosta CMA at 9:27 AM on 12/3/2018

## 2018-12-03 NOTE — PROGRESS NOTES
HPI  Tatiana Paredes is a 53 yo female  Comes in for follow up today. She had HA's and possible mild increase BP. She states her mother had HA's and mild increase BP and takes a BP medication and feels better. She denies any neurological sxs. She states some hot feeling as well last few months. She has not had menstrual cycle because of past endometrial ablation. She has mild bottom of R foot tingling. No injury.  She follows with Dr. Pisano, ENT for voice changes got vocal cord injection on 7/5/2018. She is getting speech therapy. Last endoscopy 7/2015 and was normal.  She had normal CXR 1/17.  She does not smoke nor abuse EtOH. No change in family history. No other HEENT, cardiopulmonary, abdominal, , GYN, neurological, systemic, psychiatric, skin, vascular, lymphatic, musculoskeletal complaints. She does not smoke nor abuse EtOH.     Past Medical History:   Diagnosis Date     Breast pain, left 11/2013     Choroidal nevus of right eye      Constipation      Dysphonia      Gastroesophageal reflux disease      Hemorrhoids      Hoarseness      Menorrhagia      Migraines      Current Outpatient Prescriptions   Medication     nitrofurantoin, macrocrystal-monohydrate, (MACROBID) 100 MG capsule     ranitidine (ZANTAC) 300 MG tablet     valACYclovir (VALTREX) 500 MG tablet     No current facility-administered medications for this visit.          Physical Exam  /73 (BP Location: Right arm, Patient Position: Sitting, Cuff Size: Adult Regular)  Pulse 78  Resp 16  Wt 67.1 kg (148 lb)  Breastfeeding? No  BMI 23.89 kg/m2  Exam:  Constitutional: healthy, alert and no distress  Cardiovascular: RRR, S1, S2, No MRG. No B carotid bruits. No scalp tenderness to palpation. No temporal bruits.   Respiratory: negative, Good diaphragmatic excursion. Lungs clear  Gastrointestinal: Abdomen soft, non-tender. BS normal. No masses, organomegaly  : Deferred  Musculoskeletal: extremities normal- no gross deformities noted, gait  normal and normal muscle tone  Skin: no suspicious lesions or rashes  Neurologic: Gait normal. Reflexes normal and symmetric. Sensation grossly WNL. Her R foot has goo sensation and not tender  Psychiatric: mentation appears normal and affect normal/bright      Assessment/Plan    1. She had mammogram  8/8/17; [facility] and follow up breast imaging 10/16/17.  Annual exam 9/6/17. She also saw   Dr. Powell GYN 10/6/17 for breast mass. Mammogram done 8/27/2018. She will see Dr. Powell 1/30/2019  2. Previous abdominal complaints; CT scan unremarkable 7/2/16. Ordered colonoscopy and MRCP and this was done 12/29/16 as unremarkable.  3. She remains on PPI for reflux; last EGD 7/15.  4. She continues to follow with  Dr. Pisano, ENT  for hoarse voice. She is getting speech therapy. EGD done 11/17/17 and she is on H2 blocker.   MRI brain and MRI neck 11/20/17 showing R sided vocal cord findings.  She had Cymetra vocal cord injection 7/5/2018.   5. Fasting lipids done 8/27/2018   6. New Zoster vaccine today. She states she got flu shot at work  7. Seen by Dr. Foster Neurology 4/16/2018 to assess any neurological issues with vocal cord findings.   8. Ordered MRI/MRA for new HA's and new B ear sounds done 11/2/2018 normal.      Total time spent 25 minutes.  More than 50% of the time spent with Ms. Paredes on counseling / coordinating her care

## 2018-12-03 NOTE — MR AVS SNAPSHOT
After Visit Summary   12/3/2018    Tatiana Paredes    MRN: [id number]           Patient Information     Date Of Birth          1965        Visit Information        Provider Department      12/3/2018 8:35 AM Cruz Prather MD [facility]        Today's Diagnoses     Delayed vaccination    -  1       Follow-ups after your visit        Your next 10 appointments already scheduled     2019  9:00 AM CST   Annual Visit with Natali Powell MD   [facility] ([facility])    [facility] [address],[place] [address]  [address]  [facility] [address] [phone]              Who to contact     Please call your clinic at [phone] to:    Ask questions about your health    Make or cancel appointments    Discuss your medicines    Learn about your test results    Speak to your doctor            Additional Information About Your Visit        MyChart Information     [employer] is an electronic gateway that provides easy, online access to your medical records. With [employer], you can request a clinic appointment, read your test results, renew a prescription or communicate with your care team.     To sign up for [employer] visit the website at [email or web address]   You will be asked to enter the access code listed below, as well as some personal information. Please follow the directions to create your username and password.     Your access code is: [email or web address]  Expires: 3/3/2019  9:12 AM     Your access code will  in 90 days. If you need help or a new code, please contact your [facility] or call [phone] for assistance.        Care EveryWhere ID     This is your Care EveryWhere ID. This could be used by other organizations to access your [place] medical records  [id number]        Your Vitals Were     Pulse Respirations Breastfeeding? BMI (Body Mass Index)          78 16 No 23.89  kg/m2         Blood Pressure from Last 3 Encounters:   12/03/18 112/73   10/24/18 143/82   08/22/18 116/75    Weight from Last 3 Encounters:   12/03/18 67.1 kg (148 lb)   10/24/18 67.6 kg (149 lb)   08/22/18 67.3 kg (148 lb 6.4 oz)              We Performed the Following     ZOSTER VACCINE RECOMBINANT ADJUVANTED IM NJX          Today's Medication Changes          These changes are accurate as of 12/3/18  9:19 AM.  If you have any questions, ask your nurse or doctor.               These medicines have changed or have updated prescriptions.        Dose/Directions    nitroFURantoin macrocrystal-monohydrate 100 MG capsule   Commonly known as:  MACROBID   This may have changed:    - when to take this  - reasons to take this   Used for:  Frequent UTI        Dose:  100 mg   Take 1 capsule (100 mg) by mouth daily   Quantity:  14 capsule   Refills:  3       valACYclovir 500 MG tablet   Commonly known as:  VALTREX   This may have changed:    - when to take this  - reasons to take this   Used for:  HSV (herpes simplex virus) anogenital infection        Dose:  500 mg   Take 1 tablet (500 mg) by mouth 2 times daily   Quantity:  28 tablet   Refills:  3                Primary Care Provider Office Phone # Fax #    Cruz Prather [phone]       [address] [address]        Equal Access to Services     ELSIE TELLO AH: Chito munguiao Soestrella, waaxda luqadaha, qaybta kaalmada kevan, sarahy moura. So [facility] [phone].    ATENCIÓN: Si habla español, tiene a cazares disposición servicios gratuitos de asistencia lingüística. Llame al [phone].    We comply with applicable federal civil rights laws and [place] laws. We do not discriminate on the basis of race, color, national origin, age, disability, sex, sexual orientation, or gender identity.            Thank you!     Thank you for choosing [facility]  for your care. Our goal is  always to provide you with excellent care. Hearing back from our patients is one way we can continue to improve our services. Please take a few minutes to complete the written survey that you may receive in the mail after your visit with us. Thank you!             Your Updated Medication List - Protect others around you: Learn how to safely use, store and throw away your medicines at www.disposemymeds.org.          This list is accurate as of 12/3/18  9:19 AM.  Always use your most recent med list.                   Brand Name Dispense Instructions for use Diagnosis    nitroFURantoin macrocrystal-monohydrate 100 MG capsule    MACROBID    14 capsule    Take 1 capsule (100 mg) by mouth daily    Frequent UTI       ranitidine 300 MG tablet    ZANTAC     Take 300 mg by mouth At Bedtime        valACYclovir 500 MG tablet    VALTREX    28 tablet    Take 1 tablet (500 mg) by mouth 2 times daily    HSV (herpes simplex virus) anogenital infection

## 2018-12-03 NOTE — NURSING NOTE
Chief Complaint   Patient presents with     Results     Patient is here for follow up on results     Imm/Inj     Also here for Shingle Shingrix vaccine       Piotr Acosta CMA (AAMA) at 8:49 AM on 12/3/2018

## 2019-01-23 ENCOUNTER — TELEPHONE (OUTPATIENT)
Dept: OTOLARYNGOLOGY | Facility: CLINIC | Age: 54
End: 2019-01-23

## 2019-01-31 ENCOUNTER — OFFICE VISIT (OUTPATIENT)
Dept: OTOLARYNGOLOGY | Facility: CLINIC | Age: 54
End: 2019-01-31
Payer: COMMERCIAL

## 2019-01-31 DIAGNOSIS — J38.00 VOCAL CORD PARESIS: Primary | ICD-10-CM

## 2019-01-31 NOTE — LETTER
1/31/2019       RE: Tatiana Paredes  [address]  [facility] [address]     Dear Colleague,    Thank you for referring your patient, Tatiana Paredes, to the [facility] at [facility]. Please see a copy of my visit note below.      HISTORY OF PRESENT ILLNESS: Tatiana Paredes is a 53 year old female with a history of dysphonia.  I originally saw her on 6/10/2014.  That was associated with time of prolonged use of her voice.  Please see the 7/5/2018 note for a more in-depth history.  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  This is been associated with deterioration of vocal quality.  She had no other neurologic symptoms.  Neurology consult was obtained and did not find any systemic illnesses.  Laboratory test was negative an MRI of the brain was also normal.  Antibiotics for myasthenia gravis were also within normal limits.  With continued atrophy we offered her a Cymetra injection.  This was performed on 7/5/2018.  She did well for approximately 5 months but then began to notice some subtle deterioration of her vocal quality.  Is now intermittent and she comes back here for repeat discussion of treatment and possible Cymetra injection    Last 2 Scores for Patient-Answered VHI Questionnaire  VHI Total Score 7/5/2018 1/31/2019   VHI Total Score 18 16       Last 2 Scores for Patient-Answered CSI Questionnaire  CSI Total Score 7/5/2018 1/31/2019   CSI Total Score 0 0         Last 2 Scores for Patient-Answered EAT Questionnaire  EAT Total Score 7/5/2018 1/31/2019   EAT Total Score 0 1           PAST MEDICAL HISTORY:   Past Medical History:   Diagnosis Date     Breast pain, left 11/2013     Choroidal nevus of right eye      Constipation      Dysphonia      Gastroesophageal reflux disease      Hemorrhoids      Hoarseness      Menorrhagia      Migraines        PAST SURGICAL HISTORY:   Past Surgical History:    Procedure Laterality Date     HC HYSTEROSCOPY, SURGICAL; W/ ENDOMETRIAL ABLATION, ANY METHOD  2011       FAMILY HISTORY:   Family History   Problem Relation Age of Onset     Breast Cancer Sister 52     Cancer - colorectal Maternal Grandmother 70     Migraines Mother        SOCIAL HISTORY:   Social History     Tobacco Use     Smoking status: Never Smoker     Smokeless tobacco: Never Used   Substance Use Topics     Alcohol use: Yes     Alcohol/week: 0.5 - 2.5 oz     Comment: 1-3 times a week       REVIEW OF SYSTEMS: Ten point review of systems was performed and is negative except for:   [facility] ROS 5/1/2018   Constitutional -   Neurology -   Ears, Nose, Throat Hoarseness        ALLERGIES: Patient has no known allergies.    MEDICATIONS:   Current Outpatient Medications   Medication Sig Dispense Refill     nitrofurantoin, macrocrystal-monohydrate, (MACROBID) 100 MG capsule Take 1 capsule (100 mg) by mouth daily (Patient taking differently: Take 100 mg by mouth daily as needed ) 14 capsule 3     ranitidine (ZANTAC) 300 MG tablet Take 300 mg by mouth At Bedtime       valACYclovir (VALTREX) 500 MG tablet Take 1 tablet (500 mg) by mouth 2 times daily (Patient taking differently: Take 500 mg by mouth 2 times daily as needed ) 28 tablet 3         PHYSICAL EXAMINATION:  She  is awake, alert and in no apparent distress.    Her tympanic membranes are clear and intact bilaterally. External auditory canals are clear.  Nasal exam shows a mild septal deviation without obstruction. Examination of the oral cavity shows no suspicious lesions.  There is symmetric movement of the tongue and soft palate.    The oropharynx is clear.  Her neck is supple without significant adenopathy.  Pulse is regular.  Upper airway is clear.  Cranial nerves II-XII are grossly intact.   No lingual fasciculations are present.  There is good hand strength there is good finger coordination bilaterally.  There is no dysarthria of the lips tip of tongue or  base of tongue.      PROCEDURE: A flexible laryngoscopy  was performed by our speech-language pathologist and reviewed by myself..  Informed consent was obtained and a time out was performed. 3% lidocaine and 0.25% phenylephrine was sprayed into the nasal cavity and allowed 3 to 5 minutes for effect. The scope was passed through the left sided nostril. Examination showed the left vocal fold to be fully mobile.  The right vocal fold had reduced mobility.  On many room repetitive and phonation tasks the right vocal fold remained in the midline.  No nodules, polyps or ulcerations are seen.  There is mild atrophy of the right vocal fold and mild bowing of the right vocal fold. There is a small persistent glottic gap present during phonation.  There is mild inflammation or erythema of the supraglottic or glottic larynx.  With phonation there is moderatecontraction of the supraglottic larynx.  The hypopharynx is otherwise clear as is the subglottis.      After a thorough discussion we decided to proceed with a Cymetra injection.     PROCEDURE NOTE:   After local preparation with an alcohol swab, 1% lidocaine with 1:100,000 epinephrine was injected into the area of the cricothyroid membrane and the right sidethyroid ala. This was given approximately five minutes to take effect. During this time, the Cymetra was reconstituted with saline and  prepared for injection. After this process was completed, a fiberoptic scope was then placed through the nostril, visualizing the larynx on a video monitor. A 23-gauge needle was passed through the cricothyroid membrane into the right sidevocal fold. Approximately 0.4 cc of Cymetra was injected into the vocal fold. Good medialization with a strong voice and cough post injection. Following the injection, the needle was removed. The fiberoptic scope showed that there was an adequate airway and a convex character to the injected vocal fold where it had been concave previously. The scope  was removed, and the procedure was completed. The patient tolerated the procedure well and left our clinic after a short observation.   The lot number for the Cymetra [id number], the expiration date is 2020-08.    Pre injection    Post injection          PLAN: I will have  her follow up in approximately  5 months time.    Again, thank you for allowing me to participate in the care of your patient.      Sincerely,    Jose Ramon Pisano MD

## 2019-01-31 NOTE — NURSING NOTE
Procedure: Flexible transnasal laryngoscopy with vocal fold injection   Reason: Vocal cord paralysis/paresis   PREPROCEDURE:   Yes Patient ID verified with 2 identifiers (name and  or MRN)   Yes: Procedure and site verified with patient/designee (when able)   Yes: Accurate consent documentation in medical record   No: Marking not required. Reason: [ Procedure does not require site marking. ][ Provider is in continuous attendance with the patient from consent through completion of procedure. ][ Marking unable or refused by patient (see scanned diagram).   TIME OUT:   Yes: Time-Out performed immediately prior to starting procedure, including verbal and active participation of all team members addressing:   * Correct patient identity   * Confirmed that the correct side and site are marked   * An accurate procedure consent form   * Agreement on the procedure to be done   * Correct patient position   * Relevant images and results are properly labeled and appropriately displayed   * The need to administer antibiotics or fluids for irrigation purposes during the procedure as applicable   * Safety precations based on patient history or medication use   DURING PROCEDURE: Verification of correct person, site, and procedure occurs any time the responsibility for care of the patient is transferred to another member of the care team.

## 2019-01-31 NOTE — PROGRESS NOTES
HISTORY OF PRESENT ILLNESS: Tatiana Paredes is a 53 year old female with a history of dysphonia.  I originally saw her on 6/10/2014.  That was associated with time of prolonged use of her voice.  Please see the 7/5/2018 note for a more in-depth history.  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  This is been associated with deterioration of vocal quality.  She had no other neurologic symptoms.  Neurology consult was obtained and did not find any systemic illnesses.  Laboratory test was negative an MRI of the brain was also normal.  Antibiotics for myasthenia gravis were also within normal limits.  With continued atrophy we offered her a Cymetra injection.  This was performed on 7/5/2018.  She did well for approximately 5 months but then began to notice some subtle deterioration of her vocal quality.  Is now intermittent and she comes back here for repeat discussion of treatment and possible Cymetra injection    Last 2 Scores for Patient-Answered VHI Questionnaire  VHI Total Score 7/5/2018 1/31/2019   VHI Total Score 18 16       Last 2 Scores for Patient-Answered CSI Questionnaire  CSI Total Score 7/5/2018 1/31/2019   CSI Total Score 0 0         Last 2 Scores for Patient-Answered EAT Questionnaire  EAT Total Score 7/5/2018 1/31/2019   EAT Total Score 0 1           PAST MEDICAL HISTORY:   Past Medical History:   Diagnosis Date     Breast pain, left 11/2013     Choroidal nevus of right eye      Constipation      Dysphonia      Gastroesophageal reflux disease      Hemorrhoids      Hoarseness      Menorrhagia      Migraines        PAST SURGICAL HISTORY:   Past Surgical History:   Procedure Laterality Date     HC HYSTEROSCOPY, SURGICAL; W/ ENDOMETRIAL ABLATION, ANY METHOD  2011       FAMILY HISTORY:   Family History   Problem Relation Age of Onset     Breast Cancer Sister 52     Cancer - colorectal Maternal Grandmother 70     Migraines Mother        SOCIAL HISTORY:   Social History      Tobacco Use     Smoking status: Never Smoker     Smokeless tobacco: Never Used   Substance Use Topics     Alcohol use: Yes     Alcohol/week: 0.5 - 2.5 oz     Comment: 1-3 times a week       REVIEW OF SYSTEMS: Ten point review of systems was performed and is negative except for:   [facility] ROS 5/1/2018   Constitutional -   Neurology -   Ears, Nose, Throat Hoarseness        ALLERGIES: Patient has no known allergies.    MEDICATIONS:   Current Outpatient Medications   Medication Sig Dispense Refill     nitrofurantoin, macrocrystal-monohydrate, (MACROBID) 100 MG capsule Take 1 capsule (100 mg) by mouth daily (Patient taking differently: Take 100 mg by mouth daily as needed ) 14 capsule 3     ranitidine (ZANTAC) 300 MG tablet Take 300 mg by mouth At Bedtime       valACYclovir (VALTREX) 500 MG tablet Take 1 tablet (500 mg) by mouth 2 times daily (Patient taking differently: Take 500 mg by mouth 2 times daily as needed ) 28 tablet 3         PHYSICAL EXAMINATION:  She  is awake, alert and in no apparent distress.    Her tympanic membranes are clear and intact bilaterally. External auditory canals are clear.  Nasal exam shows a mild septal deviation without obstruction. Examination of the oral cavity shows no suspicious lesions.  There is symmetric movement of the tongue and soft palate.    The oropharynx is clear.  Her neck is supple without significant adenopathy.  Pulse is regular.  Upper airway is clear.  Cranial nerves II-XII are grossly intact.   No lingual fasciculations are present.  There is good hand strength there is good finger coordination bilaterally.  There is no dysarthria of the lips tip of tongue or base of tongue.      PROCEDURE: A flexible laryngoscopy  was performed by our speech-language pathologist and reviewed by myself..  Informed consent was obtained and a time out was performed. 3% lidocaine and 0.25% phenylephrine was sprayed into the nasal cavity and allowed 3 to 5 minutes for effect. The scope  was passed through the left sided nostril. Examination showed the left vocal fold to be fully mobile.  The right vocal fold had reduced mobility.  On many room repetitive and phonation tasks the right vocal fold remained in the midline.  No nodules, polyps or ulcerations are seen.  There is mild atrophy of the right vocal fold and mild bowing of the right vocal fold. There is a small persistent glottic gap present during phonation.  There is mild inflammation or erythema of the supraglottic or glottic larynx.  With phonation there is moderatecontraction of the supraglottic larynx.  The hypopharynx is otherwise clear as is the subglottis.      After a thorough discussion we decided to proceed with a Cymetra injection.     PROCEDURE NOTE:   After local preparation with an alcohol swab, 1% lidocaine with 1:100,000 epinephrine was injected into the area of the cricothyroid membrane and the right sidethyroid ala. This was given approximately five minutes to take effect. During this time, the Cymetra was reconstituted with saline and  prepared for injection. After this process was completed, a fiberoptic scope was then placed through the nostril, visualizing the larynx on a video monitor. A 23-gauge needle was passed through the cricothyroid membrane into the right sidevocal fold. Approximately 0.4 cc of Cymetra was injected into the vocal fold. Good medialization with a strong voice and cough post injection. Following the injection, the needle was removed. The fiberoptic scope showed that there was an adequate airway and a convex character to the injected vocal fold where it had been concave previously. The scope was removed, and the procedure was completed. The patient tolerated the procedure well and left our clinic after a short observation.   The lot number for the Cymetra [id number], the expiration date is 2020-08.    Pre injection    Post injection          PLAN: I will have  her follow up in approximately  5 months  time.

## 2019-03-01 DIAGNOSIS — A60.9 HSV (HERPES SIMPLEX VIRUS) ANOGENITAL INFECTION: ICD-10-CM

## 2019-03-01 DIAGNOSIS — N39.0 FREQUENT UTI: ICD-10-CM

## 2019-03-01 RX ORDER — VALACYCLOVIR HYDROCHLORIDE 500 MG/1
500 TABLET, FILM COATED ORAL 2 TIMES DAILY
Qty: 28 TABLET | Refills: 0 | Status: SHIPPED | OUTPATIENT
Start: 2019-03-01 | End: 2019-11-26

## 2019-03-01 RX ORDER — NITROFURANTOIN 25; 75 MG/1; MG/1
100 CAPSULE ORAL DAILY
Qty: 14 CAPSULE | Refills: 3 | Status: SHIPPED | OUTPATIENT
Start: 2019-03-01 | End: 2020-02-14

## 2019-03-01 NOTE — TELEPHONE ENCOUNTER
[facility] Call Center    Phone Message    May a detailed message be left on voicemail: yes    Reason for Call: Medication Refill Request    Has the patient contacted the pharmacy for the refill? Yes   Name of medication being requested: nitrofurantoin, macrocrystal-monohydrate, (MACROBID) 100 MG capsule and valACYclovir (VALTREX) 500 MG tablet  Provider who prescribed the medication: Dr. Cruz Prather  Pharmacy: [place] [id number] [address]  Date medication is needed: Next available      Action Taken: Message routed to:  Other: Med Refills

## 2019-04-24 ENCOUNTER — OFFICE VISIT (OUTPATIENT)
Dept: OBGYN | Facility: CLINIC | Age: 54
End: 2019-04-24
Attending: OBSTETRICS & GYNECOLOGY
Payer: COMMERCIAL

## 2019-04-24 VITALS
DIASTOLIC BLOOD PRESSURE: 75 MMHG | HEART RATE: 79 BPM | BODY MASS INDEX: 24.28 KG/M2 | HEIGHT: 66 IN | WEIGHT: 151.1 LBS | SYSTOLIC BLOOD PRESSURE: 116 MMHG

## 2019-04-24 DIAGNOSIS — Z12.39 BREAST CANCER SCREENING: ICD-10-CM

## 2019-04-24 DIAGNOSIS — Z00.00 VISIT FOR PREVENTIVE HEALTH EXAMINATION: Primary | ICD-10-CM

## 2019-04-24 ASSESSMENT — MIFFLIN-ST. JEOR: SCORE: 1312.14

## 2019-04-24 NOTE — PROGRESS NOTES
"[facility]  Annual Exam    HPI:    Tatiana Paredes is a 53 year old , here for well woman exam. She would like to discuss symptoms of menopause today. She states she did have a period of night sweats in 2018, but denies hot flashes or mood swings. These have resolved since then. She is no longer menstruating as she has a history of endometrial ablation in . States she still does get occasional \"spotting,\" but states she only notices \"one spot\" and then it will go away. This has happened 1-2 times over the last two years. It is not bothersome. Denies chest pain, shortness of breath, abdominal pain, or any other systemic symptoms.     GYN History  - LMP: No LMP recorded. Patient has had an ablation. 3/8/19 (one episode of spotting)  - Pap Smears: Remote history of abnormals     Lab Results   Component Value Date    PAP NIL 2017    PAP NIL 2014    PAP NIL 2013     - Contraception: None  - Sexual Activity: Male partners  - Sexual Concerns: No  - Hx STIs: No    OBHx  OB History    Para Term  AB Living   1 1 0 0 0 1   SAB TAB Ectopic Multiple Live Births   0 0 0 0 0      # Outcome Date GA Lbr Wilberto/2nd Weight Sex Delivery Anes PTL Lv   1 Para              Past Medical History  - Hoarseness- following with ENT, possible surgery  - Migraines  - Hemorrhoids  - GERD    Past Surgical History  - Hysteroscopy, ablation     Medications  Current Outpatient Medications   Medication     nitroFURantoin macrocrystal-monohydrate (MACROBID) 100 MG capsule     ranitidine (ZANTAC) 300 MG tablet     valACYclovir (VALTREX) 500 MG tablet     Allergies  - No known allergies    Social History  - Pharmacist- Managed prior auth with [employer]    Family History  Family History   Problem Relation Age of Onset     Breast Cancer Sister 52     Cancer - colorectal Maternal Grandmother 70     Migraines Mother      Prostate Cancer Father      ROS: 10-Point ROS negative except as noted in " "HPI    Physical Exam  /75   Pulse 79   Ht 1.676 m (5' 6\")   Wt 68.5 kg (151 lb 1.6 oz)   BMI 24.39 kg/m    Gen: Well-appearing, NAD  HEENT: Normocephalic, atraumatic  Neck: Thyroid is not enlarged, no appreciable masses palpated. Non-tender  CV:  RRR, no m/r/g auscultated  Pulm: CTAB, no w/r/r auscultated  Abd: Soft, non-tender, non-distended  Ext: No LE edema, extremities warm and well perfused    Breast: Symmetric, no nipple discharge or retraction, no axillary lymphadenopathy, no palpable nodules or masses bilaterally    Pelvic:  Normal appearing external female genitalia. Normal hair distribution. Vagina is without lesions. Physiologic discharge. Cervix without lesions, no cervical motion tenderness. Uterus is small, mobile, non-tender. No adnexal tenderness or masses    Current BMI: Body mass index is 24.39 kg/m . (normal weight)    Assessment/Plan  Tatiana Paredes is a 53 year old  female here for annual exam, she is doing well and does not have any concerns.    #Menopausal Status  - Discuss symptoms of menopause with patient, difficult to assess with history of ablation.  - If bleeding worsens or changes will consider endometrial biopsy  - Patient states she is not having any menopausal symptoms currently, did offer medication if these start to affect daily living  - Menopause booklet given to patient    #Routine Health Maintenance  - Last Pap- 17 NILM, HPV Negative. Next in   - Mammogram - Last 2018, Next due in 2019. Order placed.  - Colonoscopy - Last 2016  - Lab Work - To be ordered by PCP, patient declines today    Follow Up: in 1 year for annual exam or sooner if needed    Patient discussed and staffed with Dr. Powell.    Lilia Schneider MD  OB/GYN PGY-1  19 9:52 AM    I have seen and examined the patient with the resident. I have reviewed, edited, and agree with the note.     Natali Powell MD        "

## 2019-04-24 NOTE — PATIENT INSTRUCTIONS

## 2019-04-24 NOTE — LETTER
"  RE: Tatiana Paredes  [address]  [facility] [address]     Dear Colleague,    Thank you for referring your patient, Tatiana Paredes, to the [facility] at [facility]. Please see a copy of my visit note below.    [facility]  Annual Exam    HPI:    Tatiana Paredes is a 53 year old , here for well woman exam. She would like to discuss symptoms of menopause today. She states she did have a period of night sweats in 2018, but denies hot flashes or mood swings. These have resolved since then. She is no longer menstruating as she has a history of endometrial ablation in . States she still does get occasional \"spotting,\" but states she only notices \"one spot\" and then it will go away. This has happened 1-2 times over the last two years. It is not bothersome. Denies chest pain, shortness of breath, abdominal pain, or any other systemic symptoms.     GYN History  - LMP: No LMP recorded. Patient has had an ablation. 3/8/19 (one episode of spotting)  - Pap Smears: Remote history of abnormals     Lab Results   Component Value Date    PAP NIL 2017    PAP NIL 2014    PAP NIL 2013     - Contraception: None  - Sexual Activity: Male partners  - Sexual Concerns: No  - Hx STIs: No    OBHx  OB History    Para Term  AB Living   1 1 0 0 0 1   SAB TAB Ectopic Multiple Live Births   0 0 0 0 0      # Outcome Date GA Lbr Wilberto/2nd Weight Sex Delivery Anes PTL Lv   1 Para              Past Medical History  - Hoarseness- following with ENT, possible surgery  - Migraines  - Hemorrhoids  - GERD    Past Surgical History  - Hysteroscopy, ablation     Medications  Current Outpatient Medications   Medication     nitroFURantoin macrocrystal-monohydrate (MACROBID) 100 MG capsule     ranitidine (ZANTAC) 300 MG tablet     valACYclovir (VALTREX) 500 MG tablet     Allergies  - No known allergies    Social History  - " "Pharmacist- Managed prior auth with [employer]    Family History  Family History   Problem Relation Age of Onset     Breast Cancer Sister 52     Cancer - colorectal Maternal Grandmother 70     Migraines Mother      Prostate Cancer Father      ROS: 10-Point ROS negative except as noted in HPI    Physical Exam  /75   Pulse 79   Ht 1.676 m (5' 6\")   Wt 68.5 kg (151 lb 1.6 oz)   BMI 24.39 kg/m     Gen: Well-appearing, NAD  HEENT: Normocephalic, atraumatic  Neck: Thyroid is not enlarged, no appreciable masses palpated. Non-tender  CV:  RRR, no m/r/g auscultated  Pulm: CTAB, no w/r/r auscultated  Abd: Soft, non-tender, non-distended  Ext: No LE edema, extremities warm and well perfused    Breast: Symmetric, no nipple discharge or retraction, no axillary lymphadenopathy, no palpable nodules or masses bilaterally    Pelvic:  Normal appearing external female genitalia. Normal hair distribution. Vagina is without lesions. Physiologic discharge. Cervix without lesions, no cervical motion tenderness. Uterus is small, mobile, non-tender. No adnexal tenderness or masses    Current BMI: Body mass index is 24.39 kg/m . (normal weight)    Assessment/Plan  Tatiana Paredes is a 53 year old  female here for annual exam, she is doing well and does not have any concerns.    #Menopausal Status  - Discuss symptoms of menopause with patient, difficult to assess with history of ablation.  - If bleeding worsens or changes will consider endometrial biopsy  - Patient states she is not having any menopausal symptoms currently, did offer medication if these start to affect daily living  - Menopause booklet given to patient    #Routine Health Maintenance  - Last Pap- 17 NILM, HPV Negative. Next in   - Mammogram - Last 2018, Next due in 2019. Order placed.  - Colonoscopy - Last 2016  - Lab Work - To be ordered by PCP, patient declines today    Follow Up: in 1 year for annual exam or sooner if needed    Patient " discussed and staffed with Dr. Powell.    Lilia Schneider MD  OB/GYN PGY-1  04/24/19 9:52 AM    I have seen and examined the patient with the resident. I have reviewed, edited, and agree with the note.     Again, thank you for allowing me to participate in the care of your patient.      Sincerely,    Natali Powell MD

## 2019-05-06 ENCOUNTER — TELEPHONE (OUTPATIENT)
Dept: OTOLARYNGOLOGY | Facility: CLINIC | Age: 54
End: 2019-05-06

## 2019-05-06 ENCOUNTER — ALLIED HEALTH/NURSE VISIT (OUTPATIENT)
Dept: INTERNAL MEDICINE | Facility: CLINIC | Age: 54
End: 2019-05-06
Payer: COMMERCIAL

## 2019-05-06 DIAGNOSIS — Z23 NEED FOR SHINGLES VACCINE: Primary | ICD-10-CM

## 2019-05-06 NOTE — NURSING NOTE
Tatiana Paredes comes into clinic today at the request of Dr. Prather Ordering Provider for 2nd Shingrix Vaccine.    See immunization list for administration details.  Patient tolerated injection well.    This service provided today was under the supervising provider of the day Dr. Prather, who was available if needed.    Leidy Severino LPN at 9:20 AM on 5/6/2019.

## 2019-05-06 NOTE — TELEPHONE ENCOUNTER
[facility] Call Center    Phone Message    May a detailed message be left on voicemail: yes    Reason for Call: Other: Pt states she had seen [facility] in January, and they had discussed an additional surgery. She mentioned it had to do with an implant in her throat. She would like a call to discuss scheduling, as she is interested in pursuing this. Please advise.    Action Taken: Message routed to:  [facility] ([facility] ENT

## 2019-05-06 NOTE — TELEPHONE ENCOUNTER
Reviewed Chart, Reviewed with RN.  Called pt., left message that we should schedule follow up appt w/ Dr. Pisano to discuss Surgery.  Will FWD to Clinic Pool to schedule...

## 2019-05-09 ENCOUNTER — OFFICE VISIT (OUTPATIENT)
Dept: OTOLARYNGOLOGY | Facility: CLINIC | Age: 54
End: 2019-05-09
Payer: COMMERCIAL

## 2019-05-09 VITALS
BODY MASS INDEX: 24.27 KG/M2 | SYSTOLIC BLOOD PRESSURE: 115 MMHG | HEIGHT: 66 IN | WEIGHT: 151 LBS | DIASTOLIC BLOOD PRESSURE: 72 MMHG

## 2019-05-09 DIAGNOSIS — R49.0 DYSPHONIA: Primary | ICD-10-CM

## 2019-05-09 DIAGNOSIS — J38.00 VOCAL CORD PARESIS: Primary | ICD-10-CM

## 2019-05-09 DIAGNOSIS — J38.01 VOCAL FOLD PARESIS, RIGHT: ICD-10-CM

## 2019-05-09 ASSESSMENT — MIFFLIN-ST. JEOR: SCORE: 1311.68

## 2019-05-09 NOTE — LETTER
5/9/2019       RE: Tatiana Paredes  [address]  [facility] [address]     Dear Colleague,    Thank you for referring your patient, Tatiana Paredes, to the [facility] at [facility]. Please see a copy of my visit note below.          HISTORY OF PRESENT ILLNESS: Tatiana Paredes is a 53 year old female with a history of dysphonia.  I originally saw her on 6/10/2014.  That was associated with time of prolonged use of her voice.  Please see the 7/5/2018 note for a more in-depth history.  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  This is been associated with deterioration of vocal quality.  She had no other neurologic symptoms.  Neurology consult was obtained and did not find any systemic illnesses.  Laboratory test was negative an MRI of the brain was also normal.  Antibiotics for myasthenia gravis were also within normal limits.  With continued atrophy we offered her a Cymetra injection.  This was performed on 7/5/2018.  She did well for approximately 5 months but then began to notice some subtle deterioration of her vocal quality.  She had a repeat injection on 1/31/2019.  This again went well but only lasted for approximately 2 months.  She comes today with a weak quality to her voice.  There are no airway difficulties and no swallowing difficulties.        Last 2 Scores for Patient-Answered VHI Questionnaire  VHI Total Score 1/31/2019 5/9/2019   VHI Total Score 16 Incomplete       Last 2 Scores for Patient-Answered CSI Questionnaire  CSI Total Score 7/5/2018 1/31/2019   CSI Total Score 0 0         Last 2 Scores for Patient-Answered EAT Questionnaire  EAT Total Score 7/5/2018 1/31/2019   EAT Total Score 0 1           PAST MEDICAL HISTORY:   Past Medical History:   Diagnosis Date     Breast pain, left 11/2013     Choroidal nevus of right eye      Constipation      Dysphonia      Gastroesophageal reflux disease       Hemorrhoids      Hoarseness      Menorrhagia      Migraines        PAST SURGICAL HISTORY:   Past Surgical History:   Procedure Laterality Date     [facility] HYSTEROSCOPY, SURGICAL; W/ ENDOMETRIAL ABLATION, ANY METHOD  2011       FAMILY HISTORY:   Family History   Problem Relation Age of Onset     Breast Cancer Sister 52     Cancer - colorectal Maternal Grandmother 70     Migraines Mother      Prostate Cancer Father        SOCIAL HISTORY:   Social History     Tobacco Use     Smoking status: Never Smoker     Smokeless tobacco: Never Used   Substance Use Topics     Alcohol use: Yes     Alcohol/week: 0.5 - 2.5 oz     Comment: 1-3 times a week       REVIEW OF SYSTEMS: Ten point review of systems was performed and is negative except for:   [facility] ROS 5/1/2018   Constitutional -   Neurology -   Ears, Nose, Throat Hoarseness        ALLERGIES: Patient has no known allergies.    MEDICATIONS:   Current Outpatient Medications   Medication Sig Dispense Refill     nitroFURantoin macrocrystal-monohydrate (MACROBID) 100 MG capsule Take 1 capsule (100 mg) by mouth daily 14 capsule 3     ranitidine (ZANTAC) 300 MG tablet Take 300 mg by mouth At Bedtime       valACYclovir (VALTREX) 500 MG tablet Take 1 tablet (500 mg) by mouth 2 times daily 28 tablet 0       PHYSICAL EXAMINATION:  She  is awake, alert and in no apparent distress.    Her tympanic membranes are clear and intact bilaterally. External auditory canals are clear.  Nasal exam shows a mild septal deviation without obstruction. Examination of the oral cavity shows no suspicious lesions.  There is symmetric movement of the tongue and soft palate.    The oropharynx is clear.  Her neck is supple without significant adenopathy.  Pulse is regular.  Upper airway is clear.  Cranial nerves II-XII are grossly intact.   No lingual fasciculations are present.  There is good hand strength there is good finger coordination bilaterally.  There is no dysarthria of the lips tip of tongue or base  "of tongue.      PROCEDURE: A flexible laryngoscopy  was performed by our speech-language pathologist and reviewed by myself..  Informed consent was obtained and a time out was performed. 3% lidocaine and 0.25% phenylephrine was sprayed into the nasal cavity and allowed 3 to 5 minutes for effect. The scope was passed through the left sided nostril. Examination showed the left vocal fold to be fully mobile.  The right vocal fold completely immobile.  On many room repetitive and phonation tasks the right vocal fold remained in the midline.  No nodules, polyps or ulcerations are seen.  There was mild atrophy of the left vocal fold.  The ventricle on the right side is full today. There is a small persistent glottic gap present during phonation.  There is mild inflammation or erythema of the supraglottic or glottic larynx.  With phonation there is moderatecontraction of the supraglottic larynx.  The hypopharynx is otherwise clear as is the subglottis.          IMPRESSION/PLAN: Right vocal fold immobility.  This is more significant that was seen on the last examinations which had bowing of the vocal fold but full mobility.  There is also fullness of the ventricle which was not previously present in past examinations.  This may represent Cymetra which is residual in the lateral aspect of the vocal fold.  At this point I will have her follow-up in 2 months for repeat examination.  If her vocal fold remains immobile I will repeat the CT scan to make sure there is no pathology along the course of the recurrent laryngeal nerve.  If the atrophy has also resolved we can go ahead and consider a right thyroplasty under monitored anesthesia as well.              I encouraged patient to review  ENT Adult Default Surgery Request\" packet.  Highlighted points include:  1. Complete History and Physical within 30 days of surgery, either with PAC clinic or family clinic.   If completed outside of  [facility], please have provider send " all of your results to the hospital before the surgery.  Please schedule an appointment with your primary care provider.  2. Same-day surgery, must arrange for an adult to take you home and stay with you for the first 24 hours after surgery.  3. Discuss with primary care provider what medicines are safe before surgery.   Example, Coumadin, Plavix, Aspirin, Ibuprofen/Motrin, herbal products, Vitamin E and Fish oil may possibly be discontinued 7 days prior to surgery date.  4. Stop drinking alcohol at least 24 hours before surgery.  5. Quit or at least cut down smoking as it higher your risks for infection after surgery. No chewing tobacco for at least 8 hours before surgery.  6. Take a bath or shower the night before and morning of surgery.   Use antiseptic soap.  7. No food or drink after midnight. Follow your surgeon s orders for eating and drinking.  8. Schedule Pre-Op Speech therapy with SLP from [facility].     Please following surgeon s instructions as if you don t, your surgery could be canceled.     Thank you for choosing [facility].      Again, thank you for allowing me to participate in the care of your patient.      Sincerely,    Jose Ramon Pisano MD

## 2019-05-09 NOTE — PROGRESS NOTES
"[facility] VOICE CLINIC    Patient: Tatiana Paredes  Date of Visit: 5/9/2019    CHIEF COMPLAINT: Voice quality    HISTORY  Tatiana Paredes is a 53 year old year old woman, who was seen for follow-up evaluation in conjunction with a visit to Dr. Pisano.  She has a long-standing history of dysphonia.  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  She had worked for some time with Jada Lynn, CCC-SLP; however, by the summer 2018 she was no longer able to achieve her goals through therapeutic means alone, and at this point injection augmentation was recommended.  She had excellent response to her first injection with 5 months of good voice quality, and a repeat injection was completed on 1/31/2019.  Response to the most recent injection was good initially, but voice quality deteriorated after approximately 2 months.  She presents today for reevaluation and to discuss the possibilities of permanent surgical options.  As perioperative speech therapy is advantageous in these cases reevaluation by speech pathology today is warranted.      OBJECTIVE  PATIENT REPORTED MEASURES  No flowsheet data found.     Patient Supplied Answers To Last 2 VHI Questionnaires  Voice Handicap Index (VHI-10) 1/31/2019 5/9/2019   My voice makes it difficult for people to hear me 2 2   People have difficulty understanding me in a noisy room 3 2   My voice difficulties restrict my personal and social life.  1 -   I feel left out of conversations because of my voice 2 -   My voice problem causes me to lose income 0 0   I feel as though I have to strain to produce voice 2 2   The clarity of my voice is unpredictable 2 2   My voice problem upsets me 1 2   My voice makes me feel handicapped 2 2   People ask, \"What's wrong with your voice?\" 1 2   VHI-10 16 14      Patient Supplied Answers To CSI Questionnaire  Cough Severity Index (CSI) 1/31/2019   My cough is worse when I lie " down 0   My coughing problem causes me to restrict my personal and social life 0   I tend to avoid places because of my cough problem 0   I feel embarrassed because of my coughing problem 0   People ask, ''What's wrong?'' because I cough a lot 0   I run out of air when I cough 0   My coughing problem affects my voice 0   My coughing problem limits my physical activity 0   My coughing problem upsets me 0   People ask me if I am sick because I cough a lot 0   CSI Score 0      Patient Supplied Answers To EAT Questionnaire  Eating Assessment Tool (EAT-10) 1/31/2019   My swallowing problem has caused me to lose weight 0   My swallowing problem interferes with my ability to go out for meals 0   Swallowing liquids takes extra effort 0   Swallowing solids takes extra effort 0   Swallowing pills takes extra effort 0   Swallowing is painful 0   The pleasure of eating is affected by my swallowing 0   When I swallow food sticks in my throat 1   I cough when I eat 0   Swallowing is stressful 0   EAT-10 1     PERCEPTUAL EVALUATION (CPT 27091)  POSTURE / TENSION:     no overt tension visible    BREATHING:     appears within normal limits and adequate     VOICE:    Roughness: Moderate Intermittent associated with aperiodicity    Breathiness: Mild to moderate Intermittent    Strain: Mild Intermittent    Loudness    Conversational speech:  WNL    Projected speech:  Mild to moderately reduced    Pitch:    Conversational speech:  Mild to moderately elevated with frequent breaks    Pitch glide:     Adequate access to loft register with decreased pitch breaks as pitch ascended    Intermittent access to modal registration    Resonance:    Conversational speech:  laryngeal pharyngeal resonance    CAPE-V Overall Severity:  39/100    COUGH/THROAT CLEARING:    Not observed    THERAPY PROBES: Improvement was elicited with use of forward resonant stimuli, coordination of respiration and phonation and use of glottic coup to promote vocal fold  closure    LARYNGEAL EXAMINATION  Procedure: Flexible endoscopy with chip-tip technology without stroboscopy, right nostril; topical anesthesia with 3% Lidocaine and 0.25% phenylephrine was applied.   Performed by: Dr. Pisano   The laryngeal and pharyngeal structures were evaluated for gross appearance, mobility, function, and focal lesions / abnormalities of the associated mucosa.  Stroboscopy was warranted to evaluate closure, symmetry, and vibratory characteristics of the vocal folds.  All findings were within normal limits with the exception of the following salient features:     Right true vocal fold was effectively immobile in a near midline position with mild concavity of the vibratory margin    Residual Cymetra still appears to be present in the form of    Left true vocal fold demonstrates full range of mobility and is able to facilitate patent airway as well as weak glottic closure    Patient requires moderate cueing in order to achieve glottic coup    With phonation there is variable degrees of supraglottic recruitment.      When the patient is able to achieve low modal registration moderate to severe four-way constrictive supraglottic hyperfunction is noted    Patient is able to achieve decreased hyperfunction with forward resonant stimuli, but frequently this causes pitch to maintain at loft registration      Maximal abduction      Supraglottic hyperfunction during running speech    The laryngeal exam was reviewed with Ms. Paredes, and I provided pertinent explanations, as well as written and oral information.    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  Laryngeal evaluation demonstrates near complete immobility of the right true vocal fold with concavity of the vibratory margin.  With phonatory tasks there is frequent supraglottic  hyperfunction, and voice breaks associated with poor vocal fold entrainment and imbalance of the activation of the cricothyroid and thyroid arytenoid muscles.  There is also ongoing presence of Cymetra in the right true vocal fold.  Previous evaluations were reviewed and an interval decrease in right true vocal fold mobility was noted over the past year.      STIMULABILITY: results of therapy probes during perceptual and laryngeal evaluation demonstrate improvement with use of forward resonant stimuli and coordination of respiration and phonation    RECOMMENDATIONS:     Surgical intervention (thyroplasty) is warranted; however, given ongoing presence of Cymetra, and decreased mobility of the right true vocal fold over the past several evaluations Dr. Pisano has recommended waiting for this procedure for several months.    If mobility is not seen to improve repeat imaging is warranted to characterize the nature of the paralysis.    In order to optimize her voice for the next several months while waiting for resumption of the Cymetra a brief refresher course of speech therapy was recommended.    We discussed pursuing this course with Jada Lynn with whom the patient has worked in the past, or with myself for a change of perspective.  Patient has elected to pursue therapy with me at this time.    She demonstrates a Good prognosis for improvement given adherence to therapeutic recommendations. Therapeutic     Positive indicators: positive response to therapy probes diagnosis is known to respond to treatment previously positive response to behavioral therapy    Negative indicators: Ongoing change in status of the right true vocal fold mobility    DURATION / FREQUENCY: 2 biweekly and 1 monthly one-hour sessions    GOALS:  Patient goal:   1. To improve and maintain a healthy voice quality  2. To understand the problem and fix it as much as possible    Short-term goal(s): Within the first 4 sessions, Ms.  Reggie:  1. will demonstrate assigned laryngeal massage techniques with 80% accuracy or better with no clinician support  2. will accurately identify target vs. habitual voice quality during therapy tasks in 4 out of 5 trials with no clinician support  3. will demonstrate the ability to alternate between target and habitual voice quality given clinician cue 75% of the time during therapy tasks    Long-term goal(s): In 6 months, Ms. Paredes will:  1. Report a week of typical activities, in which Dysphonia does not exceed a level of 3 out of 10, 80% of the time    This treatment plan was developed with the patient who agreed with the recommendations.    TOTAL SERVICE TIME: 60 minutes  EVALUATION OF VOICE AND RESONANCE (26615)  NO CHARGE FACILITY FEE (28868)    Matthieu Tobias M.M., M.A., CCC-SLP  Speech-Language Pathologist  Certificate of Vocology  [phone]    *this report was created in part through the use of computerized dictation software, and though reviewed following completion, some typographic errors may persist.  If there is confusion regarding any of this notes contents, please contact me for clarification.*

## 2019-05-09 NOTE — LETTER
"5/9/2019       RE: Tatiana Paredes  [address] [place] [address]       [facility] VOICE CLINIC    Patient: Tatiana Paredes  Date of Visit: 5/9/2019    CHIEF COMPLAINT: Voice quality    HISTORY  Tatiana Paredes is a 53 year old year old woman, who was seen for follow-up evaluation in conjunction with a visit to Dr. Pisano.  She has a long-standing history of dysphonia.  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  She had worked for some time with Jada Lynn, CCC-SLP; however, by the summer 2018 she was no longer able to achieve her goals through therapeutic means alone, and at this point injection augmentation was recommended.  She had excellent response to her first injection with 5 months of good voice quality, and a repeat injection was completed on 1/31/2019.  Response to the most recent injection was good initially, but voice quality deteriorated after approximately 2 months.  She presents today for reevaluation and to discuss the possibilities of permanent surgical options.  As perioperative speech therapy is advantageous in these cases reevaluation by speech pathology today is warranted.      OBJECTIVE  PATIENT REPORTED MEASURES  No flowsheet data found.     Patient Supplied Answers To Last 2 VHI Questionnaires  Voice Handicap Index (VHI-10) 1/31/2019 5/9/2019   My voice makes it difficult for people to hear me 2 2   People have difficulty understanding me in a noisy room 3 2   My voice difficulties restrict my personal and social life.  1 -   I feel left out of conversations because of my voice 2 -   My voice problem causes me to lose income 0 0   I feel as though I have to strain to produce voice 2 2   The clarity of my voice is unpredictable 2 2   My voice problem upsets me 1 2   My voice makes me feel handicapped 2 2   People ask, \"What's wrong with your voice?\" 1 2   VHI-10 16 14      Patient Supplied Answers To " CSI Questionnaire  Cough Severity Index (CSI) 1/31/2019   My cough is worse when I lie down 0   My coughing problem causes me to restrict my personal and social life 0   I tend to avoid places because of my cough problem 0   I feel embarrassed because of my coughing problem 0   People ask, ''What's wrong?'' because I cough a lot 0   I run out of air when I cough 0   My coughing problem affects my voice 0   My coughing problem limits my physical activity 0   My coughing problem upsets me 0   People ask me if I am sick because I cough a lot 0   CSI Score 0      Patient Supplied Answers To EAT Questionnaire  Eating Assessment Tool (EAT-10) 1/31/2019   My swallowing problem has caused me to lose weight 0   My swallowing problem interferes with my ability to go out for meals 0   Swallowing liquids takes extra effort 0   Swallowing solids takes extra effort 0   Swallowing pills takes extra effort 0   Swallowing is painful 0   The pleasure of eating is affected by my swallowing 0   When I swallow food sticks in my throat 1   I cough when I eat 0   Swallowing is stressful 0   EAT-10 1     PERCEPTUAL EVALUATION (CPT 25214)  POSTURE / TENSION:     no overt tension visible    BREATHING:     appears within normal limits and adequate     VOICE:    Roughness: Moderate Intermittent associated with aperiodicity    Breathiness: Mild to moderate Intermittent    Strain: Mild Intermittent    Loudness    Conversational speech:  WNL    Projected speech:  Mild to moderately reduced    Pitch:    Conversational speech:  Mild to moderately elevated with frequent breaks    Pitch glide:     Adequate access to loft register with decreased pitch breaks as pitch ascended    Intermittent access to modal registration    Resonance:    Conversational speech:  laryngeal pharyngeal resonance    CAPE-V Overall Severity:  39/100    COUGH/THROAT CLEARING:    Not observed    THERAPY PROBES: Improvement was elicited with use of forward resonant stimuli,  coordination of respiration and phonation and use of glottic coup to promote vocal fold closure    LARYNGEAL EXAMINATION  Procedure: Flexible endoscopy with chip-tip technology without stroboscopy, right nostril; topical anesthesia with 3% Lidocaine and 0.25% phenylephrine was applied.   Performed by: Dr. Pisano   The laryngeal and pharyngeal structures were evaluated for gross appearance, mobility, function, and focal lesions / abnormalities of the associated mucosa.  Stroboscopy was warranted to evaluate closure, symmetry, and vibratory characteristics of the vocal folds.  All findings were within normal limits with the exception of the following salient features:     Right true vocal fold was effectively immobile in a near midline position with mild concavity of the vibratory margin    Residual Cymetra still appears to be present in the form of    Left true vocal fold demonstrates full range of mobility and is able to facilitate patent airway as well as weak glottic closure    Patient requires moderate cueing in order to achieve glottic coup    With phonation there is variable degrees of supraglottic recruitment.      When the patient is able to achieve low modal registration moderate to severe four-way constrictive supraglottic hyperfunction is noted    Patient is able to achieve decreased hyperfunction with forward resonant stimuli, but frequently this causes pitch to maintain at loft registration      Maximal abduction      Supraglottic hyperfunction during running speech    The laryngeal exam was reviewed with Ms. Paredes, and I provided pertinent explanations, as well as written and oral information.    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  Laryngeal evaluation demonstrates near complete immobility of the right true vocal fold with  concavity of the vibratory margin.  With phonatory tasks there is frequent supraglottic hyperfunction, and voice breaks associated with poor vocal fold entrainment and imbalance of the activation of the cricothyroid and thyroid arytenoid muscles.  There is also ongoing presence of Cymetra in the right true vocal fold.  Previous evaluations were reviewed and an interval decrease in right true vocal fold mobility was noted over the past year.      STIMULABILITY: results of therapy probes during perceptual and laryngeal evaluation demonstrate improvement with use of forward resonant stimuli and coordination of respiration and phonation    RECOMMENDATIONS:     Surgical intervention (thyroplasty) is warranted; however, given ongoing presence of Cymetra, and decreased mobility of the right true vocal fold over the past several evaluations Dr. Pisano has recommended waiting for this procedure for several months.    If mobility is not seen to improve repeat imaging is warranted to characterize the nature of the paralysis.    In order to optimize her voice for the next several months while waiting for resumption of the Cymetra a brief refresher course of speech therapy was recommended.    We discussed pursuing this course with Jada Lynn with whom the patient has worked in the past, or with myself for a change of perspective.  Patient has elected to pursue therapy with me at this time.    She demonstrates a Good prognosis for improvement given adherence to therapeutic recommendations. Therapeutic     Positive indicators: positive response to therapy probes diagnosis is known to respond to treatment previously positive response to behavioral therapy    Negative indicators: Ongoing change in status of the right true vocal fold mobility    DURATION / FREQUENCY: 2 biweekly and 1 monthly one-hour sessions    GOALS:  Patient goal:   1. To improve and maintain a healthy voice quality  2. To understand the problem and fix it as  much as possible    Short-term goal(s): Within the first 4 sessions, Ms. Paredes:  1. will demonstrate assigned laryngeal massage techniques with 80% accuracy or better with no clinician support  2. will accurately identify target vs. habitual voice quality during therapy tasks in 4 out of 5 trials with no clinician support  3. will demonstrate the ability to alternate between target and habitual voice quality given clinician cue 75% of the time during therapy tasks    Long-term goal(s): In 6 months, Ms. Paredes will:  1. Report a week of typical activities, in which Dysphonia does not exceed a level of 3 out of 10, 80% of the time    This treatment plan was developed with the patient who agreed with the recommendations.    TOTAL SERVICE TIME: 60 minutes  EVALUATION OF VOICE AND RESONANCE (62357)  NO CHARGE FACILITY FEE (88962)    Matthieu Tobias M.M., M.A., CCC-SLP  Speech-Language Pathologist  Certificate of Vocology  [phone]    *this report was created in part through the use of computerized dictation software, and though reviewed following completion, some typographic errors may persist.  If there is confusion regarding any of this notes contents, please contact me for clarification.*      Calvin Tobias, SLP

## 2019-05-09 NOTE — PROGRESS NOTES
HISTORY OF PRESENT ILLNESS: Tatiana Paredes is a 53 year old female with a history of dysphonia.  I originally saw her on 6/10/2014.  That was associated with time of prolonged use of her voice.  Please see the 7/5/2018 note for a more in-depth history.  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  This is been associated with deterioration of vocal quality.  She had no other neurologic symptoms.  Neurology consult was obtained and did not find any systemic illnesses.  Laboratory test was negative an MRI of the brain was also normal.  Antibiotics for myasthenia gravis were also within normal limits.  With continued atrophy we offered her a Cymetra injection.  This was performed on 7/5/2018.  She did well for approximately 5 months but then began to notice some subtle deterioration of her vocal quality.  She had a repeat injection on 1/31/2019.  This again went well but only lasted for approximately 2 months.  She comes today with a weak quality to her voice.  There are no airway difficulties and no swallowing difficulties.        Last 2 Scores for Patient-Answered VHI Questionnaire  VHI Total Score 1/31/2019 5/9/2019   VHI Total Score 16 Incomplete       Last 2 Scores for Patient-Answered CSI Questionnaire  CSI Total Score 7/5/2018 1/31/2019   CSI Total Score 0 0         Last 2 Scores for Patient-Answered EAT Questionnaire  EAT Total Score 7/5/2018 1/31/2019   EAT Total Score 0 1           PAST MEDICAL HISTORY:   Past Medical History:   Diagnosis Date     Breast pain, left 11/2013     Choroidal nevus of right eye      Constipation      Dysphonia      Gastroesophageal reflux disease      Hemorrhoids      Hoarseness      Menorrhagia      Migraines        PAST SURGICAL HISTORY:   Past Surgical History:   Procedure Laterality Date     HC HYSTEROSCOPY, SURGICAL; W/ ENDOMETRIAL ABLATION, ANY METHOD  2011       FAMILY HISTORY:   Family History   Problem Relation Age of Onset     Breast  Cancer Sister 52     Cancer - colorectal Maternal Grandmother 70     Migraines Mother      Prostate Cancer Father        SOCIAL HISTORY:   Social History     Tobacco Use     Smoking status: Never Smoker     Smokeless tobacco: Never Used   Substance Use Topics     Alcohol use: Yes     Alcohol/week: 0.5 - 2.5 oz     Comment: 1-3 times a week       REVIEW OF SYSTEMS: Ten point review of systems was performed and is negative except for:   [facility] ROS 5/1/2018   Constitutional -   Neurology -   Ears, Nose, Throat Hoarseness        ALLERGIES: Patient has no known allergies.    MEDICATIONS:   Current Outpatient Medications   Medication Sig Dispense Refill     nitroFURantoin macrocrystal-monohydrate (MACROBID) 100 MG capsule Take 1 capsule (100 mg) by mouth daily 14 capsule 3     ranitidine (ZANTAC) 300 MG tablet Take 300 mg by mouth At Bedtime       valACYclovir (VALTREX) 500 MG tablet Take 1 tablet (500 mg) by mouth 2 times daily 28 tablet 0       PHYSICAL EXAMINATION:  She  is awake, alert and in no apparent distress.    Her tympanic membranes are clear and intact bilaterally. External auditory canals are clear.  Nasal exam shows a mild septal deviation without obstruction. Examination of the oral cavity shows no suspicious lesions.  There is symmetric movement of the tongue and soft palate.    The oropharynx is clear.  Her neck is supple without significant adenopathy.  Pulse is regular.  Upper airway is clear.  Cranial nerves II-XII are grossly intact.   No lingual fasciculations are present.  There is good hand strength there is good finger coordination bilaterally.  There is no dysarthria of the lips tip of tongue or base of tongue.      PROCEDURE: A flexible laryngoscopy  was performed by our speech-language pathologist and reviewed by myself..  Informed consent was obtained and a time out was performed. 3% lidocaine and 0.25% phenylephrine was sprayed into the nasal cavity and allowed 3 to 5 minutes for effect. The  scope was passed through the left sided nostril. Examination showed the left vocal fold to be fully mobile.  The right vocal fold completely immobile.  On many room repetitive and phonation tasks the right vocal fold remained in the midline.  No nodules, polyps or ulcerations are seen.  There was mild atrophy of the left vocal fold.  The ventricle on the right side is full today. There is a small persistent glottic gap present during phonation.  There is mild inflammation or erythema of the supraglottic or glottic larynx.  With phonation there is moderatecontraction of the supraglottic larynx.  The hypopharynx is otherwise clear as is the subglottis.          IMPRESSION/PLAN: Right vocal fold immobility.  This is more significant that was seen on the last examinations which had bowing of the vocal fold but full mobility.  There is also fullness of the ventricle which was not previously present in past examinations.  This may represent Cymetra which is residual in the lateral aspect of the vocal fold.  At this point I will have her follow-up in 2 months for repeat examination.  If her vocal fold remains immobile I will repeat the CT scan to make sure there is no pathology along the course of the recurrent laryngeal nerve.  If the atrophy has also resolved we can go ahead and consider a right thyroplasty under monitored anesthesia as well.

## 2019-05-09 NOTE — PATIENT INSTRUCTIONS
1.  You were seen in the ENT Clinic today by Dr. Pisano.  If you have any questions or concerns after your appointment, please call [phone].  Press option #1 for scheduling related needs.  Press option #3 for Nurse advice.  2. Follow up in clinic in 2 months to re-assess your vocal folds.   3.  Plan is to proceed with a Thyroplasty in about 2 months when your Cymetra Wears off.   4. [facility] [phone]

## 2019-05-09 NOTE — PROGRESS NOTES
"I encouraged patient to review  ENT Adult Default Surgery Request\" packet.  Highlighted points include:  1. Complete History and Physical within 30 days of surgery, either with PAC clinic or family clinic.   If completed outside of  [facility], please have provider send all of your results to the hospital before the surgery.  Please schedule an appointment with your primary care provider.  2. Same-day surgery, must arrange for an adult to take you home and stay with you for the first 24 hours after surgery.  3. Discuss with primary care provider what medicines are safe before surgery.   Example, Coumadin, Plavix, Aspirin, Ibuprofen/Motrin, herbal products, Vitamin E and Fish oil may possibly be discontinued 7 days prior to surgery date.  4. Stop drinking alcohol at least 24 hours before surgery.  5. Quit or at least cut down smoking as it higher your risks for infection after surgery. No chewing tobacco for at least 8 hours before surgery.  6. Take a bath or shower the night before and morning of surgery.   Use antiseptic soap.  7. No food or drink after midnight. Follow your surgeon s orders for eating and drinking.  8. Schedule Pre-Op Speech therapy with SLP from [facility].     Please following surgeon s instructions as if you don t, your surgery could be canceled.     Thank you for choosing [facility].    "

## 2019-05-24 ENCOUNTER — OFFICE VISIT (OUTPATIENT)
Dept: OTOLARYNGOLOGY | Facility: CLINIC | Age: 54
End: 2019-05-24
Payer: COMMERCIAL

## 2019-05-24 DIAGNOSIS — J38.01 VOCAL FOLD PARESIS, RIGHT: ICD-10-CM

## 2019-05-24 DIAGNOSIS — R49.0 DYSPHONIA: Primary | ICD-10-CM

## 2019-05-24 NOTE — PROGRESS NOTES
"[facility] VOICE CLINIC  THERAPY NOTE (CPT 52115)    Patient: Tatiana Paredes  Date of Service: 5/24/2019  Referring physician: Dr. 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  Laryngeal evaluation demonstrates near complete immobility of the right true vocal fold with concavity of the vibratory margin.  With phonatory tasks there is frequent supraglottic hyperfunction, and voice breaks associated with poor vocal fold entrainment and imbalance of the activation of the cricothyroid and thyroid arytenoid muscles.  There is also ongoing presence of Cymetra in the right true vocal fold.  Previous evaluations were reviewed and an interval decrease in right true vocal fold mobility was noted over the past year.  \"    SUBJECTIVE:  Since the patient's last session, they report the following:     Overall symptoms are about the same    Some days her voice is very squeaky and she is unclear why    She finds that voice use does not cause the worsened voice quality, but taking a break from using her voice will help it reset    OBJECTIVE:  PATIENT REPORTED MEASURES:  Patient Supplied Answers To SLP QOL Questionnaire  Therapy Quality of Life 5/24/2019   Since my l ast session, I used the speech therapy exercises and strategies as recommended by my speech pathologist. Not applicable   I feel that using my therapy techniques has become a habit Not applicable   I feel confident in my ability to manage my current and future symptoms. Neither agree nor disagree   Since my last session I feel my symptoms have --------. Stayed the same   Overall, since starting therapy I feel my symptoms are --------. About the same      THERAPEUTIC ACTIVITIES    Counseling and Education:    Physiologic underpinnings for the changes in her voice quality were " "discussed    Therapeutic rationale and goals explained    Exercises to decrease vocal instability    Various facilitating sounds were explored    Glottic coup was not found to be facilitating    Balanced forward resonant /m/ sound was most facilitating    Easy sustained phonation /m/ utilized in conjunction with relaxed jaw, tongue, and lightly closed lips     Use of the carrier phrase \"mmhmm\" instructed to promote generalization to everyday speech    Word level exercises featuring nasal continuant loaded stimuli    Phrase level exercises featuring nasal continuants in more complex phonemic contexts were employed    Negative practice was employed and was facilitative    Patient was able to generalize the sound to the sentence level with minimal to moderate clinician support    She reported that the sound was clear and close to her historic voice    Using a chant context patient was able to generalize this into running speech    Loudness was still quieter than the average      A regimen for home practice was instructed.    I provided an audio recording and handouts of today's therapeutic activities to facilitate practice.    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    Within today's session she was able to access more consistent voice quality by utilizing forward resonant /m/ sound and comfortable intensity.  She was quickly able to generalize this to more complex linguistic contexts including extemporaneous speech.      PLAN: Ms. Paredes feels confident in her ability to use these techniques independently at this time.  She will be seen for follow-up with Dr. Pisano in early July at which time reevaluation and preoperative baseline measures may be taken " pending results of that evaluation.    For practice goals see AVS.     TOTAL SERVICE TIME: 60 minutes  TREATMENT (29183): 60 minutes  NO CHARGE FACILITY FEE (80777)    Matthieu Tobias M.M., M.A., CCC-SLP  Speech-Language Pathologist  Certificate of Vocology  [phone]

## 2019-05-24 NOTE — LETTER
"5/24/2019       RE: Tatiana Paredes  [address] [place] [address]     Dear Colleague,    Thank you for referring your patient, Tatiana Paredes, to the [facility]. Please see a copy of my visit note below.    [facility] VOICE CLINIC  THERAPY NOTE (CPT 56331)    Patient: Tatiana Paredes  Date of Service: 5/24/2019  Referring physician: Dr. 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  Laryngeal evaluation demonstrates near complete immobility of the right true vocal fold with concavity of the vibratory margin.  With phonatory tasks there is frequent supraglottic hyperfunction, and voice breaks associated with poor vocal fold entrainment and imbalance of the activation of the cricothyroid and thyroid arytenoid muscles.  There is also ongoing presence of Cymetra in the right true vocal fold.  Previous evaluations were reviewed and an interval decrease in right true vocal fold mobility was noted over the past year.  \"    SUBJECTIVE:  Since the patient's last session, they report the following:     Overall symptoms are about the same    Some days her voice is very squeaky and she is unclear why    She finds that voice use does not cause the worsened voice quality, but taking a break from using her voice will help it reset    OBJECTIVE:  PATIENT REPORTED MEASURES:  Patient Supplied Answers To SLP QOL Questionnaire  Therapy Quality of Life 5/24/2019   Since my l ast session, I used the speech therapy exercises and strategies as recommended by my speech pathologist. Not applicable   I feel that using my therapy techniques has become a habit Not applicable   I feel confident in my ability to manage my current and future symptoms. Neither agree nor disagree   Since " "my last session I feel my symptoms have --------. Stayed the same   Overall, since starting therapy I feel my symptoms are --------. About the same      THERAPEUTIC ACTIVITIES    Counseling and Education:    Physiologic underpinnings for the changes in her voice quality were discussed    Therapeutic rationale and goals explained    Exercises to decrease vocal instability    Various facilitating sounds were explored    Glottic coup was not found to be facilitating    Balanced forward resonant /m/ sound was most facilitating    Easy sustained phonation /m/ utilized in conjunction with relaxed jaw, tongue, and lightly closed lips     Use of the carrier phrase \"mmhmm\" instructed to promote generalization to everyday speech    Word level exercises featuring nasal continuant loaded stimuli    Phrase level exercises featuring nasal continuants in more complex phonemic contexts were employed    Negative practice was employed and was facilitative    Patient was able to generalize the sound to the sentence level with minimal to moderate clinician support    She reported that the sound was clear and close to her historic voice    Using a chant context patient was able to generalize this into running speech    Loudness was still quieter than the average      A regimen for home practice was instructed.    I provided an audio recording and handouts of today's therapeutic activities to facilitate practice.    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     Within today's session she was able to access more consistent voice quality by utilizing forward resonant /m/ sound and comfortable intensity.  She was quickly able to generalize this to more complex linguistic contexts " including extemporaneous speech.      PLAN: Ms. Paredes feels confident in her ability to use these techniques independently at this time.  She will be seen for follow-up with Dr. Pisano in early July at which time reevaluation and preoperative baseline measures may be taken pending results of that evaluation.    For practice goals see AVS.     TOTAL SERVICE TIME: 60 minutes  TREATMENT (17770): 60 minutes  NO CHARGE FACILITY FEE (31970)    Matthieu Tobias M.M., M.A., CCC-SLP  Speech-Language Pathologist  Certificate of Vocology  [phone]      Again, thank you for allowing me to participate in the care of your patient.      Sincerely,    Calvin Tobias, SLP

## 2019-05-24 NOTE — PATIENT INSTRUCTIONS
Voice Recovery Protocol!  The goal behind this is to help you have an opportunity to calibrate despite how your voice may vary from day to day.  We used  m  sounds very well today, but you may find other sounds are more facilitating at other times.  Feel free to follow the same principle with whatever sound is hitting the sweetspot.  If you aren t sure how to do something, try listening to the recording or call me at [phone]    1. Start off with a couple of nice deep breaths, while seated somewhere comfortable    2. Make a brief  mmm  sound on a comfy pitch near where you want to talk.  Allow time between repetitions to recognize the sweetspot.  Think about how it feels, and how it sounds.   Try saying  mmhmm  like you are agreeing to something. It should sound natural!    3. Now see if you can produce the following list of words, feeling the same feeling:    noon moon soon loon ring thing   Magma moan nine moon noon mine   Men Noun Mom Man Name Thing   Ring Sing Long Wrong Song Bring   Buzzing Running Touching Driving Caring Sleeping       There are two questions to bear in mind to let you know you are doing these correctly:  1. Does it feel easy?  2. Does it sound like you?    Practice strategies:  New voice techniques are best learned if you plan several brief practices per day.  Determine 4-5 times during the day to spend about 5 minutes practicing your new voice.  By practicing frequently throughout the day, you are more likely to generalize the technique to your conversational voice        4. Say these sentences.  After each sentence, stop and pay attention to how it felt.  Did it feel easy and free?  Did your voice  drop off  at the end?    Turn it down.  I need a nap at noon.  It began to rain at noon.  Osmany ran for senator and won.  Lawns need rain to remain green.  Gene turned the fan on in his van.  Not one crane was seen before noon.  Don and Charlie went on their honeymoon.  Osmany is a lean, mean,  running machine.  The chicken and noodle soup is nearly gone.  My neighbors painted my new barn in only one day.    My arm is numb.  Come to my summer home.  Lilly can roam a shopping mall all morning.  Our home team will meet the [employer] on Monday.  Meet me in my room at the same time tomorrow.  I ve made up my mind, we might move to [place].  Og and Ruth Ann will be  in the middle of May.  Come with me and have some of Mom s marvelous homemade jam.    Many men were mining.  Sandra made lemon jam.  Leila made muffins every morning.    His ears buzzed.  He bruised his knees.  His nose is as red as a dai.  His shoes are the wrong size.  All he does is wheeze and sneeze.  Cheese sauce was poured over the peas.  Alan plays drums in the boys  jazz band.  My greatest fears were realized when the shark s jaws closed.    Give me five.  Abebe gave a luna to Emy.  Bo freeman was full of valuables.  Did the vase have violets in it?  Twyla invited very few visitors.  Shannan was vague about her rendezvous with Jayy.  Let s drive to the river for a view of the falls.  Atilio says he will love Lorena forever and ever.  [place]    5. Read a passage of your choice from a book, newspaper, or magazine.  Pay attention to the tilt of your head, making sure not to jut out your chin.    6. Now see if you can use your free, resonant voice in a few spontaneous sentences.

## 2019-07-09 ENCOUNTER — OFFICE VISIT (OUTPATIENT)
Dept: OTOLARYNGOLOGY | Facility: CLINIC | Age: 54
End: 2019-07-09
Payer: COMMERCIAL

## 2019-07-09 VITALS
TEMPERATURE: 97.9 F | DIASTOLIC BLOOD PRESSURE: 69 MMHG | HEART RATE: 86 BPM | RESPIRATION RATE: 14 BRPM | SYSTOLIC BLOOD PRESSURE: 126 MMHG | BODY MASS INDEX: 24.27 KG/M2 | WEIGHT: 151 LBS | HEIGHT: 66 IN

## 2019-07-09 DIAGNOSIS — J38.01 VOCAL FOLD PARESIS, RIGHT: Primary | ICD-10-CM

## 2019-07-09 DIAGNOSIS — R49.0 DYSPHONIA: ICD-10-CM

## 2019-07-09 DIAGNOSIS — J38.01 VOCAL FOLD PARESIS, RIGHT: ICD-10-CM

## 2019-07-09 DIAGNOSIS — R49.0 DYSPHONIA: Primary | ICD-10-CM

## 2019-07-09 ASSESSMENT — PAIN SCALES - GENERAL: PAINLEVEL: NO PAIN (0)

## 2019-07-09 ASSESSMENT — MIFFLIN-ST. JEOR: SCORE: 1313.93

## 2019-07-09 NOTE — PROGRESS NOTES
[facility] VOICE CLINIC    Patient: Tatiana Paredes  Date of Visit: 7/9/2019    CHIEF COMPLAINT: Voice Quality    HISTORY  Tatiana Paredes is a 53 year old year old woman, who was seen for follow-up evaluation in conjunction with a visit to Dr. Pisano.  She has a long-standing history of dysphonia.  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  She had worked for some time with Jada Lynn, CCC-SLP; however, by the summer 2018 she was no longer able to achieve her goals through therapeutic means alone, and at this point injection augmentation was recommended.  She had excellent response to her first injection with 5 months of good voice quality, and a repeat injection was completed on 1/31/2019.  Response to the most recent injection was good initially, but voice quality deteriorated after approximately 2 months.    She was seen in clinic on 5/9/2019 for reevaluation and to discuss the possibility of thyroplasty.  At that time a lingering amount of Cymetra was noted in the right ventricle, and further decrease in mobility of the right true vocal fold was seen.  In light of these findings discussion of thyroplasty was postponed, and the patient was asked to follow-up in 2 months in order to allow time for full resorption.  A single session of speech therapy was recommended in the interim to help maximize function while waiting.  Patient completed this on 5/24/2019 and noted some benefit with the use of forward resonance sounds, though this was noted to be imperfect.  She presents today for planned 2-month follow-up.  There is ongoing presence of Cymetra during today's evaluation necessitating further postponement of possible thyroplasty.  The patient feels that she is managing with previously learned therapeutic techniques and does not feel the need for ongoing therapy at this time.  She will be seen at her follow-up with Dr. Pisano in  approximately 3 months at which time baseline measures will be taken if thyroplasty is going to be pursued.      No charge for today s session; charges will be billed at the completion of the evaluation  NO CHARGE FACILITY FEE (11504)    PRIMARY ICD-10 code:  R49.0 (Dysphonia)  SECONDARY ICD-10 code:  J38.01 (Unilateral Vocal Fold Paralysis)       Matthieu Tobias M.M., M.A., CCC-SLP  Speech-Language Pathologist  Certificate of Vocology  [phone]    *this report was created in part through the use of computerized dictation software, and though reviewed following completion, some typographic errors may persist.  If there is confusion regarding any of this notes contents, please contact me for clarification.*

## 2019-07-09 NOTE — NURSING NOTE
"Chief Complaint   Patient presents with     RECHECK     2 month follow up    Blood pressure 126/69, pulse 86, temperature 97.9  F (36.6  C), resp. rate 14, height 1.68 m (5' 6.14\"), weight 68.5 kg (151 lb), not currently breastfeeding.      Emmanuel Valdez LPN    "

## 2019-07-09 NOTE — PROGRESS NOTES
HISTORY OF PRESENT ILLNESS: Tatiana Paredes is a 53 year old female with a history of  dysphonia.  I originally saw her on 6/10/2014.  That was associated with time of prolonged use of her voice.  Please see the 7/5/2018 note for a more in-depth history.  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  This is been associated with deterioration of vocal quality.  She had no other neurologic symptoms.  Neurology consult was obtained and did not find any systemic illnesses.  Laboratory test was negative an MRI of the brain was also normal.  Antibiotics for myasthenia gravis were also within normal limits.  With continued atrophy we offered her a Cymetra injection.  This was performed on 7/5/2018.  She did well for approximately 5 months but then began to notice some subtle deterioration of her vocal quality.  She had a repeat injection on 1/31/2019.  This again went well but only lasted for approximately 2 months.  She was last seen on 5/9/2009 18.  At that time she continued to have a weak quality to her voice.  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  This was a new finding.  There is also fullness of the ventricle that was not seen in exams prior to injection therefore could be residual Cymetra.  She is here for follow-up in 2 months.  Her voice is stable and her swallowing is stable.    Last 2 Scores for Patient-Answered VHI Questionnaire  VHI Total Score 5/9/2019 7/9/2019   VHI Total Score Incomplete 19       Last 2 Scores for Patient-Answered CSI Questionnaire  CSI Total Score 7/5/2018 1/31/2019   CSI Total Score 0 0         Last 2 Scores for Patient-Answered EAT Questionnaire  EAT Total Score 7/5/2018 1/31/2019   EAT Total Score 0 1           PAST MEDICAL HISTORY:   Past Medical History:   Diagnosis Date     Breast pain, left 11/2013     Choroidal nevus of right eye      Constipation      Dysphonia      Gastroesophageal reflux disease      Hemorrhoids       Hoarseness      Menorrhagia      Migraines        PAST SURGICAL HISTORY:   Past Surgical History:   Procedure Laterality Date     [facility] HYSTEROSCOPY, SURGICAL; W/ ENDOMETRIAL ABLATION, ANY METHOD  2011       FAMILY HISTORY:   Family History   Problem Relation Age of Onset     Breast Cancer Sister 52     Cancer - colorectal Maternal Grandmother 70     Migraines Mother      Unknown/Adopted Mother      Stomach Problem Mother      Prostate Cancer Father      Cancer Father      Cancer Sister        SOCIAL HISTORY:   Social History     Tobacco Use     Smoking status: Never Smoker     Smokeless tobacco: Never Used   Substance Use Topics     Alcohol use: Yes     Alcohol/week: 0.5 - 2.5 oz     Comment: 1-3 times a week       REVIEW OF SYSTEMS: Ten point review of systems was performed and is negative except for:   [facility] ENT ROS 5/1/2018   Constitutional -   Neurology -   Ears, Nose, Throat Hoarseness        ALLERGIES: Patient has no known allergies.    MEDICATIONS:   Current Outpatient Medications   Medication Sig Dispense Refill     nitroFURantoin macrocrystal-monohydrate (MACROBID) 100 MG capsule Take 1 capsule (100 mg) by mouth daily 14 capsule 3     ranitidine (ZANTAC) 300 MG tablet Take 300 mg by mouth At Bedtime       valACYclovir (VALTREX) 500 MG tablet Take 1 tablet (500 mg) by mouth 2 times daily 28 tablet 0         PHYSICAL EXAMINATION:  She  is awake, alert and in no apparent distress.    Her tympanic membranes are clear and intact bilaterally. External auditory canals are clear.  Nasal exam shows a mild septal deviation without obstruction. Examination of the oral cavity shows no suspicious lesions.  There is symmetric movement of the tongue and soft palate.    The oropharynx is clear.  Her neck is supple without significant adenopathy.  Pulse is regular.  Upper airway is clear.  Cranial nerves II-XII are grossly intact.   No lingual fasciculations are present.  There is good hand strength there is good finger  coordination bilaterally.  There is no dysarthria of the lips tip of tongue or base of tongue.      PROCEDURE: A flexible laryngoscopy  was performed by our speech-language pathologist and reviewed by myself..  Informed consent was obtained and a time out was performed. 3% lidocaine and 0.25% phenylephrine was sprayed into the nasal cavity and allowed 3 to 5 minutes for effect. The scope was passed through the left sided nostril. Examination showed the left vocal fold to be fully mobile.  The right vocal fold completely immobile.  On many room repetitive and phonation tasks the right vocal fold remained in the midline.  No nodules, polyps or ulcerations are seen.  There was mild atrophy of the left vocal fold.  The ventricle on the right side is full today. There is a small persistent glottic gap present during phonation.  There is mild inflammation or erythema of the supraglottic or glottic larynx.  With phonation there is moderatecontraction of the supraglottic larynx.  The hypopharynx is otherwise clear as is the subglottis.                  IMPRESSION/PLAN: Stable immobility of the right true vocal fold.  I did review the MRI from last October.  This did seem to cover the upper mediastinum.  Because of this recent exam and that her vocal fold dysfunction has been going on for several years I believe this may be adequate to evaluate her neck and upper mediastinum.  I will review this with the radiologist to make sure they are comfortable ruling out mass lesions of the course of the right recurrent laryngeal nerve based on the MRI.  I will see her back in approximately 3 months.  At that time we discussed whether a thyroplasty would be of benefit to her.  She is interested in in an intervention either repeated injections or thyroplasty implant.  I am delaying the implant for now to make sure no residual Cymetra needs to be resolved prior to surgery.

## 2019-07-09 NOTE — LETTER
7/9/2019       RE: Tatiana Paredes  [address]  [facility] [address]     Dear Colleague,    Thank you for referring your patient, Tatiana Paredes, to the [facility] EAR NOSE AND THROAT at [facility]. Please see a copy of my visit note below.      HISTORY OF PRESENT ILLNESS: Tatiana Paredes is a 53 year old female with a history of  dysphonia.  I originally saw her on 6/10/2014.  That was associated with time of prolonged use of her voice.  Please see the 7/5/2018 note for a more in-depth history.  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  This is been associated with deterioration of vocal quality.  She had no other neurologic symptoms.  Neurology consult was obtained and did not find any systemic illnesses.  Laboratory test was negative an MRI of the brain was also normal.  Antibiotics for myasthenia gravis were also within normal limits.  With continued atrophy we offered her a Cymetra injection.  This was performed on 7/5/2018.  She did well for approximately 5 months but then began to notice some subtle deterioration of her vocal quality.  She had a repeat injection on 1/31/2019.  This again went well but only lasted for approximately 2 months.  She was last seen on 5/9/2009 18.  At that time she continued to have a weak quality to her voice.  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  This was a new finding.  There is also fullness of the ventricle that was not seen in exams prior to injection therefore could be residual Cymetra.  She is here for follow-up in 2 months.  Her voice is stable and her swallowing is stable.    Last 2 Scores for Patient-Answered VHI Questionnaire  VHI Total Score 5/9/2019 7/9/2019   VHI Total Score Incomplete 19       Last 2 Scores for Patient-Answered CSI Questionnaire  CSI Total Score 7/5/2018 1/31/2019   CSI Total Score 0 0         Last 2 Scores for  Patient-Answered EAT Questionnaire  EAT Total Score 7/5/2018 1/31/2019   EAT Total Score 0 1           PAST MEDICAL HISTORY:   Past Medical History:   Diagnosis Date     Breast pain, left 11/2013     Choroidal nevus of right eye      Constipation      Dysphonia      Gastroesophageal reflux disease      Hemorrhoids      Hoarseness      Menorrhagia      Migraines        PAST SURGICAL HISTORY:   Past Surgical History:   Procedure Laterality Date     [facility] HYSTEROSCOPY, SURGICAL; W/ ENDOMETRIAL ABLATION, ANY METHOD  2011       FAMILY HISTORY:   Family History   Problem Relation Age of Onset     Breast Cancer Sister 52     Cancer - colorectal Maternal Grandmother 70     Migraines Mother      Unknown/Adopted Mother      Stomach Problem Mother      Prostate Cancer Father      Cancer Father      Cancer Sister        SOCIAL HISTORY:   Social History     Tobacco Use     Smoking status: Never Smoker     Smokeless tobacco: Never Used   Substance Use Topics     Alcohol use: Yes     Alcohol/week: 0.5 - 2.5 oz     Comment: 1-3 times a week       REVIEW OF SYSTEMS: Ten point review of systems was performed and is negative except for:   [facility] ROS 5/1/2018   Constitutional -   Neurology -   Ears, Nose, Throat Hoarseness        ALLERGIES: Patient has no known allergies.    MEDICATIONS:   Current Outpatient Medications   Medication Sig Dispense Refill     nitroFURantoin macrocrystal-monohydrate (MACROBID) 100 MG capsule Take 1 capsule (100 mg) by mouth daily 14 capsule 3     ranitidine (ZANTAC) 300 MG tablet Take 300 mg by mouth At Bedtime       valACYclovir (VALTREX) 500 MG tablet Take 1 tablet (500 mg) by mouth 2 times daily 28 tablet 0         PHYSICAL EXAMINATION:  She  is awake, alert and in no apparent distress.    Her tympanic membranes are clear and intact bilaterally. External auditory canals are clear.  Nasal exam shows a mild septal deviation without obstruction. Examination of the oral cavity shows no suspicious lesions.   There is symmetric movement of the tongue and soft palate.    The oropharynx is clear.  Her neck is supple without significant adenopathy.  Pulse is regular.  Upper airway is clear.  Cranial nerves II-XII are grossly intact.   No lingual fasciculations are present.  There is good hand strength there is good finger coordination bilaterally.  There is no dysarthria of the lips tip of tongue or base of tongue.      PROCEDURE: A flexible laryngoscopy  was performed by our speech-language pathologist and reviewed by myself..  Informed consent was obtained and a time out was performed. 3% lidocaine and 0.25% phenylephrine was sprayed into the nasal cavity and allowed 3 to 5 minutes for effect. The scope was passed through the left sided nostril. Examination showed the left vocal fold to be fully mobile.  The right vocal fold completely immobile.  On many room repetitive and phonation tasks the right vocal fold remained in the midline.  No nodules, polyps or ulcerations are seen.  There was mild atrophy of the left vocal fold.  The ventricle on the right side is full today. There is a small persistent glottic gap present during phonation.  There is mild inflammation or erythema of the supraglottic or glottic larynx.  With phonation there is moderatecontraction of the supraglottic larynx.  The hypopharynx is otherwise clear as is the subglottis.                  IMPRESSION/PLAN: Stable immobility of the right true vocal fold.  I did review the MRI from last October.  This did seem to cover the upper mediastinum.  Because of this recent exam and that her vocal fold dysfunction has been going on for several years I believe this may be adequate to evaluate her neck and upper mediastinum.  I will review this with the radiologist to make sure they are comfortable ruling out mass lesions of the course of the right recurrent laryngeal nerve based on the MRI.  I will see her back in approximately 3 months.  At that time we  discussed whether a thyroplasty would be of benefit to her.  She is interested in in an intervention either repeated injections or thyroplasty implant.  I am delaying the implant for now to make sure no residual Cymetra needs to be resolved prior to surgery.    Again, thank you for allowing me to participate in the care of your patient.      Sincerely,    Jose Ramon Pisano MD

## 2019-07-09 NOTE — PATIENT INSTRUCTIONS
1.  You were seen in the ENT Clinic today by Dr. Pisano.  If you have any questions or concerns after your appointment, please call [phone].  Press option #1 for scheduling related needs.  Press option #3 for Nurse advice.  2.  Plan is to return to clinic in 3 months    [facility] ENT   Head & Neck Surgery

## 2019-07-10 DIAGNOSIS — J38.01 VOCAL FOLD PARESIS, RIGHT: Primary | ICD-10-CM

## 2019-08-13 ENCOUNTER — DOCUMENTATION ONLY (OUTPATIENT)
Dept: CARE COORDINATION | Facility: CLINIC | Age: 54
End: 2019-08-13

## 2019-08-20 NOTE — TELEPHONE ENCOUNTER
FUTURE VISIT INFORMATION      FUTURE VISIT INFORMATION:    Date: 9/23/19    Time: 3:00 pm    Location: [facility]NT  REFERRAL INFORMATION:    Referring provider:  Dr. Cruz Prather?    Referring providers clinic:  [facility]    Reason for visit/diagnosis  Cyst in mouth    RECORDS REQUESTED FROM:       Clinic name Comments Records Status Imaging Status                                         8/20/19-There is no referral and no pertinent notes.  Patient is seeing Dr. Prather on 9/6/19.  Will defer until then.  PB    9/9/19-Patient no-showed for Dr. Prather appt.  PB

## 2019-09-13 ENCOUNTER — ANCILLARY PROCEDURE (OUTPATIENT)
Dept: MAMMOGRAPHY | Facility: CLINIC | Age: 54
End: 2019-09-13
Payer: COMMERCIAL

## 2019-09-13 DIAGNOSIS — Z12.39 BREAST CANCER SCREENING: ICD-10-CM

## 2019-09-20 ENCOUNTER — ANCILLARY PROCEDURE (OUTPATIENT)
Dept: MAMMOGRAPHY | Facility: CLINIC | Age: 54
End: 2019-09-20
Payer: COMMERCIAL

## 2019-09-20 DIAGNOSIS — R92.8 ABNORMAL MAMMOGRAM OF RIGHT BREAST: ICD-10-CM

## 2019-09-23 ENCOUNTER — PRE VISIT (OUTPATIENT)
Dept: OTOLARYNGOLOGY | Facility: CLINIC | Age: 54
End: 2019-09-23

## 2019-09-25 DIAGNOSIS — N63.0 LUMP OR MASS IN BREAST: Primary | ICD-10-CM

## 2019-09-27 ENCOUNTER — TELEPHONE (OUTPATIENT)
Dept: OBGYN | Facility: CLINIC | Age: 54
End: 2019-09-27

## 2019-09-27 NOTE — TELEPHONE ENCOUNTER
Left voicemail for patient to make sure she schedules follow up mammogram and breast us in 6 months. Let her know the phone number and that the orders are in.

## 2019-09-27 NOTE — TELEPHONE ENCOUNTER
----- Message from Tammy Trevino MD sent at 9/25/2019  2:06 PM CDT -----  Hello,    This patient has been informed of her US and mammogram results. I have placed orders for her to have a repeat R mammogram and US in 6 months. Could you call her to let her know to schedule this?   Thanks,  Tammy Trevino MD

## 2019-10-22 ENCOUNTER — OFFICE VISIT (OUTPATIENT)
Dept: OTOLARYNGOLOGY | Facility: CLINIC | Age: 54
End: 2019-10-22
Payer: COMMERCIAL

## 2019-10-22 ENCOUNTER — TELEPHONE (OUTPATIENT)
Dept: OTOLARYNGOLOGY | Facility: CLINIC | Age: 54
End: 2019-10-22

## 2019-10-22 DIAGNOSIS — J38.01 VOCAL FOLD PARESIS, RIGHT: ICD-10-CM

## 2019-10-22 DIAGNOSIS — R49.0 DYSPHONIA: Primary | ICD-10-CM

## 2019-10-22 NOTE — PROGRESS NOTES
HISTORY OF PRESENT ILLNESS: Tatiana Paredes is a 53 year old female with a history of   dysphonia.  I originally saw her on 6/10/2014.  That was associated with time of prolonged use of her voice.  Please see the 7/5/2018 note for a more in-depth history.  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  This is been associated with deterioration of vocal quality.  She had no other neurologic symptoms.  Neurology consult was obtained and did not find any systemic illnesses.  Laboratory test was negative an MRI of the brain was also normal.  Antibodies for myasthenia gravis were also within normal limits.  With continued atrophy we offered her a Cymetra injection.  This was performed on 7/5/2018.  She did well for approximately 5 months but then began to notice some subtle deterioration of her vocal quality.  She had a repeat injection on 1/31/2019.  This again went well but only lasted for approximately 2 months.  She was seen on 5/9/2009 18.  At that time she continued to have a weak quality to her voice.  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  This was a new finding.  There is also fullness of the ventricle that was not seen in exams prior to injection therefore could be residual Cymetra.  On our last visit 7/8/2019 her voice was stable and her swallowing was stable.  Her MRI from October did cover the upper mediastinum.  She continued to have interest in an intervention whether an injection or a thyroplasty.  We did delay to make sure no residual Cymetra was present prior to any surgery.    Since our last visit her voice has remained stable.  She has mild to moderate vocal fatigue.  Her voice is mildly raspy and she has difficulty being heard with background noise.    Last 2 Scores for Patient-Answered VHI Questionnaire  VHI Total Score 5/9/2019 7/9/2019   VHI Total Score Incomplete 19       Last 2 Scores for Patient-Answered CSI Questionnaire  CSI Total  Score 7/5/2018 1/31/2019   CSI Total Score 0 0         Last 2 Scores for Patient-Answered EAT Questionnaire  EAT Total Score 7/5/2018 1/31/2019   EAT Total Score 0 1           PAST MEDICAL HISTORY:   Past Medical History:   Diagnosis Date     Breast pain, left 11/2013     Choroidal nevus of right eye      Constipation      Dysphonia      Gastroesophageal reflux disease      Hemorrhoids      Hoarseness      Menorrhagia      Migraines        PAST SURGICAL HISTORY:   Past Surgical History:   Procedure Laterality Date     [facility] HYSTEROSCOPY, SURGICAL; W/ ENDOMETRIAL ABLATION, ANY METHOD  2011       FAMILY HISTORY:   Family History   Problem Relation Age of Onset     Breast Cancer Sister 52     Cancer - colorectal Maternal Grandmother 70     Migraines Mother      Unknown/Adopted Mother      Stomach Problem Mother      Prostate Cancer Father      Cancer Father      Cancer Sister        SOCIAL HISTORY:   Social History     Tobacco Use     Smoking status: Never Smoker     Smokeless tobacco: Never Used   Substance Use Topics     Alcohol use: Yes     Alcohol/week: 0.8 - 4.2 standard drinks     Comment: 1-3 times a week       REVIEW OF SYSTEMS: Ten point review of systems was performed and is negative except for:   [facility] ROS 5/1/2018   Constitutional -   Neurology -   Ears, Nose, Throat Hoarseness        ALLERGIES: Patient has no known allergies.    MEDICATIONS:   Current Outpatient Medications   Medication Sig Dispense Refill     nitroFURantoin macrocrystal-monohydrate (MACROBID) 100 MG capsule Take 1 capsule (100 mg) by mouth daily 14 capsule 3     ranitidine (ZANTAC) 300 MG tablet Take 300 mg by mouth At Bedtime       valACYclovir (VALTREX) 500 MG tablet Take 1 tablet (500 mg) by mouth 2 times daily 28 tablet 0         PHYSICAL EXAMINATION:  She  is awake, alert and in no apparent distress.    Her tympanic membranes are clear and intact bilaterally. External auditory canals are clear.  Nasal exam shows a mild septal deviation  without obstruction. Examination of the oral cavity shows no suspicious lesions.  There is symmetric movement of the tongue and soft palate.    The oropharynx is clear.  Her neck is supple without significant adenopathy.  Pulse is regular.  Upper airway is clear.  Cranial nerves II-XII are grossly intact.   No lingual fasciculations are present.  There is good hand strength there is good finger coordination bilaterally.  There is no dysarthria of the lips tip of tongue or base of tongue.      PROCEDURE: A flexible laryngoscopy  was performed by our speech-language pathologist and reviewed by myself..  Informed consent was obtained and a time out was performed. 3% lidocaine and 0.25% phenylephrine was sprayed into the nasal cavity and allowed 3 to 5 minutes for effect. The scope was passed through the left sided nostril. Examination showed the left vocal fold to be fully mobile.  The right vocal fold completely immobile.  On many repetitive and phonation tasks the right vocal fold remained in the midline.  No nodules, polyps or ulcerations are seen.  There was mild atrophy of the left vocal fold.  The ventricle on the right side is full today. There is a small persistent glottic gap present during phonation.  There is mild inflammation or erythema of the supraglottic or glottic larynx.  With phonation there is moderatecontraction of the supraglottic larynx.  The hypopharynx is otherwise clear as is the subglottis.     Videostroboscopy was performed to evaluate the mucosal wave integrity and dynamics.  A full mucosal wave was seen bilaterally.  There were no areas of mucosal wave breakdown or restriction on either side.  There intermittent deterioration of  periodicity seen bilaterally. This is likely due to asymmetry in vocal fold muscle tension.    The mucosal wave amplitude was good bilaterally.  This resulted intermittently  in good symmetry at middle and lower pitches . At higher pitches there was a loss of  stable vocal stability resulting in a deterioration of the mucosal wave analysis.     No additional lesions or disruptions of the mucosal wave were seen .    IMPRESSION/PLAN: Intermittent vocal roughness and poor projection of her voice.  There is good glottic closure but rough quality to the voice on an intermittent basis.  We discussed that a thyroplasty would help her vocal stamina and projection but may not improve her vocal clarity.  She will work on her exercises at these as these have helped her in the past.  I will see her in 6 months.  If she decides she would like a procedure I have asked her to call us sooner and we can either visit or should we schedule something for her peer

## 2019-10-22 NOTE — TELEPHONE ENCOUNTER
LVM with patient to help get her scheduled with 6 month Follow-up with Dr. Pisano around 4/22/2020.  Left call center number to get scheduled.

## 2019-10-22 NOTE — PATIENT INSTRUCTIONS
Thank you for choosing  [facility]  Please follow up with Dr. Pisano in approximately 6 month.    Thyroplasty was discussed, please contact team if you decide you would like the procedure.    [phone] appointment scheduling option 1 and nurse advice option 3.

## 2019-10-22 NOTE — LETTER
10/22/2019       RE: Tatiana Paredes  [address]  [facility] [address]     Dear Colleague,    Thank you for referring your patient, Tatiana Paredes, to the [facility] EAR NOSE AND THROAT at [facility]. Please see a copy of my visit note below.      HISTORY OF PRESENT ILLNESS: Tatiana Paredes is a 53 year old female with a history of   dysphonia.  I originally saw her on 6/10/2014.  That was associated with time of prolonged use of her voice.  Please see the 7/5/2018 note for a more in-depth history.  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  This is been associated with deterioration of vocal quality.  She had no other neurologic symptoms.  Neurology consult was obtained and did not find any systemic illnesses.  Laboratory test was negative an MRI of the brain was also normal.  Antibiotics for myasthenia gravis were also within normal limits.  With continued atrophy we offered her a Cymetra injection.  This was performed on 7/5/2018.  She did well for approximately 5 months but then began to notice some subtle deterioration of her vocal quality.  She had a repeat injection on 1/31/2019.  This again went well but only lasted for approximately 2 months.  She was seen on 5/9/2009 18.  At that time she continued to have a weak quality to her voice.  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  This was a new finding.  There is also fullness of the ventricle that was not seen in exams prior to injection therefore could be residual Cymetra.  On our last visit 7/8/2019 her voice was stable and her swallowing was stable.  Her MRI from October did cover the upper mediastinum.  She continued to have interest in an intervention whether an injection or a thyroplasty.  We did delay to make sure no residual Cymetra was present prior to any surgery.    Since our last visit her voice has remained stable.  She has mild  to moderate vocal fatigue.  Her voice is mildly raspy and she has difficulty being heard with background noise.    Last 2 Scores for Patient-Answered VHI Questionnaire  VHI Total Score 5/9/2019 7/9/2019   VHI Total Score Incomplete 19       Last 2 Scores for Patient-Answered CSI Questionnaire  CSI Total Score 7/5/2018 1/31/2019   CSI Total Score 0 0         Last 2 Scores for Patient-Answered EAT Questionnaire  EAT Total Score 7/5/2018 1/31/2019   EAT Total Score 0 1           PAST MEDICAL HISTORY:   Past Medical History:   Diagnosis Date     Breast pain, left 11/2013     Choroidal nevus of right eye      Constipation      Dysphonia      Gastroesophageal reflux disease      Hemorrhoids      Hoarseness      Menorrhagia      Migraines        PAST SURGICAL HISTORY:   Past Surgical History:   Procedure Laterality Date     [facility] HYSTEROSCOPY, SURGICAL; W/ ENDOMETRIAL ABLATION, ANY METHOD  2011       FAMILY HISTORY:   Family History   Problem Relation Age of Onset     Breast Cancer Sister 52     Cancer - colorectal Maternal Grandmother 70     Migraines Mother      Unknown/Adopted Mother      Stomach Problem Mother      Prostate Cancer Father      Cancer Father      Cancer Sister        SOCIAL HISTORY:   Social History     Tobacco Use     Smoking status: Never Smoker     Smokeless tobacco: Never Used   Substance Use Topics     Alcohol use: Yes     Alcohol/week: 0.8 - 4.2 standard drinks     Comment: 1-3 times a week       REVIEW OF SYSTEMS: Ten point review of systems was performed and is negative except for:   [facility] ROS 5/1/2018   Constitutional -   Neurology -   Ears, Nose, Throat Hoarseness        ALLERGIES: Patient has no known allergies.    MEDICATIONS:   Current Outpatient Medications   Medication Sig Dispense Refill     nitroFURantoin macrocrystal-monohydrate (MACROBID) 100 MG capsule Take 1 capsule (100 mg) by mouth daily 14 capsule 3     ranitidine (ZANTAC) 300 MG tablet Take 300 mg by mouth At Bedtime        valACYclovir (VALTREX) 500 MG tablet Take 1 tablet (500 mg) by mouth 2 times daily 28 tablet 0         PHYSICAL EXAMINATION:  She  is awake, alert and in no apparent distress.    Her tympanic membranes are clear and intact bilaterally. External auditory canals are clear.  Nasal exam shows a mild septal deviation without obstruction. Examination of the oral cavity shows no suspicious lesions.  There is symmetric movement of the tongue and soft palate.    The oropharynx is clear.  Her neck is supple without significant adenopathy.  Pulse is regular.  Upper airway is clear.  Cranial nerves II-XII are grossly intact.   No lingual fasciculations are present.  There is good hand strength there is good finger coordination bilaterally.  There is no dysarthria of the lips tip of tongue or base of tongue.      PROCEDURE: A flexible laryngoscopy  was performed by our speech-language pathologist and reviewed by myself..  Informed consent was obtained and a time out was performed. 3% lidocaine and 0.25% phenylephrine was sprayed into the nasal cavity and allowed 3 to 5 minutes for effect. The scope was passed through the left sided nostril. Examination showed the left vocal fold to be fully mobile.  The right vocal fold completely immobile.  On many repetitive and phonation tasks the right vocal fold remained in the midline.  No nodules, polyps or ulcerations are seen.  There was mild atrophy of the left vocal fold.  The ventricle on the right side is full today. There is a small persistent glottic gap present during phonation.  There is mild inflammation or erythema of the supraglottic or glottic larynx.  With phonation there is moderatecontraction of the supraglottic larynx.  The hypopharynx is otherwise clear as is the subglottis.     Videostroboscopy was performed to evaluate the mucosal wave integrity and dynamics.  A full mucosal wave was seen bilaterally.  There were no areas of mucosal wave breakdown or restriction on  either side.  There  intermittent deterioration of  periodicity seen bilaterally. This is likely due to asymmetry in vocal fold muscle tension.    The mucosal wave amplitude was  good bilaterally.  This resulted intermittently  in good symmetry at middle and lower pitches . At higher pitches there was a loss of stable vocal stability resulting in a deterioration of the mucosal wave analysis.     No additional lesions or disruptions of the mucosal wave were seen .    IMPRESSION/PLAN: Intermittent vocal roughness and poor projection of her voice.  There is good glottic closure but rough quality to the voice on an intermittent basis.  We discussed that a thyroplasty would help her vocal stamina and projection but may not improve her vocal clarity.  She will work on her exercises at these as these have helped her in the past.  I will see her in 6 months.  If she decides she would like a procedure I have asked her to call us sooner and we can either visit or should we schedule something for her peer    Again, thank you for allowing me to participate in the care of your patient.      Sincerely,    Jose Ramon Pisano MD

## 2019-10-22 NOTE — PROGRESS NOTES
[facility] VOICE CLINIC    [facility] VOICE CLINIC  VOICE EVALUATION/LARYNGEAL EXAMINATION REPORT    Patient: Tatiana Paredes  Date of Visit:     CHIEF COMPLAINT: Voice Quality     HISTORY  Tatiana Paredes is a 53 year old year old woman, who was seen for follow-up evaluation in conjunction with a visit to Dr. Pisano.  She has a long-standing history of dysphonia.  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  She had worked for some time with Jada Lynn, CCC-SLP; however, by the summer 2018 she was no longer able to achieve her goals through therapeutic means alone, and at this point injection augmentation was recommended.  She had excellent response to her first injection with 5 months of good voice quality, and a repeat injection was completed on 1/31/2019.  Response to the most recent injection was good initially, but voice quality deteriorated after approximately 2 months.  She was seen in clinic on 5/9/2019 for reevaluation and to discuss the possibility of thyroplasty.  At that time a lingering amount of Cymetra was noted in the right ventricle, and further decrease in mobility of the right true vocal fold was seen.  In light of these findings discussion of thyroplasty was postponed, and the patient was asked to follow-up in 2 months in order to allow time for full resorption.  A single session of speech therapy was recommended in the interim to help maximize function while waiting.  Patient completed this on 5/24/2019 and noted some benefit with the use of forward resonance sounds, though this was noted to be imperfect. Patient was reevaluated on 7/9/2019, which demonstrated ongoing presence of Cymetra and further postponement of possible thyroplasty. The patient felt comfortable practicing exercises on her own at that time.    Ms. Paredes presents today for follow-up. She reports her voice does not bother her at work or at home; however,  she does have difficulty in noisy environments like going out to restaurants or happy hour. After laryngeal examination, Dr. Pisano stated a thyroplasty would help improve her stamina, but not necessarily the clarity of her voice. Additionally, she reports when she does her exercises consistently her voice improves. After lengthy discussion with the patient, she decided to continue exercises independently at home and think about a potential thyroplasty. She will follow up with Dr. Pisano in 6 months at which time baseline measures will be taken if thyroplasty is going to be pursued.    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

## 2019-12-27 DIAGNOSIS — A60.9 HSV (HERPES SIMPLEX VIRUS) ANOGENITAL INFECTION: ICD-10-CM

## 2019-12-31 NOTE — TELEPHONE ENCOUNTER
valACYclovir (VALTREX) 500 MG tablet      Last Written Prescription Date:  11/27/19  Last Fill Quantity: 28,   # refills: 0  Last Office Visit : 12/3/18  Future Office visit:  1/14/20    Routing refill request to provider for review/approval because:  Overdue office visit and creatinine.

## 2020-01-02 RX ORDER — VALACYCLOVIR HYDROCHLORIDE 500 MG/1
TABLET, FILM COATED ORAL
Qty: 28 TABLET | Refills: 0 | Status: SHIPPED | OUTPATIENT
Start: 2020-01-02 | End: 2020-05-11

## 2020-01-14 ENCOUNTER — ANCILLARY PROCEDURE (OUTPATIENT)
Dept: GENERAL RADIOLOGY | Facility: CLINIC | Age: 55
End: 2020-01-14
Attending: INTERNAL MEDICINE
Payer: COMMERCIAL

## 2020-01-14 ENCOUNTER — OFFICE VISIT (OUTPATIENT)
Dept: INTERNAL MEDICINE | Facility: CLINIC | Age: 55
End: 2020-01-14
Payer: COMMERCIAL

## 2020-01-14 ENCOUNTER — TELEPHONE (OUTPATIENT)
Dept: GASTROENTEROLOGY | Facility: CLINIC | Age: 55
End: 2020-01-14

## 2020-01-14 VITALS
BODY MASS INDEX: 23.25 KG/M2 | SYSTOLIC BLOOD PRESSURE: 132 MMHG | TEMPERATURE: 97.7 F | WEIGHT: 148.1 LBS | HEART RATE: 76 BPM | DIASTOLIC BLOOD PRESSURE: 84 MMHG | HEIGHT: 67 IN | OXYGEN SATURATION: 99 %

## 2020-01-14 DIAGNOSIS — K59.09 OTHER CONSTIPATION: ICD-10-CM

## 2020-01-14 DIAGNOSIS — E78.00 HIGH BLOOD CHOLESTEROL: ICD-10-CM

## 2020-01-14 DIAGNOSIS — G89.29 CHRONIC PAIN OF LEFT KNEE: ICD-10-CM

## 2020-01-14 DIAGNOSIS — E78.00 HIGH BLOOD CHOLESTEROL: Primary | ICD-10-CM

## 2020-01-14 DIAGNOSIS — M25.562 CHRONIC PAIN OF LEFT KNEE: ICD-10-CM

## 2020-01-14 LAB
ALBUMIN SERPL-MCNC: 4.1 G/DL (ref 3.4–5)
ALP SERPL-CCNC: 67 U/L (ref 40–150)
ALT SERPL W P-5'-P-CCNC: 22 U/L (ref 0–50)
ANION GAP SERPL CALCULATED.3IONS-SCNC: 4 MMOL/L (ref 3–14)
AST SERPL W P-5'-P-CCNC: 11 U/L (ref 0–45)
BASOPHILS # BLD AUTO: 0 10E9/L (ref 0–0.2)
BASOPHILS NFR BLD AUTO: 0.6 %
BILIRUB SERPL-MCNC: 0.4 MG/DL (ref 0.2–1.3)
BUN SERPL-MCNC: 15 MG/DL (ref 7–30)
CALCIUM SERPL-MCNC: 9.2 MG/DL (ref 8.5–10.1)
CHLORIDE SERPL-SCNC: 110 MMOL/L (ref 94–109)
CHOLEST SERPL-MCNC: 202 MG/DL
CO2 SERPL-SCNC: 27 MMOL/L (ref 20–32)
CREAT SERPL-MCNC: 0.67 MG/DL (ref 0.52–1.04)
DIFFERENTIAL METHOD BLD: NORMAL
EOSINOPHIL # BLD AUTO: 0.1 10E9/L (ref 0–0.7)
EOSINOPHIL NFR BLD AUTO: 1.1 %
ERYTHROCYTE [DISTWIDTH] IN BLOOD BY AUTOMATED COUNT: 12.6 % (ref 10–15)
GFR SERPL CREATININE-BSD FRML MDRD: >90 ML/MIN/{1.73_M2}
GLUCOSE SERPL-MCNC: 100 MG/DL (ref 70–99)
HCT VFR BLD AUTO: 40.6 % (ref 35–47)
HDLC SERPL-MCNC: 77 MG/DL
HGB BLD-MCNC: 13 G/DL (ref 11.7–15.7)
IMM GRANULOCYTES # BLD: 0 10E9/L (ref 0–0.4)
IMM GRANULOCYTES NFR BLD: 0.3 %
LDLC SERPL CALC-MCNC: 107 MG/DL
LYMPHOCYTES # BLD AUTO: 2 10E9/L (ref 0.8–5.3)
LYMPHOCYTES NFR BLD AUTO: 31.2 %
MCH RBC QN AUTO: 30 PG (ref 26.5–33)
MCHC RBC AUTO-ENTMCNC: 32 G/DL (ref 31.5–36.5)
MCV RBC AUTO: 94 FL (ref 78–100)
MONOCYTES # BLD AUTO: 0.6 10E9/L (ref 0–1.3)
MONOCYTES NFR BLD AUTO: 8.4 %
NEUTROPHILS # BLD AUTO: 3.8 10E9/L (ref 1.6–8.3)
NEUTROPHILS NFR BLD AUTO: 58.4 %
NONHDLC SERPL-MCNC: 126 MG/DL
NRBC # BLD AUTO: 0 10*3/UL
NRBC BLD AUTO-RTO: 0 /100
PLATELET # BLD AUTO: 321 10E9/L (ref 150–450)
POTASSIUM SERPL-SCNC: 4 MMOL/L (ref 3.4–5.3)
PROT SERPL-MCNC: 7.4 G/DL (ref 6.8–8.8)
RBC # BLD AUTO: 4.34 10E12/L (ref 3.8–5.2)
SODIUM SERPL-SCNC: 141 MMOL/L (ref 133–144)
TRIGL SERPL-MCNC: 95 MG/DL
WBC # BLD AUTO: 6.5 10E9/L (ref 4–11)

## 2020-01-14 ASSESSMENT — ENCOUNTER SYMPTOMS
BACK PAIN: 0
VOMITING: 0
BLOOD IN STOOL: 0
CONSTIPATION: 1
BLOATING: 0
ARTHRALGIAS: 1
MUSCLE CRAMPS: 0
NECK PAIN: 0
MUSCLE WEAKNESS: 0
ABDOMINAL PAIN: 0
JOINT SWELLING: 0
RECTAL PAIN: 0
NAUSEA: 0
STIFFNESS: 0
HEARTBURN: 1
MYALGIAS: 0
DIARRHEA: 0

## 2020-01-14 ASSESSMENT — MIFFLIN-ST. JEOR: SCORE: 1303.91

## 2020-01-14 ASSESSMENT — PAIN SCALES - GENERAL: PAINLEVEL: NO PAIN (0)

## 2020-01-14 NOTE — PATIENT INSTRUCTIONS
[facility] Medication Refill Request Information:  * Please contact your pharmacy regarding ANY request for medication refills.  ** [facility] Prescription Fax = [phone]  * Please allow 3 business days for routine medication refills.  * Please allow 5 business days for controlled substance medication refills.     [facility] Test Result notification information:  *You will be notified with in 7-10 days of your appointment day regarding the results of your test.  If you are on MyChart you will be notified as soon as the provider has reviewed the results and signed off on them.    [facility]: [phone]       Colonoscopy   [phone]  You will be contacted by a  to schedule the procedure.  Please contact them if not heard within 2 business days.      Orthopaedics [phone]

## 2020-01-14 NOTE — NURSING NOTE
Chief Complaint   Patient presents with     Physical     pt here for physical       Yany Vela CMA at 8:43 AM on 1/14/2020.

## 2020-01-30 ENCOUNTER — TELEPHONE (OUTPATIENT)
Dept: GASTROENTEROLOGY | Facility: OUTPATIENT CENTER | Age: 55
End: 2020-01-30

## 2020-01-30 DIAGNOSIS — K59.00 CONSTIPATION: Primary | ICD-10-CM

## 2020-01-30 NOTE — TELEPHONE ENCOUNTER
Patient taking any blood thinners ? No      Heart disease ? Denies      Lung disease ? Denies      Sleep apnea ? Denies      Diabetic ? Denies      Kidney disease ?Denies      Electronic implanted medical devices ? Denies      Are you taking any narcotic pain medication ? No      PTSD ? N/a      Prep instructions reviewed with patient ? Instructions, policy,MAC sedation plan reviewed. Advised patient to have someone stay with them post exam.     Yes    Pharmacy : [facility]     Indication for procedure : High blood cholesterol [E78.00];Chronic pain of left knee [M25.562, G89.29];Other constipation [K59.09]     Referring provider : Cruz Prather MD      Arrival Time : 7 AM

## 2020-02-06 ENCOUNTER — TRANSFERRED RECORDS (OUTPATIENT)
Dept: HEALTH INFORMATION MANAGEMENT | Facility: CLINIC | Age: 55
End: 2020-02-06

## 2020-02-06 ENCOUNTER — DOCUMENTATION ONLY (OUTPATIENT)
Dept: GASTROENTEROLOGY | Facility: OUTPATIENT CENTER | Age: 55
End: 2020-02-06
Payer: COMMERCIAL

## 2020-02-11 ENCOUNTER — TELEPHONE (OUTPATIENT)
Dept: OBGYN | Facility: CLINIC | Age: 55
End: 2020-02-11

## 2020-02-11 LAB — COPATH REPORT: NORMAL

## 2020-02-11 NOTE — TELEPHONE ENCOUNTER
Tatinaa is calling with concern for yeast infection. She states she has noted vaginal itching. Denies discharge. States she also has noted an odor.     Discussed with Tataina that this sounds more like bacterial vaginosis versus yeast, so advised that she come to clinic for evaluation.     Patient scheduled with Amparo Schofield.

## 2020-02-12 NOTE — TELEPHONE ENCOUNTER
DIAGNOSIS: Left kne pain. No images   APPOINTMENT DATE: 2/28   NOTES STATUS DETAILS   OFFICE NOTE from referring provider N/A    OFFICE NOTE from other specialist Internal  Cruz Prather MD    DISCHARGE SUMMARY from hospital N/A    DISCHARGE REPORT from the ER N/A    OPERATIVE REPORT N/A    MEDICATION LIST Internal    MRI N/A    CT SCAN N/A    XRAYS (IMAGES & REPORTS) Internal 1/14/20

## 2020-02-13 NOTE — PROGRESS NOTES
SUBJECTIVE:   Zonia Paredes is a  patient presenting to the clinic with intermittent vaginal itching with intermittent odor. Medical history significant for polyps treated with endometrial ablation in .     Patient states the intermittent itching and odor started several weeks ago, lasting for a day or two per episode.  Denies change in discharge, malodor, painful urination, swelling, and pelvic pain. Patient denies use of new products (eg. creams, soaps, laundry detergent, condoms). Patient has a history of a yeast infection several years ago, reporting needing two doses of Diflucan to resolve the infection.    Menstrual History:  Unable to report LMP, now 6-7 months without vaginal bleeding (perimenopausal).   Patient experiencing monthly, scant spotting during typical menses after ablation in . About 12 months ago, bleeding spaced apart. 6-7 months ago her bleeding stopped completely.  Patient reporting hot flashes during the night, states they are tolerable and not interested in medical management.    Sexual history:   Patient uses condoms consistently. No new partners in the last few years. No pain during sexual intercourse. No concerns about her sexual health. Declines STD testing, no concern for STD.    Last pap smear: 2017 NIL, HPV negative. Next pap due 2022    Past Medical History:   Diagnosis Date     Breast pain, left 2013     Choroidal nevus of right eye      Constipation      Dysphonia      Gastroesophageal reflux disease      Hemorrhoids      Hoarseness      Menorrhagia      Migraines      Past Surgical History:   Procedure Laterality Date     HC HYSTEROSCOPY, SURGICAL; W/ ENDOMETRIAL ABLATION, ANY METHOD       Family History   Problem Relation Age of Onset     Breast Cancer Sister 52     Cancer - colorectal Maternal Grandmother 70     Migraines Mother      Unknown/Adopted Mother      Stomach Problem Mother      Stomach Cancer Mother      Prostate Cancer Father      Cancer  "Father      Social History     Tobacco Use     Smoking status: Never Smoker     Smokeless tobacco: Never Used   Substance Use Topics     Alcohol use: Yes     Alcohol/week: 0.8 - 4.2 standard drinks     Comment: 1-3 times a week     OBJECTIVE:   /77   Pulse 80   Ht 1.693 m (5' 6.65\")   Wt 68.9 kg (151 lb 12.8 oz)   BMI 24.03 kg/m    General: Patient is pleasant in no acute distress  Pelvic Exam:  Vulva: No external lesions, normal hair distribution, no adenopathy  Vagina: Moist, pale, no abnormal discharge, no lesions  Cervix: Smooth, pale, no visible lesions, no CMT  Uterus: Normal size, anteverted, mobile  Ovaries: No mass, non-tender, mobile    Wet prep + for yeast, ph 5.0. Negative for clue cells, trichomoniasis and amine odor.     ASSESSMENT:  Encounter Diagnoses   Name Primary?     Vaginal itching      Vulvovaginal candidiasis Yes     PLAN:   Wet Prep + for yeast. Fluconazole 150 mg prescribed. Patient given a refill and educated to fill if symptoms do not resolve in 3 days.  Patient encouraged to continue tracking all episodes of bleeding that she has. Counseled that after 1 yr without vaginal bleeding, any new episodes of vaginal bleeding should be evaluated.   Patient educated on the use of coconut oil or Replens for vaginal dryness related to perimenopause. Patient educated on natural ways, non-pharmacologic ways to manage hot flashes. She has the [facility] folder with resources.  She was not interested in pharmacologic measures today.      Tatiana expressed understanding and agreement with the plan for care.    I, Caitlyn Martinez, completed the PFSH and ROS. I then acted as a scribe for STEVE Aguilar, for the remainder of the visit.  Caitlyn Martinez, RN, BSN, STEVE Student    I agree with the PFSH and ROS as completed by the STEVE Student, except for changes made by me.  The remainder of the encounter was performed by me and scribed by the STEVE Student.  The scribed note " accurately reflects my personal services and decisions made by me.  Amparo Schofield, DNP, APRN, WHNP

## 2020-02-14 ENCOUNTER — OFFICE VISIT (OUTPATIENT)
Dept: SURGERY | Facility: CLINIC | Age: 55
End: 2020-02-14
Payer: COMMERCIAL

## 2020-02-14 ENCOUNTER — OFFICE VISIT (OUTPATIENT)
Dept: OBGYN | Facility: CLINIC | Age: 55
End: 2020-02-14
Attending: NURSE PRACTITIONER
Payer: COMMERCIAL

## 2020-02-14 VITALS
SYSTOLIC BLOOD PRESSURE: 117 MMHG | WEIGHT: 151.8 LBS | DIASTOLIC BLOOD PRESSURE: 77 MMHG | BODY MASS INDEX: 23.83 KG/M2 | HEART RATE: 80 BPM | HEIGHT: 67 IN

## 2020-02-14 VITALS
HEIGHT: 67 IN | HEART RATE: 80 BPM | SYSTOLIC BLOOD PRESSURE: 117 MMHG | WEIGHT: 151.9 LBS | BODY MASS INDEX: 23.84 KG/M2 | OXYGEN SATURATION: 98 % | DIASTOLIC BLOOD PRESSURE: 77 MMHG

## 2020-02-14 DIAGNOSIS — N89.8 VAGINAL ITCHING: ICD-10-CM

## 2020-02-14 DIAGNOSIS — K64.8 HEMORRHOID PROLAPSE: Primary | ICD-10-CM

## 2020-02-14 DIAGNOSIS — B37.31 VULVOVAGINAL CANDIDIASIS: Primary | ICD-10-CM

## 2020-02-14 DIAGNOSIS — K59.09 OTHER CONSTIPATION: ICD-10-CM

## 2020-02-14 LAB
CLUE CELLS: NEGATIVE
TRICHOMONAS (WET PREP): NEGATIVE
YEAST (WET PREP): POSITIVE

## 2020-02-14 PROCEDURE — G0463 HOSPITAL OUTPT CLINIC VISIT: HCPCS | Mod: ZF

## 2020-02-14 PROCEDURE — 87210 SMEAR WET MOUNT SALINE/INK: CPT | Mod: ZF | Performed by: NURSE PRACTITIONER

## 2020-02-14 RX ORDER — FAMOTIDINE 20 MG/1
20 TABLET, FILM COATED ORAL
COMMUNITY

## 2020-02-14 RX ORDER — FLUCONAZOLE 150 MG/1
150 TABLET ORAL
Qty: 2 TABLET | Refills: 0 | Status: SHIPPED | OUTPATIENT
Start: 2020-02-14 | End: 2020-02-18

## 2020-02-14 ASSESSMENT — PAIN SCALES - GENERAL: PAINLEVEL: NO PAIN (0)

## 2020-02-14 ASSESSMENT — MIFFLIN-ST. JEOR
SCORE: 1321.08
SCORE: 1320.63

## 2020-02-14 ASSESSMENT — ANXIETY QUESTIONNAIRES
3. WORRYING TOO MUCH ABOUT DIFFERENT THINGS: NOT AT ALL
5. BEING SO RESTLESS THAT IT IS HARD TO SIT STILL: NOT AT ALL
6. BECOMING EASILY ANNOYED OR IRRITABLE: NOT AT ALL
1. FEELING NERVOUS, ANXIOUS, OR ON EDGE: NOT AT ALL
7. FEELING AFRAID AS IF SOMETHING AWFUL MIGHT HAPPEN: NOT AT ALL
GAD7 TOTAL SCORE: 0
2. NOT BEING ABLE TO STOP OR CONTROL WORRYING: NOT AT ALL

## 2020-02-14 ASSESSMENT — PATIENT HEALTH QUESTIONNAIRE - PHQ9
5. POOR APPETITE OR OVEREATING: NOT AT ALL
SUM OF ALL RESPONSES TO PHQ QUESTIONS 1-9: 0

## 2020-02-14 NOTE — PROGRESS NOTES
"Colon and Rectal Surgery Follow-Up Clinic Note    RE: Tatiana Paredes  : 1965  GIUSEPPE: 2020    Tatiana Paredes is a very pleasant 54 year old female here for follow-up of hemorrhoids.    Interval history: I saw Tatiana in 2016 for hemorrhoids.  I had performed hemorrhoid banding for some prolapsing internal hemorrhoids.  She had been doing well but was having some left upper quadrant abdominal pain, narrow stools, and had one episode of rectal bleeding as well as some prolapsing hemorrhoidal tissue.  She had a colonoscopy last week with internal hemorrhoids only, but was otherwise normal.  She has had some ongoing constipation.  She used to take a fiber supplement but had stopped this as she was not able to drink enough water during the day.  She is now taking prune juice but has not noticed a significant difference.  She tried using magnesium but this was giving her diarrhea.  She no longer has any rectal bleeding.  She does have tissue that seems to get larger with bowel movements but then recedes on its own.  This did not need to be manually reduced.  She denies any pain with bowel movements.     Physical Examination:   /77 (BP Location: Left arm, Patient Position: Sitting, Cuff Size: Adult Regular)   Pulse 80   Ht 5' 6.65\"   Wt 151 lb 14.4 oz   SpO2 98%   BMI 24.04 kg/m    General: Alert, oriented, in no acute distress, sitting comfortably  HEENT: Mucous membranes moist    Assessment/Plan: 54 year old female with constipation and hemorrhoid prolapse.  Normal colonoscopy last week.  We discussed managing hemorrhoids again with hemorrhoid banding and she tolerated this well previously.  However, she states that now that she knows that her colonoscopy is normal she has not very bothered by the prolapsing hemorrhoids and has not had any further bleeding.  I recommended using a daily fiber supplement and can add in a laxative in addition as needed for constipation management.  If " hemorrhoids become more symptomatic or are bothersome to her, she can return to clinic at any time to consider further hemorrhoid banding. Patient's questions were answered to her stated satisfaction and she is in agreement with this plan.    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 Provider to review       Knee instability 06/05/2012     Priority: Medium       Medications:  Current Outpatient Medications   Medication Sig Dispense Refill     nitroFURantoin macrocrystal-monohydrate (MACROBID) 100 MG capsule Take 1 capsule (100 mg) by mouth daily 14 capsule 3     valACYclovir (VALTREX) 500 MG tablet TAKE 1 TABLET BY MOUTH TWICE A DAY 28 tablet 0       Allergies:  No Known Allergies    Family history:  Family History   Problem Relation Age of Onset     Breast Cancer Sister 52     Cancer - colorectal Maternal Grandmother 70     Migraines Mother      Unknown/Adopted Mother      Stomach Problem Mother      Prostate Cancer Father      Cancer Father      Cancer Sister        Social history:  Social History  "    Tobacco Use     Smoking status: Never Smoker     Smokeless tobacco: Never Used   Substance Use Topics     Alcohol use: Yes     Alcohol/week: 0.8 - 4.2 standard drinks     Comment: 1-3 times a week     Marital status: .  Nursing Notes:   Sidra Wilkins, EMT  2/14/2020  8:15 AM  Signed  Chief Complaint   Patient presents with     Consult     New appointment for hemorrhoids.       Vitals:    02/14/20 0813   BP: 117/77   BP Location: Left arm   Patient Position: Sitting   Cuff Size: Adult Regular   Pulse: 80   SpO2: 98%   Weight: 151 lb 14.4 oz   Height: 5' 6.65\"       Body mass index is 24.04 kg/m .                            TURNER MCMAHAN      Total face to face time was 15 minutes, >50% counseling.    Queta Finch NP-C  Colon and Rectal Surgery  [facility]    "

## 2020-02-14 NOTE — LETTER
2020       RE: Tatiana Paredes  [address] [facility] [address]     Dear Colleague,    Thank you for referring your patient, Tatiana Paredes, to the [facility] at [facility]. Please see a copy of my visit note below.    SUBJECTIVE:   Zonia Paredes is a  patient presenting to the clinic with intermittent vaginal itching with intermittent odor. Medical history significant for polyps treated with endometrial ablation in .     Patient states the intermittent itching and odor started several weeks ago, lasting for a day or two per episode.  Denies change in discharge, malodor, painful urination, swelling, and pelvic pain. Patient denies use of new products (eg. creams, soaps, laundry detergent, condoms). Patient has a history of a yeast infection several years ago, reporting needing two doses of Diflucan to resolve the infection.    Menstrual History:  Unable to report LMP, now 6-7 months without vaginal bleeding (perimenopausal).   Patient experiencing monthly, scant spotting during typical menses after ablation in . About 12 months ago, bleeding spaced apart. 6-7 months ago her bleeding stopped completely.  Patient reporting hot flashes during the night, states they are tolerable and not interested in medical management.    Sexual history:   Patient uses condoms consistently. No new partners in the last few years. No pain during sexual intercourse. No concerns about her sexual health. Declines STD testing, no concern for STD.    Last pap smear: 2017 NIL, HPV negative. Next pap due 2022    Past Medical History:   Diagnosis Date     Breast pain, left 2013     Choroidal nevus of right eye      Constipation      Dysphonia      Gastroesophageal reflux disease      Hemorrhoids      Hoarseness      Menorrhagia      Migraines      Past Surgical History:   Procedure Laterality Date     HC HYSTEROSCOPY, SURGICAL; W/  "ENDOMETRIAL ABLATION, ANY METHOD  2011     Family History   Problem Relation Age of Onset     Breast Cancer Sister 52     Cancer - colorectal Maternal Grandmother 70     Migraines Mother      Unknown/Adopted Mother      Stomach Problem Mother      Stomach Cancer Mother      Prostate Cancer Father      Cancer Father      Social History     Tobacco Use     Smoking status: Never Smoker     Smokeless tobacco: Never Used   Substance Use Topics     Alcohol use: Yes     Alcohol/week: 0.8 - 4.2 standard drinks     Comment: 1-3 times a week     OBJECTIVE:   /77   Pulse 80   Ht 1.693 m (5' 6.65\")   Wt 68.9 kg (151 lb 12.8 oz)   BMI 24.03 kg/m     General: Patient is pleasant in no acute distress  Pelvic Exam:  Vulva: No external lesions, normal hair distribution, no adenopathy  Vagina: Moist, pale, no abnormal discharge, no lesions  Cervix: Smooth, pale, no visible lesions, no CMT  Uterus: Normal size, anteverted, mobile  Ovaries: No mass, non-tender, mobile    Wet prep + for yeast, ph 5.0. Negative for clue cells, trichomoniasis and amine odor.     ASSESSMENT:  Encounter Diagnoses   Name Primary?     Vaginal itching      Vulvovaginal candidiasis Yes     PLAN:   Wet Prep + for yeast. Fluconazole 150 mg prescribed. Patient given a refill and educated to fill if symptoms do not resolve in 3 days.  Patient encouraged to continue tracking all episodes of bleeding that she has. Counseled that after 1 yr without vaginal bleeding, any new episodes of vaginal bleeding should be evaluated.   Patient educated on the use of coconut oil or Replens for vaginal dryness related to perimenopause. Patient educated on natural ways, non-pharmacologic ways to manage hot flashes. She has the [facility] folder with resources.  She was not interested in pharmacologic measures today.      Tatiana expressed understanding and agreement with the plan for care.    I, Caitlyn Martinez, completed the PFSH and ROS. I then acted as a scribe " for STEVE Aguilar, for the remainder of the visit.  Caitlyn Martinez, RN, BSN, NP Student    I agree with the PFSH and ROS as completed by the WHNP Student, except for changes made by me.  The remainder of the encounter was performed by me and scribed by the WHNP Student.  The scribed note accurately reflects my personal services and decisions made by me.  Amparo Schofield DNP, APRN, WHNP

## 2020-02-14 NOTE — LETTER
"2020       RE: Tatiana Paredes  [address] [facility] [address]     Dear Colleague,    Thank you for referring your patient, Tatiana Paredes, to the [facility] COLON AND RECTAL SURGERY at [facility]. Please see a copy of my visit note below.    Colon and Rectal Surgery Follow-Up Clinic Note    RE: Tatiana Paredes  : 1965  GIUSEPPE: 2020    Tatiana Paredes is a very pleasant 54 year old female here for follow-up of hemorrhoids.    Interval history: I saw Tatiana in 2016 for hemorrhoids.  I had performed hemorrhoid banding for some prolapsing internal hemorrhoids.  She had been doing well but was having some left upper quadrant abdominal pain, narrow stools, and had one episode of rectal bleeding as well as some prolapsing hemorrhoidal tissue.  She had a colonoscopy last week with internal hemorrhoids only, but was otherwise normal.  She has had some ongoing constipation.  She used to take a fiber supplement but had stopped this as she was not able to drink enough water during the day.  She is now taking prune juice but has not noticed a significant difference.  She tried using magnesium but this was giving her diarrhea.  She no longer has any rectal bleeding.  She does have tissue that seems to get larger with bowel movements but then recedes on its own.  This did not need to be manually reduced.  She denies any pain with bowel movements.     Physical Examination:   /77 (BP Location: Left arm, Patient Position: Sitting, Cuff Size: Adult Regular)   Pulse 80   Ht 5' 6.65\"   Wt 151 lb 14.4 oz   SpO2 98%   BMI 24.04 kg/m     General: Alert, oriented, in no acute distress, sitting comfortably  HEENT: Mucous membranes moist    Assessment/Plan: 54 year old female with constipation and hemorrhoid prolapse.  Normal colonoscopy last week.  We discussed managing hemorrhoids again with hemorrhoid banding and she tolerated this well " previously.  However, she states that now that she knows that her colonoscopy is normal she has not very bothered by the prolapsing hemorrhoids and has not had any further bleeding.  I recommended using a daily fiber supplement and can add in a laxative in addition as needed for constipation management.  If hemorrhoids become more symptomatic or are bothersome to her, she can return to clinic at any time to consider further hemorrhoid banding. Patient's questions were answered to her stated satisfaction and she is in agreement with this plan.    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 Provider to review       Knee instability 06/05/2012     Priority: Medium       Medications:  Current Outpatient Medications   Medication Sig Dispense Refill     nitroFURantoin macrocrystal-monohydrate (MACROBID) 100 MG capsule Take 1 capsule (100 mg) by mouth daily 14 capsule 3     valACYclovir (VALTREX) 500 MG tablet TAKE 1 TABLET BY MOUTH TWICE A DAY 28 tablet 0       Allergies:  No Known Allergies    Family  "history:  Family History   Problem Relation Age of Onset     Breast Cancer Sister 52     Cancer - colorectal Maternal Grandmother 70     Migraines Mother      Unknown/Adopted Mother      Stomach Problem Mother      Prostate Cancer Father      Cancer Father      Cancer Sister        Social history:  Social History     Tobacco Use     Smoking status: Never Smoker     Smokeless tobacco: Never Used   Substance Use Topics     Alcohol use: Yes     Alcohol/week: 0.8 - 4.2 standard drinks     Comment: 1-3 times a week     Marital status: .  Nursing Notes:   Sidra Wilkins EMT  2/14/2020  8:15 AM  Signed  Chief Complaint   Patient presents with     Consult     New appointment for hemorrhoids.       Vitals:    02/14/20 0813   BP: 117/77   BP Location: Left arm   Patient Position: Sitting   Cuff Size: Adult Regular   Pulse: 80   SpO2: 98%   Weight: 151 lb 14.4 oz   Height: 5' 6.65\"       Body mass index is 24.04 kg/m .      TURNER MCMAHAN    Total face to face time was 15 minutes, >50% counseling.    SOCORRO FalconC  Colon and Rectal Surgery  [facility]      "

## 2020-02-14 NOTE — NURSING NOTE
"Chief Complaint   Patient presents with     Consult     New appointment for hemorrhoids.       Vitals:    02/14/20 0813   BP: 117/77   BP Location: Left arm   Patient Position: Sitting   Cuff Size: Adult Regular   Pulse: 80   SpO2: 98%   Weight: 151 lb 14.4 oz   Height: 5' 6.65\"       Body mass index is 24.04 kg/m .                            MARY TROTTER, EMT    "

## 2020-02-15 ASSESSMENT — ANXIETY QUESTIONNAIRES: GAD7 TOTAL SCORE: 0

## 2020-02-18 ENCOUNTER — OFFICE VISIT (OUTPATIENT)
Dept: INTERNAL MEDICINE | Facility: CLINIC | Age: 55
End: 2020-02-18
Payer: COMMERCIAL

## 2020-02-18 VITALS
RESPIRATION RATE: 16 BRPM | SYSTOLIC BLOOD PRESSURE: 128 MMHG | DIASTOLIC BLOOD PRESSURE: 83 MMHG | BODY MASS INDEX: 24.22 KG/M2 | WEIGHT: 153 LBS | HEART RATE: 92 BPM | OXYGEN SATURATION: 99 %

## 2020-02-18 DIAGNOSIS — R20.0 HAND NUMBNESS: Primary | ICD-10-CM

## 2020-02-18 RX ORDER — NITROFURANTOIN 25; 75 MG/1; MG/1
CAPSULE ORAL
COMMUNITY
Start: 2019-12-27 | End: 2021-04-16

## 2020-02-18 ASSESSMENT — PAIN SCALES - GENERAL: PAINLEVEL: NO PAIN (0)

## 2020-02-18 NOTE — NURSING NOTE
Chief Complaint   Patient presents with     Recheck Medication     patient states she is following up on January visit     JOSELYN MARTIN CMA at 8:35 AM on 2/18/2020.

## 2020-02-18 NOTE — PROGRESS NOTES
QUINCY Paredes is a 51 yo female comes in for follow up  today. She had HA's and possible mild increase BP. She states her mother had HA's and mild increase BP and takes a BP medication and feels better. She denies any neurological sxs. She states some hot feeling as well last few months. She has not had menstrual cycle because of past endometrial ablation.   She follows with Dr. Pisano, ENT for voice changes and gets vocal cord injections She is getting speech therapy. Last endoscopy 7/2015 and was normal.  She had normal CXR 1/17.  She does not smoke nor abuse EtOH. No change in family history. No other HEENT, cardiopulmonary, abdominal, , GYN, neurological, systemic, psychiatric, skin, vascular, lymphatic, musculoskeletal complaints. She does not smoke nor abuse EtOH. She has some minor night time L hand/finger numbness. She had transient L UQ pain not recently.     Past Medical History:   Diagnosis Date     Breast pain, left 11/2013     Choroidal nevus of right eye      Constipation      Dysphonia      Gastroesophageal reflux disease      Hemorrhoids      Hoarseness      Menorrhagia      Migraines      Current Outpatient Medications   Medication     famotidine (PEPCID) 20 MG tablet     valACYclovir (VALTREX) 500 MG tablet     nitroFURantoin macrocrystal-monohydrate (MACROBID) 100 MG capsule     No current facility-administered medications for this visit.          PE:     Vitals noted, gen nad cooperative alert, HEENT, oropharynx clear no exudate, neck supple nl rom, no B carotid bruits, lungs with good air movement, RRR, S1, S2, no MRG, positive Bowel sounds, no acute findings. Grossly normal neurological exam.       Assessment/Plan    1. She had mammogram  9/20/2019.  She saw   Dr. Powell GYN 4/24/2019.  She was in the GYN clinic recently 2/14/2020.   2. Previous abdominal complaints; CT scan unremarkable 7/2/16. Ordered colonoscopy and MRCP and this was done 12/29/16 as unremarkable (repeat 5-10 years).  She has mild somewhat chronic L upper quadrant pain. Colonoscopy done 2/6/2020 and  Unremarkable. I recommended if persists to do abdominal/pelvic CT imaging but she wants to wait on this  3. She remains on H2 blocker for reflux; last EGD 7/15.  4. She continues to follow with  Dr. Pisano, ENT  for hoarse voice. She is getting speech therapy. EGD done 11/17/17 and she is on H2 blocker.   MRI brain and MRI neck 11/20/17 showing R sided vocal cord findings.  She had Cymetra vocal cord injection 7/5/2018. She saw Dr. Pisano 10/22/2019  5. Fasting lipids done 1/14/2020   6. She got this year's influenza vaccination. She completed [employer] vaccination series   7. Seen by Dr. Foster Neurology 4/16/2018 to assess any neurological issues with vocal cord findings.   8. Ordered MRI/MRA for new HA's and new B ear sounds done 11/2/2018 normal.   9. See in C/R 2/14/2020 for hemorrhoids  10.  L lknee pain; X-ray 1/14/2020 and she has ortho appt. With Suzanne Ruiz 2/28/2020  11. L hand nocturnal numbness, if worse/persists EMG. She can try hand/wrist splint.     Total time spent 25 minutes.  More than 50% of the time spent with Ms. Paredes on counseling / coordinating her care

## 2020-02-28 ENCOUNTER — OFFICE VISIT (OUTPATIENT)
Dept: ORTHOPEDICS | Facility: CLINIC | Age: 55
End: 2020-02-28
Payer: COMMERCIAL

## 2020-02-28 ENCOUNTER — PRE VISIT (OUTPATIENT)
Dept: ORTHOPEDICS | Facility: CLINIC | Age: 55
End: 2020-02-28

## 2020-02-28 VITALS — BODY MASS INDEX: 22.73 KG/M2 | RESPIRATION RATE: 16 BRPM | HEIGHT: 68 IN | WEIGHT: 150 LBS

## 2020-02-28 DIAGNOSIS — M25.562 CHRONIC PAIN OF LEFT KNEE: Primary | ICD-10-CM

## 2020-02-28 DIAGNOSIS — G89.29 CHRONIC PAIN OF LEFT KNEE: Primary | ICD-10-CM

## 2020-02-28 ASSESSMENT — MIFFLIN-ST. JEOR: SCORE: 1329.41

## 2020-02-28 NOTE — PROGRESS NOTES
" Subjective:   Tatiana Paredes is a 54 year old female who presents for knee pain after recommendation from Dr. Prather. During her physical in Jan she noted some knee pain, and got knee Xray which were normal.    Started 30 years ago and every few years there is some pain and then uses NSAIDs and heat and it goes away. Notices it when she wakes up and puts weight on it but no pattern of movements/activities that elicit pain. She feels like she is hyperextending her knee and makes it unstable. Reports wearing good supportive shoes when walking/hiking.     Physical Activity: Yoga, walk in summer, winter is more inactive    Around 2012 had some instability in knee and did PT. No pain just knee would give out. No known history on injury, no surgeries, no other joint issues.       Background:   Date of injury: about 2 months ago   Duration of symptoms: 3 days  Mechanism of Injury: Insidious Onset; Unknown , started limping due to pain   Intensity: 1/10 at rest, 5/10 with activity   Aggravating factors: weightbearing/walking  Relieving Factors: heat and NSAIDs  Prior Evaluation: Prior Physician Evalutation:spoke to  and X-rays    PAST MEDICAL, SOCIAL, SURGICAL AND FAMILY HISTORY: Past medical, surgical, family and social history was reviewed and unchanged since last visit.  ALLERGIES: She has No Known Allergies.    CURRENT MEDICATIONS: She has a current medication list which includes the following prescription(s): famotidine, nitrofurantoin macrocrystal-monohydrate, and valacyclovir.     REVIEW OF SYSTEMS: 6 point review of systems is negative except as noted above.     Exam:   Resp 16   Ht 1.72 m (5' 7.72\")   Wt 68 kg (150 lb)   BMI 23.00 kg/m             CONSTITUTIONAL: healthy, alert and no distress  HEAD: Normocephalic. No masses, lesions, tenderness or abnormalities  SKIN: no suspicious lesions or rashes  GAIT: normal  NEUROLOGIC: Normal muscle tone and strength, reflexes normal, sensation " grossly normal.  PSYCHIATRIC: affect normal/bright and mentation appears normal.    MUSCULOSKELETAL: bilateral knees showed no effusion, erythema, no tenderness to palpation. FROM. 5/5 strength.  lachmans, posterior drawer, [facility], Valgus/Varus stress did not show any ligament weakness.  Patellar grind test and apprehension test was negative. Bilateral hips showed FROM. Single leg squat showed valgus with L>R. Hip flexion,extension, adduction were 5/5 strength. Abduction testing showed weakness of gluteus medius 4/5.   Dynamic valgus with two legged squatting and increased with single leg squatting L>>R.     Xrays 1/14/2020  IMPRESSION:   1. No significant joint space narrowing in all 3 compartments.   2. Mild lateral patellar tilt.     Assessment/Plan:   1. Knee Pain  2. Gluteus medius weakness  Xrays as above showed no OA or other acute process. No history of trauma or injury. Physical exam showed weakness of gluteus medius. Recommended continuing to use NSAIDs and heat to treat pain when it occurs.   - PT referral to [facility] for strengthening     Jayy Toro MD

## 2020-02-28 NOTE — PROGRESS NOTES
Attending Note:   I have personally examined this patient and have reviewed the clinical presentation and progress note with the resident. I agree with the treatment plan as outlined. The plan was formulated with the resident on the day of the patient's visit. I have reviewed all imaging and/or labs with the resident and agree with the findings in the documentation.     Cristina Ruiz MD, CAQ, CCD  [facility]  Sports Medicine and Bone Health

## 2020-02-28 NOTE — LETTER
"  2/28/2020      RE: Tatiana Paredes  [address] [facility] [address]        Subjective:   Tatiana Paredes is a 54 year old female who presents for knee pain after recommendation from Dr. Prather. During her physical in Jan she noted some knee pain, and got knee Xray which were normal.    Started 30 years ago and every few years there is some pain and then uses NSAIDs and heat and it goes away. Notices it when she wakes up and puts weight on it but no pattern of movements/activities that elicit pain. She feels like she is hyperextending her knee and makes it unstable. Reports wearing good supportive shoes when walking/hiking.     Physical Activity: Yoga, walk in summer, winter is more inactive    Around 2012 had some instability in knee and did PT. No pain just knee would give out. No known history on injury, no surgeries, no other joint issues.       Background:   Date of injury: about 2 months ago   Duration of symptoms: 3 days  Mechanism of Injury: Insidious Onset; Unknown , started limping due to pain   Intensity: 1/10 at rest, 5/10 with activity   Aggravating factors: weightbearing/walking  Relieving Factors: heat and NSAIDs  Prior Evaluation: Prior Physician Evalutation:spoke to  and X-rays    PAST MEDICAL, SOCIAL, SURGICAL AND FAMILY HISTORY: Past medical, surgical, family and social history was reviewed and unchanged since last visit.  ALLERGIES: She has No Known Allergies.    CURRENT MEDICATIONS: She has a current medication list which includes the following prescription(s): famotidine, nitrofurantoin macrocrystal-monohydrate, and valacyclovir.     REVIEW OF SYSTEMS: 6 point review of systems is negative except as noted above.     Exam:   Resp 16   Ht 1.72 m (5' 7.72\")   Wt 68 kg (150 lb)   BMI 23.00 kg/m              CONSTITUTIONAL: healthy, alert and no distress  HEAD: Normocephalic. No masses, lesions, tenderness or abnormalities  SKIN: no suspicious lesions or " rashes  GAIT: normal  NEUROLOGIC: Normal muscle tone and strength, reflexes normal, sensation grossly normal.  PSYCHIATRIC: affect normal/bright and mentation appears normal.    MUSCULOSKELETAL: bilateral knees showed no effusion, erythema, no tenderness to palpation. FROM. 5/5 strength.  lachmans, posterior drawer, [facility], Valgus/Varus stress did not show any ligament weakness.  Patellar grind test and apprehension test was negative. Bilateral hips showed FROM. Single leg squat showed valgus with L>R. Hip flexion,extension, adduction were 5/5 strength. Abduction testing showed weakness of gluteus medius 4/5.   Dynamic valgus with two legged squatting and increased with single leg squatting L>>R.     Xrays 1/14/2020  IMPRESSION:   1. No significant joint space narrowing in all 3 compartments.   2. Mild lateral patellar tilt.     Assessment/Plan:   1. Knee Pain  2. Gluteus medius weakness  Xrays as above showed no OA or other acute process. No history of trauma or injury. Physical exam showed weakness of gluteus medius. Recommended continuing to use NSAIDs and heat to treat pain when it occurs.   - PT referral to [facility] for strengthening     Jayy Toro MD    Attending Note:   I have personally examined this patient and have reviewed the clinical presentation and progress note with the resident. I agree with the treatment plan as outlined. The plan was formulated with the resident on the day of the patient's visit. I have reviewed all imaging and/or labs with the resident and agree with the findings in the documentation.     Cristina Ruiz MD, CAQ, CCD  [facility]  Sports Medicine and Bone Health      Cristina Ruiz MD

## 2020-03-10 ENCOUNTER — THERAPY VISIT (OUTPATIENT)
Dept: PHYSICAL THERAPY | Facility: CLINIC | Age: 55
End: 2020-03-10
Payer: COMMERCIAL

## 2020-03-10 DIAGNOSIS — M25.562 CHRONIC PAIN OF LEFT KNEE: ICD-10-CM

## 2020-03-10 DIAGNOSIS — G89.29 CHRONIC PAIN OF LEFT KNEE: ICD-10-CM

## 2020-03-10 PROCEDURE — 97110 THERAPEUTIC EXERCISES: CPT | Mod: GP | Performed by: PHYSICAL THERAPIST

## 2020-03-10 PROCEDURE — 97161 PT EVAL LOW COMPLEX 20 MIN: CPT | Mod: GP | Performed by: PHYSICAL THERAPIST

## 2020-03-10 PROCEDURE — 97530 THERAPEUTIC ACTIVITIES: CPT | Mod: GP | Performed by: PHYSICAL THERAPIST

## 2020-03-10 ASSESSMENT — ACTIVITIES OF DAILY LIVING (ADL)
WEAKNESS: I DO NOT HAVE THE SYMPTOM
PAIN: I DO NOT HAVE THE SYMPTOM
AS_A_RESULT_OF_YOUR_KNEE_INJURY,_HOW_WOULD_YOU_RATE_YOUR_CURRENT_LEVEL_OF_DAILY_ACTIVITY?: NORMAL
RISE FROM A CHAIR: ACTIVITY IS NOT DIFFICULT
KNEEL ON THE FRONT OF YOUR KNEE: ACTIVITY IS NOT DIFFICULT
HOW_WOULD_YOU_RATE_THE_CURRENT_FUNCTION_OF_YOUR_KNEE_DURING_YOUR_USUAL_DAILY_ACTIVITIES_ON_A_SCALE_FROM_0_TO_100_WITH_100_BEING_YOUR_LEVEL_OF_KNEE_FUNCTION_PRIOR_TO_YOUR_INJURY_AND_0_BEING_THE_INABILITY_TO_PERFORM_ANY_OF_YOUR_USUAL_DAILY_ACTIVITIES?: 100
HOW_WOULD_YOU_RATE_THE_OVERALL_FUNCTION_OF_YOUR_KNEE_DURING_YOUR_USUAL_DAILY_ACTIVITIES?: NORMAL
GO DOWN STAIRS: ACTIVITY IS NOT DIFFICULT
LIMPING: I DO NOT HAVE THE SYMPTOM
SIT WITH YOUR KNEE BENT: ACTIVITY IS NOT DIFFICULT
KNEE_ACTIVITY_OF_DAILY_LIVING_SCORE: 94.29
RAW_SCORE: 66
SQUAT: ACTIVITY IS NOT DIFFICULT
SWELLING: I DO NOT HAVE THE SYMPTOM
WALK: ACTIVITY IS NOT DIFFICULT
GO UP STAIRS: ACTIVITY IS NOT DIFFICULT
STIFFNESS: I DO NOT HAVE THE SYMPTOM
KNEE_ACTIVITY_OF_DAILY_LIVING_SUM: 66
GIVING WAY, BUCKLING OR SHIFTING OF KNEE: THE SYMPTOM AFFECTS MY ACTIVITY SLIGHTLY
STAND: ACTIVITY IS SOMEWHAT DIFFICULT

## 2020-03-10 NOTE — PROGRESS NOTES
"[facility] Initial Evaluation  Physical Therapy Initial Examination/Evaluation    March 10, 2020    Tatiana Paredes  is a 54 year old  female referred to physical therapy by Dr. Ruiz for treatment of L knee.      DOI/onset 12/2019  Mechanism of injury Unsure.  It really hurt when I stepped on the leg and I limped for 3 days.  I did have this issue in 2012 due to prolonged squatting with gardening.    DOS none  Prior treatment Red light therapy. Effect of prior treatment fair    Chief Complaint:   When I am walking I will feel it go back- excessive hyperextension upon WB.  It has happened a couple of times.   Pain location: anterior knee.  It will hurt for a couple of days.    Quality: aching  Constant/Intermittent: intermittent  Time of day: non dependent  Symptoms have improved since onset.    Current pain 0/10.  Pain at worst 3-4/10.    Symptoms aggravated by transfers.    Symptoms improved with light therapy and medication.     Social history:  Pt is , 1 child.  29 years old.  Pt does not do much for exercise to date.  Was going to yoga and \"sweat with Marnie\" 2x/week during lunch..    Occupation: pharmacist.  Job duties:  sitting.    Patient having difficulty with ADLs: transfered.    Patient's goals are improve strength and ensure safety with returning to yoga at home.    Patient reports general health as good.  Related medical history anemia, migraines/HA.    Surgical History:  none.    Imaging: x-ray.    Medications:  famotidine.       Outcome measure:   KOS  Return to MD:  As needed.      Clinical Impression: Tatiana presents to [facility] with primary complaint of left knee pain.  Per clinical examination, pt demonstrates decreased left gluteal strength with minor compensations with body mechanics.  Suspect significant contribution may be secondary to postural habits with positioning into deep knee flexion.  Pt will benefit from skilled physical therapy per plan of care " outlined below.          Subjective:  HPI       Knee Activity of Daily Living Score: 94.29            Objective:    Observation:   Standing   - Equal illiac crest, greater trochanter.  Slight L genu recruvatum vs R     - (+) Trendelenburg L    Squat:   - Incr WB L LE with squat, good femoral control     SLS squat:   - limits to 30 deg.  Femoral IR/adduction R>L with standing            System                                           Hip Evaluation    Hip Strength:        Abduction:  Left: 4/5     Pain:    Internal Rotation:  Left: 5/5    Pain:Right: 5/5   Pain:  External Rotation:  Left: 4-/5   Pain:  Right: 4/5   Pain:                     Knee Evaluation:  ROM:  AROM: normal            Strength:     Extension:  Left: 5/5   Pain:      Right: 5/5   Pain:  Flexion:  Left: 5/5   Pain:      Right: 5/5   Pain:        Ligament Testing:  Normal                Special Tests: Not Assessed              Due to time, unable to complete PF testing and additional intra-articular testing.  To complete next as needed.  Focused on ADL concerns as well as HEP initiation.      General     ROS    Assessment/Plan:    Patient is a 54 year old female with left side knee complaints.    Patient has the following significant findings with corresponding treatment plan.                Diagnosis 1:  L knee pain    Pain -  hot/cold therapy, US, manual therapy, self management, education and home program  Decreased strength - therapeutic exercise and therapeutic activities  Decreased proprioception - neuro re-education and therapeutic activities  Impaired muscle performance - neuro re-education  Decreased function - therapeutic activities  Impaired posture - neuro re-education    Therapy Evaluation Codes:   1) History comprised of:   Personal factors that impact the plan of care:      None.    Comorbidity factors that impact the plan of care are:      Anemia, migraines, bowel irregularties.     Medications impacting care: other.  2) Examination  of Body Systems comprised of:   Body structures and functions that impact the plan of care:      Knee.   Activity limitations that impact the plan of care are:      Bending and Squatting/kneeling.  3) Clinical presentation characteristics are:   Evolving/Changing.  4) Decision-Making    Low complexity using standardized patient assessment instrument and/or measureable assessment of functional outcome.  Cumulative Therapy Evaluation is: Low complexity.    Previous and current functional limitations:  (See Goal Flow Sheet for this information)    Short term and Long term goals: (See Goal Flow Sheet for this information)     Communication ability:  Patient appears to be able to clearly communicate and understand verbal and written communication and follow directions correctly.  Treatment Explanation - The following has been discussed with the patient:   RX ordered/plan of care  Anticipated outcomes  Possible risks and side effects  This patient would benefit from PT intervention to resume normal activities.   Rehab potential is good.    Frequency:  2-3 X a month, once daily  Duration:  for 2 months  Discharge Plan:  Achieve all LTG.  Independent in home treatment program.  Reach maximal therapeutic benefit.    Please refer to the daily flowsheet for treatment today, total treatment time and time spent performing 1:1 timed codes.

## 2020-03-13 ENCOUNTER — ANCILLARY PROCEDURE (OUTPATIENT)
Dept: MAMMOGRAPHY | Facility: CLINIC | Age: 55
End: 2020-03-13
Payer: COMMERCIAL

## 2020-03-13 DIAGNOSIS — N63.0 LUMP OR MASS IN BREAST: ICD-10-CM

## 2020-03-23 ENCOUNTER — TELEPHONE (OUTPATIENT)
Dept: PHYSICAL THERAPY | Facility: CLINIC | Age: 55
End: 2020-03-23

## 2020-04-01 ENCOUNTER — VIRTUAL VISIT (OUTPATIENT)
Dept: PHYSICAL THERAPY | Facility: CLINIC | Age: 55
End: 2020-04-01
Payer: COMMERCIAL

## 2020-04-01 DIAGNOSIS — G89.29 CHRONIC PAIN OF LEFT KNEE: ICD-10-CM

## 2020-04-01 DIAGNOSIS — M25.562 CHRONIC PAIN OF LEFT KNEE: ICD-10-CM

## 2020-04-01 PROCEDURE — 97112 NEUROMUSCULAR REEDUCATION: CPT | Mod: GT | Performed by: PHYSICAL THERAPIST

## 2020-04-01 PROCEDURE — 97110 THERAPEUTIC EXERCISES: CPT | Mod: GT | Performed by: PHYSICAL THERAPIST

## 2020-04-01 NOTE — PROGRESS NOTES
"Physical Therapy Virtual Follow Up Visit      The patient has been notified of following:     \"This virtual visit will be conducted between you and your provider. We have found that certain health care needs can be provided without the need for physical presence.  This service lets us provide the care you need with a virtual visit.\"    Due to external, as well as internal [facility] management of the COVID-19 Virus, Tatiana Paredes was not seen in our clinic.  As a substitution, we implemented a virtual visit to manage this patient's condition utilizing the [employer] virtual visit platform via the patient s existing code.  The provider, Leydi Clark, reviewed the patient's chart, PTRx prescription, and spoke with the patient to determine the following telemedicine visit is appropriate and effective for the patient's care.    The following type of visit was completed:   Video Visit:  The [employer] platform uses a synchronous HIPAA compliant video stream for this patient encounter.        S: Tatiana Paredes is a 54 year old female. Connected virtually on the PTRx platform to discuss their condition/progress. They noted improvements in knee pain.  They noted ongoing pain or limitations with I am doing the exercises every day.  Pt does the exercise from a sitting position vs all of the way down.     Current pain level: 0/10    O: Patient demonstrated ability to complete lunges and progression of CKC LE strengthening with the band.       PTRx Content from today's visit:  Exercise Name: Education Sheet Knee, Notes: Cardio: 3-5x/week = goal   - Walking, biking   - Avoid deep squatting in am- trial leaning forward  Exercise Name: Bridging #2A Weight Shift, Sets: 2 - Reps: 10 each leg - Sessions: 1x/day   Exercise Name: Clamshell Feet together, Sets: 2 - Reps: 10-20 reps - Sessions: 1x/day   Exercise Name: Hip Abduction Straight Leg Raise, Sets: 2 - Reps: 10-20 reps  - Sessions: 1x/day   Exercise Name: Wall Sit, " Sets: 3 - Reps: 30-60 sec hold - Sessions: 3x/week   Exercise Name: Managing Knee Pain  Exercise Name: Side Stepping With Theraband, Sets: 3 - Reps: 1 min - Sessions: 3x/week    A:   Patient is progressing as expected.  Response to therapy has shown an improvement in  pain level and strength  Desire to get more activity as she is not walking or getting out of the home.     P: Patient will continue with the exercise program assigned on their PTRx code and will add the following measures to manage their pain/condition: will integrate CKC strengthening and walking to HEP     Current treatment program is being advanced to more complex exercises.      Virtual visit contact time    Time of service began: 1:00 PM  Time of service ended: 1:35 PM  Total Time for set up, visit, and documentation: 45 minutes    Payor: [employer] / Plan: [employer] FULLY INSURED / Product Type: HMO /   .  Procedure Code/s   Therapeutic Exercise (25090): 8 minutes  Neuromuscular Re-education (27982): 17 minutes    I have reviewed the note as documented above.  This accurately captures the substance of my conversation with the patient.  Provider location: [place] ([facility])  Patient location: home    ___________________________________________________    Any subjective info about the quality of the visit, ease of use: difficulty with camera-  and wife working together to make it coordinate  Patient's opinion about reasonable cost for this visit not inquired

## 2020-05-05 ENCOUNTER — TELEPHONE (OUTPATIENT)
Dept: OBGYN | Facility: CLINIC | Age: 55
End: 2020-05-05

## 2020-05-05 NOTE — TELEPHONE ENCOUNTER
Received call from Tatiana stating she has a small, inflamed, red, itchy area near vaginal opening that does not look like herpes and is not responding to valtrex treatment. She is wondering if there is something else she can use. Lesion has been present for 1 week.    Advised she come to clinic for evaluation and she agreed with plan.    Forwarded her to  to schedule.

## 2020-05-11 ENCOUNTER — OFFICE VISIT (OUTPATIENT)
Dept: OBGYN | Facility: CLINIC | Age: 55
End: 2020-05-11
Attending: OBSTETRICS & GYNECOLOGY
Payer: COMMERCIAL

## 2020-05-11 VITALS
WEIGHT: 156.5 LBS | HEART RATE: 88 BPM | HEIGHT: 68 IN | BODY MASS INDEX: 23.72 KG/M2 | SYSTOLIC BLOOD PRESSURE: 130 MMHG | DIASTOLIC BLOOD PRESSURE: 87 MMHG

## 2020-05-11 DIAGNOSIS — A60.9 HSV (HERPES SIMPLEX VIRUS) ANOGENITAL INFECTION: ICD-10-CM

## 2020-05-11 RX ORDER — VALACYCLOVIR HYDROCHLORIDE 500 MG/1
500 TABLET, FILM COATED ORAL 2 TIMES DAILY
Qty: 6 TABLET | Refills: 11 | Status: SHIPPED | OUTPATIENT
Start: 2020-05-11 | End: 2020-11-10

## 2020-05-11 ASSESSMENT — PAIN SCALES - GENERAL: PAINLEVEL: NO PAIN (0)

## 2020-05-11 ASSESSMENT — MIFFLIN-ST. JEOR: SCORE: 1358.94

## 2020-05-11 NOTE — PROGRESS NOTES
"55 yo perimenopausal woman comes in with c/o of 2 weeks of vulvar lesion, not responsive to Valtrex.  She notes it is actually improving in the last few days.    Past Medical History:   Diagnosis Date     Breast pain, left 11/2013     Choroidal nevus of right eye      Constipation      Dysphonia      Gastroesophageal reflux disease      Hemorrhoids      Hoarseness      Menorrhagia      Migraines    genital herpes    Past Surgical History:   Procedure Laterality Date     HC HYSTEROSCOPY, SURGICAL; W/ ENDOMETRIAL ABLATION, ANY METHOD  2011     Current Outpatient Medications   Medication     famotidine (PEPCID) 20 MG tablet     valACYclovir (VALTREX) 500 MG tablet     nitroFURantoin macrocrystal-monohydrate (MACROBID) 100 MG capsule     No current facility-administered medications for this visit.      PE:  /87   Pulse 88   Ht 1.72 m (5' 7.72\")   Wt 71 kg (156 lb 8 oz)   BMI 23.99 kg/m    Gen well appearing, masked  Vulvar:  EG shows 3 mm red papule on left labia minora with 1 mm white head. BUS nl  Expressed small amount of pus form lesion     A/P  vulvar closed comedone  Let lesion be    Do not recommend bacitracin or coconut oil.  Check lesion daily.  If becomes increasingly red then us e bacitracin.  Refill Valtrex.  Leila Peñaloza MD      "

## 2020-06-30 ENCOUNTER — TELEPHONE (OUTPATIENT)
Dept: PHYSICAL THERAPY | Facility: CLINIC | Age: 55
End: 2020-06-30

## 2020-09-16 ENCOUNTER — OFFICE VISIT (OUTPATIENT)
Dept: OPHTHALMOLOGY | Facility: CLINIC | Age: 55
End: 2020-09-16
Payer: COMMERCIAL

## 2020-09-16 DIAGNOSIS — H18.452 SALZMANN'S NODULAR DYSTROPHY OF LEFT EYE: ICD-10-CM

## 2020-09-16 DIAGNOSIS — D31.31 CHOROIDAL NEVUS OF RIGHT EYE: ICD-10-CM

## 2020-09-16 DIAGNOSIS — H52.13 MYOPIA OF BOTH EYES: Primary | ICD-10-CM

## 2020-09-16 DIAGNOSIS — G43.009 MIGRAINE WITHOUT AURA AND WITHOUT STATUS MIGRAINOSUS, NOT INTRACTABLE: ICD-10-CM

## 2020-09-16 ASSESSMENT — REFRACTION_WEARINGRX
OD_CYLINDER: SPHERE
OS_AXIS: 050
OS_CYLINDER: +0.25
OS_SPHERE: -2.00
OD_SPHERE: -2.00

## 2020-09-16 ASSESSMENT — EXTERNAL EXAM - LEFT EYE: OS_EXAM: NORMAL

## 2020-09-16 ASSESSMENT — SLIT LAMP EXAM - LIDS
COMMENTS: NORMAL
COMMENTS: NORMAL

## 2020-09-16 ASSESSMENT — TONOMETRY
OD_IOP_MMHG: 13
OS_IOP_MMHG: 10
IOP_METHOD: ICARE

## 2020-09-16 ASSESSMENT — REFRACTION_MANIFEST
OS_CYLINDER: SPHERE
OS_CYLINDER: +0.50
OD_CYLINDER: SPHERE
OD_ADD: +2.25
OS_ADD: +2.25
OS_SPHERE: -1.25
OD_CYLINDER: SPHERE
OD_SPHERE: -1.00
OS_SPHERE: -1.75
OD_SPHERE: -1.50
OS_AXIS: 045

## 2020-09-16 ASSESSMENT — CONF VISUAL FIELD
OD_NORMAL: 1
OS_NORMAL: 1

## 2020-09-16 ASSESSMENT — VISUAL ACUITY
OD_CC: 20/20
METHOD_MR_RETINOSCOPY: 1
OD_SC: 20/20-1
OS_SC: 20/20-1
METHOD: SNELLEN - LINEAR
OS_CC: 20/20

## 2020-09-16 ASSESSMENT — CUP TO DISC RATIO
OD_RATIO: 0.4
OS_RATIO: 0.4

## 2020-09-16 ASSESSMENT — EXTERNAL EXAM - RIGHT EYE: OD_EXAM: NORMAL

## 2020-09-16 NOTE — PROGRESS NOTES
History  HPI     Eye Exam For Headaches     In both eyes.  Associated symptoms include blurred vision.  Negative for eye pain.  Treatments tried include glasses.  Pain was noted as 0/10.              Comments     The patient reports increase in headaches recently. Has two pair of glasses, removes them to read.  Uses the computer without glasses.  Wears single vision glasses.  Current glasses are around 2 years old.    Denies redness, tearing, flashes, floaters.          Last edited by Gavin Austin, OD on 9/16/2020  7:45 AM. (History)          Assessment/Plan  (H52.13) Myopia of both eyes  (primary encounter diagnosis)  Comment: Myopia with presbyopia both eyes, previous difficulty with adaptation to bifocals  Plan: REFRACTION         Educated patient on condition and clinical findings. Dispensed spectacle prescription for distance only. Recommended OTC reading glasses as needed for near tasks. Patient prefers no correction when using the computer. Monitor annually.    (G43.009) Migraine without aura and without status migrainosus, not intractable  Comment: Not likely related to refractive error, lights may be a trigger (worse after watching TV)  Plan:  Recommended trying plano blue light filtering glasses when on the computer. Continue management with primary care provider if headaches persist.    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 Monitor annually.    Return to clinic in 1 year for comprehensive eye exam.    Complete documentation of historical and exam elements from today's encounter can  be found in the full encounter summary report (not reduplicated in this progress  note). I personally obtained the chief  complaint(s) and history of present illness. I  confirmed and edited as necessary the review of systems, past medical/surgical  history, family history, social history, and examination findings as documented by  others; and I examined the patient myself. I personally reviewed the relevant tests,  images, and reports as documented above. I formulated and edited as necessary the  assessment and plan and discussed the findings and management plan with the  patient and family.    Gavin Austin OD, FAAO

## 2020-10-09 ENCOUNTER — OFFICE VISIT (OUTPATIENT)
Dept: SURGERY | Facility: CLINIC | Age: 55
End: 2020-10-09
Payer: COMMERCIAL

## 2020-10-09 VITALS
HEIGHT: 67 IN | HEART RATE: 86 BPM | DIASTOLIC BLOOD PRESSURE: 84 MMHG | BODY MASS INDEX: 24.51 KG/M2 | SYSTOLIC BLOOD PRESSURE: 126 MMHG | OXYGEN SATURATION: 98 %

## 2020-10-09 DIAGNOSIS — K64.8 HEMORRHOID PROLAPSE: Primary | ICD-10-CM

## 2020-10-09 PROCEDURE — 46221 LIGATION OF HEMORRHOID(S): CPT | Performed by: NURSE PRACTITIONER

## 2020-10-09 ASSESSMENT — PAIN SCALES - GENERAL: PAINLEVEL: NO PAIN (0)

## 2020-10-09 NOTE — PROGRESS NOTES
"Colon and Rectal Surgery Follow-Up Clinic Note    RE: Tatiana Paredes  : 1965  GIUSEPEP: 10/9/2020    Tatiana Paredes is a very pleasant 54 year old female here for follow-up of hemorrhoids.    Interval history: I saw Tatiana in  with hemorrhoid banding for prolapsing hemorrhoids. She had a return of hemorrhoid prolapse and I saw her again in February of this year after a normal colonoscopy but hemorrhoids were not bothering her at that time so we deferred further banding. I recommended starting a daily fiber supplement for constipation. She did not find Metamucil helpful as it made her stools too hard despite drinking a lot of water. She has not tried Miralax. She takes prune juice and eats plums daily which she finds the most helpful. She continues to have hemorrhoids that come out with bowel movements and often need to be manually reduced.    Physical Examination: Exam was chaperoned by TURNER Fisher   /84 (BP Location: Left arm, Patient Position: Chair, Cuff Size: Adult Regular)   Pulse 86   Ht 5' 7\"   SpO2 98%   BMI 24.51 kg/m    General: alert, oriented, in no acute distress, sitting comfortably  HEENT: mucous membranes moist  Perianal external examination:  Perianal skin: Intact with no excoriation or lichenification.  Lesions: No evidence of an external lesion, nodularity, or induration in the perianal region.  Eversion of buttocks: There was not evidence of an anal fissure. Details: N/A.  Skin tags or external hemorrhoids: Yes: some very small mixed hemorrhoids in the anterior and right posterior positions. No bleeding. No thrombosis.    Digital rectal examination: Was performed.   Sphincter tone: Good.  Palpable lesions: No.  Other: None.  Bimanual examination: was not performed.    Anoscopy: Was performed.   Hemorrhoids: Yes. Grade 2-3 internal hemorrhoids without active bleeding  Lesions: No.    Assessment/Plan: 54 year old female with hemorrhoid prolapse. Discussed managing " again with hemorrhoid banding as she tolerated this well previously. Discussed the importance of long-term management of constipation.  Would like her to try daily MiraLAX to keep stool soft.  Discussed the risks of banding including bleeding today and when the band falls off in 1 to 2 weeks.  Asked her to remain off of blood thinners and no heavy lifting for 2 weeks following banding.  She states understanding of this and wished to proceed with banding today.  One band was placed in the right posterior and one in the anterior positions.  She tolerated this well.  We will have her return to clinic anytime after 4 weeks with any persistent symptoms. Patient's questions were answered to her stated satisfaction and she is in agreement with this plan.      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 Provider to review       Knee instability 06/05/2012     Priority: Medium       Medications:  Current Outpatient Medications   Medication  "Sig Dispense Refill     carboxymethylcellul-glycerin (REFRESH OPTIVE) 0.5-0.9 % SOLN ophthalmic solution Place 1 drop into both eyes 2 times daily 1 Bottle 3     famotidine (PEPCID) 20 MG tablet Take 20 mg by mouth nightly as needed       nitroFURantoin macrocrystal-monohydrate (MACROBID) 100 MG capsule        valACYclovir (VALTREX) 500 MG tablet Take 1 tablet (500 mg) by mouth 2 times daily for 3 days 6 tablet 11       Allergies:  No Known Allergies    Family history:  Family History   Problem Relation Age of Onset     Breast Cancer Sister 52     Cancer - colorectal Maternal Grandmother 70     Migraines Mother      Unknown/Adopted Mother      Stomach Problem Mother      Stomach Cancer Mother      Prostate Cancer Father      Cancer Father      Glaucoma No family hx of      Macular Degeneration No family hx of      Retinal detachment No family hx of      Amblyopia No family hx of      Strabismus No family hx of      Glasses (<9 y/o) No family hx of      Nystagmus No family hx of        Social history:  Social History     Tobacco Use     Smoking status: Never Smoker     Smokeless tobacco: Never Used   Substance Use Topics     Alcohol use: Yes     Alcohol/week: 0.8 - 4.2 standard drinks     Comment: 1-3 times a week     Marital status: .  Occupation: pharmacist.    Nursing Notes:   Ivan Sahu EMT  10/9/2020  8:10 AM  Signed  Chief Complaint   Patient presents with     RECHECK     hemorrhoid recheck       Vitals:    10/09/20 0808   BP: 126/84   BP Location: Left arm   Patient Position: Chair   Cuff Size: Adult Regular   Pulse: 86   SpO2: 98%   Height: 1.702 m (5' 7\")       Body mass index is 24.51 kg/m .    TURNER Fisher        Procedures:  After discussing the risks and benefits, the patient agreed to proceed with internal hemorrhoidal banding.    Prior to the start of the procedure and with procedural staff participation, I verbally confirmed the patient s identity using two indicators, relevant " allergies, that the procedure was appropriate and matched the consent or emergent situation, and that the correct equipment/implants were available. Immediately prior to starting the procedure I conducted the Time Out with the procedural staff and re-confirmed the patient s name, procedure, and site/side. (The Joint Commission universal protocol was followed.)  Yes    Sedation (Moderate or Deep): None    A suction hemorrhoidal  was used to place a total of 2 band(s) in the right posterior and anterior position(s).    There was no significant bleeding. The patient tolerated the procedure well.    This procedure was performed under a collaborative privileging agreement with Dr. Walker, Chief of Colon and Rectal Surgery.    Total face to face time was 15 minutes, >50% counseling, in addition to the procedure time.    Queta Finch, NP-C  Colon and Rectal Surgery  [facility]

## 2020-10-09 NOTE — LETTER
"10/9/2020       RE: Tatiana Paredes  [address]  [facility] [address]     Dear Colleague,    Thank you for referring your patient, Tatiana Paredes, to the [facility] COLON AND RECTAL SURGERY CLINIC [place] at [facility]. Please see a copy of my visit note below.    Colon and Rectal Surgery Follow-Up Clinic Note    RE: Tatiana Paredes  : 1965  GIUSEPPE: 10/9/2020    Tatiana Paredes is a very pleasant 54 year old female here for follow-up of hemorrhoids.    Interval history: I saw Tatiana in  with hemorrhoid banding for prolapsing hemorrhoids. She had a return of hemorrhoid prolapse and I saw her again in February of this year after a normal colonoscopy but hemorrhoids were not bothering her at that time so we deferred further banding. I recommended starting a daily fiber supplement for constipation. She did not find Metamucil helpful as it made her stools too hard despite drinking a lot of water. She has not tried Miralax. She takes prune juice and eats plums daily which she finds the most helpful. She continues to have hemorrhoids that come out with bowel movements and often need to be manually reduced.    Physical Examination: Exam was chaperoned by TURNER Fisher   /84 (BP Location: Left arm, Patient Position: Chair, Cuff Size: Adult Regular)   Pulse 86   Ht 5' 7\"   SpO2 98%   BMI 24.51 kg/m    General: alert, oriented, in no acute distress, sitting comfortably  HEENT: mucous membranes moist  Perianal external examination:  Perianal skin: Intact with no excoriation or lichenification.  Lesions: No evidence of an external lesion, nodularity, or induration in the perianal region.  Eversion of buttocks: There was not evidence of an anal fissure. Details: N/A.  Skin tags or external hemorrhoids: Yes: some very small mixed hemorrhoids in the anterior and right posterior positions. No bleeding. No thrombosis.    Digital " rectal examination: Was performed.   Sphincter tone: Good.  Palpable lesions: No.  Other: None.  Bimanual examination: was not performed.    Anoscopy: Was performed.   Hemorrhoids: Yes. Grade 2-3 internal hemorrhoids without active bleeding  Lesions: No.    Assessment/Plan: 54 year old female with hemorrhoid prolapse. Discussed managing again with hemorrhoid banding as she tolerated this well previously. Discussed the importance of long-term management of constipation.  Would like her to try daily MiraLAX to keep stool soft.  Discussed the risks of banding including bleeding today and when the band falls off in 1 to 2 weeks.  Asked her to remain off of blood thinners and no heavy lifting for 2 weeks following banding.  She states understanding of this and wished to proceed with banding today.  One band was placed in the right posterior and one in the anterior positions.  She tolerated this well.  We will have her return to clinic anytime after 4 weeks with any persistent symptoms. Patient's questions were answered to her stated satisfaction and she is in agreement with this plan.    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 Provider to review       Knee instability 06/05/2012     Priority: Medium     Medications:  Current Outpatient Medications   Medication Sig Dispense Refill     carboxymethylcellul-glycerin (REFRESH OPTIVE) 0.5-0.9 % SOLN ophthalmic solution Place 1 drop into both eyes 2 times daily 1 Bottle 3     famotidine (PEPCID) 20 MG tablet Take 20 mg by mouth nightly as needed       nitroFURantoin macrocrystal-monohydrate (MACROBID) 100 MG capsule        valACYclovir (VALTREX) 500 MG tablet Take 1 tablet (500 mg) by mouth 2 times daily for 3 days 6 tablet 11     Allergies:  No Known Allergies    Family history:  Family History   Problem Relation Age of Onset     Breast Cancer Sister 52     Cancer - colorectal Maternal Grandmother 70     Migraines Mother      Unknown/Adopted Mother      Stomach Problem Mother      Stomach Cancer Mother      Prostate Cancer Father      Cancer Father      Glaucoma No family hx of      Macular Degeneration No family hx of      Retinal detachment No family hx of      Amblyopia No family hx of      Strabismus No family hx of      Glasses (<9 y/o) No family hx of      Nystagmus No family hx of      Social history:  Social History     Tobacco Use     Smoking status: Never Smoker     Smokeless tobacco: Never Used   Substance Use Topics     Alcohol use: Yes     Alcohol/week: 0.8 - 4.2 standard drinks     Comment: 1-3 times a week     Marital status: .  Occupation: pharmacist.    Nursing Notes:   Ivan Sahu, EMT  10/9/2020  8:10 AM  Signed  Chief Complaint   Patient presents with     RECHECK     hemorrhoid recheck     Vitals:    10/09/20 0808   BP: 126/84   BP Location: Left arm   Patient Position: Chair   Cuff Size: Adult Regular   Pulse: 86   SpO2: 98%   Height: 1.702 m (5' 7\")     Body mass index " is 24.51 kg/m .    TURNER Fisher      Procedures:  After discussing the risks and benefits, the patient agreed to proceed with internal hemorrhoidal banding.    Prior to the start of the procedure and with procedural staff participation, I verbally confirmed the patient s identity using two indicators, relevant allergies, that the procedure was appropriate and matched the consent or emergent situation, and that the correct equipment/implants were available. Immediately prior to starting the procedure I conducted the Time Out with the procedural staff and re-confirmed the patient s name, procedure, and site/side. (The Joint Commission universal protocol was followed.)  Yes    Sedation (Moderate or Deep): None    A suction hemorrhoidal  was used to place a total of 2 band(s) in the right posterior and anterior position(s).    There was no significant bleeding. The patient tolerated the procedure well.    This procedure was performed under a collaborative privileging agreement with Dr. Walker, Chief of Colon and Rectal Surgery.    Total face to face time was 15 minutes, >50% counseling, in addition to the procedure time.    Again, thank you for allowing me to participate in the care of your patient.  Sincerely,    Queta Finch, NP-C  Colon and Rectal Surgery  [facility]

## 2020-10-09 NOTE — NURSING NOTE
"Chief Complaint   Patient presents with     RECHECK     hemorrhoid recheck       Vitals:    10/09/20 0808   BP: 126/84   BP Location: Left arm   Patient Position: Chair   Cuff Size: Adult Regular   Pulse: 86   SpO2: 98%   Height: 1.702 m (5' 7\")       Body mass index is 24.51 kg/m .    TURNER Fisher    "

## 2020-10-09 NOTE — LETTER
"10/9/2020       RE: Tatiana Paredes  [address]  [facility] [address]     Dear Colleague,    Thank you for referring your patient, Tatiana Paredes, to the [facility] COLON AND RECTAL SURGERY CLINIC [place] at [facility]. Please see a copy of my visit note below.    Colon and Rectal Surgery Follow-Up Clinic Note    RE: Tatiana Paredes  : 1965  GIUSEPPE: 10/9/2020    Tatiana Paredes is a very pleasant 54 year old female here for follow-up of hemorrhoids.    Interval history: I saw Tatiana in  with hemorrhoid banding for prolapsing hemorrhoids. She had a return of hemorrhoid prolapse and I saw her again in February of this year after a normal colonoscopy but hemorrhoids were not bothering her at that time so we deferred further banding. I recommended starting a daily fiber supplement for constipation. She did not find Metamucil helpful as it made her stools too hard despite drinking a lot of water. She has not tried Miralax. She takes prune juice and eats plums daily which she finds the most helpful. She continues to have hemorrhoids that come out with bowel movements and often need to be manually reduced.    Physical Examination: Exam was chaperoned by TURNER Fisher   /84 (BP Location: Left arm, Patient Position: Chair, Cuff Size: Adult Regular)   Pulse 86   Ht 5' 7\"   SpO2 98%   BMI 24.51 kg/m    General: alert, oriented, in no acute distress, sitting comfortably  HEENT: mucous membranes moist  Perianal external examination:  Perianal skin: Intact with no excoriation or lichenification.  Lesions: No evidence of an external lesion, nodularity, or induration in the perianal region.  Eversion of buttocks: There was not evidence of an anal fissure. Details: N/A.  Skin tags or external hemorrhoids: Yes: some very small mixed hemorrhoids in the anterior and right posterior positions. No bleeding. No thrombosis.    Digital " rectal examination: Was performed.   Sphincter tone: Good.  Palpable lesions: No.  Other: None.  Bimanual examination: was not performed.    Anoscopy: Was performed.   Hemorrhoids: Yes. Grade 2-3 internal hemorrhoids without active bleeding  Lesions: No.    Assessment/Plan: 54 year old female with hemorrhoid prolapse. Discussed managing again with hemorrhoid banding as she tolerated this well previously. Discussed the importance of long-term management of constipation.  Would like her to try daily MiraLAX to keep stool soft.  Discussed the risks of banding including bleeding today and when the band falls off in 1 to 2 weeks.  Asked her to remain off of blood thinners and no heavy lifting for 2 weeks following banding.  She states understanding of this and wished to proceed with banding today.  One band was placed in the right posterior and one in the anterior positions.  She tolerated this well.  We will have her return to clinic anytime after 4 weeks with any persistent symptoms. Patient's questions were answered to her stated satisfaction and she is in agreement with this plan.      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 Provider to review       Knee instability 06/05/2012     Priority: Medium       Medications:  Current Outpatient Medications   Medication Sig Dispense Refill     carboxymethylcellul-glycerin (REFRESH OPTIVE) 0.5-0.9 % SOLN ophthalmic solution Place 1 drop into both eyes 2 times daily 1 Bottle 3     famotidine (PEPCID) 20 MG tablet Take 20 mg by mouth nightly as needed       nitroFURantoin macrocrystal-monohydrate (MACROBID) 100 MG capsule        valACYclovir (VALTREX) 500 MG tablet Take 1 tablet (500 mg) by mouth 2 times daily for 3 days 6 tablet 11       Allergies:  No Known Allergies    Family history:  Family History   Problem Relation Age of Onset     Breast Cancer Sister 52     Cancer - colorectal Maternal Grandmother 70     Migraines Mother      Unknown/Adopted Mother      Stomach Problem Mother      Stomach Cancer Mother      Prostate Cancer Father      Cancer Father      Glaucoma No family hx of      Macular Degeneration No family hx of      Retinal detachment No family hx of      Amblyopia No family hx of      Strabismus No family hx of      Glasses (<9 y/o) No family hx of      Nystagmus No family hx of        Social history:  Social History     Tobacco Use     Smoking status: Never Smoker     Smokeless tobacco: Never Used   Substance Use Topics     Alcohol use: Yes     Alcohol/week: 0.8 - 4.2 standard drinks     Comment: 1-3 times a week     Marital status: .  Occupation: pharmacist.    Nursing Notes:   Ivan Sahu EMT  10/9/2020  8:10 AM  Signed  Chief Complaint   Patient presents with     RECHECK     hemorrhoid recheck       Vitals:    10/09/20 0808   BP: 126/84   BP Location: Left arm   Patient Position: Chair   Cuff Size: Adult Regular   Pulse: 86   SpO2: 98%   Height: 1.702 m (5' 7\") "       Body mass index is 24.51 kg/m .    TURNER Fisher        Procedures:  After discussing the risks and benefits, the patient agreed to proceed with internal hemorrhoidal banding.    Prior to the start of the procedure and with procedural staff participation, I verbally confirmed the patient s identity using two indicators, relevant allergies, that the procedure was appropriate and matched the consent or emergent situation, and that the correct equipment/implants were available. Immediately prior to starting the procedure I conducted the Time Out with the procedural staff and re-confirmed the patient s name, procedure, and site/side. (The Joint Commission universal protocol was followed.)  Yes    Sedation (Moderate or Deep): None    A suction hemorrhoidal  was used to place a total of 2 band(s) in the right posterior and anterior position(s).    There was no significant bleeding. The patient tolerated the procedure well.    This procedure was performed under a collaborative privileging agreement with Dr. Walker, Chief of Colon and Rectal Surgery.    Total face to face time was 15 minutes, >50% counseling, in addition to the procedure time.    Queta Finch NP-C  Colon and Rectal Surgery  [facility]        Again, thank you for allowing me to participate in the care of your patient.      Sincerely,    SERA Trotter CNP

## 2020-11-09 NOTE — PROGRESS NOTES
HPI:    Pt. Comes in for follow up today. She still has a hoarse voice. She has some B joint hand pain. She does not have a family history of arthritis. She is working from home. No other HEENT, cardiopulmonary, abdominal, , GYN, neurological, systemic, psychiatric, lymphatic, endocrine, vascular complaints.       Past Medical History:   Diagnosis Date     Breast pain, left 11/2013     Choroidal nevus of right eye      Constipation      Dysphonia      Gastroesophageal reflux disease      Genital herpes      Hemorrhoids      Hoarseness      Menorrhagia      Migraines      Past Surgical History:   Procedure Laterality Date     HC HYSTEROSCOPY, SURGICAL; W/ ENDOMETRIAL ABLATION, ANY METHOD  2011     PE:    Vitals noted, gen, nad, cooperative, alert, she is wearing a mask, neck supple nl rom, lungs with good air movement, RRR, S1, S2, no MRG, abdomen, no acute findings, B hands/wrists w/o tenderness, no appreciable joint swelling or erythema.       A/P:    1. She continues to follow with Dr. Pisano (10/22/2019), ENT for voice/throat issues and has an appt 12/8/2020. She remains on famotidine; EGD 11/17/2017  2. PRN Valtrex for h/o  herpes    3. She was seen in C/R surgery 10/9/2020 for hemorrhoids  4. Immunizations; she has completed the Shingrix vaccination series, Influenza vaccination today 11/20/2020  5. Mammogram 3/13/2020  6. Colonoscopy 2/6/2020 and repeat in 5-10 years   7. She was seen in GYN clinic 5/11/2020  8. Fasting lipids done 1/14/2020 with   9. Ordered plain B hand X-rays and labs for hand complaints.     Total time spent 25 minutes.  More than 50% of the time spent with Ms. Paredes on counseling / coordinating her care

## 2020-11-10 ENCOUNTER — OFFICE VISIT (OUTPATIENT)
Dept: INTERNAL MEDICINE | Facility: CLINIC | Age: 55
End: 2020-11-10
Payer: COMMERCIAL

## 2020-11-10 VITALS
TEMPERATURE: 98.2 F | BODY MASS INDEX: 24.74 KG/M2 | HEIGHT: 67 IN | HEART RATE: 76 BPM | WEIGHT: 157.6 LBS | OXYGEN SATURATION: 99 % | RESPIRATION RATE: 16 BRPM | DIASTOLIC BLOOD PRESSURE: 85 MMHG | SYSTOLIC BLOOD PRESSURE: 145 MMHG

## 2020-11-10 DIAGNOSIS — A60.9 HSV (HERPES SIMPLEX VIRUS) ANOGENITAL INFECTION: ICD-10-CM

## 2020-11-10 DIAGNOSIS — M79.602 PAIN OF LEFT UPPER EXTREMITY: ICD-10-CM

## 2020-11-10 DIAGNOSIS — Z13.220 SCREENING CHOLESTEROL LEVEL: Primary | ICD-10-CM

## 2020-11-10 PROCEDURE — 90471 IMMUNIZATION ADMIN: CPT | Performed by: INTERNAL MEDICINE

## 2020-11-10 PROCEDURE — 99214 OFFICE O/P EST MOD 30 MIN: CPT | Mod: 25 | Performed by: INTERNAL MEDICINE

## 2020-11-10 PROCEDURE — 90686 IIV4 VACC NO PRSV 0.5 ML IM: CPT | Performed by: INTERNAL MEDICINE

## 2020-11-10 RX ORDER — VALACYCLOVIR HYDROCHLORIDE 500 MG/1
500 TABLET, FILM COATED ORAL 2 TIMES DAILY
Qty: 28 TABLET | Refills: 11 | Status: SHIPPED | OUTPATIENT
Start: 2020-11-10 | End: 2022-04-21

## 2020-11-10 ASSESSMENT — PAIN SCALES - GENERAL: PAINLEVEL: NO PAIN (0)

## 2020-11-10 ASSESSMENT — MIFFLIN-ST. JEOR: SCORE: 1347.5

## 2020-11-10 NOTE — PATIENT INSTRUCTIONS
[facility] Center Medication Refill Request Information:  * Please contact your pharmacy regarding ANY request for medication refills.  ** [facility] Prescription Fax = [phone]  * Please allow 3 business days for routine medication refills.  * Please allow 5 business days for controlled substance medication refills.     [facility] Test Result notification information:  *You will be notified with in 7-10 days of your appointment day regarding the results of your test.  If you are on MyChart you will be notified as soon as the provider has reviewed the results and signed off on them.    [facility]: [phone]     [facility] Lab: [phone]    Radiology (Imaging Center) [phone] ([facility], 1st Floor S)   Radiology (Main [facility]) [phone] (2nd Floor [facility])

## 2020-11-10 NOTE — NURSING NOTE
Chief Complaint   Patient presents with     Physical     Patient comes in for a physical exam.          James Austin MA on 11/10/2020 at 4:26 PM

## 2020-12-30 PROBLEM — M25.562 LEFT KNEE PAIN: Status: RESOLVED | Noted: 2020-03-10 | Resolved: 2020-12-30

## 2020-12-30 NOTE — TELEPHONE ENCOUNTER
Pt did not return for further appointments, to be discharged from skilled physical therapy at this time with continued HEP compliance and return to MD as needed.

## 2021-01-25 ENCOUNTER — TELEPHONE (OUTPATIENT)
Dept: OTOLARYNGOLOGY | Facility: CLINIC | Age: 56
End: 2021-01-25

## 2021-01-25 NOTE — TELEPHONE ENCOUNTER
Spoke with patient to discuss cancelled appointment tomorrow with Dr. Pisano. Patient would like to hold off due to covid-19. Patient will call back to reschedule when things settle down a bit.    Elvira Clark RN on 1/25/2021 at 9:01 AM

## 2021-04-05 ENCOUNTER — TELEPHONE (OUTPATIENT)
Dept: OBGYN | Facility: CLINIC | Age: 56
End: 2021-04-05

## 2021-04-05 NOTE — TELEPHONE ENCOUNTER
----- Message from Natali Aguilar sent at 4/5/2021 10:20 AM CDT -----  Regarding: possible UTI  Hello!  Tatiana called to request an appointment for a possible UTI. She is scheduled for tomorrow, 4/6, at 8am with Darlin Villar being sent to nurses as directed by the guidelines.    Thank you!  Natali ANTHONY    Please DO NOT send this message and/or reply back to sender. Call Center Representatives DO NOT respond to messages.

## 2021-04-05 NOTE — TELEPHONE ENCOUNTER
States that she has not had a UTI in a long time, generally uses macrobid after intercourse.  10 days ago started having itching/burning with urination.  She looked up treatment dose of macrobid, and started taking BID x 5 days.  Symptoms resolved, but started again after a couple of days.  She resumed the macrobid, but ran out.  Last dose was 4/4 evening.  She has no symptoms now.      Will plan to keep appointment tomorrow as scheduled.  Will need new prn rx and can determine if further treatment is needed

## 2021-04-05 NOTE — PROGRESS NOTES
Tatiana Paredes 55 year old post-menopausal female  here to discuss UTI symptoms.     Subjective:   Pt has a hx of UTIs after intercourse; currently has a prescription for post-coital Macrobid, which she has been using for the last ~10 yrs. . Last UTI was >10 yrs ago.  Tatiana noticed symptoms of vaginal itching, burning with urination, and mid lower pelvic pain starting on . She denies having intercourse prior. She looked up treatment for UTI with Macrobid and took Macrobid BID x 5 days starting -. She stopped the Macrobid at that time, symptoms had resolved, but then symptoms started again 1 day later and she restarted the Macrobid  in the evening through  as she ran out. At this time she denies burning with urination, pelvic pain, cloudy urine, hematuria, fevers/chills, flank pain, vaginal discharge/burning/dryness/odor. Reports + mild vaginal itching itching.     Menstrual history: Uterine ablation , denies vaginal bleeding/spotting for >12 months, menopausal.     Sexual history: sexually active, male partner, no new partners, STI history: denies, denies concerns for STIs. [place]: mild discomfort, increases foreplay, does not use lubricants, denies bleeding/spotting after intercourse. Pap history: 2017: NIL, negative HPV, no history of abnormal Paps. Next due: 2022.     ROS: Denies fevers/chills, fatigue, malaise, abdominal pain, pelvic pain, dyspareunia, vaginal discharge, dysuria, constipation or diarrhea.     Past Medical History:   Diagnosis Date     Breast pain, left 2013     Choroidal nevus of right eye      Constipation      Dysphonia      Gastroesophageal reflux disease      Genital herpes      Hemorrhoids      Hoarseness      Menorrhagia      Migraines      Past Surgical History:   Procedure Laterality Date     HC HYSTEROSCOPY, SURGICAL; W/ ENDOMETRIAL ABLATION, ANY METHOD        No Known Allergies    Current Outpatient  "Medications   Medication     famotidine (PEPCID) 20 MG tablet     carboxymethylcellul-glycerin (REFRESH OPTIVE) 0.5-0.9 % SOLN ophthalmic solution     nitroFURantoin macrocrystal-monohydrate (MACROBID) 100 MG capsule     valACYclovir (VALTREX) 500 MG tablet     No current facility-administered medications for this visit.      Objective:  /83 (BP Location: Left arm, Patient Position: Chair)   Pulse 84   Ht 1.702 m (5' 7\")   Wt 70.7 kg (155 lb 12.8 oz)   BMI 24.40 kg/m     Exam:  Constitutional: healthy, alert and no distress  Respiratory: no SOB, able to speak in full sentences  Gastrointestinal: Abdomen soft, non-tender. No masses. No CVA tenderness   : Normal external genitalia without lesions  Urethra: no lesions, supported  Vagina: atrophic, no lesions, erythema, odor, or abnormal discharge  Cervix: no lesions, discharge, pale  Musculoskeletal: extremities normal- no gross deformities noted, gait normal and normal muscle tone  Neurologic: Gait normal.   Psychiatric: mentation appears normal and affect normal/bright    Wet Prep: negative for yeast, BV, Trich & amine odor; pH 6.1    UA RESULTS:  Recent Labs   Lab Test 04/06/21  0818 01/29/15  0835   COLOR Yellow Yellow   APPEARANCE Clear Cloudy   URINEGLC Negative Negative   URINEBILI Negative Negative   URINEKETONE Negative Negative   SG <1.005 1.024   UBLD Trace* Negative   URINEPH 5.5 6.0   PROTEIN Negative 10*   UROBILINOGEN 0.2  --    NITRITE Negative Negative   LEUKEST Trace* Negative   RBCU  --  0   WBCU  --  0       Assessment:   Encounter Diagnoses   Name Primary?     Vaginal itching      Dysuria Yes     Plan:   Dysuria: Urine culture with sensitivities ordered; will call pt with results and treat infection if present, appropriately, given recent antibiotic use and that she currently denies urinary symptoms.   We discussed plan to wait until UC results return before refilling post-coital Macrobid to verify sensitivities and decrease risk of " resistance. If she experiences fevers and/or flank pain with return of urinary symptoms in the meantime, advised to go to urgent care or ED.  Advised to refrain from intercourse until UC returns to lower risk of recurrent UTI.      Vaginal itching: We discussed possibility of beginning yeast infection secondary to antibiotic use, although wet prep was negative today. Offered Diflucan 150mg one time and patient desires this.    Follow up with UC results, and as needed.   Pt expressed understanding and agreement with the plan for care.    ALEJANDRA AbrahamN, RN, DNP student, [facility] specialty, with the consent of the patient and in collaboration with Amparo Schofield CNP performed the initial HPI, ROS, physical exam and acted as scribe for the remainder of the visit    I agree with the PFSH and ROS as completed by the NP Student, except for changes made by me.  The remainder of the encounter was performed by me and scribed by the NP Student.  The scribed note accurately reflects my personal services and decisions made by me.  Amparo Schofield DNP, APRN, MAYC    .

## 2021-04-06 ENCOUNTER — OFFICE VISIT (OUTPATIENT)
Dept: OBGYN | Facility: CLINIC | Age: 56
End: 2021-04-06
Attending: NURSE PRACTITIONER
Payer: COMMERCIAL

## 2021-04-06 VITALS
WEIGHT: 155.8 LBS | BODY MASS INDEX: 24.45 KG/M2 | HEART RATE: 84 BPM | HEIGHT: 67 IN | DIASTOLIC BLOOD PRESSURE: 83 MMHG | SYSTOLIC BLOOD PRESSURE: 129 MMHG

## 2021-04-06 DIAGNOSIS — N89.8 VAGINAL ITCHING: ICD-10-CM

## 2021-04-06 DIAGNOSIS — R30.0 DYSURIA: Primary | ICD-10-CM

## 2021-04-06 DIAGNOSIS — N39.0 RECURRENT UTI: ICD-10-CM

## 2021-04-06 LAB
ALBUMIN UR-MCNC: NEGATIVE MG/DL
APPEARANCE UR: CLEAR
BILIRUB UR QL STRIP: NEGATIVE
CLUE CELLS: NEGATIVE
COLOR UR AUTO: YELLOW
GLUCOSE UR STRIP-MCNC: NEGATIVE MG/DL
HGB UR QL STRIP: ABNORMAL
KETONES UR STRIP-MCNC: NEGATIVE MG/DL
LEUKOCYTE ESTERASE UR QL STRIP: ABNORMAL
NITRATE UR QL: NEGATIVE
PH UR STRIP: 5.5 PH (ref 5–7)
SP GR UR STRIP: <1.005 (ref 1–1.03)
TRICHOMONAS (WET PREP): NEGATIVE
UROBILINOGEN UR STRIP-ACNC: 0.2 EU/DL (ref 0.2–1)
YEAST (WET PREP): NEGATIVE

## 2021-04-06 PROCEDURE — G0463 HOSPITAL OUTPT CLINIC VISIT: HCPCS

## 2021-04-06 PROCEDURE — 81003 URINALYSIS AUTO W/O SCOPE: CPT

## 2021-04-06 PROCEDURE — 87086 URINE CULTURE/COLONY COUNT: CPT | Performed by: NURSE PRACTITIONER

## 2021-04-06 PROCEDURE — 99214 OFFICE O/P EST MOD 30 MIN: CPT | Performed by: NURSE PRACTITIONER

## 2021-04-06 PROCEDURE — 87210 SMEAR WET MOUNT SALINE/INK: CPT | Performed by: NURSE PRACTITIONER

## 2021-04-06 RX ORDER — FLUCONAZOLE 150 MG/1
150 TABLET ORAL ONCE
Qty: 1 TABLET | Refills: 0 | Status: SHIPPED | OUTPATIENT
Start: 2021-04-06 | End: 2021-04-06

## 2021-04-06 ASSESSMENT — ANXIETY QUESTIONNAIRES
5. BEING SO RESTLESS THAT IT IS HARD TO SIT STILL: NOT AT ALL
1. FEELING NERVOUS, ANXIOUS, OR ON EDGE: NOT AT ALL
2. NOT BEING ABLE TO STOP OR CONTROL WORRYING: NOT AT ALL
GAD7 TOTAL SCORE: 0
6. BECOMING EASILY ANNOYED OR IRRITABLE: NOT AT ALL
7. FEELING AFRAID AS IF SOMETHING AWFUL MIGHT HAPPEN: NOT AT ALL
3. WORRYING TOO MUCH ABOUT DIFFERENT THINGS: NOT AT ALL

## 2021-04-06 ASSESSMENT — PATIENT HEALTH QUESTIONNAIRE - PHQ9: 5. POOR APPETITE OR OVEREATING: NOT AT ALL

## 2021-04-06 ASSESSMENT — MIFFLIN-ST. JEOR: SCORE: 1339.33

## 2021-04-06 NOTE — LETTER
2021       RE: Tatiana Paredes  [address]  [facility] [address]     Dear Colleague,    Thank you for referring your patient, Tatiana Paredes, to the [facility] [place] at [facility]. Please see a copy of my visit note below.    Tatiana Paredes 55 year old post-menopausal female  here to discuss UTI symptoms.     Subjective:   Pt has a hx of UTIs after intercourse; currently has a prescription for post-coital Macrobid, which she has been using for the last ~10 yrs. . Last UTI was >10 yrs ago.  Tatiana noticed symptoms of vaginal itching, burning with urination, and mid lower pelvic pain starting on . She denies having intercourse prior. She looked up treatment for UTI with Macrobid and took Macrobid BID x 5 days starting -. She stopped the Macrobid at that time, symptoms had resolved, but then symptoms started again 1 day later and she restarted the Macrobid  in the evening through  as she ran out. At this time she denies burning with urination, pelvic pain, cloudy urine, hematuria, fevers/chills, flank pain, vaginal discharge/burning/dryness/odor. Reports + mild vaginal itching itching.     Menstrual history: Uterine ablation , denies vaginal bleeding/spotting for >12 months, menopausal.     Sexual history: sexually active, male partner, no new partners, STI history: denies, denies concerns for STIs. [place]: mild discomfort, increases foreplay, does not use lubricants, denies bleeding/spotting after intercourse. Pap history: 2017: NIL, negative HPV, no history of abnormal Paps. Next due: 2022.     ROS: Denies fevers/chills, fatigue, malaise, abdominal pain, pelvic pain, dyspareunia, vaginal discharge, dysuria, constipation or diarrhea.     Past Medical History:   Diagnosis Date     Breast pain, left 2013     Choroidal nevus of right eye       "Constipation      Dysphonia      Gastroesophageal reflux disease      Genital herpes      Hemorrhoids      Hoarseness      Menorrhagia      Migraines      Past Surgical History:   Procedure Laterality Date     HC HYSTEROSCOPY, SURGICAL; W/ ENDOMETRIAL ABLATION, ANY METHOD  2011      No Known Allergies    Current Outpatient Medications   Medication     famotidine (PEPCID) 20 MG tablet     carboxymethylcellul-glycerin (REFRESH OPTIVE) 0.5-0.9 % SOLN ophthalmic solution     nitroFURantoin macrocrystal-monohydrate (MACROBID) 100 MG capsule     valACYclovir (VALTREX) 500 MG tablet     No current facility-administered medications for this visit.      Objective:  /83 (BP Location: Left arm, Patient Position: Chair)   Pulse 84   Ht 1.702 m (5' 7\")   Wt 70.7 kg (155 lb 12.8 oz)   BMI 24.40 kg/m     Exam:  Constitutional: healthy, alert and no distress  Respiratory: no SOB, able to speak in full sentences  Gastrointestinal: Abdomen soft, non-tender. No masses. No CVA tenderness   : Normal external genitalia without lesions  Urethra: no lesions, supported  Vagina: atrophic, no lesions, erythema, odor, or abnormal discharge  Cervix: no lesions, discharge, pale  Musculoskeletal: extremities normal- no gross deformities noted, gait normal and normal muscle tone  Neurologic: Gait normal.   Psychiatric: mentation appears normal and affect normal/bright    Wet Prep: negative for yeast, BV, Trich & amine odor; pH 6.1    UA RESULTS:  Recent Labs   Lab Test 04/06/21  0818 01/29/15  0835   COLOR Yellow Yellow   APPEARANCE Clear Cloudy   URINEGLC Negative Negative   URINEBILI Negative Negative   URINEKETONE Negative Negative   SG <1.005 1.024   UBLD Trace* Negative   URINEPH 5.5 6.0   PROTEIN Negative 10*   UROBILINOGEN 0.2  --    NITRITE Negative Negative   LEUKEST Trace* Negative   RBCU  --  0   WBCU  --  0       Assessment:   Encounter Diagnoses   Name Primary?     Vaginal itching      Dysuria Yes     Plan:   Dysuria: " Urine culture with sensitivities ordered; will call pt with results and treat infection if present, appropriately, given recent antibiotic use and that she currently denies urinary symptoms.   We discussed plan to wait until UC results return before refilling post-coital Macrobid to verify sensitivities and decrease risk of resistance. If she experiences fevers and/or flank pain with return of urinary symptoms in the meantime, advised to go to urgent care or ED.  Advised to refrain from intercourse until UC returns to lower risk of recurrent UTI.      Vaginal itching: We discussed possibility of beginning yeast infection secondary to antibiotic use, although wet prep was negative today. Offered Diflucan 150mg one time and patient desires this.    Follow up with UC results, and as needed.   Pt expressed understanding and agreement with the plan for care.    Inessa Dash, ALEJANDRAN, RN, DNP student, [facility] specialty, with the consent of the patient and in collaboration with Amparo Schofield CNP performed the initial HPI, ROS, physical exam and acted as scribe for the remainder of the visit    I agree with the PFSH and ROS as completed by the NP Student, except for changes made by me.  The remainder of the encounter was performed by me and scribed by the NP Student.  The scribed note accurately reflects my personal services and decisions made by me.  Amparo Schofield, ADRIANO, APRN, MACY    .

## 2021-04-07 LAB
BACTERIA SPEC CULT: NO GROWTH
Lab: NORMAL
SPECIMEN SOURCE: NORMAL

## 2021-04-07 ASSESSMENT — ANXIETY QUESTIONNAIRES: GAD7 TOTAL SCORE: 0

## 2021-04-09 ENCOUNTER — TELEPHONE (OUTPATIENT)
Dept: OBGYN | Facility: CLINIC | Age: 56
End: 2021-04-09

## 2021-04-09 NOTE — TELEPHONE ENCOUNTER
Advised that UC is negative.  She had onset of pain this AM, and thinks a small amount of blood in urine.  The pain lasted about 2 hours, then disappeared.    She is wondering what this could be    She is asking for refill of macrobid after intercourse.

## 2021-04-12 ENCOUNTER — NURSE TRIAGE (OUTPATIENT)
Dept: NURSING | Facility: CLINIC | Age: 56
End: 2021-04-12

## 2021-04-12 NOTE — TELEPHONE ENCOUNTER
"Has been advised on symptoms to observe for, if develops will go to ED rather than waiting for appointment 4/13/21 with Dr Gamez    Additional Information    Negative: Shock suspected (e.g., cold/pale/clammy skin, too weak to stand, low BP, rapid pulse)    Negative: Sounds like a life-threatening emergency to the triager    Negative: Unable to urinate (or only a few drops) and bladder feels very full    Negative: Vomiting    Negative: Patient sounds very sick or weak to the triager    Negative: SEVERE pain with urination    Negative: Fever > 100.5 F (38.1 C)    Negative: Side (flank) or lower back pain present    All other females with painful urination, or patient wants to be seen    Answer Assessment - Initial Assessment Questions  1. SEVERITY: \"How bad is the pain?\"  (e.g., Scale 1-10; mild, moderate, or severe)    - MILD (1-3): complains slightly about urination hurting    - MODERATE (4-7): interferes with normal activities      - SEVERE (8-10): excruciating, unwilling or unable to urinate because of the pain       3-4/10  2. FREQUENCY: \"How many times have you had painful urination today?\"       5-6 times  3. PATTERN: \"Is pain present every time you urinate or just sometimes?\"       Each time  4. ONSET: \"When did the painful urination start?\"       2 weeks ago  5. FEVER: \"Do you have a fever?\" If so, ask: \"What is your temperature, how was it measured, and when did it start?\"      denies  6. PAST UTI: \"Have you had a urine infection before?\" If so, ask: \"When was the last time?\" and \"What happened that time?\"       Has history or UTI's last week saw OB/GYN, vaginal swab negative, UA/UC negative.  Advised by OB/GYN to see primary provider for further work up  7. CAUSE: \"What do you think is causing the painful urination?\"  (e.g., UTI, scratch, Herpes sore)      Doesn't know  8. OTHER SYMPTOMS: \"Do you have any other symptoms?\" (e.g., flank pain, vaginal discharge, genital sores, urgency, blood in urine)      " "Has pain at end of urination and pelvic pain.  Denies fever, chilling, urgency, frequency (other than from drinking a lot of fluids).  Has blood on toilet paper when wipes, denies seeing blood in urine.  This lasted this morning for about 3 hours.  Does not currently have pelvic pain or blood on toilet paper.  Denies flank pain  9. PREGNANCY: \"Is there any chance you are pregnant?\" \"When was your last menstrual period?\"      No chance of being pregnant    Protocols used: URINATION PAIN - FEMALE-A-[place]      "

## 2021-04-13 ENCOUNTER — TELEPHONE (OUTPATIENT)
Dept: INTERNAL MEDICINE | Facility: CLINIC | Age: 56
End: 2021-04-13

## 2021-04-13 ENCOUNTER — OFFICE VISIT (OUTPATIENT)
Dept: INTERNAL MEDICINE | Facility: CLINIC | Age: 56
End: 2021-04-13
Payer: COMMERCIAL

## 2021-04-13 VITALS
TEMPERATURE: 98.2 F | HEART RATE: 91 BPM | SYSTOLIC BLOOD PRESSURE: 133 MMHG | WEIGHT: 153.8 LBS | BODY MASS INDEX: 24.14 KG/M2 | HEIGHT: 67 IN | DIASTOLIC BLOOD PRESSURE: 74 MMHG

## 2021-04-13 DIAGNOSIS — R31.0 GROSS HEMATURIA: ICD-10-CM

## 2021-04-13 DIAGNOSIS — R31.0 GROSS HEMATURIA: Primary | ICD-10-CM

## 2021-04-13 DIAGNOSIS — N39.0 FREQUENT UTI: ICD-10-CM

## 2021-04-13 DIAGNOSIS — A60.9 HSV (HERPES SIMPLEX VIRUS) ANOGENITAL INFECTION: ICD-10-CM

## 2021-04-13 DIAGNOSIS — Z13.220 SCREENING CHOLESTEROL LEVEL: ICD-10-CM

## 2021-04-13 DIAGNOSIS — R63.5 WEIGHT GAIN: ICD-10-CM

## 2021-04-13 DIAGNOSIS — M79.602 PAIN OF LEFT UPPER EXTREMITY: ICD-10-CM

## 2021-04-13 LAB
ALBUMIN SERPL-MCNC: 4.4 G/DL (ref 3.4–5)
ALBUMIN UR-MCNC: NEGATIVE MG/DL
ALP SERPL-CCNC: 95 U/L (ref 40–150)
ALT SERPL W P-5'-P-CCNC: 20 U/L (ref 0–50)
ANION GAP SERPL CALCULATED.3IONS-SCNC: 7 MMOL/L (ref 3–14)
APPEARANCE UR: CLEAR
AST SERPL W P-5'-P-CCNC: 14 U/L (ref 0–45)
BACTERIA #/AREA URNS HPF: ABNORMAL /HPF
BILIRUB SERPL-MCNC: 0.4 MG/DL (ref 0.2–1.3)
BILIRUB UR QL STRIP: NEGATIVE
BUN SERPL-MCNC: 8 MG/DL (ref 7–30)
CALCIUM SERPL-MCNC: 9.9 MG/DL (ref 8.5–10.1)
CHLORIDE SERPL-SCNC: 107 MMOL/L (ref 94–109)
CHOLEST SERPL-MCNC: 216 MG/DL
CO2 SERPL-SCNC: 26 MMOL/L (ref 20–32)
COLOR UR AUTO: ABNORMAL
CREAT SERPL-MCNC: 0.67 MG/DL (ref 0.52–1.04)
CRP SERPL-MCNC: <2.9 MG/L (ref 0–8)
ERYTHROCYTE [SEDIMENTATION RATE] IN BLOOD BY WESTERGREN METHOD: 14 MM/H (ref 0–30)
GFR SERPL CREATININE-BSD FRML MDRD: >90 ML/MIN/{1.73_M2}
GLUCOSE SERPL-MCNC: 96 MG/DL (ref 70–99)
GLUCOSE UR STRIP-MCNC: NEGATIVE MG/DL
HCG UR QL: NEGATIVE
HDLC SERPL-MCNC: 65 MG/DL
HGB UR QL STRIP: ABNORMAL
KETONES UR STRIP-MCNC: NEGATIVE MG/DL
LDLC SERPL CALC-MCNC: 124 MG/DL
LEUKOCYTE ESTERASE UR QL STRIP: ABNORMAL
MUCOUS THREADS #/AREA URNS LPF: PRESENT /LPF
NITRATE UR QL: NEGATIVE
NONHDLC SERPL-MCNC: 152 MG/DL
PH UR STRIP: 6 PH (ref 5–7)
POTASSIUM SERPL-SCNC: 3.9 MMOL/L (ref 3.4–5.3)
PROT SERPL-MCNC: 8.1 G/DL (ref 6.8–8.8)
RBC #/AREA URNS AUTO: 4 /HPF (ref 0–2)
SODIUM SERPL-SCNC: 141 MMOL/L (ref 133–144)
SOURCE: ABNORMAL
SP GR UR STRIP: 1.01 (ref 1–1.03)
SQUAMOUS #/AREA URNS AUTO: <1 /HPF (ref 0–1)
TRIGL SERPL-MCNC: 138 MG/DL
TSH SERPL DL<=0.005 MIU/L-ACNC: 1.42 MU/L (ref 0.4–4)
URATE SERPL-MCNC: 3.8 MG/DL (ref 2.6–6)
UROBILINOGEN UR STRIP-MCNC: 0 MG/DL (ref 0–2)
WBC #/AREA URNS AUTO: 61 /HPF (ref 0–5)
WBC CLUMPS #/AREA URNS HPF: PRESENT /HPF

## 2021-04-13 PROCEDURE — 81025 URINE PREGNANCY TEST: CPT | Performed by: PATHOLOGY

## 2021-04-13 PROCEDURE — 87186 SC STD MICRODIL/AGAR DIL: CPT | Mod: 90 | Performed by: PATHOLOGY

## 2021-04-13 PROCEDURE — 80053 COMPREHEN METABOLIC PANEL: CPT | Performed by: PATHOLOGY

## 2021-04-13 PROCEDURE — 81001 URINALYSIS AUTO W/SCOPE: CPT | Performed by: PATHOLOGY

## 2021-04-13 PROCEDURE — 86140 C-REACTIVE PROTEIN: CPT | Performed by: PATHOLOGY

## 2021-04-13 PROCEDURE — 36415 COLL VENOUS BLD VENIPUNCTURE: CPT | Mod: 90 | Performed by: PATHOLOGY

## 2021-04-13 PROCEDURE — 86200 CCP ANTIBODY: CPT | Mod: 90 | Performed by: PATHOLOGY

## 2021-04-13 PROCEDURE — 85652 RBC SED RATE AUTOMATED: CPT | Performed by: PATHOLOGY

## 2021-04-13 PROCEDURE — 86431 RHEUMATOID FACTOR QUANT: CPT | Mod: 90 | Performed by: PATHOLOGY

## 2021-04-13 PROCEDURE — 87086 URINE CULTURE/COLONY COUNT: CPT | Mod: 90 | Performed by: PATHOLOGY

## 2021-04-13 PROCEDURE — 84550 ASSAY OF BLOOD/URIC ACID: CPT | Performed by: PATHOLOGY

## 2021-04-13 PROCEDURE — 99215 OFFICE O/P EST HI 40 MIN: CPT | Performed by: PEDIATRICS

## 2021-04-13 PROCEDURE — 87088 URINE BACTERIA CULTURE: CPT | Mod: 90 | Performed by: PATHOLOGY

## 2021-04-13 PROCEDURE — 99417 PROLNG OP E/M EACH 15 MIN: CPT | Performed by: PEDIATRICS

## 2021-04-13 PROCEDURE — 84443 ASSAY THYROID STIM HORMONE: CPT | Performed by: PATHOLOGY

## 2021-04-13 ASSESSMENT — PAIN SCALES - GENERAL: PAINLEVEL: NO PAIN (0)

## 2021-04-13 ASSESSMENT — MIFFLIN-ST. JEOR: SCORE: 1330.26

## 2021-04-13 NOTE — PROGRESS NOTES
"Dear patient. I use the medical record to document (to the best of my ability) my understanding of:   - What you told me,  - Relevant details from my exam, records review, and/or test results, and  - My assessment and plan  If you have questions, you are welcome to contact me; I will reply as soon as time allows.    About this visit:  PCP: Cruz Prather   Visit type: problem-oriented  Time note ([id number], 69-83'): The total time (on the date of service) for this service was 75 minutes, including discussion/face-to-face, chart review, interpretation not otherwise reported, documentation, and updating of the computerized record.        Subjective     Tatiana Paredes is a 55 year old woman, with concerns including:  Chief Complaint   Patient presents with     Abdominal Pain     Patient complains of lower abdominal pain for the past two weeks.          For the past 2 weeks she has had lower abdominal pain and urinary discomfort, and noted blood last week. She used macrobid initially, and decided to come in.    Yesterday morning she woke up with pain, and slowed over the next two hours and intensified, she peed out blood, and has been fine since - no pain or blood.    Fluid intake estimated at 1.5 liters/day.  In the past she had coffee now and then in the morning - because of her BP (if she doesn't have coffee, she feels dizzy).   2 cups of water in the morning because of constipation.  Tea (ishan or green) in the later afternoon.    Some old blood orders in the system from Dr. Prather.    She has gained 10 pounds because of sitting at home with COVID distancing. Counseled about activity.        Objective     /74 (BP Location: Left arm, Patient Position: Sitting, Cuff Size: Adult Regular)   Pulse 91   Temp 98.2  F (36.8  C) (Oral)   Ht 1.702 m (5' 7\")   Wt 69.8 kg (153 lb 12.8 oz)   BMI 24.09 kg/m      Physical Exam  Constitutional:       General: She is not in acute distress.     Appearance: " Normal appearance. She is not ill-appearing.   HENT:      Head: Normocephalic.      Nose: Nose normal.   Eyes:      General: No scleral icterus.        Right eye: No discharge.         Left eye: No discharge.      Extraocular Movements: Extraocular movements intact.   Pulmonary:      Effort: Pulmonary effort is normal. No respiratory distress.   Abdominal:      General: Abdomen is flat. Bowel sounds are normal. There is no distension.      Palpations: Abdomen is soft. There is no mass.      Tenderness: There is no abdominal tenderness.   Musculoskeletal:      Comments: No CVA tenderness   Neurological:      Mental Status: She is alert.   Psychiatric:         Mood and Affect: Mood normal.         Behavior: Behavior normal.            Assessment & Plan     Lower abdominal pain and hematuria, resolved.  Her history seems quite consistent with classic description of kidney stones.  There have been a few episodes with similar but lesser symptoms, and it does make one wonder if that had been the problem before.  We will go ahead and check labs, and decide whether imaging might be useful.  In the meantime I recommended increasing her fluids more for the next several days.    ADDENDUM: normal urate, TSH.  Urinalysis shows WBC in clumps and moderate LE but negative nitrate.  CMP is reassuring.  Her recent history sounds like kidney stones, but her UA today (versus 4/6) seems consistent with a UTI after all. I wonder about cause for her frequent UTIs. With all of this, I will arrange for a CT after all.  [place] [place]    Talked with patient. Treatment pending UCx results, given ongoing macrobid use.   It turns out she had an itching reaction from CT contrast dye, so I switched the order to MRI. I need to ensure the abd order will be extended down to pelvis.

## 2021-04-14 LAB
BACTERIA SPEC CULT: ABNORMAL
Lab: ABNORMAL
SPECIMEN SOURCE: ABNORMAL

## 2021-04-14 NOTE — TELEPHONE ENCOUNTER
Called MRI.  Unsure if pelvis will be covered under order MRI abdomen  (renal system check).  Was advise a separate order for MRI Pelvis in case.        Piotr Acosta CMA ([facility]) at 9:36 AM on 4/14/2021

## 2021-04-14 NOTE — TELEPHONE ENCOUNTER
Team, can you please:  -- call radiology and ensure that the MRI ordered will include the pelvis (I want entire renal collecting system).  -- call patient with the phone number for her to call to schedule the MRI.  (I already spoke to her 7:17 PM 4/13/2021, she is expecting your call on Wednesday).  Thanks!

## 2021-04-15 ENCOUNTER — MYC MEDICAL ADVICE (OUTPATIENT)
Dept: INTERNAL MEDICINE | Facility: CLINIC | Age: 56
End: 2021-04-15

## 2021-04-15 DIAGNOSIS — N30.01 ACUTE CYSTITIS WITH HEMATURIA: Primary | ICD-10-CM

## 2021-04-15 DIAGNOSIS — Z12.31 VISIT FOR SCREENING MAMMOGRAM: ICD-10-CM

## 2021-04-15 LAB
CCP AB SER IA-ACNC: 1 U/ML
RHEUMATOID FACT SER NEPH-ACNC: 7 IU/ML (ref 0–20)

## 2021-04-15 PROCEDURE — 77063 BREAST TOMOSYNTHESIS BI: CPT | Mod: GC | Performed by: RADIOLOGY

## 2021-04-15 PROCEDURE — 77067 SCR MAMMO BI INCL CAD: CPT | Mod: GC | Performed by: RADIOLOGY

## 2021-04-15 RX ORDER — CIPROFLOXACIN 250 MG/1
250 TABLET, FILM COATED ORAL 2 TIMES DAILY
Qty: 14 TABLET | Refills: 0 | Status: SHIPPED | OUTPATIENT
Start: 2021-04-15 | End: 2021-08-24

## 2021-04-15 NOTE — TELEPHONE ENCOUNTER
UTI treatment  Choose cipro because of resistance to nitrofurantoin and bactrim. Needs imaging. MRI later this month (allergic to CT contrast). If pain worsens again, get a plain film.  No current plan for suppressive treatment.

## 2021-04-15 NOTE — TELEPHONE ENCOUNTER
Thanks, Piotr. Please call them back, and ensure the attached order is combined with the already-scheduled abd MRI

## 2021-04-15 NOTE — TELEPHONE ENCOUNTER
Called Radiology scheduling. Added MRI Pelvic to MRI Abdomen at 7 AM on 04/23.      Called and notify patient of reason why MRI of Pelvic is ordered and appointment time changed to 7 AM instead of 8 AM.  Patient gave verbal understanding.       Piotr Acosta CMA ([facility]) at 9:19 AM on 4/15/2021

## 2021-04-16 RX ORDER — NITROFURANTOIN 25; 75 MG/1; MG/1
100 CAPSULE ORAL
Qty: 30 CAPSULE | Refills: 1 | Status: SHIPPED | OUTPATIENT
Start: 2021-04-16 | End: 2021-08-24

## 2021-04-23 ENCOUNTER — HOSPITAL ENCOUNTER (OUTPATIENT)
Dept: MRI IMAGING | Facility: CLINIC | Age: 56
End: 2021-04-23
Attending: PEDIATRICS
Payer: COMMERCIAL

## 2021-04-23 DIAGNOSIS — R31.0 GROSS HEMATURIA: ICD-10-CM

## 2021-04-23 DIAGNOSIS — N39.0 FREQUENT UTI: ICD-10-CM

## 2021-04-23 PROCEDURE — 74183 MRI ABD W/O CNTR FLWD CNTR: CPT

## 2021-04-23 PROCEDURE — 74183 MRI ABD W/O CNTR FLWD CNTR: CPT | Mod: 26 | Performed by: RADIOLOGY

## 2021-04-23 PROCEDURE — A9585 GADOBUTROL INJECTION: HCPCS | Performed by: PEDIATRICS

## 2021-04-23 PROCEDURE — 255N000002 HC RX 255 OP 636: Performed by: PEDIATRICS

## 2021-04-23 PROCEDURE — 72197 MRI PELVIS W/O & W/DYE: CPT | Mod: 26 | Performed by: RADIOLOGY

## 2021-04-23 PROCEDURE — 258N000003 HC RX IP 258 OP 636: Performed by: PEDIATRICS

## 2021-04-23 PROCEDURE — 72197 MRI PELVIS W/O & W/DYE: CPT

## 2021-04-23 RX ORDER — GADOBUTROL 604.72 MG/ML
7.5 INJECTION INTRAVENOUS ONCE
Status: COMPLETED | OUTPATIENT
Start: 2021-04-23 | End: 2021-04-23

## 2021-04-23 RX ADMIN — SODIUM CHLORIDE 60 ML: 9 INJECTION, SOLUTION INTRAVENOUS at 08:50

## 2021-04-23 RX ADMIN — GADOBUTROL 7 ML: 604.72 INJECTION INTRAVENOUS at 08:50

## 2021-05-16 ENCOUNTER — HEALTH MAINTENANCE LETTER (OUTPATIENT)
Age: 56
End: 2021-05-16

## 2021-05-17 DIAGNOSIS — N30.01 ACUTE CYSTITIS WITH HEMATURIA: ICD-10-CM

## 2021-05-17 LAB
ALBUMIN UR-MCNC: NEGATIVE MG/DL
APPEARANCE UR: CLEAR
BILIRUB UR QL STRIP: NEGATIVE
COLOR UR AUTO: COLORLESS
GLUCOSE UR STRIP-MCNC: NEGATIVE MG/DL
HGB UR QL STRIP: NEGATIVE
KETONES UR STRIP-MCNC: NEGATIVE MG/DL
LEUKOCYTE ESTERASE UR QL STRIP: NEGATIVE
NITRATE UR QL: NEGATIVE
PH UR STRIP: 6 PH (ref 5–7)
RBC #/AREA URNS AUTO: <1 /HPF (ref 0–2)
SOURCE: NORMAL
SP GR UR STRIP: 1 (ref 1–1.03)
UROBILINOGEN UR STRIP-MCNC: 0 MG/DL (ref 0–2)
WBC #/AREA URNS AUTO: <1 /HPF (ref 0–5)

## 2021-05-17 PROCEDURE — 81001 URINALYSIS AUTO W/SCOPE: CPT | Performed by: PATHOLOGY

## 2021-08-24 ENCOUNTER — LAB (OUTPATIENT)
Dept: LAB | Facility: CLINIC | Age: 56
End: 2021-08-24
Payer: COMMERCIAL

## 2021-08-24 ENCOUNTER — OFFICE VISIT (OUTPATIENT)
Dept: INTERNAL MEDICINE | Facility: CLINIC | Age: 56
End: 2021-08-24
Payer: COMMERCIAL

## 2021-08-24 VITALS
BODY MASS INDEX: 23.02 KG/M2 | DIASTOLIC BLOOD PRESSURE: 84 MMHG | WEIGHT: 147 LBS | OXYGEN SATURATION: 98 % | HEART RATE: 79 BPM | SYSTOLIC BLOOD PRESSURE: 134 MMHG

## 2021-08-24 DIAGNOSIS — R21 RASH: ICD-10-CM

## 2021-08-24 DIAGNOSIS — R21 RASH: Primary | ICD-10-CM

## 2021-08-24 LAB
BASOPHILS # BLD AUTO: 0 10E3/UL (ref 0–0.2)
BASOPHILS NFR BLD AUTO: 0 %
EOSINOPHIL # BLD AUTO: 0.1 10E3/UL (ref 0–0.7)
EOSINOPHIL NFR BLD AUTO: 1 %
ERYTHROCYTE [DISTWIDTH] IN BLOOD BY AUTOMATED COUNT: 12.9 % (ref 10–15)
HCT VFR BLD AUTO: 37.8 % (ref 35–47)
HGB BLD-MCNC: 12.6 G/DL (ref 11.7–15.7)
IMM GRANULOCYTES # BLD: 0 10E3/UL
IMM GRANULOCYTES NFR BLD: 0 %
LYMPHOCYTES # BLD AUTO: 1.7 10E3/UL (ref 0.8–5.3)
LYMPHOCYTES NFR BLD AUTO: 25 %
MCH RBC QN AUTO: 29.8 PG (ref 26.5–33)
MCHC RBC AUTO-ENTMCNC: 33.3 G/DL (ref 31.5–36.5)
MCV RBC AUTO: 89 FL (ref 78–100)
MONOCYTES # BLD AUTO: 0.4 10E3/UL (ref 0–1.3)
MONOCYTES NFR BLD AUTO: 6 %
NEUTROPHILS # BLD AUTO: 4.4 10E3/UL (ref 1.6–8.3)
NEUTROPHILS NFR BLD AUTO: 68 %
NRBC # BLD AUTO: 0 10E3/UL
NRBC BLD AUTO-RTO: 0 /100
PLATELET # BLD AUTO: 295 10E3/UL (ref 150–450)
RBC # BLD AUTO: 4.23 10E6/UL (ref 3.8–5.2)
WBC # BLD AUTO: 6.5 10E3/UL (ref 4–11)

## 2021-08-24 PROCEDURE — 85025 COMPLETE CBC W/AUTO DIFF WBC: CPT | Performed by: PATHOLOGY

## 2021-08-24 PROCEDURE — 36415 COLL VENOUS BLD VENIPUNCTURE: CPT | Performed by: PATHOLOGY

## 2021-08-24 PROCEDURE — 99214 OFFICE O/P EST MOD 30 MIN: CPT | Performed by: INTERNAL MEDICINE

## 2021-08-24 ASSESSMENT — PAIN SCALES - GENERAL: PAINLEVEL: NO PAIN (0)

## 2021-08-24 NOTE — NURSING NOTE
Chief Complaint   Patient presents with     Derm Problem     pt here to discuss spots on face under eyes, noticed a month ago       David Wilkins CMA, EMT at 10:31 AM on 8/24/2021.

## 2021-08-24 NOTE — PROGRESS NOTES
SUBJECTIVE: Chief concern: Rash.  This 55-year-old woman with history of GERD and dysphonia reports that she noted a right infraorbital rash approximately one month ago.  With topical hydrocortisone treatment, the rash fully subsided within a few days.  Ten days ago she noted a recurrent rash in the right infraorbital region, somewhat larger than previously, with a smaller area of involvement in the left infraorbital region; with this episode, the rash was associated with mild localized pruritus that improved with cetirizine.  A trial of an unspecified facial cream lead to progression of her rash, leading her to discontinue use of the facial cream approximately 5 days ago.  Yesterday she noted mild progression of the rash and mild generalized pruritus, despite avoidance of facial cream and use of masks.  Use of cetirizine has lead to improvement of her pruritus, without change in her rash.  She denies cough, sinus congestion, conjunctival erythema, wheezing, dyspnea, abdominal pain, nausea, vomiting, diarrhea, and other areas of rash.  She has used a disposable mask as recommended per current Covid pandemic guidelines, although as noted above, she has limited her use of masks over the past few days.    Past Medical History: Reviewed and updated in patient health profile.     Adverse Drug Reactions: Reviewed and updated in patient health profile.     Current Medications: Reviewed and updated in patient health profile.     OBJECTIVE:     Vital signs: Weight 147 pounds.  Blood pressure 134/84.  Heart rate 79.  Respiratory rate 12.  O2 saturation 98% on room air.  General: Alert, neatly dressed and groomed, in no acute distress.  HEENT: Atraumatic and normocephalic. Eyelids, pupils, and conjunctivae appeared normal. Lips, teeth and gums appeared normal. Oropharynx showed moist mucous membranes, without exudate or erythema.  No evidence of oral or labial arteriovenous malformations.  Neck: Supple, without thyromegaly,  mass, or bruit. No cervical or supraclavicular lymphadenopathy.  Back: No spinal or costovertebral angle tenderness.  Chest: Clear to auscultation and percussion. Normal respiratory effort.  Cardiovascular: No jugular venous distention. Regular rate and rhythm, normal S1, S2 without murmur.  Abdomen: Bowel sounds positive; soft, nontender, without rebound, guarding, hepatosplenomegaly or mass.  Extremities: No cyanosis or edema.  Skin: Scattered benign-appearing skin lesions, including multiple capillary hemangiomas.  Inspection of the areas of concern is notable for a roughly 0.5 x 1.5 cm area of mild erythema, without induration, ulceration, crusting, flaking, or petechiae in the right infraorbital region; the left infraorbital region was notable for rashes similar characteristics covering an area of approximately 1.0 x 0.3 cm.     ASSESSMENT:    Rash.  Area of involvement matches with the upper border of the surgical mask that she was wearing at the time for examination.  Associated with mild pruritus, responsive to cetirizine, suggesting an allergic process.  No wheezing, stridor, or significant systemic symptoms at this time.  We agreed that she would avoid use of moisturizing lotion and other topical treatments including makeup.  She will limit her use of masks when not in areas where mask use his required.  She agrees to maintain a symptom journal, with attention to associated symptoms, potential triggers and relievers, and response to recommended treatment.  She will apply hydrocortisone 1% cream sparingly to the areas of involvement twice daily as needed for up to 3 weeks.  She will use cetirizine, 10 mg daily as needed for pruritus.  A laboratory test to exclude the possibility of thrombocytopenia was recommended; results showed normal CBC, with no evidence of thrombocytopenia.  She will schedule next available dermatology consultation, with plan to cancel if symptoms fully subsided with the above  measures, without recurrence after discontinuation of hydrocortisone.    PLAN:  See above.    On the date of the encounter, chart review, history and examination, documentation and further activities as noted above required more than 30 minutes.         ~SRT

## 2021-09-05 ENCOUNTER — HEALTH MAINTENANCE LETTER (OUTPATIENT)
Age: 56
End: 2021-09-05

## 2021-10-12 ENCOUNTER — OFFICE VISIT (OUTPATIENT)
Dept: INTERNAL MEDICINE | Facility: CLINIC | Age: 56
End: 2021-10-12
Payer: COMMERCIAL

## 2021-10-12 ENCOUNTER — LAB (OUTPATIENT)
Dept: LAB | Facility: CLINIC | Age: 56
End: 2021-10-12
Payer: COMMERCIAL

## 2021-10-12 VITALS
SYSTOLIC BLOOD PRESSURE: 125 MMHG | DIASTOLIC BLOOD PRESSURE: 79 MMHG | HEIGHT: 67 IN | WEIGHT: 149 LBS | BODY MASS INDEX: 23.39 KG/M2 | HEART RATE: 77 BPM | OXYGEN SATURATION: 99 %

## 2021-10-12 DIAGNOSIS — R49.9 VOICE DISORDER: ICD-10-CM

## 2021-10-12 DIAGNOSIS — K64.4 EXTERNAL HEMORRHOIDS: ICD-10-CM

## 2021-10-12 DIAGNOSIS — Z23 NEED FOR VACCINATION: ICD-10-CM

## 2021-10-12 DIAGNOSIS — L84 CALLUS OF FOOT: ICD-10-CM

## 2021-10-12 DIAGNOSIS — E78.00 HIGH BLOOD CHOLESTEROL: ICD-10-CM

## 2021-10-12 DIAGNOSIS — E78.00 HIGH BLOOD CHOLESTEROL: Primary | ICD-10-CM

## 2021-10-12 LAB
ALBUMIN SERPL-MCNC: 4.2 G/DL (ref 3.4–5)
ALP SERPL-CCNC: 92 U/L (ref 40–150)
ALT SERPL W P-5'-P-CCNC: 20 U/L (ref 0–50)
ANION GAP SERPL CALCULATED.3IONS-SCNC: 4 MMOL/L (ref 3–14)
AST SERPL W P-5'-P-CCNC: 12 U/L (ref 0–45)
BASOPHILS # BLD AUTO: 0 10E3/UL (ref 0–0.2)
BASOPHILS NFR BLD AUTO: 1 %
BILIRUB SERPL-MCNC: 0.4 MG/DL (ref 0.2–1.3)
BUN SERPL-MCNC: 9 MG/DL (ref 7–30)
CALCIUM SERPL-MCNC: 9.4 MG/DL (ref 8.5–10.1)
CHLORIDE BLD-SCNC: 107 MMOL/L (ref 94–109)
CHOLEST SERPL-MCNC: 212 MG/DL
CO2 SERPL-SCNC: 27 MMOL/L (ref 20–32)
CREAT SERPL-MCNC: 0.71 MG/DL (ref 0.52–1.04)
EOSINOPHIL # BLD AUTO: 0.1 10E3/UL (ref 0–0.7)
EOSINOPHIL NFR BLD AUTO: 1 %
ERYTHROCYTE [DISTWIDTH] IN BLOOD BY AUTOMATED COUNT: 12.9 % (ref 10–15)
FASTING STATUS PATIENT QL REPORTED: YES
GFR SERPL CREATININE-BSD FRML MDRD: >90 ML/MIN/1.73M2
GLUCOSE BLD-MCNC: 100 MG/DL (ref 70–99)
HCT VFR BLD AUTO: 40.7 % (ref 35–47)
HDLC SERPL-MCNC: 78 MG/DL
HGB BLD-MCNC: 13.4 G/DL (ref 11.7–15.7)
IMM GRANULOCYTES # BLD: 0 10E3/UL
IMM GRANULOCYTES NFR BLD: 0 %
LDLC SERPL CALC-MCNC: 119 MG/DL
LYMPHOCYTES # BLD AUTO: 2 10E3/UL (ref 0.8–5.3)
LYMPHOCYTES NFR BLD AUTO: 25 %
MCH RBC QN AUTO: 29.8 PG (ref 26.5–33)
MCHC RBC AUTO-ENTMCNC: 32.9 G/DL (ref 31.5–36.5)
MCV RBC AUTO: 91 FL (ref 78–100)
MONOCYTES # BLD AUTO: 0.6 10E3/UL (ref 0–1.3)
MONOCYTES NFR BLD AUTO: 7 %
NEUTROPHILS # BLD AUTO: 5.3 10E3/UL (ref 1.6–8.3)
NEUTROPHILS NFR BLD AUTO: 66 %
NONHDLC SERPL-MCNC: 134 MG/DL
NRBC # BLD AUTO: 0 10E3/UL
NRBC BLD AUTO-RTO: 0 /100
PLATELET # BLD AUTO: 327 10E3/UL (ref 150–450)
POTASSIUM BLD-SCNC: 4 MMOL/L (ref 3.4–5.3)
PROT SERPL-MCNC: 7.7 G/DL (ref 6.8–8.8)
RBC # BLD AUTO: 4.49 10E6/UL (ref 3.8–5.2)
SODIUM SERPL-SCNC: 138 MMOL/L (ref 133–144)
TRIGL SERPL-MCNC: 76 MG/DL
WBC # BLD AUTO: 7.9 10E3/UL (ref 4–11)

## 2021-10-12 PROCEDURE — 90686 IIV4 VACC NO PRSV 0.5 ML IM: CPT | Performed by: INTERNAL MEDICINE

## 2021-10-12 PROCEDURE — 80061 LIPID PANEL: CPT | Performed by: PATHOLOGY

## 2021-10-12 PROCEDURE — 90471 IMMUNIZATION ADMIN: CPT | Performed by: INTERNAL MEDICINE

## 2021-10-12 PROCEDURE — 80053 COMPREHEN METABOLIC PANEL: CPT | Performed by: PATHOLOGY

## 2021-10-12 PROCEDURE — 99396 PREV VISIT EST AGE 40-64: CPT | Mod: 25 | Performed by: INTERNAL MEDICINE

## 2021-10-12 PROCEDURE — 36415 COLL VENOUS BLD VENIPUNCTURE: CPT | Performed by: PATHOLOGY

## 2021-10-12 PROCEDURE — 85025 COMPLETE CBC W/AUTO DIFF WBC: CPT | Performed by: PATHOLOGY

## 2021-10-12 ASSESSMENT — MIFFLIN-ST. JEOR: SCORE: 1308.49

## 2021-10-12 ASSESSMENT — PAIN SCALES - GENERAL: PAINLEVEL: NO PAIN (0)

## 2021-10-12 NOTE — PROGRESS NOTES
HPI:    Ms. Paredes comes in for a physical today. Last visit with me 11/10/2020.  Still with hoarse voice. She has some change in stool and possible hemorrhoids. She has lost weight and changed her diet. She has a R heel callous from Yoga? She does not smoke nor abuse EtOH. No change in family medical history. No other HEENT, cardiopulmonary, abdominal, , GYN, neurological, systemic, psychiatric, lymphatic, endocrine, vascular complaints.       Past Medical History:   Diagnosis Date     Breast pain, left 11/2013     Choroidal nevus of right eye      Constipation      Dysphonia      Gastroesophageal reflux disease      Genital herpes      Hemorrhoids      Hoarseness      Menorrhagia      Migraines      Past Surgical History:   Procedure Laterality Date     HC HYSTEROSCOPY, SURGICAL; W/ ENDOMETRIAL ABLATION, ANY METHOD  2011     PE:    Vitals noted, gen, nad, cooperative, alert, neck supple nl rom, no adenopathy, no B carotid bruits, Lungs with good air movement, RRR, S1, S2, no MRG, abdomen, no acute findings. Grossly normal neurological exam. L heel with callous.     A/P:    1. Immunizations; she has completed both the Shingrix and Pfizer COVID vaccination series. Influenza vaccine today 10/12/2021/   2. Reflux/throat complaints; EGD 11/17/2017. She remains on famotidine. Placed ENT referral to Dr. Pisano  3. Dermatology; has [facility] this Thursday with Dr. Eduardo. Previous note(s) in Care Everywhere.   4. Mammogram; 4/15/2021  5. Colonoscopy; 2/6/2020; repeat in 5 years   6. Lipids checked 4/13/2021 with  and HDL 65 and ordered today.   7. Seen in GYN 4/6/2021 for UTI sxs.   8. Podiatry referral for R heel callous.

## 2021-10-12 NOTE — NURSING NOTE
Chief Complaint   Patient presents with     Physical     physical       TURNER Cordero at 4:25 PM on 10/12/2021

## 2021-10-14 NOTE — TELEPHONE ENCOUNTER
RECORDS RECEIVED FROM: External Hemorrhoids   Appt date: 1/5/2022   NOTES STATUS DETAILS   OFFICE NOTE from referring provider  [facility]:  10/12/21 - PCC OV with Dr. Prather   OFFICE NOTE from other specialist   [facility]:  4/6/21 - OBGYN OV with Amparo Schofield NP    [facility]:  10/9/20, 2/22/16 - CR OV with Queta Gonzalez NP   DISCHARGE SUMMARY from hospital  N/A    DISCHARGE REPORT from the ER N/A    OPERATIVE REPORT  N/A    MEDICATION LIST Internal    LABS     PFC TESTING N/A    ANAL PAP N/A    BIOPSIES/PATHOLOGY RELATED TO DIAGNOSIS N/A    DIAGNOSTIC PROCEDURES     COLONOSCOPY Received [facility]:  2/6/20 - Colonoscopy  12/2/16 - Colonoscopy   UPPER ENDOSCOPY (EGD) Received [facility]:  11/17/17 - EGD   FLEX SIGMOIDOSCOPY  N/A    ERCP N/A    IMAGING (DISC & REPORT)      CT  Internal [facility]:  7/2/16 - CT Abd/Pelvis   MRI [facility]:  4/23/21 - MRI Abdomen  4/23/21 - MRI Pelvis  12/19/16 - MRI Abdomen MRCP   XRAY N/A    ULTRASOUND (ENDOANAL/ENDORECTAL) N/A

## 2021-10-19 ENCOUNTER — ANCILLARY PROCEDURE (OUTPATIENT)
Dept: CT IMAGING | Facility: CLINIC | Age: 56
End: 2021-10-19
Attending: INTERNAL MEDICINE
Payer: COMMERCIAL

## 2021-10-19 ENCOUNTER — OFFICE VISIT (OUTPATIENT)
Dept: INTERNAL MEDICINE | Facility: CLINIC | Age: 56
End: 2021-10-19
Payer: COMMERCIAL

## 2021-10-19 ENCOUNTER — NURSE TRIAGE (OUTPATIENT)
Dept: NURSING | Facility: CLINIC | Age: 56
End: 2021-10-19

## 2021-10-19 VITALS
WEIGHT: 151 LBS | HEIGHT: 67 IN | SYSTOLIC BLOOD PRESSURE: 128 MMHG | OXYGEN SATURATION: 100 % | BODY MASS INDEX: 23.7 KG/M2 | RESPIRATION RATE: 16 BRPM | DIASTOLIC BLOOD PRESSURE: 85 MMHG

## 2021-10-19 DIAGNOSIS — J34.89 NOSE PAIN: Primary | ICD-10-CM

## 2021-10-19 DIAGNOSIS — J34.89 NOSE PAIN: ICD-10-CM

## 2021-10-19 LAB — RADIOLOGIST FLAGS: ABNORMAL

## 2021-10-19 PROCEDURE — 70486 CT MAXILLOFACIAL W/O DYE: CPT | Mod: GC | Performed by: RADIOLOGY

## 2021-10-19 PROCEDURE — 99215 OFFICE O/P EST HI 40 MIN: CPT | Performed by: INTERNAL MEDICINE

## 2021-10-19 ASSESSMENT — MIFFLIN-ST. JEOR: SCORE: 1317.56

## 2021-10-19 ASSESSMENT — PAIN SCALES - GENERAL: PAINLEVEL: MODERATE PAIN (4)

## 2021-10-19 NOTE — TELEPHONE ENCOUNTER
Pt reporting, she was going up the steps last night to go to bed.   Pt tripped and fell up the steps.  She fell and hit the left side of her face on a door on the way down when she was falling.  The left side of her nose is very painful.   No bleeding and is able to breath okay.    But this morning her nose is very swollen on both sides.    Pt is wondering if she broke her nose.    Pt would like an X-ray of possible.    Pt wondering if there is an appointment available to be seen.  Or if there is a virtual visit to see a provider and then come in for an X-ray for Pt care.    Pleaser call the Pt back @ [phone] for further assistance.    Thank you     Mariya Monge RN  Central Triage Red Flags/Med Refills      As above.  Radha Ruiz RN 9:03 AM on 10/19/2021.        Reason for Disposition    Patient wants to be seen    Additional Information    Negative: Life-threatening reaction (anaphylaxis) in the past to similar substance (e.g., food, insect bite/sting, chemical, etc.) and < 2 hours since exposure    Negative: Unresponsive, passed out or very weak    Negative: Swollen tongue    Negative: Difficulty breathing or wheezing    Negative: Sounds like a life-threatening emergency to the triager    Negative: Followed an injury to face    Negative: Bee sting and within last 24 hours    Negative: Mosquito bite suspected    Negative: Insect bite suspected    Negative: SEVERE swelling of entire face and < 2 hours since exposure to high-risk allergen (e.g., peanuts, tree nuts, fish, shellfish or 1st dose of drug) and no serious symptoms AND [4] no serious allergic reaction in the past    Negative: Pregnant > 20 weeks    Negative: Postpartum (from 0 to 6 weeks after delivery)    Negative: Fever    Negative: Taking an ACE Inhibitor medication  (e.g., benazepril/LOTENSIN, captopril/CAPOTEN, enalapril/VASOTEC, lisinopril/ZESTRIL)    Negative: Patient sounds very sick or weak to the triager    Negative: SEVERE swelling of the  entire face    Negative: Swelling began after taking a drug    Negative: Looks infected (e.g., spreading redness, pus)    Negative: Looks like a boil or infected sore    Negative: Painful rash with multiple small blisters grouped together (i.e., dermatomal distribution or 'band' or 'stripe')    Negative: Swelling of both lower legs (i.e., bilateral pedal edema)    Negative: Widespread rash on body    Negative: Swelling is painful to touch    Negative: Toothache    Protocols used: FACE SWELLING-A-[place]

## 2021-10-19 NOTE — PROGRESS NOTES
HPI:    Tatiana comes into after falling on her face and nose yesterday. She tripped on a rug and landed on her face. She did not pass out. She has nasal bridge pain and swelling. She breaths out her L nostril better than the R. No head ache. No facial pain. No other HEENT, cardiopulmonary, abdominal, , GYN, neurological, systemic, psychiatric, lymphatic, endocrine, vascular complaints.   Past Medical History:   Diagnosis Date     Breast pain, left 11/2013     Choroidal nevus of right eye      Constipation      Dysphonia      Gastroesophageal reflux disease      Genital herpes      Hemorrhoids      Hoarseness      Menorrhagia      Migraines      Past Surgical History:   Procedure Laterality Date     HC HYSTEROSCOPY, SURGICAL; W/ ENDOMETRIAL ABLATION, ANY METHOD  2011     PE:    Vitals noted, gen, nad, cooperative, alert, she has swelling tenderness B bridge of her nose. No other maxillofacial findings. Her vision is fine.     A/P:    1. Reviewed CT imaging with Dr. Hyman ENT; she has nasal fracture and he recommend she follow up with appropriate ENT specialist. They will contact her. Return for any acute issues.    40 minutes spent on the date of the encounter doing chart review, history and exam, documentation and further activities as noted above

## 2021-10-19 NOTE — TELEPHONE ENCOUNTER
FUTURE VISIT INFORMATION      FUTURE VISIT INFORMATION:    Date: 10/20/21    Time: 8:45 AM    Location: [facility]  REFERRAL INFORMATION:    Referring provider: Dr. Cruz Prather    Referring providers clinic: [facility] - Primary [facility]    Reason for visit/diagnosis: Nasal Fracture    RECORDS REQUESTED FROM:       Clinic name Comments Records Status Imaging Status   [facility] 10/19/21 - PCC OV with Dr. Prather  10/22/19 - ENT OV with Dr. Norris Mortensen    [facility] - Imaging 10/19/21 - CT Facial Bones [facility]

## 2021-10-19 NOTE — NURSING NOTE
Tatiana Paredes is a 55 year old female patient that presents today in clinic for the following:    Chief Complaint   Patient presents with     Trauma     Patient comes to check on injury to her nose. Happed last night per the patient.      The patient's allergies and medications were reviewed as noted. A set of vitals were recorded as noted without incident. The patient does not have any other questions for the provider.    Davis Connors, EMT at 11:12 AM on 10/19/2021

## 2021-10-20 ENCOUNTER — OFFICE VISIT (OUTPATIENT)
Dept: OTOLARYNGOLOGY | Facility: CLINIC | Age: 56
End: 2021-10-20
Payer: COMMERCIAL

## 2021-10-20 ENCOUNTER — PRE VISIT (OUTPATIENT)
Dept: OTOLARYNGOLOGY | Facility: CLINIC | Age: 56
End: 2021-10-20

## 2021-10-20 VITALS
OXYGEN SATURATION: 99 % | TEMPERATURE: 98.1 F | WEIGHT: 151 LBS | BODY MASS INDEX: 23.7 KG/M2 | HEIGHT: 67 IN | HEART RATE: 73 BPM

## 2021-10-20 DIAGNOSIS — S02.2XXA CLOSED DISPLACED FRACTURE OF NASAL BONE, INITIAL ENCOUNTER: Primary | ICD-10-CM

## 2021-10-20 PROCEDURE — 99213 OFFICE O/P EST LOW 20 MIN: CPT | Performed by: OTOLARYNGOLOGY

## 2021-10-20 ASSESSMENT — PAIN SCALES - GENERAL: PAINLEVEL: MILD PAIN (2)

## 2021-10-20 ASSESSMENT — MIFFLIN-ST. JEOR: SCORE: 1317.56

## 2021-10-20 NOTE — NURSING NOTE
"Chief Complaint   Patient presents with     RECHECK     nasal fracture       Pulse 73, temperature 98.1  F (36.7  C), temperature source Temporal, height 1.702 m (5' 7\"), weight 68.5 kg (151 lb), SpO2 99 %, not currently breastfeeding.    Elvira Harry, EMT    "

## 2021-10-20 NOTE — PROGRESS NOTES
HISTORY OF PRESENT ILLNESS:  Tatiana Paredes is a 55-year-old female who last week sustained nasal trauma in a collision with a door.  She had no loss of consciousness.  She was seen by her primary care physician who ordered a CT scan which showed minimal displacement of the nasal dorsal bone.    She actually has no obstruction or breathing.  She had brief bleeding, but that subsided quickly.  She feels that her nasal dorsum, which was previously enlarged, is swollen at this time.    PAST MEDICAL HISTORY:  Reviewed.    ALLERGIES:  Reviewed.    MEDICATIONS: Reviewed.      REVIEW OF SYSTEMS:  Notes mild rhinitis in the past, for which she takes nasal sprays.  She has had no previous trauma to the nose.    SOCIAL HISTORY:  She is a nonsmoker, does not drink alcohol.    PHYSICAL EXAMINATION:  Examination shows an abrasion on the left side of the dorsum that is tender to palpation.  There is swelling across the entire dorsum.  Intranasally, the nares are patent.  Septum is midline.  There is no evidence of hemorrhage.  The remainder of head and neck examination is unremarkable.    IMAGING:  CT scan of the facial bones was obtained with a fracture of the nasal dorsum.  No other fractures are seen.    ASSESSMENT:  Closed nasal fracture.    PLAN:  We will let the swelling subside and she will call us in one week and let us know if there is any disparity to one side or the other and if she would like to have intervention.  In the meantime, I asked her to use nasal saline.  I also wonder about the abrasion on the side of her nose that she should use ointment to the site and possible sunscreen if exposed.

## 2021-10-20 NOTE — LETTER
10/20/2021       RE: Tatiana Paredes  [address]  [place] [address]     Dear Colleague,    Thank you for referring your patient, Tatiana Paredes, to the [facility] [place] [facility]. Please see a copy of my visit note below.    HISTORY OF PRESENT ILLNESS:  Tatiana Paredes is a 55-year-old female who last week sustained nasal trauma in a collision with a door.  She had no loss of consciousness.  She was seen by her primary care physician who ordered a CT scan which showed minimal displacement of the nasal dorsal bone.    She actually has no obstruction or breathing.  She had brief bleeding, but that subsided quickly.  She feels that her nasal dorsum, which was previously enlarged, is swollen at this time.    PAST MEDICAL HISTORY:  Reviewed.    ALLERGIES:  Reviewed.    MEDICATIONS: Reviewed.      REVIEW OF SYSTEMS:  Notes mild rhinitis in the past, for which she takes nasal sprays.  She has had no previous trauma to the nose.    SOCIAL HISTORY:  She is a nonsmoker, does not drink alcohol.    PHYSICAL EXAMINATION:  Examination shows an abrasion on the left side of the dorsum that is tender to palpation.  There is swelling across the entire dorsum.  Intranasally, the nares are patent.  Septum is midline.  There is no evidence of hemorrhage.  The remainder of head and neck examination is unremarkable.    IMAGING:  CT scan of the facial bones was obtained with a fracture of the nasal dorsum.  No other fractures are seen.    ASSESSMENT:  Closed nasal fracture.    PLAN:  We will let the swelling subside and she will call us in one week and let us know if there is any disparity to one side or the other and if she would like to have intervention.  In the meantime, I asked her to use nasal saline.  I also wonder about the abrasion on the side of her nose that she should use ointment to the site and possible sunscreen  if exposed.          Again, thank you for allowing me to participate in the care of your patient.      Sincerely,    Cruz Ferguson MD

## 2021-10-20 NOTE — PATIENT INSTRUCTIONS
1. You were seen in the ENT Clinic today by Dr. Ferguson.  If you have any questions or concerns after your appointment, please call   - Option 1: ENT Clinic: [phone]   - Option 2: Katarina (Dr. Ferguson's Nurse): [phone]          Theodora(Dr. Ferguson's Nurse): [phone]     2. Call me next for an update- card given.      Katarina Maria LPN  [facility] - Otolaryngology

## 2021-10-27 ENCOUNTER — TELEPHONE (OUTPATIENT)
Dept: OTOLARYNGOLOGY | Facility: CLINIC | Age: 56
End: 2021-10-27

## 2021-10-27 NOTE — TELEPHONE ENCOUNTER
FUTURE VISIT INFORMATION      FUTURE VISIT INFORMATION:    Date: 2/8/22    Time: 11 AM    [place] [facility] [place]  REFERRAL INFORMATION:    Referring provider: Self-Referred    Referring providers clinic:    Reason for visit/diagnosis: Paralyzed Vocal Cord    RECORDS REQUESTED FROM:       Clinic name Comments Records Status Imaging Status   [facility] 11/10/21, 10/20/21 - ENT OV with Dr. Ferguson  10/22/19 - ENT & SPEECH OV with Dr. Pisano  12/11/17 - PCC OV with Dr. Prather [facility]    [facility] - Procedure 11/17/17 - EGD  7/17/15 - EGD Scanned In    [facility] - Imaging 11/20/17 - MRI Neck [facility] PACs   [facility] 1/15/17 -  OV with NASIM [facility] Everywhere

## 2021-10-27 NOTE — TELEPHONE ENCOUNTER
Pt left a msg that her nose has a bump in the middle. She is not sure if it is the swelling still or the bone. Wants to know if she should come in to see Dr. Ferguson again. Spoke to the provider and pt would need to make another appt to discuss surgical options.    Katarina Maria LPN

## 2021-10-27 NOTE — TELEPHONE ENCOUNTER
Left msg for the pt that I have her scheduled for 11/10at 7:30 am to discuss her options with Dr. Ferguson.    Katarina Maria LPN

## 2021-11-03 NOTE — PATIENT INSTRUCTIONS
1. You were seen in the ENT Clinic today by Dr. Ferguson.  If you have any questions or concerns after your appointment, please call   - Option 1: ENT Clinic: [phone]   - Option 2: Katarina (Dr. Ferguson's Nurse): [phone]          Theodora(Dr. Ferguson's Nurse): [phone]     2.   Plan to return to clinic in 6 months    3. Follow up with Dr. Norris Maria LPN  [facility] - [facility]

## 2021-11-10 ENCOUNTER — OFFICE VISIT (OUTPATIENT)
Dept: OTOLARYNGOLOGY | Facility: CLINIC | Age: 56
End: 2021-11-10
Payer: COMMERCIAL

## 2021-11-10 VITALS — TEMPERATURE: 97.6 F | HEART RATE: 81 BPM | OXYGEN SATURATION: 100 % | BODY MASS INDEX: 23.81 KG/M2 | WEIGHT: 152 LBS

## 2021-11-10 DIAGNOSIS — S02.2XXA CLOSED DISPLACED FRACTURE OF NASAL BONE, INITIAL ENCOUNTER: Primary | ICD-10-CM

## 2021-11-10 PROCEDURE — 99213 OFFICE O/P EST LOW 20 MIN: CPT | Performed by: OTOLARYNGOLOGY

## 2021-11-10 ASSESSMENT — PAIN SCALES - GENERAL: PAINLEVEL: NO PAIN (0)

## 2021-11-10 NOTE — LETTER
11/10/2021       RE: Tatiana Paredes  [address]  [facility] [address]     Dear Colleague,    Thank you for referring your patient, Tatiana Paredes, to the [facility] E[facility] NOSE [facility] THROAT [facility]IN[facility] [place] [facility]. Please see a copy of my visit note below.    HISTORY OF PRESENT ILLNESS:  Tatiana Paredes is a 56-year-old female who three weeks ago sustained nasal trauma with a closed fracture of the nasal bones.  She actually has no trouble breathing, but has an enlarged dorsal hump.  She has been icing it, which we discussed but at the same time, there is mild tenderness to manipulation.  She has no epistaxis.  She has no fractures to the internal portion of the nose as documented by the ED report.    PAST MEDICAL HISTORY:  Significant for a right true vocal cord paralysis, which she has seen Dr. Pisano.  She does have a need for revisit with him.  She denies any aspiration, but she feels like her voicing is not as strong.      REVIEW OF SYSTEMS:  Significant only for the above.    PHYSICAL EXAMINATION:  Examination shows a large nasal dorsal hump.  There is mild discoloration and it is tender to manipulation.  The septum was midline.  There is no septal hematoma, but there is a deflection of the right nasal ala inward.  The remainder of the head and neck examination is unremarkable.    IMAGING:  Facial film is viewed and it shows just an anterior tip displacement of the very caudal portion of the nasal bone.    ASSESSMENT:  Closed nasal fracture with resultant nasal deformity.    PLAN:  We would recommend six months of observation to see if indeed this settles.  At the same time, a procedure could be undertaken.  We will follow up at that time.    Sincerely,    Cruz Ferguson MD

## 2021-11-10 NOTE — NURSING NOTE
Chief Complaint   Patient presents with     RECHECK     follow up nasal fracture      Pulse 81, temperature 97.6  F (36.4  C), weight 68.9 kg (152 lb), SpO2 100 %, not currently breastfeeding.    Emmanuel Valdez LPN

## 2021-11-10 NOTE — PROGRESS NOTES
HISTORY OF PRESENT ILLNESS:  Tatiana Paredes is a 56-year-old female who three weeks ago sustained nasal trauma with a closed fracture of the nasal bones.  She actually has no trouble breathing, but has an enlarged dorsal hump.  She has been icing it, which we discussed but at the same time, there is mild tenderness to manipulation.  She has no epistaxis.  She has no fractures to the internal portion of the nose as documented by the ED report.    PAST MEDICAL HISTORY:  Significant for a right true vocal cord paralysis, which she has seen Dr. Pisano.  She does have a need for revisit with him.  She denies any aspiration, but she feels like her voicing is not as strong.      REVIEW OF SYSTEMS:  Significant only for the above.    PHYSICAL EXAMINATION:  Examination shows a large nasal dorsal hump.  There is mild discoloration and it is tender to manipulation.  The septum was midline.  There is no septal hematoma, but there is a deflection of the right nasal ala inward.  The remainder of the head and neck examination is unremarkable.    IMAGING:  Facial film is viewed and it shows just an anterior tip displacement of the very caudal portion of the nasal bone.    ASSESSMENT:  Closed nasal fracture with resultant nasal deformity.    PLAN:  We would recommend six months of observation to see if indeed this settles.  At the same time, a procedure could be undertaken.  We will follow up at that time.

## 2021-11-29 ENCOUNTER — OFFICE VISIT (OUTPATIENT)
Dept: ORTHOPEDICS | Facility: CLINIC | Age: 56
End: 2021-11-29
Attending: INTERNAL MEDICINE
Payer: COMMERCIAL

## 2021-11-29 DIAGNOSIS — L85.3 XEROSIS OF SKIN: Primary | ICD-10-CM

## 2021-11-29 DIAGNOSIS — L84 CALLUS OF FOOT: ICD-10-CM

## 2021-11-29 PROCEDURE — 99202 OFFICE O/P NEW SF 15 MIN: CPT | Performed by: PODIATRIST

## 2021-11-29 NOTE — LETTER
11/29/2021         RE: Tatiana Paredes  [address]  [place] [address]        Dear Colleague,    Thank you for referring your patient, Tatiana Paredes, to the [facility]TH[facility] [place]. Please see a copy of my visit note below.    Date of Service: 11/29/2021    Chief Complaint   Patient presents with     Consult     Callus, right foot.           HPI: Tatiana is a 56 year old female who presents today for further evaluation of right heel callus.  Nature: No pain    Location: right heel    Duration: months    Onset: gradual    Course: NA    Aggravating/alleviating factors: NA    Previous Treatments: Vaseline      Review of Systems: No nausea, vomiting, diarrhea, fever, chills, shortness of breath, chest pain.    PMH:   Past Medical History:   Diagnosis Date     Breast pain, left 11/2013     Choroidal nevus of right eye      Constipation      Dysphonia      Gastroesophageal reflux disease      Genital herpes      Hemorrhoids      Hoarseness      Menorrhagia      Migraines        PSxH:   Past Surgical History:   Procedure Laterality Date     HC HYSTEROSCOPY, SURGICAL; W/ ENDOMETRIAL ABLATION, ANY METHOD  2011       Allergies: Contrast dye    SH:   Social History     Socioeconomic History     Marital status:      Spouse name: Not on file     Number of children: Not on file     Years of education: Not on file     Highest education level: Not on file   Occupational History     Not on file   Tobacco Use     Smoking status: Never Smoker     Smokeless tobacco: Never Used   Substance and Sexual Activity     Alcohol use: Yes     Alcohol/week: 0.8 - 4.2 standard drinks     Comment: 1-3 times a week     Drug use: No     Sexual activity: Yes     Partners: Male     Birth control/protection: Condom   Other Topics Concern      Service No     Blood Transfusions No     Caffeine Concern No     Occupational Exposure No     Hobby Hazards No     Sleep Concern No     Stress  Concern No     Weight Concern No     Special Diet No     Back Care No     Exercise Yes     Bike Helmet Yes     Seat Belt Yes     Self-Exams Yes   Social History Narrative    How much exercise per week? Not enough    How much calcium per day? Some in her diet       How much caffeine per day? 2 cups     How much vitamin D per day? Some in diet    Do you/your family wear seatbelts?  Yes    Do you/your family use safety helmets? N/A    Do you/your family use sunscreen? Yes    Do you/your family keep firearms in the home? No    Do you/your family have a smoke detector(s)? Yes        Do you feel safe in your home? Yes    Has anyone ever touched you in an unwanted manner? No        06/17/2015        Works at [employer].     .    Natali Powell MD                 Social Determinants of Health     Financial Resource Strain: Not on file   Food Insecurity: Not on file   Transportation Needs: Not on file   Physical Activity: Not on file   Stress: Not on file   Social Connections: Not on file   Intimate Partner Violence: Not on file   Housing Stability: Not on file       FH:   Family History   Problem Relation Age of Onset     Breast Cancer Sister 52     Cancer - colorectal Maternal Grandmother 70     Migraines Mother      Unknown/Adopted Mother      Stomach Problem Mother      Stomach Cancer Mother      Prostate Cancer Father      Cancer Father      Glaucoma No family hx of      Macular Degeneration No family hx of      Retinal detachment No family hx of      Amblyopia No family hx of      Strabismus No family hx of      Glasses (<9 y/o) No family hx of      Nystagmus No family hx of        Objective:  Data Unavailable Data Unavailable Data Unavailable Data Unavailable Data Unavailable 0 lbs 0 oz    PT and DP pulses are 2/4 bilaterally. CRT is instant.  Positive pedal hair.   Gross sensation is intact bilaterally.   Equinus is not noted bilaterally. No pain with active or passive ROM of the ankle, MTJ, 1st ray,  or halluces bilaterally,.   Nails normal bilaterally. No open lesions are noted.  Xerosis noted to right heel with a mild hyperkeratotic lesion noted to the lateral portion of the heel.  No pain noted with palpation.    Assessment: Xerosis and hyperkeratotic region.    Plan:  -Patient seen and evaluated  -Recommend urea cream over-the-counter.  She should use this daily.,  Struck to the area every few days.  -See again as needed.         Abhay Hopkins DPM

## 2021-11-29 NOTE — PROGRESS NOTES
Date of Service: 11/29/2021    Chief Complaint   Patient presents with     Consult     Callus, right foot.           HPI: Tatiana is a 56 year old female who presents today for further evaluation of right heel callus.  Nature: No pain    Location: right heel    Duration: months    Onset: gradual    Course: NA    Aggravating/alleviating factors: NA    Previous Treatments: Vaseline      Review of Systems: No nausea, vomiting, diarrhea, fever, chills, shortness of breath, chest pain.    PMH:   Past Medical History:   Diagnosis Date     Breast pain, left 11/2013     Choroidal nevus of right eye      Constipation      Dysphonia      Gastroesophageal reflux disease      Genital herpes      Hemorrhoids      Hoarseness      Menorrhagia      Migraines        PSxH:   Past Surgical History:   Procedure Laterality Date     HC HYSTEROSCOPY, SURGICAL; W/ ENDOMETRIAL ABLATION, ANY METHOD  2011       Allergies: Contrast dye    SH:   Social History     Socioeconomic History     Marital status:      Spouse name: Not on file     Number of children: Not on file     Years of education: Not on file     Highest education level: Not on file   Occupational History     Not on file   Tobacco Use     Smoking status: Never Smoker     Smokeless tobacco: Never Used   Substance and Sexual Activity     Alcohol use: Yes     Alcohol/week: 0.8 - 4.2 standard drinks     Comment: 1-3 times a week     Drug use: No     Sexual activity: Yes     Partners: Male     Birth control/protection: Condom   Other Topics Concern      Service No     Blood Transfusions No     Caffeine Concern No     Occupational Exposure No     Hobby Hazards No     Sleep Concern No     Stress Concern No     Weight Concern No     Special Diet No     Back Care No     Exercise Yes     Bike Helmet Yes     Seat Belt Yes     Self-Exams Yes   Social History Narrative    How much exercise per week? Not enough    How much calcium per day? Some in her diet       How much caffeine per  day? 2 cups     How much vitamin D per day? Some in diet    Do you/your family wear seatbelts?  Yes    Do you/your family use safety helmets? N/A    Do you/your family use sunscreen? Yes    Do you/your family keep firearms in the home? No    Do you/your family have a smoke detector(s)? Yes        Do you feel safe in your home? Yes    Has anyone ever touched you in an unwanted manner? No        06/17/2015        Works at [employer].     .    Natali Powell MD                 Social Determinants of Health     Financial Resource Strain: Not on file   Food Insecurity: Not on file   Transportation Needs: Not on file   Physical Activity: Not on file   Stress: Not on file   Social Connections: Not on file   Intimate Partner Violence: Not on file   Housing Stability: Not on file       FH:   Family History   Problem Relation Age of Onset     Breast Cancer Sister 52     Cancer - colorectal Maternal Grandmother 70     Migraines Mother      Unknown/Adopted Mother      Stomach Problem Mother      Stomach Cancer Mother      Prostate Cancer Father      Cancer Father      Glaucoma No family hx of      Macular Degeneration No family hx of      Retinal detachment No family hx of      Amblyopia No family hx of      Strabismus No family hx of      Glasses (<9 y/o) No family hx of      Nystagmus No family hx of        Objective:  Data Unavailable Data Unavailable Data Unavailable Data Unavailable Data Unavailable 0 lbs 0 oz    PT and DP pulses are 2/4 bilaterally. CRT is instant.  Positive pedal hair.   Gross sensation is intact bilaterally.   Equinus is not noted bilaterally. No pain with active or passive ROM of the ankle, MTJ, 1st ray, or halluces bilaterally,.   Nails normal bilaterally. No open lesions are noted.  Xerosis noted to right heel with a mild hyperkeratotic lesion noted to the lateral portion of the heel.  No pain noted with palpation.    Assessment: Xerosis and hyperkeratotic region.    Plan:  -Patient  seen and evaluated  -Recommend urea cream over-the-counter.  She should use this daily.,  Struck to the area every few days.  -See again as needed.

## 2021-11-29 NOTE — NURSING NOTE
Reason For Visit:   Chief Complaint   Patient presents with     Consult     Callus, right foot.        Pain Assessment  Patient Currently in Pain: Denies        Allergies   Allergen Reactions     Contrast Dye Itching     CT contrast dye           Lainey Sumner LPN

## 2022-01-04 ENCOUNTER — MYC MEDICAL ADVICE (OUTPATIENT)
Dept: SURGERY | Facility: CLINIC | Age: 57
End: 2022-01-04
Payer: COMMERCIAL

## 2022-01-05 ENCOUNTER — PRE VISIT (OUTPATIENT)
Dept: SURGERY | Facility: CLINIC | Age: 57
End: 2022-01-05

## 2022-01-05 ENCOUNTER — OFFICE VISIT (OUTPATIENT)
Dept: SURGERY | Facility: CLINIC | Age: 57
End: 2022-01-05
Attending: INTERNAL MEDICINE
Payer: COMMERCIAL

## 2022-01-05 VITALS
SYSTOLIC BLOOD PRESSURE: 122 MMHG | HEIGHT: 67 IN | HEART RATE: 88 BPM | WEIGHT: 156.8 LBS | OXYGEN SATURATION: 98 % | BODY MASS INDEX: 24.61 KG/M2 | DIASTOLIC BLOOD PRESSURE: 76 MMHG

## 2022-01-05 DIAGNOSIS — K64.8 HEMORRHOID PROLAPSE: Primary | ICD-10-CM

## 2022-01-05 PROCEDURE — 46221 LIGATION OF HEMORRHOID(S): CPT | Performed by: NURSE PRACTITIONER

## 2022-01-05 ASSESSMENT — PAIN SCALES - GENERAL: PAINLEVEL: NO PAIN (0)

## 2022-01-05 ASSESSMENT — MIFFLIN-ST. JEOR: SCORE: 1338.87

## 2022-01-05 NOTE — PROGRESS NOTES
"Colon and Rectal Surgery Follow-Up Clinic Note    RE: Tatiana Paredes  : 1965  GIUSEPPE: 2022    Tatiana Paredes is a very pleasant 56 year old female here for follow-up of hemorrhoids.    Interval history: I saw Tatiana in  with hemorrhoid prolapse and banding a few times. She tolerated this well and was doing well until a few months ago when she developed recurrent prolapsing tissue. No bleeding or pain. Bowel movements are soft. She had a normal colonoscopy 2020. She is hoping to have repeat banding today.    Physical Examination: Exam was chaperoned by Elvira David, EMT and Theodora Kerr, RN   /76 (BP Location: Left arm, Patient Position: Sitting, Cuff Size: Adult Regular)   Pulse 88   Ht 5' 7\"   Wt 156 lb 12.8 oz   SpO2 98%   BMI 24.56 kg/m    General: alert, oriented, in no acute distress, sitting comfortably  HEENT: mucous membranes moist  Perianal external examination:  Perianal skin: Intact with no excoriation or lichenification.  Lesions: No evidence of an external lesion, nodularity, or induration in the perianal region.  Eversion of buttocks: There was not evidence of an anal fissure. Details: N/A.  Skin tags or external hemorrhoids: Skin tag in the anterior midline    Digital rectal examination: Was performed.   Sphincter tone: Good.  Palpable lesions: No.  Other: None.  Bimanual examination: was not performed.    Anoscopy: Was performed.   Hemorrhoids: Yes. Grade 2 internal hemorrhoids without active bleeding  Lesions: No.    Procedures:  After discussing the risks and benefits, the patient agreed to proceed with internal hemorrhoidal banding.    Prior to the start of the procedure and with procedural staff participation, I verbally confirmed the patient s identity using two indicators, relevant allergies, that the procedure was appropriate and matched the consent or emergent situation, and that the correct equipment/implants were available. Immediately prior to " starting the procedure I conducted the Time Out with the procedural staff and re-confirmed the patient s name, procedure, and site/side. (The Joint Commission universal protocol was followed.)  Yes    Sedation (Moderate or Deep): None    A suction hemorrhoidal  was used to place a total of 2 band(s) in the anterior and right posterior position(s).    There was no significant bleeding. The patient tolerated the procedure well.    This procedure was performed under a collaborative privileging agreement with Dr. Walker, Chief of Colon and Rectal Surgery.      Assessment/Plan: 56 year old female with hemorrhoid prolapse and anal skin tag. She tolerated banding previously and wished to have further banding today for recurrent prolapsing hemorrhoids. Two bands were placed today. She tolerated this well. Will have her follow up anytime after one month for any persistent or recurrent symptoms. No heavy lifting and no blood thinners for two weeks. Patient's questions were answered to her stated satisfaction and she is in agreement with this plan.    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 Provider to review       Knee instability 06/05/2012     Priority: Medium       Medications:  Current Outpatient Medications   Medication Sig Dispense Refill     carboxymethylcellul-glycerin (REFRESH OPTIVE) 0.5-0.9 % SOLN ophthalmic solution Place 1 drop into both eyes 2 times daily 1 Bottle 3     famotidine (PEPCID) 20 MG tablet Take 20 mg by mouth nightly as needed       valACYclovir (VALTREX) 500 MG tablet Take 1 tablet (500 mg) by mouth 2 times daily 28 tablet 11       Allergies:  Allergies   Allergen Reactions     Contrast Dye Itching     CT contrast dye       Family history:  Family History   Problem Relation Age of Onset     Breast Cancer Sister 52     Cancer - colorectal Maternal Grandmother 70     Migraines Mother      Unknown/Adopted Mother      Stomach Problem Mother      Stomach Cancer Mother      Prostate Cancer Father      Cancer Father      Glaucoma No family hx of      Macular Degeneration No family hx of      Retinal detachment No family hx of      Amblyopia No family hx of      Strabismus No family hx of      Glasses (<7 y/o) No family hx of      Nystagmus No family hx of        Social history:  Social History     Tobacco Use     Smoking status: Never Smoker     Smokeless tobacco: Never Used   Substance Use Topics     Alcohol use: Yes     Alcohol/week: 0.8 - 4.2 standard drinks     Comment: 1-3 times a week     Marital status: .    Nursing Notes:   Elvira Benito, EMT  1/5/2022  2:05 PM  Signed  Chief Complaint   Patient presents with     New Patient     Possible hemorrhoids       Vitals:    01/05/22 1403   BP: 122/76   BP Location: Left arm   Patient Position: Sitting   Cuff Size: Adult Regular   Pulse: 88   SpO2: 98%   Weight: 71.1 kg (156 lb 12.8 oz)   Height: 1.702 m (5' 7\")       Body mass index is 24.56 kg/m .                          Elvira Benito, EMT        15 minutes " spent on the date of the encounter doing chart review, history and exam, documentation and further activities as noted above.     Queta Finch NP-C  Colon and Rectal Surgery  [facility]

## 2022-01-05 NOTE — LETTER
"2022       RE: Tatiana Paredes  [address] [facility] [address]     Dear Colleague,    Thank you for referring your patient, Tatiana Paredes, to the [facility] COLON AND RECTAL SURGERY CLINIC [place] at [facility]. Please see a copy of my visit note below.    Colon and Rectal Surgery Follow-Up Clinic Note    RE: Tatiana Paredes  : 1965  GIUSEPPE: 2022    Tatiana Paredes is a very pleasant 56 year old female here for follow-up of hemorrhoids.    Interval history: I saw Tatiana in  with hemorrhoid prolapse and banding a few times. She tolerated this well and was doing well until a few months ago when she developed recurrent prolapsing tissue. No bleeding or pain. Bowel movements are soft. She had a normal colonoscopy 2020. She is hoping to have repeat banding today.    Physical Examination: Exam was chaperoned by Elvira David, TURNER and Theodora Kerr RN   /76 (BP Location: Left arm, Patient Position: Sitting, Cuff Size: Adult Regular)   Pulse 88   Ht 5' 7\"   Wt 156 lb 12.8 oz   SpO2 98%   BMI 24.56 kg/m    General: alert, oriented, in no acute distress, sitting comfortably  HEENT: mucous membranes moist  Perianal external examination:  Perianal skin: Intact with no excoriation or lichenification.  Lesions: No evidence of an external lesion, nodularity, or induration in the perianal region.  Eversion of buttocks: There was not evidence of an anal fissure. Details: N/A.  Skin tags or external hemorrhoids: Skin tag in the anterior midline    Digital rectal examination: Was performed.   Sphincter tone: Good.  Palpable lesions: No.  Other: None.  Bimanual examination: was not performed.    Anoscopy: Was performed.   Hemorrhoids: Yes. Grade 2 internal hemorrhoids without active bleeding  Lesions: No.    Procedures:  After discussing the risks and benefits, the patient agreed to proceed with internal " hemorrhoidal banding.    Prior to the start of the procedure and with procedural staff participation, I verbally confirmed the patient s identity using two indicators, relevant allergies, that the procedure was appropriate and matched the consent or emergent situation, and that the correct equipment/implants were available. Immediately prior to starting the procedure I conducted the Time Out with the procedural staff and re-confirmed the patient s name, procedure, and site/side. (The Joint Commission universal protocol was followed.)  Yes    Sedation (Moderate or Deep): None    A suction hemorrhoidal  was used to place a total of 2 band(s) in the anterior and right posterior position(s).    There was no significant bleeding. The patient tolerated the procedure well.    This procedure was performed under a collaborative privileging agreement with Dr. Walker, Chief of Colon and Rectal Surgery.      Assessment/Plan: 56 year old female with hemorrhoid prolapse and anal skin tag. She tolerated banding previously and wished to have further banding today for recurrent prolapsing hemorrhoids. Two bands were placed today. She tolerated this well. Will have her follow up anytime after one month for any persistent or recurrent symptoms. No heavy lifting and no blood thinners for two weeks. Patient's questions were answered to her stated satisfaction and she is in agreement with this plan.    Medical history:  Past Medical History:   Diagnosis Date     Breast pain, left 11/2013     Choroidal nevus of right eye      Constipation      Dysphonia      Gastroesophageal reflux disease      Genital herpes      Hemorrhoids      Hoarseness      Menorrhagia      Migraines        Surgical history:  Past Surgical History:   Procedure Laterality Date     HC HYSTEROSCOPY, SURGICAL; W/ ENDOMETRIAL ABLATION, ANY METHOD  2011       Problem list:  Patient Active Problem List    Diagnosis Date Noted     Vocal fold paresis, right 08/09/2017      Priority: Medium     Hallux valgus of right foot 05/11/2015     Priority: Medium     Pes planus of both feet 05/11/2015     Priority: Medium     Pain of foot 05/11/2015     Priority: Medium     Choroidal nevus of right eye 04/24/2015     Priority: Medium     Myopia 04/24/2015     Priority: Medium     Dysphonia 12/30/2013     Priority: Medium     External hemorrhoids 07/07/2012     Priority: Medium     Problem list name updated by automated process. Provider to review       Knee instability 06/05/2012     Priority: Medium       Medications:  Current Outpatient Medications   Medication Sig Dispense Refill     carboxymethylcellul-glycerin (REFRESH OPTIVE) 0.5-0.9 % SOLN ophthalmic solution Place 1 drop into both eyes 2 times daily 1 Bottle 3     famotidine (PEPCID) 20 MG tablet Take 20 mg by mouth nightly as needed       valACYclovir (VALTREX) 500 MG tablet Take 1 tablet (500 mg) by mouth 2 times daily 28 tablet 11       Allergies:  Allergies   Allergen Reactions     Contrast Dye Itching     CT contrast dye       Family history:  Family History   Problem Relation Age of Onset     Breast Cancer Sister 52     Cancer - colorectal Maternal Grandmother 70     Migraines Mother      Unknown/Adopted Mother      Stomach Problem Mother      Stomach Cancer Mother      Prostate Cancer Father      Cancer Father      Glaucoma No family hx of      Macular Degeneration No family hx of      Retinal detachment No family hx of      Amblyopia No family hx of      Strabismus No family hx of      Glasses (<9 y/o) No family hx of      Nystagmus No family hx of        Social history:  Social History     Tobacco Use     Smoking status: Never Smoker     Smokeless tobacco: Never Used   Substance Use Topics     Alcohol use: Yes     Alcohol/week: 0.8 - 4.2 standard drinks     Comment: 1-3 times a week     Marital status: .    Nursing Notes:   Elvira Benito, EMT  1/5/2022  2:05 PM  Signed  Chief Complaint   Patient presents with      "New Patient     Possible hemorrhoids       Vitals:    01/05/22 1403   BP: 122/76   BP Location: Left arm   Patient Position: Sitting   Cuff Size: Adult Regular   Pulse: 88   SpO2: 98%   Weight: 71.1 kg (156 lb 12.8 oz)   Height: 1.702 m (5' 7\")       Body mass index is 24.56 kg/m .                          TURNER Rodriguez        15 minutes spent on the date of the encounter doing chart review, history and exam, documentation and further activities as noted above.     Queta Finch, NP-C  Colon and Rectal Surgery  [facility]        "

## 2022-02-08 ENCOUNTER — PRE VISIT (OUTPATIENT)
Dept: OTOLARYNGOLOGY | Facility: CLINIC | Age: 57
End: 2022-02-08

## 2022-03-08 NOTE — LETTER
September 13, 2017      Tatiana Rollinsnelda  [address]  [facility] [address]    Dear ,      I am happy to inform you that your recent cervical cancer screening test (PAP smear) was normal.  HPV testing was negative.     Preventative screenings such as this help to ensure your health for years to come. You should repeat a pap smear in 5 years, unless otherwise directed.      You will still need to return to the clinic every year for your annual exam and other preventive tests.     Please contact the clinic at [phone] if you have further questions.       Sincerely,      Evelyne Connors MD       decreased strength

## 2022-04-19 DIAGNOSIS — A60.9 HSV (HERPES SIMPLEX VIRUS) ANOGENITAL INFECTION: ICD-10-CM

## 2022-04-21 RX ORDER — VALACYCLOVIR HYDROCHLORIDE 500 MG/1
500 TABLET, FILM COATED ORAL 2 TIMES DAILY
Qty: 28 TABLET | Refills: 2 | Status: SHIPPED | OUTPATIENT
Start: 2022-04-21 | End: 2022-10-31

## 2022-04-21 NOTE — TELEPHONE ENCOUNTER
Last Clinic Visit: 10/19/2021  [facility] [place]      PRN Valtrex for h/o  herpes

## 2022-04-29 NOTE — PROGRESS NOTES
HISTORY OF PRESENT ILLNESS: Tatiana Paredes is a 56 year old female with a history of dysphonia.  I originally saw her on 6/10/2014.  That was associated with time of prolonged use of her voice.  Please see the 7/5/2018 note for a more in-depth history.  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  This is been associated with deterioration of vocal quality.  She had no other neurologic symptoms.  Neurology consult was obtained and did not find any systemic illnesses.  Laboratory test was negative, an MRI of the brain was also normal.  Antibodies for myasthenia gravis were also within normal limits.  With continued atrophy we offered her a Cymetra injection.  This was performed on 7/5/2018.  She did well for approximately 5 months but then began to notice some subtle deterioration of her vocal quality.  She had a repeat injection on 1/31/2019.  This again went well but only lasted for approximately 2 months.  She was seen on 5/9/20019.  At that time she continued to have a weak quality to her voice.  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  This was a new finding.  There was also fullness of the ventricle that was not seen in exams prior to injection therefore could be residual Cymetra.  On her most recent visit with me on 10/22/2019 her voice was stable and her swallowing was stable.  Continue to have some vocal fatigue. Her MRI from November 2018 did cover the upper mediastinum.  She continued to have interest in an intervention whether an injection or a thyroplasty.  We did delay to make sure no residual Cymetra was present prior to any surgery.    She has held off coming into clinic to avoid infection with the COVID-19 virus.  Since our last visit she has had some mild tightness with swallowing but no significant changes in her voice.  She has been doing a lot of virtual work and does have vocal fatigue.  When she does her exercises she feels that her  voice improves.  She has a  question whether further neurologic work-up would be worthwhile.  He was recommended to come in for a laryngeal exam to see if significant changes had occurred.   She does note that she was in Hospitals in [place] at the time of the [place] accident and likely had radiation exposure.         Last 2 Scores for Patient-Answered VHI Questionnaire  VHI Total Score 7/9/2019 10/22/2019   VHI Total Score 19 18       Last 2 Scores for Patient-Answered CSI Questionnaire  CSI Total Score 7/5/2018 1/31/2019   CSI Total Score 0 0         Last 2 Scores for Patient-Answered EAT Questionnaire  EAT Total Score 7/5/2018 1/31/2019   EAT Total Score 0 1           PAST MEDICAL HISTORY:   Past Medical History:   Diagnosis Date     Breast pain, left 11/2013     Choroidal nevus of right eye      Constipation      Dysphonia      Gastroesophageal reflux disease      Genital herpes      Hemorrhoids      Hoarseness      Menorrhagia      Migraines        PAST SURGICAL HISTORY:   Past Surgical History:   Procedure Laterality Date     HC HYSTEROSCOPY, SURGICAL; W/ ENDOMETRIAL ABLATION, ANY METHOD  2011       FAMILY HISTORY:   Family History   Problem Relation Age of Onset     Breast Cancer Sister 52     Cancer - colorectal Maternal Grandmother 70     Migraines Mother      Unknown/Adopted Mother      Stomach Problem Mother      Stomach Cancer Mother      Prostate Cancer Father      Cancer Father      Glaucoma No family hx of      Macular Degeneration No family hx of      Retinal detachment No family hx of      Amblyopia No family hx of      Strabismus No family hx of      Glasses (<9 y/o) No family hx of      Nystagmus No family hx of        SOCIAL HISTORY:   Social History     Tobacco Use     Smoking status: Never Smoker     Smokeless tobacco: Never Used   Substance Use Topics     Alcohol use: Yes     Alcohol/week: 0.8 - 4.2 standard drinks     Comment: 1-3 times a week       REVIEW OF SYSTEMS: Ten point review of systems was  performed and is negative except for:    ENT ROS 11/10/2021   Constitutional -   Neurology Headache   Ears, Nose, Throat Hoarseness        ALLERGIES: Contrast dye    MEDICATIONS:   Current Outpatient Medications   Medication Sig Dispense Refill     carboxymethylcellul-glycerin (REFRESH OPTIVE) 0.5-0.9 % SOLN ophthalmic solution Place 1 drop into both eyes 2 times daily 1 Bottle 3     famotidine (PEPCID) 20 MG tablet Take 20 mg by mouth nightly as needed       valACYclovir (VALTREX) 500 MG tablet Take 1 tablet (500 mg) by mouth 2 times daily 28 tablet 2         PHYSICAL EXAMINATION:  She  is awake, alert and in no apparent distress.    Her tympanic membranes are clear and intact bilaterally. External auditory canals are clear.  Nasal exam shows a mild septal deviation without obstruction.  Examination of the oral cavity shows no suspicious lesions.  There is symmetric movement of the tongue and soft palate.    The oropharynx is clear.  Her neck is supple without significant adenopathy.  Palpation of the thyrohyoid membrane.  Pulse is regular.  Upper airway is clear.  Cranial nerves II-XII are grossly intact.       PROCEDURE: A flexible laryngoscopy  was performed.  Informed consent was obtained and a time out was performed. 3% lidocaine and 0.25% phenylephrine was sprayed into the nasal cavity and allowed 3 to 5 minutes for effect. The scope was passed through the left sided nostril. Examination showed the left vocal fold to be mobile.  The right vocal fold was immobile and minimally bowed at rest.  With phonation glottic closure was able to be obtained.  Closure improved even more with good breath support resulted in improved tone and vocal quality.  No nodules, polyps or ulcerations are seen.  There is mild inflammation at the posterior commissure.   The vocal folds show no inflammation. With phonation there is moderate contraction of the supraglottic larynx.  There was pooling of saliva in the hypopharynx  "particularly on the right side which did not completely clear with swallow.  The hypopharynx is otherwise clear as is the subglottis.       5/3/2022 Exam        Phonation \"eee\"        IMPRESSION/PLAN: Her exam is stable in terms of both her pooling of saliva and position of the right vocal fold when compared to exams in 2017 and 2019.  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  Improved vocal quality was noted with increased diaphragmatic support during phonation.    We offered the options of: no therapy,  a trial of speech therapy to fine-tune her vocal technique  or  Surgery which would include a thyroplasty.  She has chosen to proceed with therapy which is a reasonable option for her.  I again emphasized to her that I did not find any changes in her exam terms of vocal fold position and pooling of saliva relative to our first exam that is videotaped in 2017.  Given the lack of progression of her laryngeal findings over the last few years further laryngeal evaluation was not required.  She will review these findings along with any additional symptoms with her other physicians to see if additional work-up is needed.            "

## 2022-05-03 ENCOUNTER — OFFICE VISIT (OUTPATIENT)
Dept: OTOLARYNGOLOGY | Facility: CLINIC | Age: 57
End: 2022-05-03
Payer: COMMERCIAL

## 2022-05-03 ENCOUNTER — OFFICE VISIT (OUTPATIENT)
Dept: OTOLARYNGOLOGY | Facility: CLINIC | Age: 57
End: 2022-05-03
Attending: INTERNAL MEDICINE
Payer: COMMERCIAL

## 2022-05-03 VITALS
OXYGEN SATURATION: 100 % | TEMPERATURE: 99 F | HEIGHT: 67 IN | HEART RATE: 85 BPM | SYSTOLIC BLOOD PRESSURE: 129 MMHG | DIASTOLIC BLOOD PRESSURE: 80 MMHG | WEIGHT: 150 LBS | BODY MASS INDEX: 23.54 KG/M2

## 2022-05-03 DIAGNOSIS — J38.01 VOCAL FOLD PARESIS, RIGHT: Primary | ICD-10-CM

## 2022-05-03 DIAGNOSIS — R49.0 DYSPHONIA: ICD-10-CM

## 2022-05-03 PROCEDURE — 31575 DIAGNOSTIC LARYNGOSCOPY: CPT | Performed by: OTOLARYNGOLOGY

## 2022-05-03 PROCEDURE — 99213 OFFICE O/P EST LOW 20 MIN: CPT | Mod: 25 | Performed by: OTOLARYNGOLOGY

## 2022-05-03 PROCEDURE — 92524 BEHAVRAL QUALIT ANALYS VOICE: CPT | Mod: GN | Performed by: SPEECH-LANGUAGE PATHOLOGIST

## 2022-05-03 ASSESSMENT — PAIN SCALES - GENERAL: PAINLEVEL: NO PAIN (0)

## 2022-05-03 NOTE — PROGRESS NOTES
"[facility]    Patient: Tatiana Paredes  Date of Visit: 5/3/2022    CHIEF COMPLAINT: Voice quality    HISTORY  Tatiana Paredes is a 56 year old year old woman, who was seen for follow-up evaluation in conjunction with a visit to Dr. Albright \"Shayne\" Norris.      Summary impressions by author Calvin Tobias M.M. (voice), M.A., CCC-SLP from May 3, 2022:   She has a long-standing history of dysphonia.  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  She had worked for some time with Jada Lynn CCC-SLP; however, by the summer 2018 she was no longer able to achieve her goals through therapeutic means alone, and at this point injection augmentation was recommended.  She had excellent response to her first injection with 5 months of good voice quality, and a repeat injection was completed on 1/31/2019.  Response to the most recent injection was good initially, but voice quality deteriorated after approximately 2 months.    She was seen in clinic on 5/9/2019 for reevaluation and to discuss the possibility of thyroplasty.  At that time a lingering amount of Cymetra was noted in the right ventricle, and further decrease in mobility of the right true vocal fold was seen.  In light of these findings discussion of thyroplasty was postponed, and the patient was asked to follow-up in 2 months in order to allow time for full resorption.  A single session of speech therapy was recommended in the interim to help maximize function while waiting.  Patient completed this on 5/24/2019 and noted some benefit with the use of forward resonance sounds, though this was noted to be imperfect.  She was last seen on 10/22/2019 and there was discussion of possible thyroplasty.  It was emphasized that this would improve her stamina but not necessarily the clarity of her voicing.  She opted for independent practice of therapeutic exercises following that visit but was " "lost to follow-up subsequently.    INTERVAL HISTORY:    5/3/2022 - author: Calvin Tobias -today she reports that her vocal demands have shifted with COVID to many online meetings and presentations.  Overall she has been able to continue meeting her demands that she will find her voice \"goes out on her\" intermittently.  This typically recovers with sips of water or clearing her throat.  She continues to have some of the same types of challenges as she has historically and feels that voice therapy exercises have been fruitful though she does not practice them as consistently as she feels she probably should.  Given the timeline since her last evaluation she wanted to be reevaluated to gauge status of the physiology and determine if additional avenues for intervention would be warranted.    OBJECTIVE  PATIENT REPORTED MEASURES  Patient Supplied Answers To Last 2 VHI Questionnaires  Voice Handicap Index (VHI-10) 7/9/2019 10/22/2019   My voice makes it difficult for people to hear me 3 2   People have difficulty understanding me in a noisy room 3 3   My voice difficulties restrict my personal and social life.  2 1   I feel left out of conversations because of my voice 2 2   My voice problem causes me to lose income 0 0   I feel as though I have to strain to produce voice 2 2   The clarity of my voice is unpredictable 2 2   My voice problem upsets me 1 2   My voice makes me feel handicapped 2 2   People ask, \"What's wrong with your voice?\" 2 2   VHI-10 19 18        Patient Supplied Answers To Last 2 CSI Questionnaires  Cough Severity Index (CSI) 7/5/2018 1/31/2019   My cough is worse when I lie down 0 0   My coughing problem causes me to restrict my personal and social life 0 0   I tend to avoid places because of my cough problem 0 0   I feel embarrassed because of my coughing problem 0 0   People ask, ''What's wrong?'' because I cough a lot 0 0   I run out of air when I cough 0 0   My coughing problem affects my voice 0 " 0   My coughing problem limits my physical activity 0 0   My coughing problem upsets me 0 0   People ask me if I am sick because I cough a lot 0 0   CSI Score 0 0        Patient Supplied Answers To Last 2 EAT Questionnaires  Eating Assessment Tool (EAT-10) 7/5/2018 1/31/2019   My swallowing problem has caused me to lose weight 0 0   My swallowing problem interferes with my ability to go out for meals 0 0   Swallowing liquids takes extra effort 0 0   Swallowing solids takes extra effort 0 0   Swallowing pills takes extra effort 0 0   Swallowing is painful 0 0   The pleasure of eating is affected by my swallowing 0 0   When I swallow food sticks in my throat 0 1   I cough when I eat 0 0   Swallowing is stressful 0 0   EAT-10 0 1     PERCEPTUAL EVALUATION (CPT 92460)  POSTURE / TENSION:     neck and shoulders     Frequently forward rolled shoulder posture    BREATHING:     appears within normal limits and adequate     shallow    phonation is not coordinated with respiration    LARYNGEAL PALPATION:     tenderness of the thyrohyoid area    VOICE:    Roughness: Mild to moderate (present in the form of pitch instability and fluctuation between registers)    Breathiness: Mild to moderate Intermittent    Strain: Mild to moderate Intermittent    Loudness    Conversational speech:  Mildly reduced    Projected speech:  Moderately reduced (significant tension noted on the attempt)    Pitch:    Conversational speech:  Variable with frequent light loft registration and glottal rios    Pitch glide:     Improved clarity with ascending pitch    Patient was able to show significantly improved stability even into upper modal registration following ascending    Resonance:    Conversational speech:  laryngeal pharyngeal resonance    Singing vs. Speech: Consistent across contexts    COUGH/THROAT CLEARING:    Not observed    THERAPY PROBES: Improvement was elicited with use of glottic coup to promote vocal fold closure and use of yawn  "sigh    LARYNGEAL EXAMINATION  Procedure: Flexible endoscopy with chip-tip technology without stroboscopy, right nostril; topical anesthesia with 3% Lidocaine and 0.25% phenylephrine was applied.   Performed by: Dr. Albright \"Shayne\" Norris  The laryngeal and pharyngeal structures were evaluated for gross appearance, mobility, function, and focal lesions / abnormalities of the associated mucosa.    All findings were within normal limits with the exception of the following salient features:     No notable pooling    Right true vocal fold is immobile in a near midline position with mildly concave vibratory margin    Left true vocal fold demonstrates brisk mobility and is able to come to midline to provide mildly weak glottic closure    Variable degrees of supraglottic recruitment are noted corresponding to when perceptual quality is dominated by glottal rios and decreased hyperfunction with loft registration    Best glottic configuration and clarity was achieved with glottic true followed by ascending and descending glides    The laryngeal exam was reviewed with Ms. Paredes, and I provided pertinent explanations, as well as written and oral information.    ASSESSMENT / PLAN  IMPRESSIONS: Tatiana Paredes presents today with ongoing dysphonia of a similar nature to when she was last seen 2019.  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  The degree of immobility is essentially unchanged from her last visit.  Perceptually this manifests as significant pitch instability as well as frequent descent into glottal rios with corresponding reduction of intensity.  Patient is able to achieve improved quality with focus on therapeutic tasks such as glottic coup or finding optimal pitch range.    RECOMMENDATIONS:     The potential avenues for intervention including permanent augmentative procedure " such as thyroplasty, adjuvant speech therapy, and speech therapy alone were discussed with the patient.  Following this discussion she decided that she would like to pursue a refresher course of speech therapy and this will be completed in the near future as it is feasible    Medically necessary speech therapy is warranted to optimize vocal technique and improve voice quality    She demonstrates a Good prognosis for improvement given adherence to therapeutic recommendations. Therapeutic     Positive indicators: positive response to therapy probes diagnosis is known to respond to treatment previously positive response to behavioral therapy    Negative indicators: None    DURATION / FREQUENCY: 3 biweekly and 2 monthly one-hour sessions    Research: Not a candidate.      GOALS:  Patient goal:   1. To improve and maintain a healthy voice quality  2. To understand the problem and fix it as much as possible    Short-term goal(s): Within the first 4 sessions, Ms. Paredes:  1. will demonstrate assigned laryngeal massage techniques with 80% accuracy or better with no clinician support  2. will accurately identify target vs. habitual voice quality during therapy tasks in 4 out of 5 trials with no clinician support  3. will demonstrate the ability to alternate between target and habitual voice quality given clinician cue 75% of the time during therapy tasks    Long-term goal(s): In 6 months, Ms. Paredes will:  1. Report a week of typical activities, in which Dysphonia does not exceed a level of 3 out of 10, 80% of the time    This treatment plan was developed with the patient who agreed with the recommendations.    TOTAL SERVICE TIME: 35 minutes  EVALUATION OF VOICE AND RESONANCE (91396)  NO CHARGE FACILITY FEE (88696)    Matthieu Tobias M.M., M.A., CCC-SLP  Speech-Language Pathologist  Certificate of Vocology  [phone]    *this report was created in part through the use of computerized dictation software, and though  reviewed following completion, some typographic errors may persist.  If there is confusion regarding any of this notes contents, please contact me for clarification.*

## 2022-05-03 NOTE — LETTER
5/3/2022       RE: Tatiana Paredes  [address]  [facility] [address]     Dear Colleague,    Thank you for referring your patient, Tatiana Paredes, to the [facility] [place] [facility]. Please see a copy of my visit note below.    HISTORY OF PRESENT ILLNESS: Tatiana Paredes is a 56 year old female with a history of dysphonia.  I originally saw her on 6/10/2014.  That was associated with time of prolonged use of her voice.  Please see the 7/5/2018 note for a more in-depth history.  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  This is been associated with deterioration of vocal quality.  She had no other neurologic symptoms.  Neurology consult was obtained and did not find any systemic illnesses.  Laboratory test was negative, an MRI of the brain was also normal.  Antibodies for myasthenia gravis were also within normal limits.  With continued atrophy we offered her a Cymetra injection.  This was performed on 7/5/2018.  She did well for approximately 5 months but then began to notice some subtle deterioration of her vocal quality.  She had a repeat injection on 1/31/2019.  This again went well but only lasted for approximately 2 months.  She was seen on 5/9/20019.  At that time she continued to have a weak quality to her voice.  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  This was a new finding.  There was also fullness of the ventricle that was not seen in exams prior to injection therefore could be residual Cymetra.  On her most recent visit with me on 10/22/2019 her voice was stable and her swallowing was stable.  Continue to have some vocal fatigue. Her MRI from November 2018 did cover the upper mediastinum.  She continued to have interest in an intervention whether an injection or a thyroplasty.  We did delay to make sure no residual Cymetra was  present prior to any surgery.    She has held off coming into clinic to avoid infection with the COVID-19 virus.  Since our last visit she has had some mild tightness with swallowing but no significant changes in her voice.  She has been doing a lot of virtual work and does have vocal fatigue.  When she does her exercises she feels that her voice improves.  She has a  question whether further neurologic work-up would be worthwhile.  He was recommended to come in for a laryngeal exam to see if significant changes had occurred.   She does note that she was in [facility] at the time of the [place] accident and likely had radiation exposure.         Last 2 Scores for Patient-Answered VHI Questionnaire  VHI Total Score 7/9/2019 10/22/2019   VHI Total Score 19 18       Last 2 Scores for Patient-Answered CSI Questionnaire  CSI Total Score 7/5/2018 1/31/2019   CSI Total Score 0 0         Last 2 Scores for Patient-Answered EAT Questionnaire  EAT Total Score 7/5/2018 1/31/2019   EAT Total Score 0 1           PAST MEDICAL HISTORY:   Past Medical History:   Diagnosis Date     Breast pain, left 11/2013     Choroidal nevus of right eye      Constipation      Dysphonia      Gastroesophageal reflux disease      Genital herpes      Hemorrhoids      Hoarseness      Menorrhagia      Migraines        PAST SURGICAL HISTORY:   Past Surgical History:   Procedure Laterality Date     [facility] HYSTEROSCOPY, SURGICAL; W/ ENDOMETRIAL ABLATION, ANY METHOD  2011       FAMILY HISTORY:   Family History   Problem Relation Age of Onset     Breast Cancer Sister 52     Cancer - colorectal Maternal Grandmother 70     Migraines Mother      Unknown/Adopted Mother      Stomach Problem Mother      Stomach Cancer Mother      Prostate Cancer Father      Cancer Father      Glaucoma No family hx of      Macular Degeneration No family hx of      Retinal detachment No family hx of      Amblyopia No family hx of      Strabismus No family hx of      Glasses (<9 y/o) No  family hx of      Nystagmus No family hx of        SOCIAL HISTORY:   Social History     Tobacco Use     Smoking status: Never Smoker     Smokeless tobacco: Never Used   Substance Use Topics     Alcohol use: Yes     Alcohol/week: 0.8 - 4.2 standard drinks     Comment: 1-3 times a week       REVIEW OF SYSTEMS: Ten point review of systems was performed and is negative except for:   [facility] ROS 11/10/2021   Constitutional -   Neurology Headache   Ears, Nose, Throat Hoarseness        ALLERGIES: Contrast dye    MEDICATIONS:   Current Outpatient Medications   Medication Sig Dispense Refill     carboxymethylcellul-glycerin (REFRESH OPTIVE) 0.5-0.9 % SOLN ophthalmic solution Place 1 drop into both eyes 2 times daily 1 Bottle 3     famotidine (PEPCID) 20 MG tablet Take 20 mg by mouth nightly as needed       valACYclovir (VALTREX) 500 MG tablet Take 1 tablet (500 mg) by mouth 2 times daily 28 tablet 2         PHYSICAL EXAMINATION:  She  is awake, alert and in no apparent distress.    Her tympanic membranes are clear and intact bilaterally. External auditory canals are clear.  Nasal exam shows a mild septal deviation without obstruction.  Examination of the oral cavity shows no suspicious lesions.  There is symmetric movement of the tongue and soft palate.    The oropharynx is clear.  Her neck is supple without significant adenopathy.  Palpation of the thyrohyoid membrane.  Pulse is regular.  Upper airway is clear.  Cranial nerves II-XII are grossly intact.       PROCEDURE: A flexible laryngoscopy  was performed.  Informed consent was obtained and a time out was performed. 3% lidocaine and 0.25% phenylephrine was sprayed into the nasal cavity and allowed 3 to 5 minutes for effect. The scope was passed through the left sided nostril. Examination showed the left vocal fold to be mobile.  The right vocal fold was immobile and minimally bowed at rest.  With phonation glottic closure was able to be obtained.  Closure improved even  "more with good breath support resulted in improved tone and vocal quality.  No nodules, polyps or ulcerations are seen.  There is mild inflammation at the posterior commissure.   The vocal folds show no inflammation. With phonation there is moderate contraction of the supraglottic larynx.  There was pooling of saliva in the hypopharynx particularly on the right side which did not completely clear with swallow.  The hypopharynx is otherwise clear as is the subglottis.       5/3/2022 Exam        Phonation \"eee\"        IMPRESSION/PLAN: Her exam is stable in terms of both her pooling of saliva and position of the right vocal fold when compared to exams in 2017 and 2019.  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  Improved vocal quality was noted with increased diaphragmatic support during phonation.    We offered the options of: no therapy,  a trial of speech therapy to fine-tune her vocal technique  or  Surgery which would include a thyroplasty.  She has chosen to proceed with therapy which is a reasonable option for her.  I again emphasized to her that I did not find any changes in her exam terms of vocal fold position and pooling of saliva relative to our first exam that is videotaped in 2017.  Given the lack of progression of her laryngeal findings over the last few years further laryngeal evaluation was not required.  She will review these findings along with any additional symptoms with her other physicians to see if additional work-up is needed.      Again, thank you for allowing me to participate in the care of your patient.      Sincerely,    Jose Ramon Pisano MD      "

## 2022-05-03 NOTE — PATIENT INSTRUCTIONS
If you have any questions you may reach out to me at [email or web address] once some level of normalcy has returned you may reach me via my office phone number is [phone].    For Scheduling you may call the main clinic number at [phone].

## 2022-05-03 NOTE — LETTER
"5/3/2022       RE: Tatiana Paredes  [address] [facility] [address]     Dear Colleague,    Thank you for referring your patient, Tatiana Paredes, to the [facility] VOICE CLINIC [place] at [facility]. Please see a copy of my visit note below.    [facility] VOICE CLINIC    Patient: Tatiana Paredes  Date of Visit: 5/3/2022    CHIEF COMPLAINT: Voice quality    HISTORY  Tatiana Paredes is a 56 year old year old woman, who was seen for follow-up evaluation in conjunction with a visit to Dr. Albright \"Shep\" Norris.      Summary impressions by author Calvin Tobias M.M. (voice), M.A., CCC-SLP from May 3, 2022:   She has a long-standing history of dysphonia.  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  She had worked for some time with Jada Lynn CCC-SLP; however, by the summer 2018 she was no longer able to achieve her goals through therapeutic means alone, and at this point injection augmentation was recommended.  She had excellent response to her first injection with 5 months of good voice quality, and a repeat injection was completed on 1/31/2019.  Response to the most recent injection was good initially, but voice quality deteriorated after approximately 2 months.    She was seen in clinic on 5/9/2019 for reevaluation and to discuss the possibility of thyroplasty.  At that time a lingering amount of Cymetra was noted in the right ventricle, and further decrease in mobility of the right true vocal fold was seen.  In light of these findings discussion of thyroplasty was postponed, and the patient was asked to follow-up in 2 months in order to allow time for full resorption.  A single session of speech therapy was recommended in the interim to help maximize function while waiting.  Patient completed this on 5/24/2019 and noted some benefit with the use of forward " "resonance sounds, though this was noted to be imperfect.  She was last seen on 10/22/2019 and there was discussion of possible thyroplasty.  It was emphasized that this would improve her stamina but not necessarily the clarity of her voicing.  She opted for independent practice of therapeutic exercises following that visit but was lost to follow-up subsequently.    INTERVAL HISTORY:    5/3/2022 - author: Calvin Tobias -today she reports that her vocal demands have shifted with COVID to many online meetings and presentations.  Overall she has been able to continue meeting her demands that she will find her voice \"goes out on her\" intermittently.  This typically recovers with sips of water or clearing her throat.  She continues to have some of the same types of challenges as she has historically and feels that voice therapy exercises have been fruitful though she does not practice them as consistently as she feels she probably should.  Given the timeline since her last evaluation she wanted to be reevaluated to gauge status of the physiology and determine if additional avenues for intervention would be warranted.    OBJECTIVE  PATIENT REPORTED MEASURES  Patient Supplied Answers To Last 2 VHI Questionnaires  Voice Handicap Index (VHI-10) 7/9/2019 10/22/2019   My voice makes it difficult for people to hear me 3 2   People have difficulty understanding me in a noisy room 3 3   My voice difficulties restrict my personal and social life.  2 1   I feel left out of conversations because of my voice 2 2   My voice problem causes me to lose income 0 0   I feel as though I have to strain to produce voice 2 2   The clarity of my voice is unpredictable 2 2   My voice problem upsets me 1 2   My voice makes me feel handicapped 2 2   People ask, \"What's wrong with your voice?\" 2 2   VHI-10 19 18        Patient Supplied Answers To Last 2 CSI Questionnaires  Cough Severity Index (CSI) 7/5/2018 1/31/2019   My cough is worse when I lie " down 0 0   My coughing problem causes me to restrict my personal and social life 0 0   I tend to avoid places because of my cough problem 0 0   I feel embarrassed because of my coughing problem 0 0   People ask, ''What's wrong?'' because I cough a lot 0 0   I run out of air when I cough 0 0   My coughing problem affects my voice 0 0   My coughing problem limits my physical activity 0 0   My coughing problem upsets me 0 0   People ask me if I am sick because I cough a lot 0 0   CSI Score 0 0        Patient Supplied Answers To Last 2 EAT Questionnaires  Eating Assessment Tool (EAT-10) 7/5/2018 1/31/2019   My swallowing problem has caused me to lose weight 0 0   My swallowing problem interferes with my ability to go out for meals 0 0   Swallowing liquids takes extra effort 0 0   Swallowing solids takes extra effort 0 0   Swallowing pills takes extra effort 0 0   Swallowing is painful 0 0   The pleasure of eating is affected by my swallowing 0 0   When I swallow food sticks in my throat 0 1   I cough when I eat 0 0   Swallowing is stressful 0 0   EAT-10 0 1     PERCEPTUAL EVALUATION (CPT 17445)  POSTURE / TENSION:     neck and shoulders     Frequently forward rolled shoulder posture    BREATHING:     appears within normal limits and adequate     shallow    phonation is not coordinated with respiration    LARYNGEAL PALPATION:     tenderness of the thyrohyoid area    VOICE:    Roughness: Mild to moderate (present in the form of pitch instability and fluctuation between registers)    Breathiness: Mild to moderate Intermittent    Strain: Mild to moderate Intermittent    Loudness    Conversational speech:  Mildly reduced    Projected speech:  Moderately reduced (significant tension noted on the attempt)    Pitch:    Conversational speech:  Variable with frequent light loft registration and glottal rios    Pitch glide:     Improved clarity with ascending pitch    Patient was able to show significantly improved stability even  "into upper modal registration following ascending    Resonance:    Conversational speech:  laryngeal pharyngeal resonance    Singing vs. Speech: Consistent across contexts    COUGH/THROAT CLEARING:    Not observed    THERAPY PROBES: Improvement was elicited with use of glottic coup to promote vocal fold closure and use of yawn sigh    LARYNGEAL EXAMINATION  Procedure: Flexible endoscopy with chip-tip technology without stroboscopy, right nostril; topical anesthesia with 3% Lidocaine and 0.25% phenylephrine was applied.   Performed by: Dr. Albright \"Shayne\" Norris  The laryngeal and pharyngeal structures were evaluated for gross appearance, mobility, function, and focal lesions / abnormalities of the associated mucosa.    All findings were within normal limits with the exception of the following salient features:     No notable pooling    Right true vocal fold is immobile in a near midline position with mildly concave vibratory margin    Left true vocal fold demonstrates brisk mobility and is able to come to midline to provide mildly weak glottic closure    Variable degrees of supraglottic recruitment are noted corresponding to when perceptual quality is dominated by glottal rios and decreased hyperfunction with loft registration    Best glottic configuration and clarity was achieved with glottic true followed by ascending and descending glides    The laryngeal exam was reviewed with Ms. Paredes, and I provided pertinent explanations, as well as written and oral information.    ASSESSMENT / PLAN  IMPRESSIONS: Tatiana Paredes presents today with ongoing dysphonia of a similar nature to when she was last seen 2019.  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  The degree of immobility is essentially unchanged from her last visit.  Perceptually this manifests as significant pitch " instability as well as frequent descent into glottal rios with corresponding reduction of intensity.  Patient is able to achieve improved quality with focus on therapeutic tasks such as glottic coup or finding optimal pitch range.    RECOMMENDATIONS:     The potential avenues for intervention including permanent augmentative procedure such as thyroplasty, adjuvant speech therapy, and speech therapy alone were discussed with the patient.  Following this discussion she decided that she would like to pursue a refresher course of speech therapy and this will be completed in the near future as it is feasible    Medically necessary speech therapy is warranted to optimize vocal technique and improve voice quality    She demonstrates a Good prognosis for improvement given adherence to therapeutic recommendations. Therapeutic     Positive indicators: positive response to therapy probes diagnosis is known to respond to treatment previously positive response to behavioral therapy    Negative indicators: None    DURATION / FREQUENCY: 3 biweekly and 2 monthly one-hour sessions    Research: Not a candidate.      GOALS:  Patient goal:   1. To improve and maintain a healthy voice quality  2. To understand the problem and fix it as much as possible    Short-term goal(s): Within the first 4 sessions, Ms. Paredes:  1. will demonstrate assigned laryngeal massage techniques with 80% accuracy or better with no clinician support  2. will accurately identify target vs. habitual voice quality during therapy tasks in 4 out of 5 trials with no clinician support  3. will demonstrate the ability to alternate between target and habitual voice quality given clinician cue 75% of the time during therapy tasks    Long-term goal(s): In 6 months, Ms. Paredes will:  1. Report a week of typical activities, in which Dysphonia does not exceed a level of 3 out of 10, 80% of the time    This treatment plan was developed with the patient who agreed with the  recommendations.    TOTAL SERVICE TIME: 35 minutes  EVALUATION OF VOICE AND RESONANCE (71277)  NO CHARGE FACILITY FEE (55478)    Matthieu Tobias M.M., M.A., CCC-SLP  Speech-Language Pathologist  Certificate of Vocology  [phone]    *this report was created in part through the use of computerized dictation software, and though reviewed following completion, some typographic errors may persist.  If there is confusion regarding any of this notes contents, please contact me for clarification.*        Again, thank you for allowing me to participate in the care of your patient.      Sincerely,    Calvin Tobias, SLP

## 2022-05-03 NOTE — PATIENT INSTRUCTIONS
1.  You were seen in the ENT Clinic today by . If you have any questions or concerns after your appointment, please call [phone]. Press option #1 for scheduling related needs. Press option #3 for Nurse advice.    2.   has recommended the following:   - speech therapy    3.  Plan is to return to clinic as needed      Caterina Null LPN  [phone]  [facility] - Otolaryngology

## 2022-05-03 NOTE — NURSING NOTE
"Chief Complaint   Patient presents with     Consult     Last saw Dr. Pisano in 2014; follow up on paralyzed vocal cord    Blood pressure 129/80, pulse 85, temperature 99  F (37.2  C), temperature source Temporal, height 1.702 m (5' 7\"), weight 68 kg (150 lb), SpO2 100 %, not currently breastfeeding. Melita Rowan, EMT  "

## 2022-05-11 ENCOUNTER — OFFICE VISIT (OUTPATIENT)
Dept: OTOLARYNGOLOGY | Facility: CLINIC | Age: 57
End: 2022-05-11
Payer: COMMERCIAL

## 2022-05-11 VITALS
SYSTOLIC BLOOD PRESSURE: 118 MMHG | DIASTOLIC BLOOD PRESSURE: 79 MMHG | HEIGHT: 67 IN | BODY MASS INDEX: 24.48 KG/M2 | TEMPERATURE: 97.7 F | WEIGHT: 156 LBS

## 2022-05-11 DIAGNOSIS — M95.0 NASAL DEFORMITY, ACQUIRED: Primary | ICD-10-CM

## 2022-05-11 PROCEDURE — 99212 OFFICE O/P EST SF 10 MIN: CPT | Performed by: OTOLARYNGOLOGY

## 2022-05-11 ASSESSMENT — PAIN SCALES - GENERAL: PAINLEVEL: NO PAIN (0)

## 2022-05-11 NOTE — PATIENT INSTRUCTIONS
1. You were seen in the ENT Clinic today by Dr. Ferguson.  If you have any questions or concerns after your appointment, please call   - Option 1: ENT Clinic: [phone]   - Option 2: Katarina (Dr. Ferguson's Nurse): [phone]          Theodora(Dr. Ferguson's Nurse): [phone]     2.   Plan to return to clinic to see Dr. Monik Maria LPN  [facility] - Otolaryngology

## 2022-05-11 NOTE — NURSING NOTE
"Chief Complaint   Patient presents with     RECHECK     Follow up -nasal fracture      Blood pressure 118/79, temperature 97.7  F (36.5  C), height 1.702 m (5' 7\"), weight 70.8 kg (156 lb), not currently breastfeeding.    Emmanuel Valdez LPN    "

## 2022-05-11 NOTE — LETTER
5/11/2022       RE: Tatiana Paredes  [address] [facility] [address]     Dear Colleague,    Thank you for referring your patient, Tatiana Paredes, to the [facility] EAR NOSE AND THROAT CLINIC [place] at [facility]. Please see a copy of my visit note below.    HISTORY OF PRESENT ILLNESS:  Tatiana returns to clinic after being seen initially in October 2021 with a followup in November for a closed nasal fracture.  She sustained a fracture when colliding with a door.  CT scan showed displacement of the nasal bones, but she had no obstruction of her breathing and tended not to pursue treatment at that time because there was some edema associated.  Even in the November examination, there was swelling to the nasal dorsum, but she elected not to pursue treatment at that time.    She now returns with continued nasal dorsal deformity with no trouble breathing on either side.  She feels a step-off at the mid vault area, which is troublesome to her.    PHYSICAL EXAMINATION:  Her examination is consistent with a closed nasal fracture.  The appearance is symmetrical, but there is a considerable nasal dorsal hump, which I feel some was preexisting.  The septum is midline.    ASSESSMENT:  Acquired nasal deformity, status post traumatic displacement.    PLAN:  In lieu of the needs expressed by this patient, I am referring her to Dr. Manisha Martinez for a detailed surgical evaluation and possible surgery.  Follow up with me as needed.    Again, thank you for allowing me to participate in the care of your patient.      Sincerely,    Cruz Ferguson MD

## 2022-05-11 NOTE — PROGRESS NOTES
HISTORY OF PRESENT ILLNESS:  Tatiana returns to clinic after being seen initially in October 2021 with a followup in November for a closed nasal fracture.  She sustained a fracture when colliding with a door.  CT scan showed displacement of the nasal bones, but she had no obstruction of her breathing and tended not to pursue treatment at that time because there was some edema associated.  Even in the November examination, there was swelling to the nasal dorsum, but she elected not to pursue treatment at that time.    She now returns with continued nasal dorsal deformity with no trouble breathing on either side.  She feels a step-off at the mid vault area, which is troublesome to her.    PHYSICAL EXAMINATION:  Her examination is consistent with a closed nasal fracture.  The appearance is symmetrical, but there is a considerable nasal dorsal hump, which I feel some was preexisting.  The septum is midline.    ASSESSMENT:  Acquired nasal deformity, status post traumatic displacement.    PLAN:  In lieu of the needs expressed by this patient, I am referring her to Dr. Manisha Martinez for a detailed surgical evaluation and possible surgery.  Follow up with me as needed.

## 2022-05-18 ENCOUNTER — ANCILLARY PROCEDURE (OUTPATIENT)
Dept: MAMMOGRAPHY | Facility: CLINIC | Age: 57
End: 2022-05-18
Payer: COMMERCIAL

## 2022-05-18 DIAGNOSIS — Z12.31 VISIT FOR SCREENING MAMMOGRAM: ICD-10-CM

## 2022-05-18 PROCEDURE — 77067 SCR MAMMO BI INCL CAD: CPT | Mod: GC | Performed by: STUDENT IN AN ORGANIZED HEALTH CARE EDUCATION/TRAINING PROGRAM

## 2022-05-18 PROCEDURE — 77063 BREAST TOMOSYNTHESIS BI: CPT | Mod: GC | Performed by: STUDENT IN AN ORGANIZED HEALTH CARE EDUCATION/TRAINING PROGRAM

## 2022-06-10 ENCOUNTER — TELEPHONE (OUTPATIENT)
Dept: OTOLARYNGOLOGY | Facility: CLINIC | Age: 57
End: 2022-06-10
Payer: COMMERCIAL

## 2022-06-10 NOTE — TELEPHONE ENCOUNTER
Left VM to offer an appt with Calvin Tobias on 6/13 virtually. Ok to book in a slot that open, no double booking. Slots are held for rescheduling of pts. CB number given.    Katarina Maria LPN

## 2022-06-22 ENCOUNTER — VIRTUAL VISIT (OUTPATIENT)
Dept: OTOLARYNGOLOGY | Facility: CLINIC | Age: 57
End: 2022-06-22
Payer: COMMERCIAL

## 2022-06-22 DIAGNOSIS — R49.0 DYSPHONIA: Primary | ICD-10-CM

## 2022-06-22 DIAGNOSIS — J38.01 VOCAL FOLD PARESIS, RIGHT: ICD-10-CM

## 2022-06-22 PROCEDURE — 92507 TX SP LANG VOICE COMM INDIV: CPT | Mod: GN | Performed by: SPEECH-LANGUAGE PATHOLOGIST

## 2022-06-22 NOTE — LETTER
"6/22/2022       RE: Tatiana Paredes  [address]  [facility] [address]     Dear Colleague,    Thank you for referring your patient, Tatiana Paredes, to the [facility] VOICE CLINIC [place] at [facility]. Please see a copy of my visit note below.    Tatiana Paredes is a 56 year old female who is being evaluated via a billable video visit.      The patient has been notified and verbally consented to the following:     This video visit will be conducted between you and your provider.    Patient has opted to conduct today's video visit vs an in-person appointment, and is not able to attend due to possible exposure to COVID-19.      If during the course of the call the provider feels a video visit is not appropriate, you will not be charged for this service.    Call initiated at: 10:00  Type of Video Platform Used: Accipiter Radar  Location of provider: Residence  Location of patient: [facility] VOICE CLINIC  THERAPY NOTE (CPT 80941)    Patient: Tatiana Paredes  Date of Service: 6/22/2022  Referring physician: Dr. Pisano  Impressions from most recent evaluation:  \"Tatiana Paredes presents today with ongoing dysphonia of a similar nature to when she was last seen 2019.  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  The degree of immobility is essentially unchanged from her last visit.  Perceptually this manifests as significant pitch instability as well as frequent descent into glottal rios with corresponding reduction of intensity.  Patient is able to achieve improved quality with focus on therapeutic tasks such as glottic coup or finding optimal pitch range.\"    SUBJECTIVE:  Since the patient's last session, they report the following:     Overall symptoms are about the same    Clarification of " "goals:    Improved vocal endurance    OBJECTIVE:  PATIENT REPORTED MEASURES:    Patient Supplied Answers To Last 2 VHI Questionnaires  Voice Handicap Index (VHI-10) 10/22/2019 6/22/2022   My voice makes it difficult for people to hear me 2 2   People have difficulty understanding me in a noisy room 3 3   My voice difficulties restrict my personal and social life.  1 2   I feel left out of conversations because of my voice 2 2   My voice problem causes me to lose income 0 0   I feel as though I have to strain to produce voice 2 2   The clarity of my voice is unpredictable 2 2   My voice problem upsets me 2 2   My voice makes me feel handicapped 2 2   People ask, \"What's wrong with your voice?\" 2 2   VHI-10 18 19     Patient Specific Goal Metrics:  Dysponia SLP Goals 6/22/2022   How would you rate your speaking voice quality, if 0 is worst voice quality, and 10 is best voice? 5   How much effort is it to speak, if 0 is no extra effort and 10 is maximum effort? 5   How much does your voice problem bother you? Somewhat     THERAPEUTIC ACTIVITIES    Counseling and Education:    Asked many questions about the nature of their symptoms, and I answered all of these thoroughly.    Exercises to promote reduced perilaryngeal muscle tension    Four way neck stretch instructed    Modifications for additional extension instructed    Base of tongue stretch instructed    Proper form for each stretch was emphasized, including:    Maintenance of posture for 10 breaths (~20-30 seconds)    Awareness of tension on inhalation with volitional relaxation into the stretch on exhalation    Avoidance of \"forcing\" a posture, only progressing far enough to feel the stretch    She reported awareness of significant tension during tongue stretch    She noted increased sense of muscular relaxation following completion of the stretches    Manual Laryngeal massage was performed in combination with gentle forward resonant sounds    Significant " tenderness of the thyrohyoid space with corresponding reduction of space     Thyrohyoid space, and base of tongue were targeted with gentle circular massage    Gentle lateralization of the larynx, and sternocleidomastoid massage was also performed.    Patient was trained to focus on intentional relaxation of jaw and tongue in addition to area of massage during these maneuvers.    Comfortably quiet forward resonant /m/ on descending glides was utilized throughout massage    Self-massage was instructed and patient was able to demonstrate this with acceptable accuracy    A regimen for home practice was instructed.    I provided an audio recording and handouts of today's therapeutic activities to facilitate practice.    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 Tatiana reports that her voice is in a pretty good place today but it is continued to be variable and approximately the same level as it has been at her last visit.  The use of manual therapy techniques was instructed today to help dissipate accrual of tension over the course of a day, and the patient was encouraged to return to the use of voice exercises with more consistency and gauge which avenues were most fruitful before her next session.    PLAN: I will see Tatiana in approximately 4 weeks or sooner as schedule permits at which point we will continue to advance optimizations of patterns of voice use based on progress during the interim  For practice goals see AVS.       TOTAL SERVICE TIME: 45 minutes  TREATMENT (80872)  NO CHARGE FACILITY FEE (62357)    Matthieu Tobias M.M., M.A., CCC-SLP  Speech-Language Pathologist  Certificate of Vocology  [phone]    *this report was created in part through the  use of computerized dictation software, and though reviewed following completion, some typographic errors may persist.  If there is confusion regarding any of this notes contents, please contact me for clarification.*        Again, thank you for allowing me to participate in the care of your patient.      Sincerely,    Calvin Tobias, SLP

## 2022-06-22 NOTE — PROGRESS NOTES
"Tatiana Paredes is a 56 year old female who is being evaluated via a billable video visit.      The patient has been notified and verbally consented to the following:     This video visit will be conducted between you and your provider.    Patient has opted to conduct today's video visit vs an in-person appointment, and is not able to attend due to possible exposure to COVID-19.      If during the course of the call the provider feels a video visit is not appropriate, you will not be charged for this service.    Call initiated at: 10:00  Type of Video Platform Used: YouEarnedIt  Location of provider: Residence  Location of patient: [facility]  THERAPY NOTE (CPT 44767)    Patient: Tatiana Paredes  Date of Service: 6/22/2022  Referring physician: Dr. Pisano  Impressions from most recent evaluation:  \"Tatiana Paredes presents today with ongoing dysphonia of a similar nature to when she was last seen 2019.  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  The degree of immobility is essentially unchanged from her last visit.  Perceptually this manifests as significant pitch instability as well as frequent descent into glottal rios with corresponding reduction of intensity.  Patient is able to achieve improved quality with focus on therapeutic tasks such as glottic coup or finding optimal pitch range.\"    SUBJECTIVE:  Since the patient's last session, they report the following:     Overall symptoms are about the same    Clarification of goals:    Improved vocal endurance    OBJECTIVE:  PATIENT REPORTED MEASURES:    Patient Supplied Answers To Last 2 VHI Questionnaires  Voice Handicap Index (VHI-10) 10/22/2019 6/22/2022   My voice makes it difficult for people to hear me 2 2   People have difficulty understanding me in a noisy room 3 3   My voice difficulties restrict " "my personal and social life.  1 2   I feel left out of conversations because of my voice 2 2   My voice problem causes me to lose income 0 0   I feel as though I have to strain to produce voice 2 2   The clarity of my voice is unpredictable 2 2   My voice problem upsets me 2 2   My voice makes me feel handicapped 2 2   People ask, \"What's wrong with your voice?\" 2 2   VHI-10 18 19     Patient Specific Goal Metrics:  Dysponia SLP Goals 6/22/2022   How would you rate your speaking voice quality, if 0 is worst voice quality, and 10 is best voice? 5   How much effort is it to speak, if 0 is no extra effort and 10 is maximum effort? 5   How much does your voice problem bother you? Somewhat     THERAPEUTIC ACTIVITIES    Counseling and Education:    Asked many questions about the nature of their symptoms, and I answered all of these thoroughly.    Exercises to promote reduced perilaryngeal muscle tension    Four way neck stretch instructed    Modifications for additional extension instructed    Base of tongue stretch instructed    Proper form for each stretch was emphasized, including:    Maintenance of posture for 10 breaths (~20-30 seconds)    Awareness of tension on inhalation with volitional relaxation into the stretch on exhalation    Avoidance of \"forcing\" a posture, only progressing far enough to feel the stretch    She reported awareness of significant tension during tongue stretch    She noted increased sense of muscular relaxation following completion of the stretches    Manual Laryngeal massage was performed in combination with gentle forward resonant sounds    Significant tenderness of the thyrohyoid space with corresponding reduction of space     Thyrohyoid space, and base of tongue were targeted with gentle circular massage    Gentle lateralization of the larynx, and sternocleidomastoid massage was also performed.    Patient was trained to focus on intentional relaxation of jaw and tongue in addition to area " of massage during these maneuvers.    Comfortably quiet forward resonant /m/ on descending glides was utilized throughout massage    Self-massage was instructed and patient was able to demonstrate this with acceptable accuracy    A regimen for home practice was instructed.    I provided an audio recording and handouts of today's therapeutic activities to facilitate practice.    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 Tatiana reports that her voice is in a pretty good place today but it is continued to be variable and approximately the same level as it has been at her last visit.  The use of manual therapy techniques was instructed today to help dissipate accrual of tension over the course of a day, and the patient was encouraged to return to the use of voice exercises with more consistency and gauge which avenues were most fruitful before her next session.    PLAN: I will see Tatiana in approximately 4 weeks or sooner as schedule permits at which point we will continue to advance optimizations of patterns of voice use based on progress during the interim  For practice goals see AVS.       TOTAL SERVICE TIME: 45 minutes  TREATMENT (99632)  NO CHARGE FACILITY FEE (71932)    Matthieu Tobias M.M., M.A., CCC-SLP  Speech-Language Pathologist  Certificate of Vocology  [phone]    *this report was created in part through the use of computerized dictation software, and though reviewed following completion, some typographic errors may persist.  If there is confusion regarding any of this notes contents, please contact me for clarification.*

## 2022-06-22 NOTE — PATIENT INSTRUCTIONS
Jesse Simpson,    It was nice to see you today, though I am sorry for the audio issues!  For the next little while I like for you to return to doing the voice exercises you have been using on and off on a more regular basis.  Try and pare it down to those things that feel most useful to you in a regimen that is about 5 minutes in duration.  Beyond this though I would like for you to incorporate the stretches and massages that we did today prior to that.  Using the exercises massages to break up your day will help avoid accruing tension and hopefully help you feel that at the end of the day you are as fatigued and the voice quality is more consistent.    I have sent you an email version of this message with the audio recordings I made.  I will be on the look out for additional appointments in the next few weeks and hopefully sneak you in sooner rather than later, but if I do not see you until July be sure to stay in touch and let me know how things are going!    -Calvin    Exercises:  Neck, and Tongue Stretches  Maintain a comfortably upright posture for the whole time. Don't let yourself slouch in the direction of the stretch  Hold each posture for 10 low, slow breaths.   On each inhalation recognize where you feel tension.   On each exhalation allow yourself to release whatever tension you can both in the muscle you're stretching, but also secondary muscles.  On the last exhalation of each set gently let your muscles return to their baseline position    Neck:  Let your head fall gently to the right, as though you are trying to touch your ear to your shoulder  If you need an additional stretch, reach your opposite hand (left) down toward the floor  If you need still MORE stretch use your right hand's weight to gently encourage your head toward your right shoulder  Let your head fall gently to the left, as though you are trying to touch your ear to your shoulder  If you need an additional stretch, reach your opposite hand  (right) down toward the floor  If you need still MORE stretch use your left hand's weight to gently encourage your head toward your right shoulder  Let your head fall forward as if you are trying to touch your chin to your sternum  If you need an additional stretch use the weight of one or both hands to gently encourage your head downward  Lift your head up like you are trying to look at the ceiling  If you need an additional stretch extend your jaw forward (like the drawer of a cash register opening)  You can also stretch the front /sides of your neck by looking back over your shoulder at the far corner of the room.    Tongue:  Put the tip of your tongue just behind your bottom teeth.  Roll the body of your tongue forward so that it is extending out toward your open mouth  Let yourself yawn and gently hold that position while continuing to breath  You can also massage this area, by pedaling your thumbs under your chin (anywhere  paralel to the floor).  You can test if it is released with voice use by gently humming while placing a finger against this area to see if it engages    Massage!    Each of these massages can be performed for 30 to 60 seconds.  I often find it easier to calm breaths versus seconds, so think about doing that for 10-20 slow breaths in and out.  It is helpful to tie voice use to the sense of relaxation particularly if discomfort comes with voice use in your case.  Spend the first half of a given massage focusing on a sense of relaxation, and then allow yourself to add gentle voice use on the exhale using a hum on a comfy pitch.    Laryngeal Massage  1) Using the index finger, find the Dominic's / Shabnam's apple. From there, slide the index finger and  thumb along the side of the larynx until you find the thyrohyoid space. These are little pockets  on each side of the larynx.  2) Use enough inward pressure to get traction on the cartilage, then with a rocking motion push down on the cartilage and up  on the bone.  This will be a small motion in the grand scheme of things.    Tongue Base Massage   1) Using the knuckle of your index finger or thumb, begin massaging in small, gentle circles right  under the chin. You can also hold your chin with your index finger and use your thumb.  2) Work along the sides, middle, and tip of the bottom of the chin with a gentle pressure.  3) It is important to keep the chin level or down to avoid putting strain on the neck muscles.    While you are doing these sounds I want  you to make a gentle  mmmm  sound on a comfy pitch  You can keep your fingers gently on your voice box and make the sound.  The voice box shouldn't move much up or down (it's ok if you feel vibrations)    Lateralization of the Larynx  1) Using the first 3 fingers of your hand gently lateralize the larynx toward your shoulder  2) You may feel some slight bumps or pops when you shift it to the side, but this is normal  3) If you feel your pulse very strongly, shift your fingers toward the front of your throat    Sternocleidomastoid massage  1) Starting with the muscle directly below / behind your ear follow the track of the SCM down at an angle toward the sternal notch  2) Use gentle circles with the pads of your first two fingers, drag down using the backs of your knuckles, or walk your fingers along the muscles as feels best

## 2022-06-29 ENCOUNTER — OFFICE VISIT (OUTPATIENT)
Dept: OBGYN | Facility: CLINIC | Age: 57
End: 2022-06-29
Attending: OBSTETRICS & GYNECOLOGY
Payer: COMMERCIAL

## 2022-06-29 VITALS
DIASTOLIC BLOOD PRESSURE: 79 MMHG | SYSTOLIC BLOOD PRESSURE: 121 MMHG | BODY MASS INDEX: 24.33 KG/M2 | WEIGHT: 155 LBS | HEIGHT: 67 IN | HEART RATE: 82 BPM

## 2022-06-29 DIAGNOSIS — Z29.89 NEED FOR PROPHYLAXIS AGAINST URINARY TRACT INFECTION: ICD-10-CM

## 2022-06-29 DIAGNOSIS — Z12.4 SCREENING FOR MALIGNANT NEOPLASM OF CERVIX: Primary | ICD-10-CM

## 2022-06-29 DIAGNOSIS — Z78.0 MENOPAUSE: ICD-10-CM

## 2022-06-29 PROCEDURE — G0463 HOSPITAL OUTPT CLINIC VISIT: HCPCS

## 2022-06-29 PROCEDURE — G0145 SCR C/V CYTO,THINLAYER,RESCR: HCPCS | Performed by: OBSTETRICS & GYNECOLOGY

## 2022-06-29 PROCEDURE — G0101 CA SCREEN;PELVIC/BREAST EXAM: HCPCS | Performed by: OBSTETRICS & GYNECOLOGY

## 2022-06-29 PROCEDURE — 87624 HPV HI-RISK TYP POOLED RSLT: CPT | Performed by: OBSTETRICS & GYNECOLOGY

## 2022-06-29 RX ORDER — NITROFURANTOIN 25; 75 MG/1; MG/1
100 CAPSULE ORAL
Qty: 30 CAPSULE | Refills: 11 | Status: SHIPPED | OUTPATIENT
Start: 2022-06-29

## 2022-06-29 NOTE — PROGRESS NOTES
CC/HPI:   Tatiana Paredes is a 56 year old female  who presents today for her pap and pelvic exam with a chief complaint of post-coital UTIs.    Pt has had multiple UTIs in the past year following sexual intercourse, with a history of recurrent UTI going back multiple years. In the past she has taken antibiotics prophylactically including bactrim, then later macrolides, and most recently ciprofloxacin. She wants to avoid taking ciprofloxacin due side effects. She has been avoiding traditional intercourse due to fear of duane a UTI.      States she feels that she ovulated last month.     HISTORIES:  Patient Active Problem List   Diagnosis     External hemorrhoids     Dysphonia     Choroidal nevus of right eye     Myopia     Hallux valgus of right foot     Knee instability     Pes planus of both feet     Pain of foot     Vocal fold paresis, right     Past Medical History:   Diagnosis Date     Breast pain, left 11/2013     Choroidal nevus of right eye      Constipation      Dysphonia      Gastroesophageal reflux disease      Genital herpes      Hemorrhoids      Hoarseness      Menorrhagia      Migraines      Past Surgical History:   Procedure Laterality Date     HC HYSTEROSCOPY, SURGICAL; W/ ENDOMETRIAL ABLATION, ANY METHOD  2011     Current Outpatient Medications   Medication Sig Dispense Refill     carboxymethylcellul-glycerin (REFRESH OPTIVE) 0.5-0.9 % SOLN ophthalmic solution Place 1 drop into both eyes 2 times daily 1 Bottle 3     famotidine (PEPCID) 20 MG tablet Take 20 mg by mouth nightly as needed       valACYclovir (VALTREX) 500 MG tablet Take 1 tablet (500 mg) by mouth 2 times daily 28 tablet 2     Allergies   Allergen Reactions     Contrast Dye Itching     CT contrast dye     Social History     Socioeconomic History     Marital status:      Spouse name: Not on file     Number of children: Not on file     Years of education: Not on file     Highest education level: Not on file    Occupational History     Not on file   Tobacco Use     Smoking status: Never Smoker     Smokeless tobacco: Never Used   Substance and Sexual Activity     Alcohol use: Yes     Alcohol/week: 0.8 - 4.2 standard drinks     Comment: 1-3 times a week     Drug use: No     Sexual activity: Yes     Partners: Male     Birth control/protection: Condom   Other Topics Concern      Service No     Blood Transfusions No     Caffeine Concern No     Occupational Exposure No     Hobby Hazards No     Sleep Concern No     Stress Concern No     Weight Concern No     Special Diet No     Back Care No     Exercise Yes     Bike Helmet Yes     Seat Belt Yes     Self-Exams Yes   Social History Narrative    How much exercise per week? Not enough    How much calcium per day? Some in her diet       How much caffeine per day? 2 cups     How much vitamin D per day? Some in diet    Do you/your family wear seatbelts?  Yes    Do you/your family use safety helmets? N/A    Do you/your family use sunscreen? Yes    Do you/your family keep firearms in the home? No    Do you/your family have a smoke detector(s)? Yes        Do you feel safe in your home? Yes    Has anyone ever touched you in an unwanted manner? No        06/17/2015        Works at [employer].     .    Natali Powell MD                 Social Determinants of Health     Financial Resource Strain: Not on file   Food Insecurity: Not on file   Transportation Needs: Not on file   Physical Activity: Not on file   Stress: Not on file   Social Connections: Not on file   Intimate Partner Violence: Not on file   Housing Stability: Not on file     Family History   Problem Relation Age of Onset     Breast Cancer Sister 52     Cancer - colorectal Maternal Grandmother 70     Migraines Mother      Unknown/Adopted Mother      Stomach Problem Mother      Stomach Cancer Mother      Prostate Cancer Father      Cancer Father      Glaucoma No family hx of      Macular Degeneration No  "family hx of      Retinal detachment No family hx of      Amblyopia No family hx of      Strabismus No family hx of      Glasses (<9 y/o) No family hx of      Nystagmus No family hx of           Gyn Hx:   Patient's last menstrual period was 07/18/2016 (exact date).    Review Of Systems:  CONSTITUTIONAL: NEGATIVE for fever, chills  INTEGUMENTARY/SKIN: NEGATIVE for worrisome rashes, moles or lesions  EYES: NEGATIVE for vision changes   ENT/MOUTH: NEGATIVE for ear, mouth and throat problems  RESP: NEGATIVE for significant cough or SOB  BREAST: NEGATIVE for masses, tenderness or discharge  CV: NEGATIVE for chest pain, palpitations   GI: NEGATIVE for nausea, abdominal pain, heartburn, or change in bowel habits  : NEGATIVE for frequency, dysuria, or hematuria  MUSCULOSKELETAL: NEGATIVE for significant arthralgias or myalgia  NEURO: Hx headaches-migraine  ENDOCRINE: NEGATIVE for temperature intolerance, skin/hair changes  HEME: NEGATIVE for bleeding problems  PSYCHIATRIC: NEGATIVE for changes in mood or affect    EXAM:  /79   Pulse 82   Ht 1.702 m (5' 7\")   Wt 70.3 kg (155 lb)   LMP 07/18/2016 (Exact Date)   BMI 24.28 kg/m    Body mass index is 24.28 kg/m .    General - pleasant female in no acute distress.  Skin - no suspicious lesions or rashes  EENT-  PERRLA, euthyroid with out palpable nodules  Neck - supple without lymphadenopathy.  Lungs - clear to auscultation bilaterally.  Heart - regular rate and rhythm without murmur.  Breasts- symmetrical . No dominant fixed or suspicious masses noted.  No skin or nipple changes or axillary nodes. Self exam is taught and encouraged monthly.  Abdomen - soft, nontender, nondistended, no masses or organomegaly noted.  Musculoskeletal - no gross deformities.  Neurological - normal strength, sensation, and mental status.  Pelvic - EG: normal  female, vulva reveals no erythema or lesions.   BUS: within normal limits.  Vagina: well rugated, no lesions polyps or suspicious  " discharge.     Cervix: no lesions, polyps discharge or CMT.  Uterus: firm, anteverted, normal size and nontender.  Adnexa: no masses or tenderness.  Anus- normal, no lesions.  Rectovaginal - deferred.    ASSESSMENT/PLAN  Normal gyn pelvic exam  Pap sent  Rx sent prophylactic antibiotics for use after intercourse  FSH ordered       Ricki Lizarraga, MS3  [facility] [employer]    The above patient was seen and evaluated with the medical student who acted as my scribe for the above note. Agree with note, changes made as appropriate.  Natali Powell MD

## 2022-07-01 LAB
BKR LAB AP GYN ADEQUACY: NORMAL
BKR LAB AP GYN INTERPRETATION: NORMAL
BKR LAB AP HPV REFLEX: NORMAL
BKR LAB AP PREVIOUS ABNORMAL: NORMAL
PATH REPORT.COMMENTS IMP SPEC: NORMAL
PATH REPORT.COMMENTS IMP SPEC: NORMAL
PATH REPORT.RELEVANT HX SPEC: NORMAL

## 2022-07-05 LAB
HUMAN PAPILLOMA VIRUS 16 DNA: NEGATIVE
HUMAN PAPILLOMA VIRUS 18 DNA: NEGATIVE
HUMAN PAPILLOMA VIRUS FINAL DIAGNOSIS: NORMAL
HUMAN PAPILLOMA VIRUS OTHER HR: NEGATIVE

## 2022-07-22 ENCOUNTER — LAB (OUTPATIENT)
Dept: LAB | Facility: CLINIC | Age: 57
End: 2022-07-22
Payer: COMMERCIAL

## 2022-07-22 DIAGNOSIS — Z78.0 MENOPAUSE: ICD-10-CM

## 2022-07-22 LAB — FSH SERPL IRP2-ACNC: 87 MIU/ML

## 2022-07-22 PROCEDURE — 36415 COLL VENOUS BLD VENIPUNCTURE: CPT | Performed by: PATHOLOGY

## 2022-07-22 PROCEDURE — 99000 SPECIMEN HANDLING OFFICE-LAB: CPT | Performed by: PATHOLOGY

## 2022-07-22 PROCEDURE — 83001 ASSAY OF GONADOTROPIN (FSH): CPT | Mod: 90 | Performed by: PATHOLOGY

## 2022-07-27 ENCOUNTER — VIRTUAL VISIT (OUTPATIENT)
Dept: OTOLARYNGOLOGY | Facility: CLINIC | Age: 57
End: 2022-07-27
Payer: COMMERCIAL

## 2022-07-27 DIAGNOSIS — R49.0 DYSPHONIA: Primary | ICD-10-CM

## 2022-07-27 DIAGNOSIS — J38.01 VOCAL FOLD PARESIS, RIGHT: ICD-10-CM

## 2022-07-27 PROCEDURE — 92507 TX SP LANG VOICE COMM INDIV: CPT | Mod: GN | Performed by: SPEECH-LANGUAGE PATHOLOGIST

## 2022-07-27 NOTE — LETTER
"7/27/2022       RE: Tatiana Paredes  [address]  [facility] [address]     Dear Colleague,    Thank you for referring your patient, Tatiana Paredes, to the [facility] [place] at [facility]. Please see a copy of my visit note below.    Tatiana Paredes is a 56 year old female who is being evaluated via a billable video visit.      The patient has been notified and verbally consented to the following:     This video visit will be conducted between you and your provider.    Patient has opted to conduct today's video visit vs an in-person appointment, and is not able to attend due to possible exposure to COVID-19.      If during the course of the call the provider feels a video visit is not appropriate, you will not be charged for this service.    Call initiated at: 8:00  Type of Video Platform Used: [employer]  Location of provider: Residence  Location of patient: [facility] VOICE CLINIC  THERAPY NOTE (CPT 49221)    Patient: Tatiana Paredes  Date of Service: 7/27/2022  Referring physician: Dr. Pisano  Impressions from most recent evaluation:  \"Tatiana Paredes presents today with ongoing dysphonia of a similar nature to when she was last seen 2019.  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  The degree of immobility is essentially unchanged from her last visit.  Perceptually this manifests as significant pitch instability as well as frequent descent into glottal rios with corresponding reduction of intensity.  Patient is able to achieve improved quality with focus on therapeutic tasks such as glottic coup or finding optimal pitch range.\"    SUBJECTIVE:  Since the patient's last session, they report the following:     Overall symptoms are somewhat better    Feels a bit better with " ashley at the end of the day    The most helpful therapy exercises    Flow phonation stimuli (/ju/ glides)    Pitch focused work was featured on the recording    OBJECTIVE:  PATIENT REPORTED MEASURES:  Patient Specific Goal Metrics:  Dysponia SLP Goals 6/22/2022   How would you rate your speaking voice quality, if 0 is worst voice quality, and 10 is best voice? 5   How much effort is it to speak, if 0 is no extra effort and 10 is maximum effort? 5   How much does your voice problem bother you? Somewhat     *see end of note for standardized measures*    THERAPEUTIC ACTIVITIES    Counseling and Education:    Asked many questions about the nature of their symptoms, and I answered all of these thoroughly.    Exercises to promote consistent access to vocal fold entrainment in speaking range    SOVT postures explored    /hu/ was found to be most facilitating    Tactile cue of hand in front of lips was used to raise awareness of airflow    With increased respriatory drive frequent registration breaks into loft registration    With guidance and repetition patient was able to consistently access mid range pitches near C#4 and B3    Focus placed on initiation of sound consistently without breaks    Gentle Aspirate onset used    Greatly improving accuracy over time    3 note phrase (3-5-1) used beginning at C#4 to mimic speaking inflection    Again greater support required initially but decreasing over time    Application to everyday phrases    Practice in focused way as well as with brief recalibration encouraged.      A regimen for home practice was instructed.    I provided handouts of today's therapeutic activities to facilitate practice.    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 Tatiana feels that her  "voice has moved in a positive direction though she isn't sure whether this is the result of the voice exercises or stretches / massages. Today we reframed previous exercises based on therapeutic rationales of high flow and more consistent mid range registration balance. She was able to demonstrate techniques with adequate accuracy and an audio recording was made to improve carryover into home practice.    PLAN: I will see Tatiana in ~2 weeks, at which point we will continue to advance optimized patterns of voice use.   For practice goals see AVS.       TOTAL SERVICE TIME: 50 minutes  TREATMENT (92396)  NO CHARGE FACILITY FEE (47782)    Matthieu Tobias M.M., M.A., CCC-SLP  Speech-Language Pathologist  Certificate of Vocology  [phone]    *this report was created in part through the use of computerized dictation software, and though reviewed following completion, some typographic errors may persist.  If there is confusion regarding any of this notes contents, please contact me for clarification.*    Patient Supplied Answers To Last 2 VHI Questionnaires  Voice Handicap Index (VHI-10) 6/22/2022 7/26/2022   My voice makes it difficult for people to hear me 2 2   People have difficulty understanding me in a noisy room 3 3   My voice difficulties restrict my personal and social life.  2 2   I feel left out of conversations because of my voice 2 2   My voice problem causes me to lose income 0 0   I feel as though I have to strain to produce voice 2 2   The clarity of my voice is unpredictable 2 2   My voice problem upsets me 2 1   My voice makes me feel handicapped 2 1   People ask, \"What's wrong with your voice?\" 2 2   VHI-10 19 17        Patient Supplied Answers To Last 2 CSI Questionnaires  Cough Severity Index (CSI) 7/5/2018 1/31/2019   My cough is worse when I lie down 0 0   My coughing problem causes me to restrict my personal and social life 0 0   I tend to avoid places because of my cough problem 0 0   I feel " embarrassed because of my coughing problem 0 0   People ask, ''What's wrong?'' because I cough a lot 0 0   I run out of air when I cough 0 0   My coughing problem affects my voice 0 0   My coughing problem limits my physical activity 0 0   My coughing problem upsets me 0 0   People ask me if I am sick because I cough a lot 0 0   CSI Score 0 0        Patient Supplied Answers To Last 2 EAT Questionnaires  Eating Assessment Tool (EAT-10) 7/5/2018 1/31/2019   My swallowing problem has caused me to lose weight 0 0   My swallowing problem interferes with my ability to go out for meals 0 0   Swallowing liquids takes extra effort 0 0   Swallowing solids takes extra effort 0 0   Swallowing pills takes extra effort 0 0   Swallowing is painful 0 0   The pleasure of eating is affected by my swallowing 0 0   When I swallow food sticks in my throat 0 1   I cough when I eat 0 0   Swallowing is stressful 0 0   EAT-10 0 1        Patient Supplied Answers to Dyspnea Index Questionnaire:  No flowsheet data found.        Again, thank you for allowing me to participate in the care of your patient.      Sincerely,    Calvin Tobias, SLP

## 2022-07-27 NOTE — PROGRESS NOTES
"Tatiana Paredes is a 56 year old female who is being evaluated via a billable video visit.      The patient has been notified and verbally consented to the following:     This video visit will be conducted between you and your provider.    Patient has opted to conduct today's video visit vs an in-person appointment, and is not able to attend due to possible exposure to COVID-19.      If during the course of the call the provider feels a video visit is not appropriate, you will not be charged for this service.    Call initiated at: 8:00  Type of Video Platform Used: [employer]  Location of provider: Residence  Location of patient: Residence    [facility] VOICE CLINIC  THERAPY NOTE (CPT 98186)    Patient: Tatiana Paredes  Date of Service: 7/27/2022  Referring physician: Dr. Pisano  Impressions from most recent evaluation:  \"Tatiana Paredes presents today with ongoing dysphonia of a similar nature to when she was last seen 2019.  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  The degree of immobility is essentially unchanged from her last visit.  Perceptually this manifests as significant pitch instability as well as frequent descent into glottal rios with corresponding reduction of intensity.  Patient is able to achieve improved quality with focus on therapeutic tasks such as glottic coup or finding optimal pitch range.\"    SUBJECTIVE:  Since the patient's last session, they report the following:     Overall symptoms are somewhat better    Feels a bit better with stamina at the end of the day    The most helpful therapy exercises    Flow phonation stimuli (/ju/ glides)    Pitch focused work was featured on the recording    OBJECTIVE:  PATIENT REPORTED MEASURES:  Patient Specific Goal Metrics:  Dysponia SLP Goals 6/22/2022   How would you rate your speaking voice quality, if 0 is worst " voice quality, and 10 is best voice? 5   How much effort is it to speak, if 0 is no extra effort and 10 is maximum effort? 5   How much does your voice problem bother you? Somewhat     *see end of note for standardized measures*    THERAPEUTIC ACTIVITIES    Counseling and Education:    Asked many questions about the nature of their symptoms, and I answered all of these thoroughly.    Exercises to promote consistent access to vocal fold entrainment in speaking range    SOVT postures explored    /hu/ was found to be most facilitating    Tactile cue of hand in front of lips was used to raise awareness of airflow    With increased respriatory drive frequent registration breaks into loft registration    With guidance and repetition patient was able to consistently access mid range pitches near C#4 and B3    Focus placed on initiation of sound consistently without breaks    Gentle Aspirate onset used    Greatly improving accuracy over time    3 note phrase (3-5-1) used beginning at C#4 to mimic speaking inflection    Again greater support required initially but decreasing over time    Application to everyday phrases    Practice in focused way as well as with brief recalibration encouraged.      A regimen for home practice was instructed.    I provided handouts of today's therapeutic activities to facilitate practice.    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 Tatiana feels that her voice has moved in a positive direction though she isn't sure whether this is the result of the voice exercises or stretches / massages. Today we reframed previous exercises based on therapeutic rationales of high flow and more consistent mid range registration balance. She was able to demonstrate techniques with adequate  "accuracy and an audio recording was made to improve carryover into home practice.    PLAN: I will see Tatiana in ~2 weeks, at which point we will continue to advance optimized patterns of voice use.   For practice goals see AVS.       TOTAL SERVICE TIME: 50 minutes  TREATMENT (44881)  NO CHARGE FACILITY FEE (77909)    Matthieu Tobias M.M., M.A., CCC-SLP  Speech-Language Pathologist  Certificate of Vocology  [phone]    *this report was created in part through the use of computerized dictation software, and though reviewed following completion, some typographic errors may persist.  If there is confusion regarding any of this notes contents, please contact me for clarification.*    Patient Supplied Answers To Last 2 VHI Questionnaires  Voice Handicap Index (VHI-10) 6/22/2022 7/26/2022   My voice makes it difficult for people to hear me 2 2   People have difficulty understanding me in a noisy room 3 3   My voice difficulties restrict my personal and social life.  2 2   I feel left out of conversations because of my voice 2 2   My voice problem causes me to lose income 0 0   I feel as though I have to strain to produce voice 2 2   The clarity of my voice is unpredictable 2 2   My voice problem upsets me 2 1   My voice makes me feel handicapped 2 1   People ask, \"What's wrong with your voice?\" 2 2   VHI-10 19 17        Patient Supplied Answers To Last 2 CSI Questionnaires  Cough Severity Index (CSI) 7/5/2018 1/31/2019   My cough is worse when I lie down 0 0   My coughing problem causes me to restrict my personal and social life 0 0   I tend to avoid places because of my cough problem 0 0   I feel embarrassed because of my coughing problem 0 0   People ask, ''What's wrong?'' because I cough a lot 0 0   I run out of air when I cough 0 0   My coughing problem affects my voice 0 0   My coughing problem limits my physical activity 0 0   My coughing problem upsets me 0 0   People ask me if I am sick because I cough a lot 0 " 0   CSI Score 0 0        Patient Supplied Answers To Last 2 EAT Questionnaires  Eating Assessment Tool (EAT-10) 7/5/2018 1/31/2019   My swallowing problem has caused me to lose weight 0 0   My swallowing problem interferes with my ability to go out for meals 0 0   Swallowing liquids takes extra effort 0 0   Swallowing solids takes extra effort 0 0   Swallowing pills takes extra effort 0 0   Swallowing is painful 0 0   The pleasure of eating is affected by my swallowing 0 0   When I swallow food sticks in my throat 0 1   I cough when I eat 0 0   Swallowing is stressful 0 0   EAT-10 0 1        Patient Supplied Answers to Dyspnea Index Questionnaire:  No flowsheet data found.

## 2022-08-04 ENCOUNTER — TELEPHONE (OUTPATIENT)
Dept: SURGERY | Facility: CLINIC | Age: 57
End: 2022-08-04

## 2022-08-04 NOTE — TELEPHONE ENCOUNTER
Rectal Bleeding:     I called Tatiana to review her symptoms.  She states in June she had a little bit of blood with hard stools.  Since then her stools have been softer.  She reports a total of 3 episodes of rectal bleeding.  The bleeding is on the toilet paper.  She does have some pain with bowel movements.  She does report a skin tag that has gotten larger.  [place] 19 to [place] 11.  I told her at this time I do not have a sooner appointment but if the patient cancels or we open up more clinic time I will give her a call to get her in sooner due to her concerns.     Queta Finch NP did have a sooner appt for 8/15. I called pt but unfortunately she did not  so was unable to make the appt. Queta Finch NP does not have anything sooner before she leaves town. Pt is on waitlist.

## 2022-08-04 NOTE — TELEPHONE ENCOUNTER
[facility] Call Center    Phone Message    May a detailed message be left on voicemail: yes     Reason for Call: Other: Per pt would know if she can be seen sooner since she is starting to see bleeding through out the day here and there. Per pt thinks its not because of hemorrhoids.. Please call pt back to discuss. Thank you!     Action Taken: Message routed to:  [facility] CLR    Travel Screening: Not Applicable

## 2022-08-10 ENCOUNTER — VIRTUAL VISIT (OUTPATIENT)
Dept: OTOLARYNGOLOGY | Facility: CLINIC | Age: 57
End: 2022-08-10
Payer: COMMERCIAL

## 2022-08-10 DIAGNOSIS — R49.0 DYSPHONIA: Primary | ICD-10-CM

## 2022-08-10 DIAGNOSIS — J38.01 VOCAL FOLD PARESIS, RIGHT: ICD-10-CM

## 2022-08-10 PROCEDURE — 92507 TX SP LANG VOICE COMM INDIV: CPT | Mod: GN | Performed by: SPEECH-LANGUAGE PATHOLOGIST

## 2022-08-10 NOTE — LETTER
"8/10/2022       RE: Tatiana Paredes  [address]  [facility] [address]     Dear Colleague,    Thank you for referring your patient, Tatiana Paredes, to the [facility] [place] at [facility]. Please see a copy of my visit note below.    Tatiana Paredes is a 56 year old female who is being evaluated via a billable video visit.      The patient has been notified and verbally consented to the following:     This video visit will be conducted between you and your provider.    Patient has opted to conduct today's video visit vs an in-person appointment, and is not able to attend due to possible exposure to COVID-19.      If during the course of the call the provider feels a video visit is not appropriate, you will not be charged for this service.    Call initiated at: 8:03  Type of Video Platform Used: [employer]  Location of provider: Residence  Location of patient: [facility] VOICE CLINIC  THERAPY NOTE (CPT 70914)    Patient: Tatiana Paredes  Date of Service: 8/10/2022  Referring physician: Dr. Pisano  Impressions from most recent evaluation:  \"Tatiana Paredes presents today with ongoing dysphonia of a similar nature to when she was last seen 2019.  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  The degree of immobility is essentially unchanged from her last visit.  Perceptually this manifests as significant pitch instability as well as frequent descent into glottal rios with corresponding reduction of intensity.  Patient is able to achieve improved quality with focus on therapeutic tasks such as glottic coup or finding optimal pitch range.\"    SUBJECTIVE:  Since the patient's last session, they report the following:     Overall symptoms are about the same    Hard to find time to " practice    Trying to use techniques on and off during the day    Overall she does not feel she has had enough time to evaluate the efficacy of the most recent strategies since she was last seen    OBJECTIVE:  PATIENT REPORTED MEASURES:  Patient Specific Goal Metrics:  Dysponia SLP Goals 6/22/2022 8/10/2022   How would you rate your speaking voice quality, if 0 is worst voice quality, and 10 is best voice? 5 5   How much effort is it to speak, if 0 is no extra effort and 10 is maximum effort? 5 5   How much does your voice problem bother you? Somewhat Somewhat     *see end of note for standardized measures*    THERAPEUTIC ACTIVITIES    Counseling and Education:    Discussion of ways of caring of her practice into daily life  o Use of audio recordings during driving on weekends    Exercises to promote carryover of therapeutic techniques    Conversational training therapy type modalities utilized    Alternation between habitual as target voice trialed without specific cueing    This was not found to be facilitating    Alternation between clipped  staccato productions versus stretched out legato productions    Much more facilitating    Patient noted that more staccato voicing sounded more consistent in quality which led to a discussion of consistency versus clarity     Improved projection was noted with legato productions and decreased audio perceptual strain appreciated    This was explored within a conversational context with moderate support      A regimen for home practice was instructed.    I provided after visit summary discussing today's therapeutic activities to facilitate practice.    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 Tatiana physical therapeutic exercises continue to help in some  "ways, does not feel that she has been able to practice consistently and over allotment of time to fully gauge the efficacy of her most recent exercises.  Carryover into daily life started today through the addition of conversational training therapy type modality which can be used in conjunction with prior exercises.    PLAN: I will see Tatiana in approximately 3 weeks, at which point we will gauge progress during the interim.   For practice goals see AVS.       TOTAL SERVICE TIME: 50 minutes  TREATMENT (55314)  NO CHARGE FACILITY FEE (72277)    Matthieu Tobias M.M., M.A., CCC-SLP  Speech-Language Pathologist  Certificate of Vocology  [phone]    *this report was created in part through the use of computerized dictation software, and though reviewed following completion, some typographic errors may persist.  If there is confusion regarding any of this notes contents, please contact me for clarification.*    Patient Supplied Answers To Last 2 VHI Questionnaires  Voice Handicap Index (VHI-10) 7/26/2022 8/10/2022   My voice makes it difficult for people to hear me 2 2   People have difficulty understanding me in a noisy room 3 3   My voice difficulties restrict my personal and social life.  2 2   I feel left out of conversations because of my voice 2 2   My voice problem causes me to lose income 0 0   I feel as though I have to strain to produce voice 2 2   The clarity of my voice is unpredictable 2 2   My voice problem upsets me 1 1   My voice makes me feel handicapped 1 2   People ask, \"What's wrong with your voice?\" 2 2   VHI-10 17 18        Patient Supplied Answers To Last 2 CSI Questionnaires  Cough Severity Index (CSI) 7/5/2018 1/31/2019   My cough is worse when I lie down 0 0   My coughing problem causes me to restrict my personal and social life 0 0   I tend to avoid places because of my cough problem 0 0   I feel embarrassed because of my coughing problem 0 0   People ask, ''What's wrong?'' because I cough " a lot 0 0   I run out of air when I cough 0 0   My coughing problem affects my voice 0 0   My coughing problem limits my physical activity 0 0   My coughing problem upsets me 0 0   People ask me if I am sick because I cough a lot 0 0   CSI Score 0 0        Patient Supplied Answers To Last 2 EAT Questionnaires  Eating Assessment Tool (EAT-10) 7/5/2018 1/31/2019   My swallowing problem has caused me to lose weight 0 0   My swallowing problem interferes with my ability to go out for meals 0 0   Swallowing liquids takes extra effort 0 0   Swallowing solids takes extra effort 0 0   Swallowing pills takes extra effort 0 0   Swallowing is painful 0 0   The pleasure of eating is affected by my swallowing 0 0   When I swallow food sticks in my throat 0 1   I cough when I eat 0 0   Swallowing is stressful 0 0   EAT-10 0 1        Patient Supplied Answers to Dyspnea Index Questionnaire:  No flowsheet data found.        Again, thank you for allowing me to participate in the care of your patient.      Sincerely,    Calvin Tobias, SLP

## 2022-08-10 NOTE — PROGRESS NOTES
"Tatiana Paredes is a 56 year old female who is being evaluated via a billable video visit.      The patient has been notified and verbally consented to the following:     This video visit will be conducted between you and your provider.    Patient has opted to conduct today's video visit vs an in-person appointment, and is not able to attend due to possible exposure to COVID-19.      If during the course of the call the provider feels a video visit is not appropriate, you will not be charged for this service.    Call initiated at: 8:03  Type of Video Platform Used: [employer]  Location of provider: Residence  Location of patient: [facility]  THERAPY NOTE (CPT 66624)    Patient: Tatiana Paredes  Date of Service: 8/10/2022  Referring physician: Dr. Pisano  Impressions from most recent evaluation:  \"Tatiana Paredes presents today with ongoing dysphonia of a similar nature to when she was last seen 2019.  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  The degree of immobility is essentially unchanged from her last visit.  Perceptually this manifests as significant pitch instability as well as frequent descent into glottal rios with corresponding reduction of intensity.  Patient is able to achieve improved quality with focus on therapeutic tasks such as glottic coup or finding optimal pitch range.\"    SUBJECTIVE:  Since the patient's last session, they report the following:     Overall symptoms are about the same    Hard to find time to practice    Trying to use techniques on and off during the day    Overall she does not feel she has had enough time to evaluate the efficacy of the most recent strategies since she was last seen    OBJECTIVE:  PATIENT REPORTED MEASURES:  Patient Specific Goal Metrics:  Dysponia SLP Goals 6/22/2022 8/10/2022   How would you rate your " speaking voice quality, if 0 is worst voice quality, and 10 is best voice? 5 5   How much effort is it to speak, if 0 is no extra effort and 10 is maximum effort? 5 5   How much does your voice problem bother you? Somewhat Somewhat     *see end of note for standardized measures*    THERAPEUTIC ACTIVITIES    Counseling and Education:    Discussion of ways of caring of her practice into daily life  o Use of audio recordings during driving on weekends    Exercises to promote carryover of therapeutic techniques    Conversational training therapy type modalities utilized    Alternation between habitual as target voice trialed without specific cueing    This was not found to be facilitating    Alternation between clipped  staccato productions versus stretched out legato productions    Much more facilitating    Patient noted that more staccato voicing sounded more consistent in quality which led to a discussion of consistency versus clarity     Improved projection was noted with legato productions and decreased audio perceptual strain appreciated    This was explored within a conversational context with moderate support      A regimen for home practice was instructed.    I provided after visit summary discussing today's therapeutic activities to facilitate practice.    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 Tatiana physical therapeutic exercises continue to help in some ways, does not feel that she has been able to practice consistently and over allotment of time to fully gauge the efficacy of her most recent exercises.  Carryover into daily life started today through the addition of conversational training therapy type modality which can be used in conjunction with prior exercises.    PLAN: I will see  "Tatiana in approximately 3 weeks, at which point we will gauge progress during the interim.   For practice goals see AVS.       TOTAL SERVICE TIME: 50 minutes  TREATMENT (69063)  NO CHARGE FACILITY FEE (93225)    Matthieu Tobias M.M., M.A., CCC-SLP  Speech-Language Pathologist  Certificate of Vocology  [phone]    *this report was created in part through the use of computerized dictation software, and though reviewed following completion, some typographic errors may persist.  If there is confusion regarding any of this notes contents, please contact me for clarification.*    Patient Supplied Answers To Last 2 VHI Questionnaires  Voice Handicap Index (VHI-10) 7/26/2022 8/10/2022   My voice makes it difficult for people to hear me 2 2   People have difficulty understanding me in a noisy room 3 3   My voice difficulties restrict my personal and social life.  2 2   I feel left out of conversations because of my voice 2 2   My voice problem causes me to lose income 0 0   I feel as though I have to strain to produce voice 2 2   The clarity of my voice is unpredictable 2 2   My voice problem upsets me 1 1   My voice makes me feel handicapped 1 2   People ask, \"What's wrong with your voice?\" 2 2   VHI-10 17 18        Patient Supplied Answers To Last 2 CSI Questionnaires  Cough Severity Index (CSI) 7/5/2018 1/31/2019   My cough is worse when I lie down 0 0   My coughing problem causes me to restrict my personal and social life 0 0   I tend to avoid places because of my cough problem 0 0   I feel embarrassed because of my coughing problem 0 0   People ask, ''What's wrong?'' because I cough a lot 0 0   I run out of air when I cough 0 0   My coughing problem affects my voice 0 0   My coughing problem limits my physical activity 0 0   My coughing problem upsets me 0 0   People ask me if I am sick because I cough a lot 0 0   CSI Score 0 0        Patient Supplied Answers To Last 2 EAT Questionnaires  Eating Assessment Tool " (EAT-10) 7/5/2018 1/31/2019   My swallowing problem has caused me to lose weight 0 0   My swallowing problem interferes with my ability to go out for meals 0 0   Swallowing liquids takes extra effort 0 0   Swallowing solids takes extra effort 0 0   Swallowing pills takes extra effort 0 0   Swallowing is painful 0 0   The pleasure of eating is affected by my swallowing 0 0   When I swallow food sticks in my throat 0 1   I cough when I eat 0 0   Swallowing is stressful 0 0   EAT-10 0 1        Patient Supplied Answers to Dyspnea Index Questionnaire:  No flowsheet data found.

## 2022-08-12 ENCOUNTER — OFFICE VISIT (OUTPATIENT)
Dept: OBGYN | Facility: CLINIC | Age: 57
End: 2022-08-12
Payer: COMMERCIAL

## 2022-08-12 VITALS — HEIGHT: 67 IN | BODY MASS INDEX: 24.34 KG/M2 | WEIGHT: 155.1 LBS

## 2022-08-12 DIAGNOSIS — R30.0 DYSURIA: Primary | ICD-10-CM

## 2022-08-12 LAB
ALBUMIN UR-MCNC: NEGATIVE MG/DL
APPEARANCE UR: CLEAR
BILIRUB UR QL STRIP: NEGATIVE
COLOR UR AUTO: YELLOW
GLUCOSE UR STRIP-MCNC: NEGATIVE MG/DL
HGB UR QL STRIP: NEGATIVE
KETONES UR STRIP-MCNC: NEGATIVE MG/DL
LEUKOCYTE ESTERASE UR QL STRIP: NEGATIVE
NITRATE UR QL: NEGATIVE
PH UR STRIP: 6 [PH] (ref 5–8)
SP GR UR STRIP: <=1.005 (ref 1–1.03)
UROBILINOGEN UR STRIP-ACNC: 0.2 E.U./DL

## 2022-08-12 PROCEDURE — 99213 OFFICE O/P EST LOW 20 MIN: CPT | Mod: GC | Performed by: STUDENT IN AN ORGANIZED HEALTH CARE EDUCATION/TRAINING PROGRAM

## 2022-08-12 PROCEDURE — 87086 URINE CULTURE/COLONY COUNT: CPT | Performed by: STUDENT IN AN ORGANIZED HEALTH CARE EDUCATION/TRAINING PROGRAM

## 2022-08-12 PROCEDURE — 81003 URINALYSIS AUTO W/O SCOPE: CPT | Performed by: STUDENT IN AN ORGANIZED HEALTH CARE EDUCATION/TRAINING PROGRAM

## 2022-08-12 PROCEDURE — G0463 HOSPITAL OUTPT CLINIC VISIT: HCPCS

## 2022-08-12 NOTE — TELEPHONE ENCOUNTER
FUTURE VISIT INFORMATION      FUTURE VISIT INFORMATION:    Date: 11/16/22    Time: 10AM    Location: [facility]  REFERRAL INFORMATION:    Referring provider:  Cruz Prather MD    Referring providers clinic:  [facility]     Reason for visit/diagnosis  New nose- Referred by Dr. Ferguson     RECORDS REQUESTED FROM:       Clinic name Comments Records Status Imaging Status   [facility]  10/12/21 note from Cruz Prather MD Epic    Imaging  10/19/21 CT Facial Bone   11/20/17 MR Brain and MR NECK  [facility] PACS   [facility] [place] 8/10/22 note from Calvin Tobias, SLP  5/11/22 note from Dr Ferguson   5/3/22 note from Dr Norris URBAN

## 2022-08-12 NOTE — PROGRESS NOTES
"[facility]   Brief Progress Note  2022    CC: Dysuria    S: Tatiana Paredes is a 56 year old  presenting with a 1 day history of dysuria/suprapubic pain with urination. Denies hematuria, increased urinary frequency. No change in vaginal discharge. No flank pain, fevers, chills. Endorses some vaginal pruritis since this morning as well. She is concerned that she may have a UTI and is planning to leave for [place] [facility] next Friday.     O:  Ht 1.702 m (5' 7\")   Wt 70.4 kg (155 lb 1.6 oz)   LMP 2016 (Exact Date)   BMI 24.29 kg/m      Gen: NAD, alert  HEENT: AT/NC  Resp: non-labored breathing on room air  : Normal external genitalia; atrophic vaginal mucosa; no vaginal discharge; normal appearing, smooth cervix without lesions; wet prep collected. Exam chaperoned by Dr. Powell.  Psych: normal mood and affect     Labs:   POC urinalysis wnl  POC wet prep wnl  UCx pending     Imaging: None    A/P:  56 year old  presenting with dysuria with negative wet prep and UA    # Dysuria  - Follow up urine culture and will treat if positive   - Offered Rx for Macrobid to begin if symptoms worsen during vacation and patient declines as she already has Macrobid at home     RTC PRN/if symptoms worsen or fail to improve    Disucssed with Dr. Andre Davis MD  Ob/Gyn Resident, PGY-4  22     The Patient was seen in Resident Continuity Clinic by JUANPABLO DAVIS.  I have seen and examined the patient with the resident.     Natali Powell MD      "

## 2022-08-14 LAB — BACTERIA UR CULT: NO GROWTH

## 2022-08-15 NOTE — PATIENT INSTRUCTIONS
Jesse Simpson,    It was good to see you on Wednesday of last week.  I wanted to put in a quick note describing some of the things that he worked on.  One of the key things we need to do is figure out ways for you to incorporate the exercises more naturally into your daily life versus carving out time.  The technique we used during last session is called conversational training therapy and it focuses on the use of negative practice (alternation between different ways of doing the same thing) to improve your awareness of and accuracy with accessing the voice we are aiming for.  The most useful alternation was switching back and forth between a more clipped / staccato voice quality and more flowing/legato voice quality.  You were more aware of variations in quality with the legato voice, but from the outside there was improved projection and clarity overall.  My hope is that you feel over time you are able to maintain that quality more consistently and that will in turn become as consistent as the clipped/staccato voice.    He can find 3 conversations a day or reading something aloud where you switch back and forth that would be wonderful.  I will forward to seeing you after you return from your trip!    -Data

## 2022-08-19 NOTE — PROGRESS NOTES
HPI  Tatiana Paredes is a 51 yo female comes in for physical  today. She had HA's and possible mild increase BP. She states her mother had HA's and mild increase BP and takes a BP medication and feels better. She denies any neurological sxs. She states some hot feeling as well last few months. She has not had menstrual cycle because of past endometrial ablation.   She follows with Dr. Pisano, ENT for voice changes and gets vocal cord injections She is getting speech therapy. Last endoscopy 7/2015 and was normal.  She had normal CXR 1/17.  She does not smoke nor abuse EtOH. No change in family history. No other HEENT, cardiopulmonary, abdominal, , GYN, neurological, systemic, psychiatric, skin, vascular, lymphatic, musculoskeletal complaints. She does not smoke nor abuse EtOH.     Past Medical History:   Diagnosis Date     Breast pain, left 11/2013     Choroidal nevus of right eye      Constipation      Dysphonia      Gastroesophageal reflux disease      Hemorrhoids      Hoarseness      Menorrhagia      Migraines      Current Outpatient Medications   Medication     nitroFURantoin macrocrystal-monohydrate (MACROBID) 100 MG capsule     valACYclovir (VALTREX) 500 MG tablet     No current facility-administered medications for this visit.          PE:     Vitals noted, gen nad cooperative alert, HEENT, oropharynx clear no exudate, neck supple nl rom, no B carotid bruits, lungs with good air movement, RRR, S1, S2, no MRG, positive Bowel sounds, no acute findings. Grossly normal neurological exam.       Assessment/Plan    1. She had mammogram  9/20/2019.  She saw   Dr. Powell GYN 4/24/2019.    2. Previous abdominal complaints; CT scan unremarkable 7/2/16. Ordered colonoscopy and MRCP and this was done 12/29/16 as unremarkable. She has mild somewhat chronic L upper quadrant pain. Will get colonoscopy and if persists will do abdominal/pelvic CT imaging.   3. She remains on H2 blocker for reflux; last EGD 7/15.  4. She  continues to follow with  Dr. Pisano, ENT  for hoarse voice. She is getting speech therapy. EGD done 11/17/17 and she is on H2 blocker.   MRI brain and MRI neck 11/20/17 showing R sided vocal cord findings.  She had Cymetra vocal cord injection 7/5/2018. She saw Dr. Pisano 10/22/2019  5. Fasting lipids done 8/27/2018 and ordered today 1/14/2020  6. She got this year's influenza vaccination. She completed [employer] vaccination series   7. Seen by Dr. Foster Neurology 4/16/2018 to assess any neurological issues with vocal cord findings.   8. Ordered MRI/MRA for new HA's and new B ear sounds done 11/2/2018 normal.   9. For abdominal complaints and rectal sxs. (bleeding); Ordered colonoscopy and she has colorectal appt. 2/14/2020.      I will see her back in about one month.            fall precautions

## 2022-09-20 NOTE — PROGRESS NOTES
HPI:    Ms. Paredes comes in for a physical today. She has constipation and some rectal bleeding. She still has some L upper abdominal pain. She has R upper arm pain as well. She denies any R arm injury. No injection in her R arm (she states usually in the L arm). Otherwise, no additional HEENT, cardiopulmonary, abdominal, , GYN, neurological, systemic, psychiatric, lymphatic, endocrine, vascular complaints.       Past Medical History:   Diagnosis Date     Breast pain, left 11/2013     Choroidal nevus of right eye      Constipation      Dysphonia      Gastroesophageal reflux disease      Genital herpes      Hemorrhoids      Hoarseness      Menorrhagia      Migraines      Past Surgical History:   Procedure Laterality Date     [facility] HYSTEROSCOPY, SURGICAL; W/ ENDOMETRIAL ABLATION, ANY METHOD  2011     PE:    Vitals noted, gen, nad, cooperative, alert, neck supple nl rom, lungs with good air movement, RRR, S1, S2, no MRG, abdomen, no acute findings and positive bowel sounds. Grossly normal neurological exam. R upper arm with tenderness but good ROM, no joint tenderness nor swelling.     A/P:    1. Immunizations; she has completed both the Shing[employer].  Pfizer COVID vaccination x 4 (last 7/29/2022).  Influenza vaccine today. Tdap 11/13/3013.   2. Reflux/throat complaints; EGD 11/17/2017. She remains on famotidine. She saw Dr. Pisano last 5/3/2022  3. Dermatology; Telemedicine with  Dr. Eduardo. 10/14/2021 in [facility] Every[facility].   4. Mammogram; 5/18/2022  5. Colonoscopy; 2/6/2020; repeat in 5 years. Ordered future colonoscopy today 9/21/2022.   6. Lipids checked 4/13/2021 with  and HDL 65 and ordered 9/20/2022  7. Seen in GYN 8/12/2022. She was also seen by Dr. Powell, GN 6/29/2022  8. Seen 5/11/2022 by Dr. Rawls ENT for nasal fracture. She has 11/16/2022 follow up with Dr. Ruggiero  9. Hemorrhoids seen by Ms. Oseas Haley María, Colorectal 1/5/2022 and next vist 10/10/2022  10. Nutrition referral for weight gain   11. R  arm X-rays today. Consider PT and if persists R arm/shoulder MRI and orthopedic evaluation.   12. Chronic abdominal pain She had a MRI abdomen 4/23/2021.

## 2022-09-21 ENCOUNTER — OFFICE VISIT (OUTPATIENT)
Dept: INTERNAL MEDICINE | Facility: CLINIC | Age: 57
End: 2022-09-21
Payer: COMMERCIAL

## 2022-09-21 ENCOUNTER — ANCILLARY PROCEDURE (OUTPATIENT)
Dept: GENERAL RADIOLOGY | Facility: CLINIC | Age: 57
End: 2022-09-21
Attending: INTERNAL MEDICINE
Payer: COMMERCIAL

## 2022-09-21 ENCOUNTER — LAB (OUTPATIENT)
Dept: LAB | Facility: CLINIC | Age: 57
End: 2022-09-21

## 2022-09-21 VITALS
WEIGHT: 155.6 LBS | HEIGHT: 67 IN | BODY MASS INDEX: 24.42 KG/M2 | SYSTOLIC BLOOD PRESSURE: 125 MMHG | OXYGEN SATURATION: 98 % | HEART RATE: 77 BPM | DIASTOLIC BLOOD PRESSURE: 79 MMHG

## 2022-09-21 DIAGNOSIS — Z13.220 SCREENING CHOLESTEROL LEVEL: ICD-10-CM

## 2022-09-21 DIAGNOSIS — Z23 NEED FOR VACCINATION: ICD-10-CM

## 2022-09-21 DIAGNOSIS — Z13.220 SCREENING CHOLESTEROL LEVEL: Primary | ICD-10-CM

## 2022-09-21 DIAGNOSIS — R63.5 WEIGHT GAIN: ICD-10-CM

## 2022-09-21 DIAGNOSIS — M79.601 PAIN OF RIGHT UPPER EXTREMITY: ICD-10-CM

## 2022-09-21 LAB
ALBUMIN SERPL BCG-MCNC: 4.7 G/DL (ref 3.5–5.2)
ALP SERPL-CCNC: 81 U/L (ref 35–104)
ALT SERPL W P-5'-P-CCNC: 19 U/L (ref 10–35)
ANION GAP SERPL CALCULATED.3IONS-SCNC: 12 MMOL/L (ref 7–15)
AST SERPL W P-5'-P-CCNC: 26 U/L (ref 10–35)
BASOPHILS # BLD AUTO: 0 10E3/UL (ref 0–0.2)
BASOPHILS NFR BLD AUTO: 1 %
BILIRUB SERPL-MCNC: 0.4 MG/DL
BUN SERPL-MCNC: 15.9 MG/DL (ref 6–20)
CALCIUM SERPL-MCNC: 10.1 MG/DL (ref 8.6–10)
CHLORIDE SERPL-SCNC: 104 MMOL/L (ref 98–107)
CHOLEST SERPL-MCNC: 221 MG/DL
CREAT SERPL-MCNC: 0.67 MG/DL (ref 0.51–0.95)
DEPRECATED HCO3 PLAS-SCNC: 25 MMOL/L (ref 22–29)
EOSINOPHIL # BLD AUTO: 0.1 10E3/UL (ref 0–0.7)
EOSINOPHIL NFR BLD AUTO: 1 %
ERYTHROCYTE [DISTWIDTH] IN BLOOD BY AUTOMATED COUNT: 12.9 % (ref 10–15)
GFR SERPL CREATININE-BSD FRML MDRD: >90 ML/MIN/1.73M2
GLUCOSE SERPL-MCNC: 100 MG/DL (ref 70–99)
HCT VFR BLD AUTO: 40.9 % (ref 35–47)
HDLC SERPL-MCNC: 76 MG/DL
HGB BLD-MCNC: 13.4 G/DL (ref 11.7–15.7)
IMM GRANULOCYTES # BLD: 0 10E3/UL
IMM GRANULOCYTES NFR BLD: 0 %
LDLC SERPL CALC-MCNC: 132 MG/DL
LYMPHOCYTES # BLD AUTO: 2.1 10E3/UL (ref 0.8–5.3)
LYMPHOCYTES NFR BLD AUTO: 34 %
MCH RBC QN AUTO: 29.9 PG (ref 26.5–33)
MCHC RBC AUTO-ENTMCNC: 32.8 G/DL (ref 31.5–36.5)
MCV RBC AUTO: 91 FL (ref 78–100)
MONOCYTES # BLD AUTO: 0.5 10E3/UL (ref 0–1.3)
MONOCYTES NFR BLD AUTO: 8 %
NEUTROPHILS # BLD AUTO: 3.5 10E3/UL (ref 1.6–8.3)
NEUTROPHILS NFR BLD AUTO: 56 %
NONHDLC SERPL-MCNC: 145 MG/DL
NRBC # BLD AUTO: 0 10E3/UL
NRBC BLD AUTO-RTO: 0 /100
PLATELET # BLD AUTO: 312 10E3/UL (ref 150–450)
POTASSIUM SERPL-SCNC: 4.9 MMOL/L (ref 3.4–5.3)
PROT SERPL-MCNC: 7.4 G/DL (ref 6.4–8.3)
RBC # BLD AUTO: 4.48 10E6/UL (ref 3.8–5.2)
SODIUM SERPL-SCNC: 141 MMOL/L (ref 136–145)
TRIGL SERPL-MCNC: 64 MG/DL
WBC # BLD AUTO: 6.3 10E3/UL (ref 4–11)

## 2022-09-21 PROCEDURE — 99396 PREV VISIT EST AGE 40-64: CPT | Mod: 25 | Performed by: INTERNAL MEDICINE

## 2022-09-21 PROCEDURE — 80053 COMPREHEN METABOLIC PANEL: CPT | Performed by: PATHOLOGY

## 2022-09-21 PROCEDURE — 99000 SPECIMEN HANDLING OFFICE-LAB: CPT | Performed by: PATHOLOGY

## 2022-09-21 PROCEDURE — 85025 COMPLETE CBC W/AUTO DIFF WBC: CPT | Performed by: PATHOLOGY

## 2022-09-21 PROCEDURE — 90471 IMMUNIZATION ADMIN: CPT | Performed by: INTERNAL MEDICINE

## 2022-09-21 PROCEDURE — 73030 X-RAY EXAM OF SHOULDER: CPT | Mod: RT | Performed by: RADIOLOGY

## 2022-09-21 PROCEDURE — 80061 LIPID PANEL: CPT | Mod: 90 | Performed by: PATHOLOGY

## 2022-09-21 PROCEDURE — 36415 COLL VENOUS BLD VENIPUNCTURE: CPT | Performed by: PATHOLOGY

## 2022-09-21 PROCEDURE — 90686 IIV4 VACC NO PRSV 0.5 ML IM: CPT | Performed by: INTERNAL MEDICINE

## 2022-09-21 ASSESSMENT — ENCOUNTER SYMPTOMS
MYALGIAS: 1
CONSTIPATION: 0

## 2022-09-21 NOTE — NURSING NOTE
"Tatiana Paredes is a 56 year old female patient that presents today in clinic for the following:    Chief Complaint   Patient presents with     Physical     The patient's allergies and medications were reviewed as noted. A set of vitals were recorded as noted without incident: /79 (BP Location: Right arm, Patient Position: Sitting, Cuff Size: Adult Regular)   Pulse 77   Ht 1.702 m (5' 7\")   Wt 70.6 kg (155 lb 9.6 oz)   LMP 07/18/2016 (Exact Date)   SpO2 98%   BMI 24.37 kg/m  . The patient does not have any other questions for the provider.    Luisana Godinez, EMT at 9:08 AM on 9/21/2022    "

## 2022-09-22 ENCOUNTER — TELEPHONE (OUTPATIENT)
Dept: INTERNAL MEDICINE | Facility: CLINIC | Age: 57
End: 2022-09-22

## 2022-09-22 NOTE — TELEPHONE ENCOUNTER
Talked w/ pt about scheduling 6wk follow up & nutrition referral - pt indicated she wanted to wait to schedule follow up until after colonoscopy was scheduled & xray results came back, transferred pt to nutrition clinic for scheduling

## 2022-09-26 ENCOUNTER — TELEPHONE (OUTPATIENT)
Dept: GASTROENTEROLOGY | Facility: CLINIC | Age: 57
End: 2022-09-26

## 2022-09-26 NOTE — TELEPHONE ENCOUNTER
Screening Questions    BlueKIND OF PREP RedLOCATION [review exclusion criteria] GreenSEDATION TYPE        Y Are you active on mychart?   Cruz Prather MD Ordering/Referring Provider?    HP  What type of coverage do you have?  N Have you had a positive covid test in the last 90 days?     1. BMI  [BMI 40+ - review exclusion criteria]  23.5    2. Are you able to give consent for your medical care? [IF NO,RN REVIEW]  SELF           3. Are you taking any prescription pain medications on a routine schedule?    N       3a. N EXTENDED PREP What kind of prescription?   4. Do you have any chemical dependencies such as alcohol, street drugs, or methadone?  N     5. Do you have any history of post-traumatic stress syndrome, severe anxiety or history of psychosis?    N    **If yes 3- 5 , please schedule with MAC sedation.**    BMI OVER 40 NEED PAC EVALUATION FOR [facility]          IF YES TO ANY 6 - 10 - HOSPITAL SETTING ONLY.     6.   Do you need assistance transferring?    N   7.   Have you had a heart or lung transplant?    N  8.   Are you currently on dialysis?   N  9.  Do you use daily home oxygen?   N  10. Do you take nitroglycerin?   N  10a. N If yes, how often?      11. [FEMALES]  N Are you currently pregnant?    11a. N If yes, how many weeks? [ Greater than 12 weeks, OR NEEDED]    12. Do you have Pulmonary Hypertension? *NEED PAC APPT AT [facility]*   N    13. [review exclusion criteria]  Do you have any implantable devices in your body (pacemaker, defib, LVAD)?  N    14. In the past 6 months, have you had any heart related issues including cardiomyopathy or heart attack?   N    14a. N If yes, did it require cardiac stenting if so when?     15. Have you had a stroke or Transient ischemic attack (TIA - aka  mini stroke ) within 6 months?    N    16. Do you have mod to severe Obstructive Sleep Apnea?  [Hospital only - [facility]]  N    17. Do you have SEVERE AND UNCONTROLLED asthma?   N  *NEED PAC APPT AT [facility]*     18.  "Are you currently taking any blood thinners?  N     18a. If yes, inform patient to \"follow up w/ ordering provider for bridging instructions.\"    19. Do you take the medication Phentermine?  N    19a. If yes, \"Hold for 7 days before procedure.  Please consult your prescribing provider if you have questions about holding this medication.\"     20.  Do you have chronic kidney disease?  N      21.  Do you have a diagnosis of diabetes?   N    22.  On a regular basis do you go 3-5 days between bowel movements?   N    23. Preferred LOCAL Pharmacy for Pre Prescription    [ LIST ONLY ONE PHARMACY]     [facility] [id number] [place] [address]        - CLOSING REMINDERS -    Informed patient they will need an adult    Cannot take any type of public or medical transportation alone  1. Conscious Sedation- Needs  for 6 hours after the procedure       MAC/General-Needs  for 24 hours after procedure    Pre-Procedure Covid test to be completed [[facility] PCR Testing Required]    Confirmed Nurse will call to complete assessment       - SCHEDULING DETAILS -     SHANE BENAVIDEZ   Surgeon    11/10  Date of Procedure  Type of Procedure Scheduled  Lower Endoscopy [Colonoscopy]     [facility]  Location  Which Colonoscopy Prep was Sent?  [facility] PREP-If you answer yes to questions #8, #20, #21     CS  Sedation Type     N  PAC / Pre-op Required         Additional comments:  N                "

## 2022-09-28 ENCOUNTER — MYC MEDICAL ADVICE (OUTPATIENT)
Dept: SURGERY | Facility: CLINIC | Age: 57
End: 2022-09-28

## 2022-09-29 NOTE — PROGRESS NOTES
OUTPATIENT CLINICAL NUTRITION SERVICES ASSESSMENT    REASON FOR ASSESSMENT  Tatiana Paredes referred by Dr. Prather, Cruz GAGNON MD for MNT related to: screening cholesterol level, pain of right upper extremity, weight gain, constipation    Patient accompanied by: N/A     Pt mentioned once starting the appointment that she was unsure if her insurance would cover the appointment for weight gain and constipation. Assured writer that she still wanted to proceed with the appointment, and would fill out a form with the understanding that she would be charged. She mentioned the schedulers provided her with information on the cost, and she would like to keep the appointment to 30 minutes instead of 60 for this reason.     ASSESSMENT   -PMH: external hemorrhoids, dysphonia, myopia, hallux valgus of right foot, knee instability, pain of foot, vocal fold paresis, right, choroidal nevus of right eye, pes planus of both feet    What are you looking to learn from our session today or specific things you would like to work on together?  Done a couple of things with diet - since COVID, pt has worked from home 100%, weight has slowly climbed up    Any recent weight changes? Would you like to lose weight?  Yes, has gained weight. 140 lbs, more active in the summer, sometimes would be around 135 lbs  Now 150-155 slowly went up    Nutritional Details:   -Food allergies: N/A  -Food sensitivities: N/A  -GI concerns: Constipation  -Appetite: N/A  -Pace of eating: N/A  -Role of cooking: N/A, pt lives with , appears they share responsibility for cooking  -Role of food shopping: N/A, pt lives with , appears they share responsibility for food shopping    Diet Recall:  Breakfast: slice of bread toasted, with a little margarine and feta cheese, sometimes slice of bread with margarine with mozzarella, sometimes yogurt and blueberries  Lunch: two eggs with bread piece of cheese and cucumber, whole wheat pasta,  sandwiches  Dinner: tofu, kimchi and asparagus, frequently has leftovers, salmon with salad - salad has half a tomato, half a pepper, chicken with one tomato and half a pepper   Snack: a piece of chocolate  Beverages: tries to drink water - at least a liter to a liter and a half, feels she should drink more water, cut out pop  Dining out: N/A    Tried a couple of things over summer: fruit in the afternoon, fruit with coffee in the morning, 0% fat yogurt with blueberry and seeds, almonds and pecans, 1/4 cup of blueberries and 1 cup of yogurt, salads - saw no weight changes, went back to normal eating and saw no changes.    Fruits and vegetables, almost decided to cut out yogurt too - Will be having a colonoscopy so worried there is something wrong with her GI system - concerned with having too much fiber and not enough fiber    LDL is slowly going up, so is cholesterol  Dishes with dinner that has eggs, omelet with eggs, scrambled with peppers or onions     Fruits and vegetables with every meal - at least twice per day, salad tomatoes, and cucumbers, peppers, cabbage with carrots, lettuce with cucumbers, cauliflower or broccoli in hummus.     Soups with beans and lentils, not lately.    Exercising more time to exercise, influence diet from weight management and because of slowly creeping cholesterol, not high enough to start any medications but still going up each check    Yogurt - 0%, feta cheese  Pork - has fat, ground turkey, grill or bake in oven, sometimes cook   Salt and pepper. Nkiky bbq sauce, sometimes has chicken that is deep fried     Physical Activity: did not discuss, future goal  Days per week: N/A  Duration: N/A  Activity type: N/A  Limitations: N/A    NUTRITION FOCUSED PHYSICAL ASSESSMENT (NFPA) FOR DIAGNOSING MALNUTRITION  No: telephone visit         Observed:   No nutrition-related physical findings observed - unable to observe as visit is via telephone    Obtained from Chart/Interdisciplinary Team:  "none noted    LABS  Labs reviewed   Latest Reference Range & Units 09/21/22 10:30   HDL Cholesterol >=50 mg/dL 76   LDL Cholesterol Calculated <=100 mg/dL 132 (H)   Non HDL Cholesterol <130 mg/dL 145 (H)   Triglycerides <150 mg/dL 64   (H): Data is abnormally high    MEDICATIONS  Medications reviewed    ANTHROPOMETRICS   Height: 5' 7\"  Weight: 155 lbs (70.6 kg)  BMI (kg/m2): 24.3  Weight Status:  Normal BMI  IBW: 135 lbs  %IBW: 115  Weight History:  Wt Readings from Last 15 Encounters:   09/21/22 70.6 kg (155 lb 9.6 oz)   08/12/22 70.4 kg (155 lb 1.6 oz)   06/29/22 70.3 kg (155 lb)   05/11/22 70.8 kg (156 lb)   05/03/22 68 kg (150 lb)   01/05/22 71.1 kg (156 lb 12.8 oz)   11/10/21 68.9 kg (152 lb)   10/20/21 68.5 kg (151 lb)   10/19/21 68.5 kg (151 lb)   10/12/21 67.6 kg (149 lb)   08/24/21 66.7 kg (147 lb)   04/13/21 69.8 kg (153 lb 12.8 oz)   04/06/21 70.7 kg (155 lb 12.8 oz)   11/10/20 71.5 kg (157 lb 9.6 oz)   05/11/20 71 kg (156 lb 8 oz)   Weight has been relatively stable for 2 years, gained approx. 10 lbs    Dosing weight: 70.6 kg    ASSESSED NUTRITION NEEDS  Estimated Energy Needs: 1,412-1,765 kcals/day (20-25 Kcal/Kg)  Justification:  (maintenance)  [place] to maintain: 1,734 kcals to maintain (ambulatory, mod active)  To lose weight: 1,234 kcals  Estimated Protein Needs: 56-71 grams protein/day (0.8-1 g pro/Kg)  Justification:  (preservation of lean body mass)  Estimated Fluid Needs: 2,118 mL/day (30 mL/kg)    ASSESSED MALNUTRITION STATUS  % Weight Loss: None noted  % Intake: Decreased intake does not meet criteria for malnutrition: any decrease may be intentional for healthy eating and/or weight loss  Subcutaneous Fat Loss: Unable to assess  Loss of Muscle Mass: Unable to assess  Fluid Retention: None noted    Malnutrition Diagnosis: Patient does not meet two of the above criteria necessary for diagnosing malnutrition    DIAGNOSIS   Nutrition Diagnosis: Food and nutrition-related knowledge " deficit related to weight maintenance and constipation as evidenced by pt self report, lack of formal education regarding weight management and fiber intake      INTERVENTIONS   Nutrition Prescription: general, healthy eating pattern following the MyPlate guidelines. Adequate fiber intake to prevent constipation - 14g per 1,000 kcals, so approx. 21 g per day (25-35 recommended for constipation). Consume both soluble and insoluble fiber.    IMPLEMENTATION   Assessed learning needs and learning preference: N/A  Teaching Method(s) used: Booklet / Handout  Explanation    Nutrition Education (Content):              a)  Discussed: metabolism possibly slowing down with calorie restriction, incorporating more protein to help with snacking and satiety, fiber rich foods and importance of insoluble fiber and fluid for constipation, heart healthy guidelines to help lower LDL including lean meats, plant protein, lower fat options, fruits and vegetables, etc.               b)  Provided the following handouts: High-Protein Foods List (2022), Constipation Nutrition Therapy, Soluble and Insoluble Fiber Foods List (2018), Eat Right with MyPlate, General, Healthful Nutrition Therapy, Heart-Healthy Nutrition Therapy (2017)  Nutrition Education (Application):              a)  Discussed current eating plans / recommended alternative food choices              b)  Patient verbalizes understanding of diet by offering no additional questions or concerns at this time and discussing goals that incorporate diet changes.    Anticipate good compliance   Stage of Change: preparation, some action  Additional: has made several changes and is not seeing progress, discussed possibly increasing intake to help metabolism    GOALS  1. Lose weight - back to usual body weight. Eating more to see if metabolism is slowing  2. Try to have soup with beans and lentils, incorporate more protein into diet    FOLLOW UP/MONITORING   Progress towards goals will be  monitored and evaluated per protocol and Practice Guidelines    Pt will see how changes go and will schedule a follow-up appointment if she is able/if insurance covers.     Time Spent with Patient  Start: 11:00am  End: 11:30am  30 minutes    Taya Cook RD, LD  Clinical Dietitian  Office: [phone]  Weekend pager: [phone]

## 2022-09-30 ENCOUNTER — HOSPITAL ENCOUNTER (OUTPATIENT)
Dept: NUTRITION | Facility: CLINIC | Age: 57
Discharge: HOME | End: 2022-09-30
Attending: INTERNAL MEDICINE
Payer: COMMERCIAL

## 2022-10-03 ENCOUNTER — TELEPHONE (OUTPATIENT)
Dept: GASTROENTEROLOGY | Facility: CLINIC | Age: 57
End: 2022-10-03

## 2022-10-03 NOTE — TELEPHONE ENCOUNTER
CANCEL - RESCHEDULE    Ordering Provider: Cruz Prather MD  Procedure Cancelled: COLON  Procedure Date Cancelled: 10/10  Surgeon of Procedure Cancelled: WISE  Sedation type: MOD   Location of Procedure Cancelled: [facility]      Rescheduled: Y     If rescheduled:    Date: 10/31   Location: [facility]   Sedation Type: MOD   PAC / Pre-op Required  N   PREP TYPE: GPREP   Covid Test [ESSC - PCR IN FACILITY] HOME   Note any change or update to original order/sedation:    Preferred LOCAL Pharmacy for Pre Prescription  [id number] [place] [address]       Reason for cancel (please be detailed, any staff messages or encounters to note? SCHED ERROR? FACILITY/ SEDATION CHANGE? ):   [facility] NOT IN NETWORK WITH INSURANCE       Details and Prep sent via [employer]    Additional details:

## 2022-10-10 ENCOUNTER — OFFICE VISIT (OUTPATIENT)
Dept: SURGERY | Facility: CLINIC | Age: 57
End: 2022-10-10
Payer: COMMERCIAL

## 2022-10-10 VITALS
DIASTOLIC BLOOD PRESSURE: 83 MMHG | WEIGHT: 158.6 LBS | SYSTOLIC BLOOD PRESSURE: 133 MMHG | BODY MASS INDEX: 24.89 KG/M2 | OXYGEN SATURATION: 100 % | HEIGHT: 67 IN | HEART RATE: 68 BPM

## 2022-10-10 DIAGNOSIS — K64.8 HEMORRHOID PROLAPSE: ICD-10-CM

## 2022-10-10 DIAGNOSIS — K64.4 ANAL SKIN TAG: Primary | ICD-10-CM

## 2022-10-10 PROCEDURE — 99203 OFFICE O/P NEW LOW 30 MIN: CPT | Performed by: NURSE PRACTITIONER

## 2022-10-10 NOTE — LETTER
"10/10/2022       RE: Tatiana Paredes  [address]  [facility] [address]     Dear Colleague,    Thank you for referring your patient, Tatiana Paredes, to the [facility] [place] at [facility]. Please see a copy of my visit note below.    Colon and Rectal Surgery Follow-Up Clinic Note    RE: Tatiana Paredes  : 1965  GIUSEPPE: 10/10/2022    Tatiana Paredes is a very pleasant 56 year old female here for follow-up of hemorrhoids.    Interval history: I have seen Zonia in the past wit hemorrhoid and mucosal prolapse with banding, last in 2022. She still has an external skin tag and can see some red tissue that comes out at times. It seems to get larger at times but will then get smaller. No pain or bleeding. She is scheduled for a colonoscopy at the end of the month.    Physical Examination: Exam was chaperoned by Dominic Laws, EMT-P   /83 (BP Location: Left arm, Patient Position: Sitting, Cuff Size: Adult Regular)   Pulse 68   Ht 5' 7\"   Wt 158 lb 9.6 oz   LMP 2016 (Exact Date)   SpO2 100%   BMI 24.84 kg/m    General: alert, oriented, in no acute distress, sitting comfortably  HEENT:mucous membranes moist  Perianal external examination:  Perianal skin: Intact with no excoriation or lichenification.  Lesions: No evidence of an external lesion, nodularity, or induration in the perianal region.  Eversion of buttocks: There was not evidence of an anal fissure. Details: N/A.  Skin tags or external hemorrhoids: Yes: wide based skin tag in the anterior midline with some mucosal prolapse present.    Digital rectal examination: Was deferred.    Anoscopy: Was deferred.    Assessment/Plan: 56 year old female with anal skin tag and mucosal prolapse.  I did discuss repeat banding for the mucosal prolapse portion, although she is got minimal benefit from this.  I did discuss surgical excision " of both the skin tag and the mucosal prolapse but she is not interested in this at this time as it is minimally symptomatic.  I be happy to see her in follow-up with any worsening symptoms, questions, or concerns.    Medical history:  Past Medical History:   Diagnosis Date     Breast pain, left 11/2013     Choroidal nevus of right eye      Constipation      Dysphonia      Gastroesophageal reflux disease      Genital herpes      Hemorrhoids      Hoarseness      Menorrhagia      Migraines        Surgical history:  Past Surgical History:   Procedure Laterality Date     HC HYSTEROSCOPY, SURGICAL; W/ ENDOMETRIAL ABLATION, ANY METHOD  2011       Problem list:  Patient Active Problem List    Diagnosis Date Noted     Vocal fold paresis, 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 Provider to review       Knee instability 06/05/2012     Priority: Medium       Medications:  Current Outpatient Medications   Medication Sig Dispense Refill     carboxymethylcellul-glycerin (REFRESH OPTIVE) 0.5-0.9 % SOLN ophthalmic solution Place 1 drop into both eyes 2 times daily 1 Bottle 3     famotidine (PEPCID) 20 MG tablet Take 20 mg by mouth nightly as needed       nitroFURantoin macrocrystal-monohydrate (MACROBID) 100 MG capsule Take 1 capsule (100 mg) by mouth once as needed (intercourse) 30 capsule 11     valACYclovir (VALTREX) 500 MG tablet Take 1 tablet (500 mg) by mouth 2 times daily 28 tablet 2       Allergies:  Allergies   Allergen Reactions     Contrast Dye Itching     CT contrast dye       Family  "history:  Family History   Problem Relation Age of Onset     Breast Cancer Sister 52     Cancer - colorectal Maternal Grandmother 70     Migraines Mother      Unknown/Adopted Mother      Stomach Problem Mother      Stomach Cancer Mother      Prostate Cancer Father      Cancer Father      Glaucoma No family hx of      Macular Degeneration No family hx of      Retinal detachment No family hx of      Amblyopia No family hx of      Strabismus No family hx of      Glasses (<9 y/o) No family hx of      Nystagmus No family hx of        Social history:  Social History     Tobacco Use     Smoking status: Never     Smokeless tobacco: Never   Substance Use Topics     Alcohol use: Yes     Alcohol/week: 0.8 - 4.2 standard drinks     Comment: 1-3 times a week     Marital status: .    Nursing Notes:   Dominic Laws, EMT  10/10/2022  3:09 PM  Sign at exiting of workspace  Chief Complaint   Patient presents with     RECHECK     Hemorrhoid banding       Vitals:    10/10/22 1503   BP: 133/83   BP Location: Left arm   Patient Position: Sitting   Cuff Size: Adult Regular   Pulse: 68   SpO2: 100%   Weight: 158 lb 9.6 oz   Height: 5' 7\"       Body mass index is 24.84 kg/m .     Dominic Laws, EMT- P        15 minutes spent on the date of encounter (excluding time performing procedures) performing chart review, history and exam, documentation and further activities as noted above.      Queta Finch NP-C  Colon and Rectal Surgery  [facility]  "

## 2022-10-10 NOTE — PROGRESS NOTES
"Colon and Rectal Surgery Follow-Up Clinic Note    RE: Tatiana Paredes  : 1965  GIUSEPPE: 10/10/2022    Tatiana Paredes is a very pleasant 56 year old female here for follow-up of hemorrhoids.    Interval history: I have seen Zonia in the past wit hemorrhoid and mucosal prolapse with banding, last in 2022. She still has an external skin tag and can see some red tissue that comes out at times. It seems to get larger at times but will then get smaller. No pain or bleeding. She is scheduled for a colonoscopy at the end of the month.    Physical Examination: Exam was chaperoned by Dominic Laws, EMT-P   /83 (BP Location: Left arm, Patient Position: Sitting, Cuff Size: Adult Regular)   Pulse 68   Ht 5' 7\"   Wt 158 lb 9.6 oz   LMP 2016 (Exact Date)   SpO2 100%   BMI 24.84 kg/m    General: alert, oriented, in no acute distress, sitting comfortably  HEENT:mucous membranes moist  Perianal external examination:  Perianal skin: Intact with no excoriation or lichenification.  Lesions: No evidence of an external lesion, nodularity, or induration in the perianal region.  Eversion of buttocks: There was not evidence of an anal fissure. Details: N/A.  Skin tags or external hemorrhoids: Yes: wide based skin tag in the anterior midline with some mucosal prolapse present.    Digital rectal examination: Was deferred.    Anoscopy: Was deferred.    Assessment/Plan: 56 year old female with anal skin tag and mucosal prolapse.  I did discuss repeat banding for the mucosal prolapse portion, although she is got minimal benefit from this.  I did discuss surgical excision of both the skin tag and the mucosal prolapse but she is not interested in this at this time as it is minimally symptomatic.  I be happy to see her in follow-up with any worsening symptoms, questions, or concerns.    Medical history:  Past Medical History:   Diagnosis Date     Breast pain, left 2013     Choroidal nevus of right eye      " Constipation      Dysphonia      Gastroesophageal reflux disease      Genital herpes      Hemorrhoids      Hoarseness      Menorrhagia      Migraines        Surgical history:  Past Surgical History:   Procedure Laterality Date     HC HYSTEROSCOPY, SURGICAL; W/ ENDOMETRIAL ABLATION, ANY METHOD  2011       Problem list:  Patient Active Problem List    Diagnosis Date Noted     Vocal fold paresis, 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 Provider to review       Knee instability 06/05/2012     Priority: Medium       Medications:  Current Outpatient Medications   Medication Sig Dispense Refill     carboxymethylcellul-glycerin (REFRESH OPTIVE) 0.5-0.9 % SOLN ophthalmic solution Place 1 drop into both eyes 2 times daily 1 Bottle 3     famotidine (PEPCID) 20 MG tablet Take 20 mg by mouth nightly as needed       nitroFURantoin macrocrystal-monohydrate (MACROBID) 100 MG capsule Take 1 capsule (100 mg) by mouth once as needed (intercourse) 30 capsule 11     valACYclovir (VALTREX) 500 MG tablet Take 1 tablet (500 mg) by mouth 2 times daily 28 tablet 2       Allergies:  Allergies   Allergen Reactions     Contrast Dye Itching     CT contrast dye       Family history:  Family History   Problem Relation Age of Onset     Breast Cancer Sister 52     Cancer - colorectal Maternal Grandmother 70     Migraines Mother      Unknown/Adopted Mother      Stomach Problem Mother      Stomach Cancer Mother      Prostate Cancer Father      Cancer Father      Glaucoma No family hx of      Macular Degeneration No family hx  "of      Retinal detachment No family hx of      Amblyopia No family hx of      Strabismus No family hx of      Glasses (<7 y/o) No family hx of      Nystagmus No family hx of        Social history:  Social History     Tobacco Use     Smoking status: Never     Smokeless tobacco: Never   Substance Use Topics     Alcohol use: Yes     Alcohol/week: 0.8 - 4.2 standard drinks     Comment: 1-3 times a week     Marital status: .    Nursing Notes:   Dominic Laws, EMT  10/10/2022  3:09 PM  Sign at exiting of workspace  Chief Complaint   Patient presents with     RECHECK     Hemorrhoid banding       Vitals:    10/10/22 1503   BP: 133/83   BP Location: Left arm   Patient Position: Sitting   Cuff Size: Adult Regular   Pulse: 68   SpO2: 100%   Weight: 158 lb 9.6 oz   Height: 5' 7\"       Body mass index is 24.84 kg/m .     Dominic Laws, EMT- P        15 minutes spent on the date of encounter (excluding time performing procedures) performing chart review, history and exam, documentation and further activities as noted above.    Queta Finch, NP-C  Colon and Rectal Surgery  [facility]    "

## 2022-10-10 NOTE — NURSING NOTE
"Chief Complaint   Patient presents with     RECHECK     Hemorrhoid banding       Vitals:    10/10/22 1503   BP: 133/83   BP Location: Left arm   Patient Position: Sitting   Cuff Size: Adult Regular   Pulse: 68   SpO2: 100%   Weight: 158 lb 9.6 oz   Height: 5' 7\"       Body mass index is 24.84 kg/m .     Dominic Laws, EMT- P    "

## 2022-10-24 ENCOUNTER — TELEPHONE (OUTPATIENT)
Dept: GASTROENTEROLOGY | Facility: CLINIC | Age: 57
End: 2022-10-24

## 2022-10-24 DIAGNOSIS — K62.5 RECTAL BLEEDING: Primary | ICD-10-CM

## 2022-10-24 RX ORDER — BISACODYL 5 MG/1
TABLET, DELAYED RELEASE ORAL
Qty: 4 TABLET | Refills: 0 | Status: SHIPPED | OUTPATIENT
Start: 2022-10-24 | End: 2023-05-22

## 2022-10-24 NOTE — TELEPHONE ENCOUNTER
"Attempted to contact patient regarding upcoming colonoscopy procedure on 10.31.2022 for pre assessment questions. No answer.     Left message to return call to [phone] #4    Discuss at home rapid antigen COVID test 1-2 days prior to procedure.    Arrival time: 0800    Facility location: [facility] [place]    Sedation type: CS- discuss with patient.  Last 2 colonoscopies were with [facility]    Indication for procedure: constipation, rectal bleeding    Bowel prep recommendation: verify bowel habits; Per endo scheduling questionnaire pt answered \"No\" to \"On a regular basis do you go 3-5 days between bowel movements?\"    Patient had Miralax/Magnesium citrate/Dulcolax prep with 2020 colonoscopy with good results.  Patient had Golytely prep with 2016 colonoscopy with excellent results.    Nano Walter RN    "

## 2022-10-24 NOTE — TELEPHONE ENCOUNTER
Patient returned call.     Pre assessment questions completed for upcoming colonoscopy  procedure scheduled on 10/31/22    COVID policy reviewed. Patient to complete rapid antigen test one to two days before their scheduled procedure. Patient to bring photo of the results when they come in for their procedure.    Reviewed procedural arrival time 0800 and facility location [facility].    Designated  policy reviewed. Instructed to have someone stay 6 hours post procedure.     Patient denies constipation.     Golytely prep script sent to [place] [id number] [place] [address] pharmacy. Prep instructions sent via [employer].    Reviewed Golytely prep instructions. No fiber/iron supplements or foods that contain nuts/seeds 7 days prior to procedure.     Patient ok with CS.     Patient verbalized understanding and had no questions or concerns at this time.    Leydi Maya RN

## 2022-10-26 ENCOUNTER — VIRTUAL VISIT (OUTPATIENT)
Dept: OTOLARYNGOLOGY | Facility: CLINIC | Age: 57
End: 2022-10-26
Payer: COMMERCIAL

## 2022-10-26 DIAGNOSIS — J38.01 VOCAL FOLD PARESIS, RIGHT: ICD-10-CM

## 2022-10-26 DIAGNOSIS — A60.9 HSV (HERPES SIMPLEX VIRUS) ANOGENITAL INFECTION: ICD-10-CM

## 2022-10-26 DIAGNOSIS — R49.0 DYSPHONIA: Primary | ICD-10-CM

## 2022-10-26 PROCEDURE — 92524 BEHAVRAL QUALIT ANALYS VOICE: CPT | Mod: GN | Performed by: SPEECH-LANGUAGE PATHOLOGIST

## 2022-10-26 NOTE — PROGRESS NOTES
"Tatiana Paredes is a 56 year old female who is being evaluated via a billable video visit.      The patient has been notified and verbally consented to the following:     This video visit will be conducted between you and your provider.    Patient has opted to conduct today's video visit vs an in-person appointment, and is not able to attend due to possible exposure to COVID-19.      If during the course of the call the provider feels a video visit is not appropriate, you will not be charged for this service.    Call initiated at: 9:02  Type of Video Platform Used: [employer]  Location of provider: Residence  Location of patient: Residence    [facility]  DISCHARGE EVALUATION NOTE (CPT 52431)    Patient: Tatiana Paredes  Date of Service: 10/26/2022  Referring physician: Dr. Pisano  Impressions from most recent evaluation:  \"Tatiana Paredes presents today with ongoing dysphonia of a similar nature to when she was last seen 2019.  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  The degree of immobility is essentially unchanged from her last visit.  Perceptually this manifests as significant pitch instability as well as frequent descent into glottal rios with corresponding reduction of intensity.  Patient is able to achieve improved quality with focus on therapeutic tasks such as glottic coup or finding optimal pitch range.\"    SUBJECTIVE:  Since the patient's last session, they report the following:     Overall symptoms are about the same, though she considers this to be \"a good thing\"    Successes:     Feels okay to good about where her voice is at currently    able to speak with a microphone for work    Hurdles:      Hard to find a good time to practice    Gets tired after meetings    Overall, Tatiana states that she is feeling better about her ability to maintain her voice at " a functional level to meet her daily demands even if it is not able to have the projection or clarity that she had historically.  We discussed her goals for ongoing therapy and she wishes to move forward independently at this time and thus discharge evaluation is warranted to establish benchmark of function at this point    OBJECTIVE:  PATIENT REPORTED MEASURES:  Patient Specific Goal Metrics:  Dysponia SLP Goals 6/22/2022 8/10/2022   How would you rate your speaking voice quality, if 0 is worst voice quality, and 10 is best voice? 5 5   How much effort is it to speak, if 0 is no extra effort and 10 is maximum effort? 5 5   How much does your voice problem bother you? Somewhat Somewhat       *see end of note for standardized measures*    PERCEPTUAL EVALUATION (CPT 73000)  POSTURE / TENSION:     neck    BREATHING:     appears within normal limits and adequate     VOICE:    Roughness: Moderate Intermittent    Breathiness: Mild to moderate Intermittent    Strain: Mild to moderate Intermittent    Loudness    Conversational speech:  Mildly reduced    Pitch:    Conversational speech:  Mildly lowered    Pitch glide:    Low pitches gravelly    Smooth transition to loft registration    Resonance:    Conversational speech:  laryngeal pharyngeal resonance    Singing vs. Speech:     Significant registration breaks in multi-pitch singing tasks    Stagnant pitches on single sounds are clear and consistent    CAPE-V Overall Severity:  52/100    COUGH/THROAT CLEARING:    Not observed      S:Z ratio: 13:22 seconds    THERAPY PROBES: Oral reading tasks    She finds this harder than single sounds    Chanting demonstrated increased roughness and registration breaks compared to single sounds  Cues to speak more slowly and loudly improved roughness slightly    THERAPEUTIC ACTIVITIES    Counseling and Education:    Asked many questions about the nature of their symptoms, and I answered all of these thoroughly.    Discussed vocal  conditioning    Discussed benefits and drawbacks of continuing speech therapy      Cumulative regimen for home practice was reminded and redistributed.    I provided handouts of therapeutic activities to facilitate practice.    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 Tatiana feels her voice has been doing relatively well lately, especially since she uses a microphone for her work meetings.  She is able to use therapeutic test she has learned from me and my colleague Jada Lynn to maintain voice at its current level.  That said she still notices pitch breaks in her voice and she cannot be understood in loud environments.  She wishes to move forward independently at this time and thus discharge evaluation was completed.  During evaluative tasks as well as during conversation moments of significantly improved clarity were appreciated intermittently, and stimulability was performed to help identify the elements that offered this improvement.  Improvements in voice quality were elicited with smaller pitch ranges in tasks and slower speech rate. Tatiana feels she is well-equipped to use her learned strategies in the short-term and will consider returning to therapy after a few months.    PLAN: Tatiana is discharged from therapy at this time but will reach out to schedule future appointments as needed with Jada Lynn CCC-SLP.  For practice goals see AVS.     TOTAL SERVICE TIME: 50 minutes  PERCEPTUAL EVALUATION OF VOICE AND RESONANCE (34094)  NO CHARGE FACILITY FEE (47887)    Today's session and note was completed in tandem with Rogelio Zarco,  clinician in speech language hearing sciences. I was present during today's appointment and have reviewed / agree with  "the contents of this note.    Matthieu Tobias M.M., M.A., CCC-SLP  Speech-Language Pathologist  Certificate of Vocology  [phone]    *this report was created in part through the use of computerized dictation software, and though reviewed following completion, some typographic errors may persist.  If there is confusion regarding any of this notes contents, please contact me for clarification.*    Patient Supplied Answers To Last 2 VHI Questionnaires  Voice Handicap Index (VHI-10) 8/10/2022 10/26/2022   My voice makes it difficult for people to hear me 2 2   People have difficulty understanding me in a noisy room 3 3   My voice difficulties restrict my personal and social life.  2 2   I feel left out of conversations because of my voice 2 2   My voice problem causes me to lose income 0 0   I feel as though I have to strain to produce voice 2 2   The clarity of my voice is unpredictable 2 2   My voice problem upsets me 1 1   My voice makes me feel handicapped 2 1   People ask, \"What's wrong with your voice?\" 2 2   VHI-10 18 17        Patient Supplied Answers To Last 2 CSI Questionnaires  Cough Severity Index (CSI) 7/5/2018 1/31/2019   My cough is worse when I lie down 0 0   My coughing problem causes me to restrict my personal and social life 0 0   I tend to avoid places because of my cough problem 0 0   I feel embarrassed because of my coughing problem 0 0   People ask, ''What's wrong?'' because I cough a lot 0 0   I run out of air when I cough 0 0   My coughing problem affects my voice 0 0   My coughing problem limits my physical activity 0 0   My coughing problem upsets me 0 0   People ask me if I am sick because I cough a lot 0 0   CSI Score 0 0        Patient Supplied Answers To Last 2 EAT Questionnaires  Eating Assessment Tool (EAT-10) 7/5/2018 1/31/2019   My swallowing problem has caused me to lose weight 0 0   My swallowing problem interferes with my ability to go out for meals 0 0   Swallowing liquids " takes extra effort 0 0   Swallowing solids takes extra effort 0 0   Swallowing pills takes extra effort 0 0   Swallowing is painful 0 0   The pleasure of eating is affected by my swallowing 0 0   When I swallow food sticks in my throat 0 1   I cough when I eat 0 0   Swallowing is stressful 0 0   EAT-10 0 1        Patient Supplied Answers to Dyspnea Index Questionnaire:  No flowsheet data found.

## 2022-10-26 NOTE — LETTER
"10/26/2022       RE: Tatiana Paredes  [address]  [place] [id number]     Dear Colleague,    Thank you for referring your patient, Tatiana Paredes, to the [facility] [place] at [facility]. Please see a copy of my visit note below.    Tatiana Paredes is a 56 year old female who is being evaluated via a billable video visit.      The patient has been notified and verbally consented to the following:     This video visit will be conducted between you and your provider.    Patient has opted to conduct today's video visit vs an in-person appointment, and is not able to attend due to possible exposure to COVID-19.      If during the course of the call the provider feels a video visit is not appropriate, you will not be charged for this service.    Call initiated at: 9:02  Type of Video Platform Used: [employer]  Location of provider: Residence  Location of patient: [facility]ICE [facility]  DISCHARGE EVALUATION NOTE (CPT 27908)    Patient: Tatiana Paredes  Date of Service: 10/26/2022  Referring physician: Dr. Pisano  Impressions from most recent evaluation:  \"Tatiana Paredes presents today with ongoing dysphonia of a similar nature to when she was last seen 2019.  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  The degree of immobility is essentially unchanged from her last visit.  Perceptually this manifests as significant pitch instability as well as frequent descent into glottal rios with corresponding reduction of intensity.  Patient is able to achieve improved quality with focus on therapeutic tasks such as glottic coup or finding optimal pitch range.\"    SUBJECTIVE:  Since the patient's last session, they report the following:     Overall symptoms are about the same, though she " "considers this to be \"a good thing\"    Successes:     Feels okay to good about where her voice is at currently    able to speak with a microphone for work    Hurdles:      Hard to find a good time to practice    Gets tired after meetings    Overall, Tatiana states that she is feeling better about her ability to maintain her voice at a functional level to meet her daily demands even if it is not able to have the projection or clarity that she had historically.  We discussed her goals for ongoing therapy and she wishes to move forward independently at this time and thus discharge evaluation is warranted to establish benchmark of function at this point    OBJECTIVE:  PATIENT REPORTED MEASURES:  Patient Specific Goal Metrics:  Dysponia SLP Goals 6/22/2022 8/10/2022   How would you rate your speaking voice quality, if 0 is worst voice quality, and 10 is best voice? 5 5   How much effort is it to speak, if 0 is no extra effort and 10 is maximum effort? 5 5   How much does your voice problem bother you? Somewhat Somewhat       *see end of note for standardized measures*    PERCEPTUAL EVALUATION (CPT 73789)  POSTURE / TENSION:     neck    BREATHING:     appears within normal limits and adequate     VOICE:    Roughness: Moderate Intermittent    Breathiness: Mild to moderate Intermittent    Strain: Mild to moderate Intermittent    Loudness    Conversational speech:  Mildly reduced    Pitch:    Conversational speech:  Mildly lowered    Pitch glide:    Low pitches gravelly    Smooth transition to loft registration    Resonance:    Conversational speech:  laryngeal pharyngeal resonance    Singing vs. Speech:     Significant registration breaks in multi-pitch singing tasks    Stagnant pitches on single sounds are clear and consistent    CAPE-V Overall Severity:  52/100    COUGH/THROAT CLEARING:    Not observed      S:Z ratio: 13:22 seconds    THERAPY PROBES: Oral reading tasks    She finds this harder than single sounds    Chanting " demonstrated increased roughness and registration breaks compared to single sounds  Cues to speak more slowly and loudly improved roughness slightly    THERAPEUTIC ACTIVITIES    Counseling and Education:    Asked many questions about the nature of their symptoms, and I answered all of these thoroughly.    Discussed vocal conditioning    Discussed benefits and drawbacks of continuing speech therapy      Cumulative regimen for home practice was reminded and redistributed.    I provided handouts of therapeutic activities to facilitate practice.    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 Tatiana feels her voice has been doing relatively well lately, especially since she uses a microphone for her work meetings.  She is able to use therapeutic test she has learned from me and my colleague Jada Lynn to maintain voice at its current level.  That said she still notices pitch breaks in her voice and she cannot be understood in loud environments.  She wishes to move forward independently at this time and thus discharge evaluation was completed.  During evaluative tasks as well as during conversation moments of significantly improved clarity were appreciated intermittently, and stimulability was performed to help identify the elements that offered this improvement.  Improvements in voice quality were elicited with smaller pitch ranges in tasks and slower speech rate. Tatiana feels she is well-equipped to use her learned strategies in the short-term and will consider returning to therapy after a few months.    PLAN: Tatiana is discharged from therapy at this time but will reach out to schedule future appointments as needed with Jada Lynn CCC-SLP.  For practice goals see AVS.     TOTAL  "SERVICE TIME: 50 minutes  PERCEPTUAL EVALUATION OF VOICE AND RESONANCE (28419)  NO CHARGE FACILITY FEE (46818)    Today's session and note was completed in tandem with Rogelio Zarco,  clinician in speech language hearing sciences. I was present during today's appointment and have reviewed / agree with the contents of this note.    Matthieu Tobias M.M., M.A., CCC-SLP  Speech-Language Pathologist  Certificate of Vocology  [phone]    *this report was created in part through the use of computerized dictation software, and though reviewed following completion, some typographic errors may persist.  If there is confusion regarding any of this notes contents, please contact me for clarification.*    Patient Supplied Answers To Last 2 VHI Questionnaires  Voice Handicap Index (VHI-10) 8/10/2022 10/26/2022   My voice makes it difficult for people to hear me 2 2   People have difficulty understanding me in a noisy room 3 3   My voice difficulties restrict my personal and social life.  2 2   I feel left out of conversations because of my voice 2 2   My voice problem causes me to lose income 0 0   I feel as though I have to strain to produce voice 2 2   The clarity of my voice is unpredictable 2 2   My voice problem upsets me 1 1   My voice makes me feel handicapped 2 1   People ask, \"What's wrong with your voice?\" 2 2   VHI-10 18 17        Patient Supplied Answers To Last 2 CSI Questionnaires  Cough Severity Index (CSI) 7/5/2018 1/31/2019   My cough is worse when I lie down 0 0   My coughing problem causes me to restrict my personal and social life 0 0   I tend to avoid places because of my cough problem 0 0   I feel embarrassed because of my coughing problem 0 0   People ask, ''What's wrong?'' because I cough a lot 0 0   I run out of air when I cough 0 0   My coughing problem affects my voice 0 0   My coughing problem limits my physical activity 0 0   My coughing problem upsets me 0 0   People ask me if " I am sick because I cough a lot 0 0   CSI Score 0 0        Patient Supplied Answers To Last 2 EAT Questionnaires  Eating Assessment Tool (EAT-10) 7/5/2018 1/31/2019   My swallowing problem has caused me to lose weight 0 0   My swallowing problem interferes with my ability to go out for meals 0 0   Swallowing liquids takes extra effort 0 0   Swallowing solids takes extra effort 0 0   Swallowing pills takes extra effort 0 0   Swallowing is painful 0 0   The pleasure of eating is affected by my swallowing 0 0   When I swallow food sticks in my throat 0 1   I cough when I eat 0 0   Swallowing is stressful 0 0   EAT-10 0 1        Patient Supplied Answers to Dyspnea Index Questionnaire:  No flowsheet data found.        Again, thank you for allowing me to participate in the care of your patient.      Sincerely,    Calvin Tobias, SLP

## 2022-10-26 NOTE — LETTER
"10/26/2022      RE: Tatiana Paredes  [address] [place] [address]       Tatiana Paredes is a 56 year old female who is being evaluated via a billable video visit.      The patient has been notified and verbally consented to the following:     This video visit will be conducted between you and your provider.    Patient has opted to conduct today's video visit vs an in-person appointment, and is not able to attend due to possible exposure to COVID-19.      If during the course of the call the provider feels a video visit is not appropriate, you will not be charged for this service.    Call initiated at: 9:02  Type of Video Platform Used: [employer]  Location of provider: Residence  Location of patient: [facility]  DISCHARGE EVALUATION NOTE (CPT 21839)    Patient: Tatiana Paredes  Date of Service: 10/26/2022  Referring physician: Dr. Pisano  Impressions from most recent evaluation:  \"Tatiana Paredes presents today with ongoing dysphonia of a similar nature to when she was last seen 2019.  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  The degree of immobility is essentially unchanged from her last visit.  Perceptually this manifests as significant pitch instability as well as frequent descent into glottal rios with corresponding reduction of intensity.  Patient is able to achieve improved quality with focus on therapeutic tasks such as glottic coup or finding optimal pitch range.\"    SUBJECTIVE:  Since the patient's last session, they report the following:     Overall symptoms are about the same, though she considers this to be \"a good thing\"    Successes:     Feels okay to good about where her voice is at currently    able to speak with a microphone for work    Hurdles:      Hard to find a good time to practice    Gets tired after " meetings    Overall, Tatiana states that she is feeling better about her ability to maintain her voice at a functional level to meet her daily demands even if it is not able to have the projection or clarity that she had historically.  We discussed her goals for ongoing therapy and she wishes to move forward independently at this time and thus discharge evaluation is warranted to establish benchmark of function at this point    OBJECTIVE:  PATIENT REPORTED MEASURES:  Patient Specific Goal Metrics:  Dysponia SLP Goals 6/22/2022 8/10/2022   How would you rate your speaking voice quality, if 0 is worst voice quality, and 10 is best voice? 5 5   How much effort is it to speak, if 0 is no extra effort and 10 is maximum effort? 5 5   How much does your voice problem bother you? Somewhat Somewhat       *see end of note for standardized measures*    PERCEPTUAL EVALUATION (CPT 74151)  POSTURE / TENSION:     neck    BREATHING:     appears within normal limits and adequate     VOICE:    Roughness: Moderate Intermittent    Breathiness: Mild to moderate Intermittent    Strain: Mild to moderate Intermittent    Loudness    Conversational speech:  Mildly reduced    Pitch:    Conversational speech:  Mildly lowered    Pitch glide:    Low pitches gravelly    Smooth transition to loft registration    Resonance:    Conversational speech:  laryngeal pharyngeal resonance    Singing vs. Speech:     Significant registration breaks in multi-pitch singing tasks    Stagnant pitches on single sounds are clear and consistent    CAPE-V Overall Severity:  52/100    COUGH/THROAT CLEARING:    Not observed      S:Z ratio: 13:22 seconds    THERAPY PROBES: Oral reading tasks    She finds this harder than single sounds    Chanting demonstrated increased roughness and registration breaks compared to single sounds  Cues to speak more slowly and loudly improved roughness slightly    THERAPEUTIC ACTIVITIES    Counseling and Education:    Asked many questions  about the nature of their symptoms, and I answered all of these thoroughly.    Discussed vocal conditioning    Discussed benefits and drawbacks of continuing speech therapy      Cumulative regimen for home practice was reminded and redistributed.    I provided handouts of therapeutic activities to facilitate practice.    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 Tatiana feels her voice has been doing relatively well lately, especially since she uses a microphone for her work meetings.  She is able to use therapeutic test she has learned from me and my colleague Jada Lynn to maintain voice at its current level.  That said she still notices pitch breaks in her voice and she cannot be understood in loud environments.  She wishes to move forward independently at this time and thus discharge evaluation was completed.  During evaluative tasks as well as during conversation moments of significantly improved clarity were appreciated intermittently, and stimulability was performed to help identify the elements that offered this improvement.  Improvements in voice quality were elicited with smaller pitch ranges in tasks and slower speech rate. Tatiana feels she is well-equipped to use her learned strategies in the short-term and will consider returning to therapy after a few months.    PLAN: Tatiana is discharged from therapy at this time but will reach out to schedule future appointments as needed with Jada Lynn CCC-SLP.  For practice goals see AVS.     TOTAL SERVICE TIME: 50 minutes  PERCEPTUAL EVALUATION OF VOICE AND RESONANCE (10806)  NO CHARGE FACILITY FEE (02387)    Today's session and note was completed in tandem with Rogelio Zarco,  clinician in speech  "language hearing sciences. I was present during today's appointment and have reviewed / agree with the contents of this note.    Matthieu Tobias M.M., M.A., CCC-SLP  Speech-Language Pathologist  Certificate of Vocology  [phone]    *this report was created in part through the use of computerized dictation software, and though reviewed following completion, some typographic errors may persist.  If there is confusion regarding any of this notes contents, please contact me for clarification.*    Patient Supplied Answers To Last 2 VHI Questionnaires  Voice Handicap Index (VHI-10) 8/10/2022 10/26/2022   My voice makes it difficult for people to hear me 2 2   People have difficulty understanding me in a noisy room 3 3   My voice difficulties restrict my personal and social life.  2 2   I feel left out of conversations because of my voice 2 2   My voice problem causes me to lose income 0 0   I feel as though I have to strain to produce voice 2 2   The clarity of my voice is unpredictable 2 2   My voice problem upsets me 1 1   My voice makes me feel handicapped 2 1   People ask, \"What's wrong with your voice?\" 2 2   VHI-10 18 17        Patient Supplied Answers To Last 2 CSI Questionnaires  Cough Severity Index (CSI) 7/5/2018 1/31/2019   My cough is worse when I lie down 0 0   My coughing problem causes me to restrict my personal and social life 0 0   I tend to avoid places because of my cough problem 0 0   I feel embarrassed because of my coughing problem 0 0   People ask, ''What's wrong?'' because I cough a lot 0 0   I run out of air when I cough 0 0   My coughing problem affects my voice 0 0   My coughing problem limits my physical activity 0 0   My coughing problem upsets me 0 0   People ask me if I am sick because I cough a lot 0 0   CSI Score 0 0        Patient Supplied Answers To Last 2 EAT Questionnaires  Eating Assessment Tool (EAT-10) 7/5/2018 1/31/2019   My swallowing problem has caused me to lose weight 0 0 "   My swallowing problem interferes with my ability to go out for meals 0 0   Swallowing liquids takes extra effort 0 0   Swallowing solids takes extra effort 0 0   Swallowing pills takes extra effort 0 0   Swallowing is painful 0 0   The pleasure of eating is affected by my swallowing 0 0   When I swallow food sticks in my throat 0 1   I cough when I eat 0 0   Swallowing is stressful 0 0   EAT-10 0 1        Patient Supplied Answers to Dyspnea Index Questionnaire:  No flowsheet data found.        Calvin Tobias, SLP

## 2022-10-31 ENCOUNTER — ANESTHESIA (OUTPATIENT)
Dept: SURGERY | Facility: AMBULATORY SURGERY CENTER | Age: 57
End: 2022-10-31
Payer: COMMERCIAL

## 2022-10-31 ENCOUNTER — HOSPITAL ENCOUNTER (OUTPATIENT)
Facility: AMBULATORY SURGERY CENTER | Age: 57
Discharge: HOME | End: 2022-10-31
Attending: INTERNAL MEDICINE
Payer: COMMERCIAL

## 2022-10-31 ENCOUNTER — ANESTHESIA EVENT (OUTPATIENT)
Dept: SURGERY | Facility: AMBULATORY SURGERY CENTER | Age: 57
End: 2022-10-31
Payer: COMMERCIAL

## 2022-10-31 VITALS
OXYGEN SATURATION: 100 % | SYSTOLIC BLOOD PRESSURE: 119 MMHG | WEIGHT: 150 LBS | BODY MASS INDEX: 23.54 KG/M2 | RESPIRATION RATE: 16 BRPM | DIASTOLIC BLOOD PRESSURE: 73 MMHG | TEMPERATURE: 97.6 F | HEIGHT: 67 IN | HEART RATE: 71 BPM

## 2022-10-31 VITALS — HEART RATE: 67 BPM

## 2022-10-31 LAB — COLONOSCOPY: NORMAL

## 2022-10-31 PROCEDURE — 45385 COLONOSCOPY W/LESION REMOVAL: CPT

## 2022-10-31 PROCEDURE — 45380 COLONOSCOPY AND BIOPSY: CPT | Mod: 59

## 2022-10-31 PROCEDURE — 88305 TISSUE EXAM BY PATHOLOGIST: CPT | Mod: TC | Performed by: INTERNAL MEDICINE

## 2022-10-31 PROCEDURE — 88305 TISSUE EXAM BY PATHOLOGIST: CPT | Mod: 26 | Performed by: PATHOLOGY

## 2022-10-31 RX ORDER — PROCHLORPERAZINE MALEATE 10 MG
10 TABLET ORAL EVERY 6 HOURS PRN
Status: DISCONTINUED | OUTPATIENT
Start: 2022-10-31 | End: 2022-11-01 | Stop reason: HOSPADM

## 2022-10-31 RX ORDER — FLUMAZENIL 0.1 MG/ML
0.2 INJECTION, SOLUTION INTRAVENOUS
Status: ACTIVE | OUTPATIENT
Start: 2022-10-31 | End: 2022-10-31

## 2022-10-31 RX ORDER — NALOXONE HYDROCHLORIDE 0.4 MG/ML
0.2 INJECTION, SOLUTION INTRAMUSCULAR; INTRAVENOUS; SUBCUTANEOUS
Status: DISCONTINUED | OUTPATIENT
Start: 2022-10-31 | End: 2022-11-01 | Stop reason: HOSPADM

## 2022-10-31 RX ORDER — PROPOFOL 10 MG/ML
INJECTION, EMULSION INTRAVENOUS CONTINUOUS PRN
Status: DISCONTINUED | OUTPATIENT
Start: 2022-10-31 | End: 2022-10-31

## 2022-10-31 RX ORDER — VALACYCLOVIR HYDROCHLORIDE 500 MG/1
500 TABLET, FILM COATED ORAL 2 TIMES DAILY
Qty: 28 TABLET | Refills: 2 | Status: SHIPPED | OUTPATIENT
Start: 2022-10-31 | End: 2022-12-14

## 2022-10-31 RX ORDER — NALOXONE HYDROCHLORIDE 0.4 MG/ML
0.4 INJECTION, SOLUTION INTRAMUSCULAR; INTRAVENOUS; SUBCUTANEOUS
Status: DISCONTINUED | OUTPATIENT
Start: 2022-10-31 | End: 2022-11-01 | Stop reason: HOSPADM

## 2022-10-31 RX ORDER — LIDOCAINE 40 MG/G
CREAM TOPICAL
Status: DISCONTINUED | OUTPATIENT
Start: 2022-10-31 | End: 2022-10-31 | Stop reason: HOSPADM

## 2022-10-31 RX ORDER — LIDOCAINE HYDROCHLORIDE 20 MG/ML
INJECTION, SOLUTION INFILTRATION; PERINEURAL PRN
Status: DISCONTINUED | OUTPATIENT
Start: 2022-10-31 | End: 2022-10-31

## 2022-10-31 RX ORDER — ONDANSETRON 2 MG/ML
4 INJECTION INTRAMUSCULAR; INTRAVENOUS
Status: DISCONTINUED | OUTPATIENT
Start: 2022-10-31 | End: 2022-10-31 | Stop reason: HOSPADM

## 2022-10-31 RX ORDER — ONDANSETRON 4 MG/1
4 TABLET, ORALLY DISINTEGRATING ORAL EVERY 6 HOURS PRN
Status: DISCONTINUED | OUTPATIENT
Start: 2022-10-31 | End: 2022-11-01 | Stop reason: HOSPADM

## 2022-10-31 RX ORDER — ONDANSETRON 2 MG/ML
4 INJECTION INTRAMUSCULAR; INTRAVENOUS EVERY 6 HOURS PRN
Status: DISCONTINUED | OUTPATIENT
Start: 2022-10-31 | End: 2022-11-01 | Stop reason: HOSPADM

## 2022-10-31 RX ORDER — SODIUM CHLORIDE, SODIUM LACTATE, POTASSIUM CHLORIDE, CALCIUM CHLORIDE 600; 310; 30; 20 MG/100ML; MG/100ML; MG/100ML; MG/100ML
INJECTION, SOLUTION INTRAVENOUS CONTINUOUS
Status: DISCONTINUED | OUTPATIENT
Start: 2022-10-31 | End: 2022-10-31 | Stop reason: HOSPADM

## 2022-10-31 RX ORDER — PROPOFOL 10 MG/ML
INJECTION, EMULSION INTRAVENOUS PRN
Status: DISCONTINUED | OUTPATIENT
Start: 2022-10-31 | End: 2022-10-31

## 2022-10-31 RX ADMIN — LIDOCAINE HYDROCHLORIDE 60 MG: 20 INJECTION, SOLUTION INFILTRATION; PERINEURAL at 09:09

## 2022-10-31 RX ADMIN — SODIUM CHLORIDE, SODIUM LACTATE, POTASSIUM CHLORIDE, CALCIUM CHLORIDE: 600; 310; 30; 20 INJECTION, SOLUTION INTRAVENOUS at 08:54

## 2022-10-31 RX ADMIN — PROPOFOL 150 MCG/KG/MIN: 10 INJECTION, EMULSION INTRAVENOUS at 09:09

## 2022-10-31 RX ADMIN — PROPOFOL 50 MG: 10 INJECTION, EMULSION INTRAVENOUS at 09:09

## 2022-10-31 NOTE — H&P
Tatiana Paredes  [id number]  female  56 year old      Reason for procedure/surgery: change in bowel habits    Patient Active Problem List   Diagnosis     External hemorrhoids     Dysphonia     Choroidal nevus of right eye     Myopia     Hallux valgus of right foot     Knee instability     Pes planus of both feet     Pain of foot     Vocal fold paresis, right       Past Surgical History:    Past Surgical History:   Procedure Laterality Date     HC HYSTEROSCOPY, SURGICAL; W/ ENDOMETRIAL ABLATION, ANY METHOD  2011       Past Medical History:   Past Medical History:   Diagnosis Date     Breast pain, left 11/2013     Choroidal nevus of right eye      Constipation      Dysphonia      Gastroesophageal reflux disease      Genital herpes      Hemorrhoids      Hoarseness      Menorrhagia      Migraines        Social History:   Social History     Tobacco Use     Smoking status: Never     Smokeless tobacco: Never   Substance Use Topics     Alcohol use: Yes     Alcohol/week: 0.8 - 4.2 standard drinks     Comment: 1-3 times a week       Family History:   Family History   Problem Relation Age of Onset     Breast Cancer Sister 52     Cancer - colorectal Maternal Grandmother 70     Migraines Mother      Unknown/Adopted Mother      Stomach Problem Mother      Stomach Cancer Mother      Prostate Cancer Father      Cancer Father      Glaucoma No family hx of      Macular Degeneration No family hx of      Retinal detachment No family hx of      Amblyopia No family hx of      Strabismus No family hx of      Glasses (<7 y/o) No family hx of      Nystagmus No family hx of        Allergies:   Allergies   Allergen Reactions     Contrast Dye Itching     CT contrast dye       Active Medications:   Current Outpatient Medications   Medication Sig Dispense Refill     famotidine (PEPCID) 20 MG tablet Take 20 mg by mouth nightly as needed       nitroFURantoin macrocrystal-monohydrate (MACROBID) 100 MG capsule Take 1 capsule (100 mg) by mouth  "once as needed (intercourse) 30 capsule 11     valACYclovir (VALTREX) 500 MG tablet Take 1 tablet (500 mg) by mouth 2 times daily 28 tablet 2     bisacodyl (DULCOLAX) 5 MG EC tablet Take as directed. One day before exam take 2 tablets at 3 PM. Take 2 tablets at 11 PM. 4 tablet 0     carboxymethylcellul-glycerin (REFRESH OPTIVE) 0.5-0.9 % SOLN ophthalmic solution Place 1 drop into both eyes 2 times daily 1 Bottle 3     polyethylene glycol (GOLYTELY) 236 g suspension Take as directed. One day before exam fill the jug with water. Cover and shake until well mixed. At 6 PM start drinking an 8oz glass of mixture every 15 minutes until jug is 1/2 empty. Store remainder in the refrigerator.  At 11 PM Start drinking the other half of the Golytely jug. Drink one 8-ounce glass every 15 minutes until the jug is empty. 4000 mL 0       Systemic Review:   CONSTITUTIONAL: NEGATIVE for fever, chills, change in weight  ENT/MOUTH: NEGATIVE for ear, mouth and throat problems  RESP: NEGATIVE for significant cough or SOB  CV: NEGATIVE for chest pain, palpitations or peripheral edema    Physical Examination:   Vital Signs: /78   Pulse 85   Temp 97.1  F (36.2  C) (Temporal)   Resp 16   Ht 1.702 m (5' 7\")   Wt 68 kg (150 lb)   LMP 07/18/2016 (Exact Date)   SpO2 98%   BMI 23.49 kg/m    GENERAL: healthy, alert and no distress  NECK: no adenopathy, no asymmetry, masses, or scars  RESP: lungs clear to auscultation - no rales, rhonchi or wheezes  CV: regular rate and rhythm, normal S1 S2, no S3 or S4, no murmur, click or rub, no peripheral edema and peripheral pulses strong  ABDOMEN: soft, nontender, no hepatosplenomegaly, no masses and bowel sounds normal  MS: no gross musculoskeletal defects noted, no edema    Plan: Appropriate to proceed as scheduled.      Emperatriz Peterson MD  10/31/2022    PCP:  Cruz Prather    "

## 2022-10-31 NOTE — TELEPHONE ENCOUNTER
valACYclovir (VALTREX) 500 MG tablet       Last Written Prescription Date:  04-21-22  Last Fill Quantity: 28,   # refills: 2  Last Office Visit : 09-21-22  Future Office visit:  12-14-22    Routing refill request to provider for review/approval because:   Gap in meds refill,  (PRN ?)

## 2022-10-31 NOTE — ANESTHESIA CARE TRANSFER NOTE
Patient: Tatiana Paredes    Procedure: Procedure(s):  COLONOSCOPY, WITH POLYPECTOMY AND BIOPSY       Diagnosis: Screening cholesterol level [Z13.220]  Diagnosis Additional Information: No value filed.    Anesthesia Type:   No value filed.     Note:    Oropharynx: oropharynx clear of all foreign objects and spontaneously breathing  Level of Consciousness: awake  Oxygen Supplementation: room air    Independent Airway: airway patency satisfactory and stable  Dentition: dentition unchanged  Vital Signs Stable: post-procedure vital signs reviewed and stable  Report to RN Given: handoff report given  Patient transferred to: Phase II    Handoff Report: Identifed the Patient, Identified the Reponsible Provider, Reviewed the pertinent medical history, Discussed the surgical course, Reviewed Intra-OP anesthesia mangement and issues during anesthesia, Set expectations for post-procedure period and Allowed opportunity for questions and acknowledgement of understanding      Vitals:  Vitals Value Taken Time   /66 10/31/22 0937   Temp 36.2  C (97.1  F) 10/31/22 0937   Pulse 69 10/31/22 0937   Resp 16 10/31/22 0937   SpO2 100 % 10/31/22 0937       Electronically Signed By: GIULIANA CERVANTES  October 31, 2022  9:38 AM

## 2022-11-01 NOTE — ANESTHESIA POSTPROCEDURE EVALUATION
Patient: Tatiana Paredes    Procedure: Procedure(s):  COLONOSCOPY, WITH POLYPECTOMY AND BIOPSY       Anesthesia Type:  MAC    Note:  Disposition: Outpatient   Postop Pain Control: Uneventful            Sign Out: Well controlled pain   PONV: No   Neuro/Psych: Uneventful            Sign Out: Acceptable/Baseline neuro status   Airway/Respiratory: Uneventful            Sign Out: Acceptable/Baseline resp. status   CV/Hemodynamics: Uneventful            Sign Out: Acceptable CV status; No obvious hypovolemia; No obvious fluid overload   Other NRE: NONE   DID A NON-ROUTINE EVENT OCCUR? No           Last vitals:  Vitals Value Taken Time   /73 10/31/22 1000   Temp 36.4  C (97.6  F) 10/31/22 0945   Pulse 71 10/31/22 1000   Resp 16 10/31/22 1000   SpO2 100 % 10/31/22 1000       Electronically Signed By: Chapo Christine MD  November 1, 2022  2:30 PM

## 2022-11-01 NOTE — ANESTHESIA PREPROCEDURE EVALUATION
Anesthesia Pre-Procedure Evaluation    Patient: Tatiana Paredes   MRN: [id number] : 1965        Procedure : Procedure(s):  COLONOSCOPY, WITH POLYPECTOMY AND BIOPSY          Past Medical History:   Diagnosis Date     Breast pain, left 2013     Choroidal nevus of right eye      Constipation      Dysphonia      Gastroesophageal reflux disease      Genital herpes      Hemorrhoids      Hoarseness      Menorrhagia      Migraines       Past Surgical History:   Procedure Laterality Date     COLONOSCOPY N/A 10/31/2022    Procedure: COLONOSCOPY, WITH POLYPECTOMY AND BIOPSY;  Surgeon: Emperatriz Peterson MD;  Location: [facility] OR      HYSTEROSCOPY, SURGICAL; W/ ENDOMETRIAL ABLATION, ANY METHOD        Allergies   Allergen Reactions     Contrast Dye Itching     CT contrast dye      Social History     Tobacco Use     Smoking status: Never     Smokeless tobacco: Never   Substance Use Topics     Alcohol use: Yes     Alcohol/week: 0.8 - 4.2 standard drinks     Comment: 1-3 times a week      Wt Readings from Last 1 Encounters:   10/31/22 68 kg (150 lb)        Anesthesia Evaluation   Pt has had prior anesthetic.     No history of anesthetic complications       ROS/MED HX  ENT/Pulmonary: Comment: Right vocal cord hemiparesis with dysphonia but without SOB      Neurologic:       Cardiovascular:       METS/Exercise Tolerance: >4 METS    Hematologic:       Musculoskeletal:   (+) arthritis,     GI/Hepatic:     (+) GERD, Asymptomatic on medication,     Renal/Genitourinary:       Endo:       Psychiatric/Substance Use:       Infectious Disease:       Malignancy:       Other:            Physical Exam    Airway  airway exam normal           Respiratory Devices and Support         Dental  no notable dental history         Cardiovascular   cardiovascular exam normal          Pulmonary   pulmonary exam normal                OUTSIDE LABS:  CBC:   Lab Results   Component Value Date    WBC 6.3 2022    WBC 7.9 10/12/2021    HGB  13.4 09/21/2022    HGB 13.4 10/12/2021    HCT 40.9 09/21/2022    HCT 40.7 10/12/2021     09/21/2022     10/12/2021     BMP:   Lab Results   Component Value Date     09/21/2022     10/12/2021    POTASSIUM 4.9 09/21/2022    POTASSIUM 4.0 10/12/2021    CHLORIDE 104 09/21/2022    CHLORIDE 107 10/12/2021    CO2 25 09/21/2022    CO2 27 10/12/2021    BUN 15.9 09/21/2022    BUN 9 10/12/2021    CR 0.67 09/21/2022    CR 0.71 10/12/2021     (H) 09/21/2022     (H) 10/12/2021     COAGS: No results found for: PTT, INR, FIBR  POC:   Lab Results   Component Value Date    HCG Negative 04/13/2021     HEPATIC:   Lab Results   Component Value Date    ALBUMIN 4.7 09/21/2022    PROTTOTAL 7.4 09/21/2022    ALT 19 09/21/2022    AST 26 09/21/2022    ALKPHOS 81 09/21/2022    BILITOTAL 0.4 09/21/2022     OTHER:   Lab Results   Component Value Date    SAPPHIRE 10.1 (H) 09/21/2022    LIPASE 147 06/21/2016    AMYLASE 63 06/21/2016    TSH 1.42 04/13/2021    CRP <2.9 04/13/2021    SED 14 04/13/2021       Anesthesia Plan    ASA Status:  1   NPO Status:  NPO Appropriate    Anesthesia Type: MAC.     - Reason for MAC: straight local not clinically adequate   Induction: Intravenous.   Maintenance: TIVA.        Consents    Anesthesia Plan(s) and associated risks, benefits, and realistic alternatives discussed. Questions answered and patient/representative(s) expressed understanding.    - Discussed:     - Discussed with:  Patient         Postoperative Care       PONV prophylaxis: Ondansetron (or other 5HT-3)     Comments:                Chapo Christine MD

## 2022-11-02 LAB
PATH REPORT.COMMENTS IMP SPEC: NORMAL
PATH REPORT.COMMENTS IMP SPEC: NORMAL
PATH REPORT.FINAL DX SPEC: NORMAL
PATH REPORT.GROSS SPEC: NORMAL
PATH REPORT.MICROSCOPIC SPEC OTHER STN: NORMAL
PATH REPORT.RELEVANT HX SPEC: NORMAL
PHOTO IMAGE: NORMAL

## 2022-11-16 ENCOUNTER — PRE VISIT (OUTPATIENT)
Dept: OTOLARYNGOLOGY | Facility: CLINIC | Age: 57
End: 2022-11-16

## 2022-11-16 ENCOUNTER — OFFICE VISIT (OUTPATIENT)
Dept: OTOLARYNGOLOGY | Facility: CLINIC | Age: 57
End: 2022-11-16
Payer: COMMERCIAL

## 2022-11-16 VITALS
SYSTOLIC BLOOD PRESSURE: 136 MMHG | OXYGEN SATURATION: 98 % | WEIGHT: 163.3 LBS | BODY MASS INDEX: 25.63 KG/M2 | HEIGHT: 67 IN | HEART RATE: 78 BPM | DIASTOLIC BLOOD PRESSURE: 87 MMHG

## 2022-11-16 DIAGNOSIS — M95.0 ACQUIRED NASAL DEFORMITY: Primary | ICD-10-CM

## 2022-11-16 DIAGNOSIS — S02.2XXA CLOSED FRACTURE OF NASAL BONE, INITIAL ENCOUNTER: ICD-10-CM

## 2022-11-16 PROCEDURE — 99203 OFFICE O/P NEW LOW 30 MIN: CPT | Performed by: OTOLARYNGOLOGY

## 2022-11-16 ASSESSMENT — PAIN SCALES - GENERAL: PAINLEVEL: NO PAIN (0)

## 2022-11-16 NOTE — LETTER
11/16/2022       RE: Tatiana Paredes  [address] [facility] [address]     Dear Colleague,    Thank you for referring your patient, Tatiana Paredes, to the [facility] [place] [facility]. Please see a copy of my visit note below.    Facial Plastic and Reconstructive Surgery Consultation    Referring Provider:   Cruz Ferguson MD  [address] [place] [id number]    HPI:   I had the pleasure of seeing Tatiana Paredes today in clinic for consultation for nasal deformity acquired.    Tatiana Paredes is a 57 year old female who had significant trauma to her nose.  She noted significant change in the external appearance of her nose.  She notes that there is slight shifting of the nasal bony framework and there is a protrusion of the right nasal bone.  She was referred by  for discussion of possible management.  She states since it has been sometime she perhaps is feeling less concerned about the new nasal changes.    Review Of Systems  ROS: 10 point ROS neg other than the symptoms noted above in the HPI.    Patient Active Problem List   Diagnosis     External hemorrhoids     Dysphonia     Choroidal nevus of right eye     Myopia     Hallux valgus of right foot     Knee instability     Pes planus of both feet     Pain of foot     Vocal fold paresis, right     Past Surgical History:   Procedure Laterality Date     COLONOSCOPY N/A 10/31/2022    Procedure: COLONOSCOPY, WITH POLYPECTOMY AND BIOPSY;  Surgeon: Emperatriz Peterson MD;  [place] [facility] [place]      HYSTEROSCOPY, SURGICAL; W/ ENDOMETRIAL ABLATION, ANY METHOD  2011     Current Outpatient Medications   Medication Sig Dispense Refill     bisacodyl (DULCOLAX) 5 MG EC tablet Take as directed. One day before exam take 2 tablets at 3 PM. Take 2 tablets at 11 PM. 4 tablet 0     carboxymethylcellul-glycerin (REFRESH OPTIVE) 0.5-0.9 %  SOLN ophthalmic solution Place 1 drop into both eyes 2 times daily 1 Bottle 3     famotidine (PEPCID) 20 MG tablet Take 20 mg by mouth nightly as needed       nitroFURantoin macrocrystal-monohydrate (MACROBID) 100 MG capsule Take 1 capsule (100 mg) by mouth once as needed (intercourse) 30 capsule 11     polyethylene glycol (GOLYTELY) 236 g suspension Take as directed. One day before exam fill the jug with water. Cover and shake until well mixed. At 6 PM start drinking an 8oz glass of mixture every 15 minutes until jug is 1/2 empty. Store remainder in the refrigerator.  At 11 PM Start drinking the other half of the Golytely jug. Drink one 8-ounce glass every 15 minutes until the jug is empty. 4000 mL 0     valACYclovir (VALTREX) 500 MG tablet Take 1 tablet (500 mg) by mouth 2 times daily 28 tablet 2     Contrast dye  Social History     Socioeconomic History     Marital status:      Spouse name: Not on file     Number of children: Not on file     Years of education: Not on file     Highest education level: Not on file   Occupational History     Not on file   Tobacco Use     Smoking status: Never     Smokeless tobacco: Never   Substance and Sexual Activity     Alcohol use: Yes     Alcohol/week: 0.8 - 4.2 standard drinks     Comment: 1-3 times a week     Drug use: No     Sexual activity: Yes     Partners: Male     Birth control/protection: Condom   Other Topics Concern      Service No     Blood Transfusions No     Caffeine Concern No     Occupational Exposure No     Hobby Hazards No     Sleep Concern No     Stress Concern No     Weight Concern No     Special Diet No     Back Care No     Exercise Yes     Bike Helmet Yes     Seat Belt Yes     Self-Exams Yes   Social History Narrative    How much exercise per week? Not enough    How much calcium per day? Some in her diet       How much caffeine per day? 2 cups     How much vitamin D per day? Some in diet    Do you/your family wear seatbelts?  Yes    Do  you/your family use safety helmets? N/A    Do you/your family use sunscreen? Yes    Do you/your family keep firearms in the home? No    Do you/your family have a smoke detector(s)? Yes        Do you feel safe in your home? Yes    Has anyone ever touched you in an unwanted manner? No        06/17/2015        Works at [employer].     .    Natali Powell MD                 Social Determinants of Health     Financial Resource Strain: Not on file   Food Insecurity: Not on file   Transportation Needs: Not on file   Physical Activity: Not on file   Stress: Not on file   Social Connections: Not on file   Intimate Partner Violence: Not on file   Housing Stability: Not on file     Family History   Problem Relation Age of Onset     Breast Cancer Sister 52     Cancer - colorectal Maternal Grandmother 70     Migraines Mother      Unknown/Adopted Mother      Stomach Problem Mother      Stomach Cancer Mother      Prostate Cancer Father      Cancer Father      Glaucoma No family hx of      Macular Degeneration No family hx of      Retinal detachment No family hx of      Amblyopia No family hx of      Strabismus No family hx of      Glasses (<7 y/o) No family hx of      Nystagmus No family hx of        PE:  Alert and Oriented, Answering Questions Appropriately  Extra examination demonstrates a significant dorsal convexity that is baseline however on that she has irregularity of the nasal bone.  The right nasal bone is convex and the left nasal bone is flattened leading to a slight shift of the nasal bony dorsum to the right.  This is notable on external appearance.      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  We discussed potential management options.  I do think that the nasal bony asymmetry can be managed in 2 ways.  I think 1 is more favorable than the other and more minimally invasive.  I do think we could do an  endonasal approach and be able to rasp down the right nasal bone in order to produce more symmetry on external appearance.  I did discuss the other option which would be bilateral osteotomies with mobilization of the nasal dorsal framework.    She does have the vocal cord paralysis which is being managed closely and this does add a component to her care.  For the rasping I could perform this under MAC and a local primarily having her sedated for the numbing but once this is performed really not requiring much sedation at all.  The alternative procedure with more significant reconstruction would require intubation with general anesthesia.  We discussed this today.    As she does not have any significant functional deficits today, this would be something that would be primarily for nasal symmetry thus discussed that there will likely be an out-of-pocket component for this.  She is interested in obtaining a quote for the procedure.    No orders of the defined types were placed in this encounter.        Photodocumentation was obtained.     I spent a total of 30 minutes in the care of patient Tatiana Paredes during today's office visit. This time includes reviewing the patient's chart and prior history, obtaining a history, performing an examination and evaluation and counseling the patient. This time also includes ordering mediations or tests necessary in addition to communication to other member's of the patient's health care team. Time spent in documentation and care coordination is included.       Again, thank you for allowing me to participate in the care of your patient.      Sincerely,    Manisha Ruggiero MD

## 2022-11-16 NOTE — PROGRESS NOTES
Facial Plastic and Reconstructive Surgery Consultation    Referring Provider:   Cruz Ferguson MD  [address] [place] [id number]    HPI:   I had the pleasure of seeing Tatiana Paredes today in clinic for consultation for nasal deformity acquired.    Tatiana Paredes is a 57 year old female who had significant trauma to her nose.  She noted significant change in the external appearance of her nose.  She notes that there is slight shifting of the nasal bony framework and there is a protrusion of the right nasal bone.  She was referred by  for discussion of possible management.  She states since it has been sometime she perhaps is feeling less concerned about the new nasal changes.    Review Of Systems  ROS: 10 point ROS neg other than the symptoms noted above in the HPI.    Patient Active Problem List   Diagnosis     External hemorrhoids     Dysphonia     Choroidal nevus of right eye     Myopia     Hallux valgus of right foot     Knee instability     Pes planus of both feet     Pain of foot     Vocal fold paresis, right     Past Surgical History:   Procedure Laterality Date     COLONOSCOPY N/A 10/31/2022    Procedure: COLONOSCOPY, WITH POLYPECTOMY AND BIOPSY;  Surgeon: Emperatriz Peterson MD;  [place] [facility] [place]STEROSCOPY, SURGICAL; W/ ENDOMETRIAL ABLATION, ANY METHOD  2011     Current Outpatient Medications   Medication Sig Dispense Refill     bisacodyl (DULCOLAX) 5 MG EC tablet Take as directed. One day before exam take 2 tablets at 3 PM. Take 2 tablets at 11 PM. 4 tablet 0     carboxymethylcellul-glycerin (REFRESH OPTIVE) 0.5-0.9 % SOLN ophthalmic solution Place 1 drop into both eyes 2 times daily 1 Bottle 3     famotidine (PEPCID) 20 MG tablet Take 20 mg by mouth nightly as needed       nitroFURantoin macrocrystal-monohydrate (MACROBID) 100 MG capsule Take 1 capsule (100 mg) by mouth once as needed (intercourse) 30 capsule 11     polyethylene glycol (GOLYTELY) 236 g  suspension Take as directed. One day before exam fill the jug with water. Cover and shake until well mixed. At 6 PM start drinking an 8oz glass of mixture every 15 minutes until jug is 1/2 empty. Store remainder in the refrigerator.  At 11 PM Start drinking the other half of the Golytely jug. Drink one 8-ounce glass every 15 minutes until the jug is empty. 4000 mL 0     valACYclovir (VALTREX) 500 MG tablet Take 1 tablet (500 mg) by mouth 2 times daily 28 tablet 2     Contrast dye  Social History     Socioeconomic History     Marital status:      Spouse name: Not on file     Number of children: Not on file     Years of education: Not on file     Highest education level: Not on file   Occupational History     Not on file   Tobacco Use     Smoking status: Never     Smokeless tobacco: Never   Substance and Sexual Activity     Alcohol use: Yes     Alcohol/week: 0.8 - 4.2 standard drinks     Comment: 1-3 times a week     Drug use: No     Sexual activity: Yes     Partners: Male     Birth control/protection: Condom   Other Topics Concern      Service No     Blood Transfusions No     Caffeine Concern No     Occupational Exposure No     Hobby Hazards No     Sleep Concern No     Stress Concern No     Weight Concern No     Special Diet No     Back Care No     Exercise Yes     Bike Helmet Yes     Seat Belt Yes     Self-Exams Yes   Social History Narrative    How much exercise per week? Not enough    How much calcium per day? Some in her diet       How much caffeine per day? 2 cups     How much vitamin D per day? Some in diet    Do you/your family wear seatbelts?  Yes    Do you/your family use safety helmets? N/A    Do you/your family use sunscreen? Yes    Do you/your family keep firearms in the home? No    Do you/your family have a smoke detector(s)? Yes        Do you feel safe in your home? Yes    Has anyone ever touched you in an unwanted manner? No        06/17/2015        Works at [employer].     .     Natali Powell MD                 Social Determinants of Health     Financial Resource Strain: Not on file   Food Insecurity: Not on file   Transportation Needs: Not on file   Physical Activity: Not on file   Stress: Not on file   Social Connections: Not on file   Intimate Partner Violence: Not on file   Housing Stability: Not on file     Family History   Problem Relation Age of Onset     Breast Cancer Sister 52     Cancer - colorectal Maternal Grandmother 70     Migraines Mother      Unknown/Adopted Mother      Stomach Problem Mother      Stomach Cancer Mother      Prostate Cancer Father      Cancer Father      Glaucoma No family hx of      Macular Degeneration No family hx of      Retinal detachment No family hx of      Amblyopia No family hx of      Strabismus No family hx of      Glasses (<7 y/o) No family hx of      Nystagmus No family hx of        PE:  Alert and Oriented, Answering Questions Appropriately  Extra examination demonstrates a significant dorsal convexity that is baseline however on that she has irregularity of the nasal bone.  The right nasal bone is convex and the left nasal bone is flattened leading to a slight shift of the nasal bony dorsum to the right.  This is notable on external appearance.      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  We discussed potential management options.  I do think that the nasal bony asymmetry can be managed in 2 ways.  I think 1 is more favorable than the other and more minimally invasive.  I do think we could do an endonasal approach and be able to rasp down the right nasal bone in order to produce more symmetry on external appearance.  I did discuss the other option which would be bilateral osteotomies with mobilization of the nasal dorsal framework.    She does have the vocal cord paralysis which is being managed closely and this does add a component to her  care.  For the rasping I could perform this under MAC and a local primarily having her sedated for the numbing but once this is performed really not requiring much sedation at all.  The alternative procedure with more significant reconstruction would require intubation with general anesthesia.  We discussed this today.    As she does not have any significant functional deficits today, this would be something that would be primarily for nasal symmetry thus discussed that there will likely be an out-of-pocket component for this.  She is interested in obtaining a quote for the procedure.    No orders of the defined types were placed in this encounter.        Photodocumentation was obtained.     I spent a total of 30 minutes in the care of patient Tatiana Paredes during today's office visit. This time includes reviewing the patient's chart and prior history, obtaining a history, performing an examination and evaluation and counseling the patient. This time also includes ordering mediations or tests necessary in addition to communication to other member's of the patient's health care team. Time spent in documentation and care coordination is included.

## 2022-11-21 NOTE — NURSING NOTE
Photodocumentation obtained.    Patient requested a quote for Cosmetic Rasping of Dorsal Hump to be sent to her via [employer].  Unable to attach document in [employer] and asked pt if OK to have quote sent via email.  Pending her response, I will email the quote.    Pt will call when/if interested in pursuing surgery.    Queta Oh RN  11/21/2022 3:02 PM

## 2022-12-13 NOTE — PROGRESS NOTES
HPI:    She states some continued R upper arm pain but less than last visit. She does not feel her R arm is larger than her L. Pain is associated with movement. She has chronic L upper quadrant pain and had non-contrast CT imaging and MRI imaging 2016. More recently MRI abdominal imaging 4/23/2021. No change in bowel habits. Colonoscopy done 10/31/2022. She has not lost weight. She has worse headaches as well and has taken ibuprofen. No other HEENT, cardiopulmonary, abdominal, , GYN, neurological, systemic, psychiatric, lymphatic, endocrine, vascular complaints.     Past Medical History:   Diagnosis Date     Breast pain, left 11/2013     Choroidal nevus of right eye      Constipation      Dysphonia      Gastroesophageal reflux disease      Genital herpes      Hemorrhoids      Hoarseness      Menorrhagia      Migraines      Past Surgical History:   Procedure Laterality Date     COLONOSCOPY N/A 10/31/2022    Procedure: COLONOSCOPY, WITH POLYPECTOMY AND BIOPSY;  Surgeon: Emperatriz Peterson MD;  [place] [facility] [place]      HYSTEROSCOPY, SURGICAL; W/ ENDOMETRIAL ABLATION, ANY METHOD  2011     PE:    Vitals noted, gen, nad, cooperative, alert, neck supple nl rom, lungs with good air movement, RRR, S1, S2, no MRG, abdomen, positive bowel sounds. No tenderness nor LUQ abdominal mass. R arm w/o tenderness nor mass. Grossly normal neurological exam.     A/P:    1. Immunizations; she has completed both the Shingrix.  Pfizer COVID vaccination x 4 (last 7/29/2022).  Influenza vaccine 9/21/2022. Tdap 11/13/3013.   2. Reflux/throat complaints; EGD 11/17/2017. She remains on famotidine. She saw Dr. Pisano last 5/3/2022  3. Dermatology; Telemedicine with  Dr. Eduardo. 10/14/2021 in [facility].   4. Mammogram; 5/18/2022  5. Colonoscopy; 10/31/2022 and repeat in 7-10 years .   6. Lipids checked 9/20/2022 with  and HDL 76, TG's 64. Diet and recheck in 6 months   7. Seen in GYN 8/12/2022. She was also seen by Dr. Powell, [facility]  6/29/2022  8. Seen 5/11/2022 by Dr. Rawls ENT for nasal fracture. She had 11/16/2022 follow up with Dr. Ruggiero  9. Hemorrhoids seen by Ms. Gonzalez, Colorectal  10/10/2022  10. Nutrition referral for weight gain was placed 9/21/2022  11. R arm X-rays 9/21/2022; no acute findings Consider PT and if persists R arm/shoulder MRI and orthopedic evaluation. Recommend MRI imagining if worse but she wants to wait on this  12. L upper quadrant pain. Ordered non-contrast CT scan and I will see her back in one month  13. Headaches. Placed neurology referral    30 minutes spent on the date of the encounter doing chart review, history and exam, documentation and further activities as noted above exclusive of procedures and other billable interpretations

## 2022-12-14 ENCOUNTER — OFFICE VISIT (OUTPATIENT)
Dept: INTERNAL MEDICINE | Facility: CLINIC | Age: 57
End: 2022-12-14
Payer: COMMERCIAL

## 2022-12-14 VITALS
OXYGEN SATURATION: 99 % | WEIGHT: 160.8 LBS | BODY MASS INDEX: 25.24 KG/M2 | HEIGHT: 67 IN | DIASTOLIC BLOOD PRESSURE: 76 MMHG | HEART RATE: 78 BPM | SYSTOLIC BLOOD PRESSURE: 115 MMHG

## 2022-12-14 DIAGNOSIS — E78.00 HIGH CHOLESTEROL: Primary | ICD-10-CM

## 2022-12-14 DIAGNOSIS — R10.12 ABDOMINAL PAIN, LEFT UPPER QUADRANT: ICD-10-CM

## 2022-12-14 DIAGNOSIS — G44.209 TENSION HEADACHE: ICD-10-CM

## 2022-12-14 DIAGNOSIS — A60.9 HSV (HERPES SIMPLEX VIRUS) ANOGENITAL INFECTION: ICD-10-CM

## 2022-12-14 PROCEDURE — 99214 OFFICE O/P EST MOD 30 MIN: CPT | Performed by: INTERNAL MEDICINE

## 2022-12-14 RX ORDER — VALACYCLOVIR HYDROCHLORIDE 500 MG/1
500 TABLET, FILM COATED ORAL 2 TIMES DAILY
Qty: 28 TABLET | Refills: 2 | Status: SHIPPED | OUTPATIENT
Start: 2022-12-14 | End: 2024-03-13

## 2022-12-14 NOTE — NURSING NOTE
Tatiana Paredes is a 57 year old female that presents in clinic today for the following:     Chief Complaint   Patient presents with     Follow Up     Pt here to follow up regarding recent physical       The patient's allergies and medications were reviewed. The patient's vitals were obtained without incident. The patient does not have any other questions for the provider.     TURNER Martin at 7:55 AM on 12/14/2022.  Primary Care Clinic: [phone]

## 2022-12-15 ENCOUNTER — TELEPHONE (OUTPATIENT)
Dept: INTERNAL MEDICINE | Facility: CLINIC | Age: 57
End: 2022-12-15

## 2022-12-21 ENCOUNTER — ANCILLARY PROCEDURE (OUTPATIENT)
Dept: CT IMAGING | Facility: CLINIC | Age: 57
End: 2022-12-21
Attending: INTERNAL MEDICINE
Payer: COMMERCIAL

## 2022-12-21 DIAGNOSIS — R10.12 ABDOMINAL PAIN, LEFT UPPER QUADRANT: ICD-10-CM

## 2022-12-21 DIAGNOSIS — A60.9 HSV (HERPES SIMPLEX VIRUS) ANOGENITAL INFECTION: ICD-10-CM

## 2022-12-21 DIAGNOSIS — E78.00 HIGH CHOLESTEROL: ICD-10-CM

## 2022-12-21 DIAGNOSIS — G44.209 TENSION HEADACHE: ICD-10-CM

## 2022-12-21 PROCEDURE — 74176 CT ABD & PELVIS W/O CONTRAST: CPT | Performed by: RADIOLOGY

## 2023-02-26 NOTE — TELEPHONE ENCOUNTER
FUTURE VISIT INFORMATION      FUTURE VISIT INFORMATION:    Date: 5/22/2023    Time: 730am    Location: [facility] [place]  REFERRAL INFORMATION:    Referring provider:  Dr. Prather    Referring providers clinic:  [facility]     Reason for visit/diagnosis  Headaches     RECORDS REQUESTED FROM:       Clinic name Comments Records Status Imaging Status   Internal Dr. Prather-12/14/2022    Dr. Foster-4/16/2018    CT Facial Bones-10/19/2021    MR Brain-11/7/2018 Epic PACS

## 2023-05-21 ASSESSMENT — HEADACHE IMPACT TEST (HIT 6)
WHEN YOU HAVE A HEADACHE HOW OFTEN DO YOU WISH YOU COULD LIE DOWN: ALWAYS
HIT6 TOTAL SCORE: 57
WHEN YOU HAVE HEADACHES HOW OFTEN IS THE PAIN SEVERE: ALWAYS
HOW OFTEN HAVE YOU FELT TOO TIRED TO WORK BECAUSE OF YOUR HEADACHES: NEVER
HOW OFTEN HAVE YOU FELT FED UP OR IRRITATED BECAUSE OF YOUR HEADACHES: NEVER
HOW OFTEN DID HEADACHS LIMIT CONCENTRATION ON WORK OR DAILY ACTIVITY: NEVER
HOW OFTEN DO HEADACHES LIMIT YOUR DAILY ACTIVITIES: ALWAYS

## 2023-05-22 ENCOUNTER — OFFICE VISIT (OUTPATIENT)
Dept: NEUROLOGY | Facility: CLINIC | Age: 58
End: 2023-05-22
Attending: INTERNAL MEDICINE
Payer: COMMERCIAL

## 2023-05-22 ENCOUNTER — PRE VISIT (OUTPATIENT)
Dept: NEUROLOGY | Facility: CLINIC | Age: 58
End: 2023-05-22

## 2023-05-22 VITALS — DIASTOLIC BLOOD PRESSURE: 83 MMHG | SYSTOLIC BLOOD PRESSURE: 135 MMHG | HEART RATE: 72 BPM | OXYGEN SATURATION: 100 %

## 2023-05-22 DIAGNOSIS — G43.909 EPISODIC MIGRAINE: Primary | ICD-10-CM

## 2023-05-22 PROCEDURE — 99204 OFFICE O/P NEW MOD 45 MIN: CPT | Performed by: PSYCHIATRY & NEUROLOGY

## 2023-05-22 RX ORDER — RIZATRIPTAN BENZOATE 10 MG/1
10 TABLET, ORALLY DISINTEGRATING ORAL
Qty: 9 TABLET | Refills: 11 | Status: SHIPPED | OUTPATIENT
Start: 2023-05-22 | End: 2023-11-20

## 2023-05-22 RX ORDER — RIZATRIPTAN BENZOATE 10 MG/1
10 TABLET, ORALLY DISINTEGRATING ORAL
Qty: 18 TABLET | Refills: 11 | Status: SHIPPED | OUTPATIENT
Start: 2023-05-22 | End: 2023-05-22

## 2023-05-22 RX ORDER — ONDANSETRON 4 MG/1
4 TABLET, ORALLY DISINTEGRATING ORAL EVERY 6 HOURS PRN
Qty: 10 TABLET | Refills: 11 | Status: SHIPPED | OUTPATIENT
Start: 2023-05-22 | End: 2023-11-20

## 2023-05-22 RX ORDER — RIZATRIPTAN BENZOATE 10 MG/1
10 TABLET ORAL
Qty: 9 TABLET | Refills: 11 | Status: SHIPPED | OUTPATIENT
Start: 2023-05-22 | End: 2023-11-20

## 2023-05-22 ASSESSMENT — PAIN SCALES - GENERAL: PAINLEVEL: NO PAIN (0)

## 2023-05-22 NOTE — LETTER
5/22/2023       RE: Tatiana Paredes  [address]  [facility] [id number]       Dear Colleague,    Thank you for referring your patient, Tatiana Paredes, to the [facility] NEUROLOGY CLINIC [place] [facility]. Please see a copy of my visit note below.    [facility]   Headache Neurology Consult  May 22, 2023     Tatiana Paredes MRN# [id number]   YOB: 1965 Age: 57 year old     Requesting physician: Cruz Prather MD         Assessment and Recommendations:     Tatiana Paredes is a 57 year old female who presents for further evaluation of headache.    Her headache presentation meets criteria for episodic migraine without aura.  I suspect she has this on a genetic basis.  She has not had any red flags for secondary causes.  She has a normal neurologic examination today.  She has had previous head imaging, most recently in 2018, that was unrevealing for secondary causes.    We discussed her migraine disease, lifestyle and behavioral ways of managing, with trigger reduction.  We also discussed pharmacologic options, including rescue and preventative options.  We decided to focus on rescue treatments today.    -She will continue avoiding triggers  -I recommend a trial of rizatriptan 10 mg oral dissolvable tablet taken at the onset of headache, with a repeat dose in 2 hours if needed.  This should not exceed more than 9 days/month to avoid medication overuse.  This is needed for her attacks that include significant nausea.    -I have also prescribed rizatriptan 10 mg, which is preferred by her insurance plan.  She will use these for attacks that do not include significant nausea.    -I recommend ondansetron 4 mg oral dissolvable tablet as needed for nausea.    In the future, if her attacks continue to occur once a week or more, a preventative treatment could be considered.  These could include  "propranolol, topiramate, amitriptyline, or a CGRP inhibitor.  She would not choose to take one of these today.    I  have asked her to send me a Cswitch message with an update, after she has had a chance to use rizatriptan 3-5 times.  I will plan to see her back at least once yearly.    Renee Little MD  Neurology            Chief Complaint:     Chief Complaint   Patient presents with    Consult     migraines           History is obtained from the patient and medical record.      Tatiana Paredes is a 57 year old female who has been living with headaches since childhood. She had an increase to weekly last year, prompting today's visit. These headaches are \"weird\", in that nothing helps as much (previously had 2 of these per year).    Headache occurs over her left eyebrow. It is a constant pain. It is severe. Headaches start in the morning and build throughout the day. They last all day and sometimes into a second day, longer than previous.     She has associated nausea, burping, more frequent bowel movements. Nausea is the most bothersome symptom. She has mild photophobia, phonophobia.    She denies typical aura, unilateral autonomic features, a positional component (but prefers to lay down while pressing on her head), and fevers.    She reports she had 8/30 headache days per month, with 4/30 severe headache days per month, up until 2-3 months ago. More recently, it's been 4/30 headache days per month.    For treatment, she takes an NSAID, caffeine, vitamin B combination tablet from [facility]. Coke, laying down for a few hours is also helpful. She tries to breath deeply.    She sometimes takes acetaminophen, due to gastritis from NSAIDs.     Triggers include watching TV, red wine, smoke, menstrual cycle (no longer having menstrual periods), wearing her glasses.    Last February or March last year, she fainted. She was on the stairs, and fell onto her butt and back, falling down 2-3 stairs. She doesn't think she " hit her head.    She measured her blood pressure at home for a while: systolic about 120 at home            Past Medical History:     Suspected blood clot in her left leg while pregnant  Right vocal chord paralysis  Breast cyst  GERD  Constipation            Past Surgical History:     Past Surgical History:   Procedure Laterality Date    COLONOSCOPY N/A 10/31/2022    Procedure: COLONOSCOPY, WITH POLYPECTOMY AND BIOPSY;  Surgeon: Emperatriz Peterson MD;  Location: [facility] [place] OR     HYSTEROSCOPY, SURGICAL; W/ ENDOMETRIAL ABLATION, ANY METHOD  2011             Social History:   She lived in [facility] [place] until 28 years ago. She currently works at "[employer] from home.    She drinks 1 cup of coffee per day.    Social History     Socioeconomic History    Marital status:      Spouse name: Not on file    Number of children: Not on file    Years of education: Not on file    Highest education level: Not on file   Occupational History    Not on file   Tobacco Use    Smoking status: Never    Smokeless tobacco: Never   Vaping Use    Vaping status: Not on file   Substance and Sexual Activity    Alcohol use: Yes     Alcohol/week: 0.8 - 4.2 standard drinks of alcohol     Comment: 1-3 times a week    Drug use: No    Sexual activity: Yes     Partners: Male     Birth control/protection: Condom   Other Topics Concern     Service No    Blood Transfusions No    Caffeine Concern No    Occupational Exposure No    Hobby Hazards No    Sleep Concern No    Stress Concern No    Weight Concern No    Special Diet No    Back Care No    Exercise Yes    Bike Helmet Yes    Seat Belt Yes    Self-Exams Yes   Social History Narrative    How much exercise per week? Not enough    How much calcium per day? Some in her diet       How much caffeine per day? 2 cups     How much vitamin D per day? Some in diet    Do you/your family wear seatbelts?  Yes    Do you/your family use safety helmets? N/A    Do you/your family use sunscreen? Yes    Do  you/your family keep firearms in the home? No    Do you/your family have a smoke detector(s)? Yes        Do you feel safe in your home? Yes    Has anyone ever touched you in an unwanted manner? No        06/17/2015        Works at [employer].     .    Natali Powell MD                 Social Determinants of Health     Financial Resource Strain: Not on file   Food Insecurity: Not on file   Transportation Needs: Not on file   Physical Activity: Not on file   Stress: Not on file   Social Connections: Not on file   Intimate Partner Violence: Not on file   Housing Stability: Not on file             Family History:   Mother, grandmother with migraine.    Family History   Problem Relation Age of Onset    Breast Cancer Sister 52    Cancer - colorectal Maternal Grandmother 70    Migraines Mother     Unknown/Adopted Mother     Stomach Problem Mother     Stomach Cancer Mother     Prostate Cancer Father     Cancer Father     Glaucoma No family hx of     Macular Degeneration No family hx of     Retinal detachment No family hx of     Amblyopia No family hx of     Strabismus No family hx of     Glasses (<7 y/o) No family hx of     Nystagmus No family hx of              Allergies:      Allergies   Allergen Reactions    Contrast Dye Itching     CT contrast dye             Medications:     Current Outpatient Medications:     carboxymethylcellul-glycerin (REFRESH OPTIVE) 0.5-0.9 % SOLN ophthalmic solution, Place 1 drop into both eyes 2 times daily, Disp: 1 Bottle, Rfl: 3    famotidine (PEPCID) 20 MG tablet, Take 20 mg by mouth nightly as needed, Disp: , Rfl:     nitroFURantoin macrocrystal-monohydrate (MACROBID) 100 MG capsule, Take 1 capsule (100 mg) by mouth once as needed (intercourse), Disp: 30 capsule, Rfl: 11    valACYclovir (VALTREX) 500 MG tablet, Take 1 tablet (500 mg) by mouth 2 times daily, Disp: 28 tablet, Rfl: 2          Physical Exam:   /83 (BP Location: Right arm, Patient Position: Sitting, Cuff  Size: Adult Regular)   Pulse 72   LMP 07/18/2016 (Exact Date)   SpO2 100%    Physical Exam:   General: NAD  Neurologic:   Mental Status Exam: Alert, awake and oriented to situation. No dysarthria. Speech of normal fluency.   Cranial Nerves: Fundoscopic exam with clear disc margins bilaterally. PERRLA, EOMs intact, no nystagmus, facial movements symmetric, facial sensation intact to light touch, hearing intact to conversation, trapezius and SCMs 5/5 bilaterally, tongue midline and fully mobile. No tongue atrophy or fasciculations.    Motor: Normal tone in all four extremities, no atrophy or fasciculations. 5/5 strength bilaterally in shoulder abduction, elbow extensors and flexors, wrist extensors and flexors, hip flexors, knee extensors and flexors, dorsi- and plantarflexion. No tremors or abnormal movements noted.   Sensory: Sensation intact to light touch on arms and legs bilaterally.    Coordination: Finger-nose-finger intact bilaterally.  Rapidly alternating movements intact bilaterally in the upper extremities.  Normal finger tapping bilaterally.  Normal Romberg.   Reflexes: 2+ and symmetric in triceps, biceps, brachioradialis, patellar, Achilles, and plantars downgoing bilaterally.   Gait: Normal gait.  Able to toe and heel walk.  Tandem gait normal.  Head: Normocephalic, atraumatic. No radiating pain with palpation over the supraorbital notches, occipital nerves.  Temporal pulses intact.   Neck: Normal range of motion with lateral head movements and neck flexion.  Eyes: No conjunctival injection, no scleral icterus.           Data:     MRI brain (11/7/2018):  1. No acute abnormality. Essentially normal brain MRI with continued  mildly low cerebellar tonsils.  2. No abnormal enhancing lesions intracranially.  3. Head MRA demonstrates no definite aneurysm or stenosis of the major  intracranial arteries.  4. Neck MRA demonstrates patent major cervical arteries.          Again, thank you for allowing me to  participate in the care of your patient.      Sincerely,    Renee Little MD

## 2023-05-22 NOTE — PROGRESS NOTES
[facility]   Headache Neurology Consult  May 22, 2023     Tatiana Paredes MRN# [id number]   YOB: 1965 Age: 57 year old     Requesting physician: Cruz Prather MD         Assessment and Recommendations:     Tatiana Paredes is a 57 year old female who presents for further evaluation of headache.    Her headache presentation meets criteria for episodic migraine without aura.  I suspect she has this on a genetic basis.  She has not had any red flags for secondary causes.  She has a normal neurologic examination today.  She has had previous head imaging, most recently in 2018, that was unrevealing for secondary causes.    We discussed her migraine disease, lifestyle and behavioral ways of managing, with trigger reduction.  We also discussed pharmacologic options, including rescue and preventative options.  We decided to focus on rescue treatments today.    -She will continue avoiding triggers  -I recommend a trial of rizatriptan 10 mg oral dissolvable tablet taken at the onset of headache, with a repeat dose in 2 hours if needed.  This should not exceed more than 9 days/month to avoid medication overuse.  This is needed for her attacks that include significant nausea.    -I have also prescribed rizatriptan 10 mg, which is preferred by her insurance plan.  She will use these for attacks that do not include significant nausea.    -I recommend ondansetron 4 mg oral dissolvable tablet as needed for nausea.    In the future, if her attacks continue to occur once a week or more, a preventative treatment could be considered.  These could include propranolol, topiramate, amitriptyline, or a CGRP inhibitor.  She would not choose to take one of these today.    I  have asked her to send me a Joyus message with an update, after she has had a chance to use rizatriptan 3-5 times.  I will plan to see her back at least once yearly.    Renee Little MD  Neurology             "Chief Complaint:     Chief Complaint   Patient presents with     Consult     migraines           History is obtained from the patient and medical record.      Tatiana Paredes is a 57 year old female who has been living with headaches since childhood. She had an increase to weekly last year, prompting today's visit. These headaches are \"weird\", in that nothing helps as much (previously had 2 of these per year).    Headache occurs over her left eyebrow. It is a constant pain. It is severe. Headaches start in the morning and build throughout the day. They last all day and sometimes into a second day, longer than previous.     She has associated nausea, burping, more frequent bowel movements. Nausea is the most bothersome symptom. She has mild photophobia, phonophobia.    She denies typical aura, unilateral autonomic features, a positional component (but prefers to lay down while pressing on her head), and fevers.    She reports she had 8/30 headache days per month, with 4/30 severe headache days per month, up until 2-3 months ago. More recently, it's been 4/30 headache days per month.    For treatment, she takes an NSAID, caffeine, vitamin B combination tablet from [facility]. Coke, laying down for a few hours is also helpful. She tries to breath deeply.    She sometimes takes acetaminophen, due to gastritis from NSAIDs.     Triggers include watching TV, red wine, smoke, menstrual cycle (no longer having menstrual periods), wearing her glasses.    Last February or March last year, she fainted. She was on the stairs, and fell onto her butt and back, falling down 2-3 stairs. She doesn't think she hit her head.    She measured her blood pressure at home for a while: systolic about 120 at home            Past Medical History:     Suspected blood clot in her left leg while pregnant  Right vocal chord paralysis  Breast cyst  GERD  Constipation            Past Surgical History:     Past Surgical History:   Procedure " Laterality Date     COLONOSCOPY N/A 10/31/2022    Procedure: COLONOSCOPY, WITH POLYPECTOMY AND BIOPSY;  Surgeon: Emperatriz Peterson MD;  Location: [facility] [place]      HYSTEROSCOPY, SURGICAL; W/ ENDOMETRIAL ABLATION, ANY METHOD  2011             Social History:   She lived in \[place] [facility]\"" until 28 years ago. She currently works at [employer], from home.    She drinks 1 cup of coffee per day.    Social History     Socioeconomic History     Marital status:      Spouse name: Not on file     Number of children: Not on file     Years of education: Not on file     Highest education level: Not on file   Occupational History     Not on file   Tobacco Use     Smoking status: Never     Smokeless tobacco: Never   Vaping Use     Vaping status: Not on file   Substance and Sexual Activity     Alcohol use: Yes     Alcohol/week: 0.8 - 4.2 standard drinks of alcohol     Comment: 1-3 times a week     Drug use: No     Sexual activity: Yes     Partners: Male     Birth control/protection: Condom   Other Topics Concern      Service No     Blood Transfusions No     Caffeine Concern No     Occupational Exposure No     Hobby Hazards No     Sleep Concern No     Stress Concern No     Weight Concern No     Special Diet No     Back Care No     Exercise Yes     Bike Helmet Yes     Seat Belt Yes     Self-Exams Yes   Social History Narrative    How much exercise per week? Not enough    How much calcium per day? Some in her diet       How much caffeine per day? 2 cups     How much vitamin D per day? Some in diet    Do you/your family wear seatbelts?  Yes    Do you/your family use safety helmets? N/A    Do you/your family use sunscreen? Yes    Do you/your family keep firearms in the home? No    Do you/your family have a smoke detector(s)? Yes        Do you feel safe in your home? Yes    Has anyone ever touched you in an unwanted manner? No        06/17/2015        Works at [employer].     .    Natali Powell MD                  Social Determinants of Health     Financial Resource Strain: Not on file   Food Insecurity: Not on file   Transportation Needs: Not on file   Physical Activity: Not on file   Stress: Not on file   Social Connections: Not on file   Intimate Partner Violence: Not on file   Housing Stability: Not on file             Family History:   Mother, grandmother with migraine.    Family History   Problem Relation Age of Onset     Breast Cancer Sister 52     Cancer - colorectal Maternal Grandmother 70     Migraines Mother      Unknown/Adopted Mother      Stomach Problem Mother      Stomach Cancer Mother      Prostate Cancer Father      Cancer Father      Glaucoma No family hx of      Macular Degeneration No family hx of      Retinal detachment No family hx of      Amblyopia No family hx of      Strabismus No family hx of      Glasses (<9 y/o) No family hx of      Nystagmus No family hx of              Allergies:      Allergies   Allergen Reactions     Contrast Dye Itching     CT contrast dye             Medications:     Current Outpatient Medications:      carboxymethylcellul-glycerin (REFRESH OPTIVE) 0.5-0.9 % SOLN ophthalmic solution, Place 1 drop into both eyes 2 times daily, Disp: 1 Bottle, Rfl: 3     famotidine (PEPCID) 20 MG tablet, Take 20 mg by mouth nightly as needed, Disp: , Rfl:      nitroFURantoin macrocrystal-monohydrate (MACROBID) 100 MG capsule, Take 1 capsule (100 mg) by mouth once as needed (intercourse), Disp: 30 capsule, Rfl: 11     valACYclovir (VALTREX) 500 MG tablet, Take 1 tablet (500 mg) by mouth 2 times daily, Disp: 28 tablet, Rfl: 2          Physical Exam:   /83 (BP Location: Right arm, Patient Position: Sitting, Cuff Size: Adult Regular)   Pulse 72   LMP 07/18/2016 (Exact Date)   SpO2 100%    Physical Exam:   General: NAD  Neurologic:   Mental Status Exam: Alert, awake and oriented to situation. No dysarthria. Speech of normal fluency.   Cranial Nerves: Fundoscopic exam with clear disc  margins bilaterally. PERRLA, EOMs intact, no nystagmus, facial movements symmetric, facial sensation intact to light touch, hearing intact to conversation, trapezius and SCMs 5/5 bilaterally, tongue midline and fully mobile. No tongue atrophy or fasciculations.    Motor: Normal tone in all four extremities, no atrophy or fasciculations. 5/5 strength bilaterally in shoulder abduction, elbow extensors and flexors, wrist extensors and flexors, hip flexors, knee extensors and flexors, dorsi- and plantarflexion. No tremors or abnormal movements noted.   Sensory: Sensation intact to light touch on arms and legs bilaterally.    Coordination: Finger-nose-finger intact bilaterally.  Rapidly alternating movements intact bilaterally in the upper extremities.  Normal finger tapping bilaterally.  Normal Romberg.   Reflexes: 2+ and symmetric in triceps, biceps, brachioradialis, patellar, Achilles, and plantars downgoing bilaterally.   Gait: Normal gait.  Able to toe and heel walk.  Tandem gait normal.  Head: Normocephalic, atraumatic. No radiating pain with palpation over the supraorbital notches, occipital nerves.  Temporal pulses intact.   Neck: Normal range of motion with lateral head movements and neck flexion.  Eyes: No conjunctival injection, no scleral icterus.           Data:     MRI brain (11/7/2018):  1. No acute abnormality. Essentially normal brain MRI with continued  mildly low cerebellar tonsils.  2. No abnormal enhancing lesions intracranially.  3. Head MRA demonstrates no definite aneurysm or stenosis of the major  intracranial arteries.  4. Neck MRA demonstrates patent major cervical arteries.

## 2023-05-22 NOTE — NURSING NOTE
Chief Complaint   Patient presents with     Consult     migraines     Zarina Quigley CMA at 7:12 AM on 5/22/2023.

## 2023-06-09 ENCOUNTER — LAB (OUTPATIENT)
Dept: LAB | Facility: CLINIC | Age: 58
End: 2023-06-09
Payer: COMMERCIAL

## 2023-06-09 ENCOUNTER — ANCILLARY PROCEDURE (OUTPATIENT)
Dept: MAMMOGRAPHY | Facility: CLINIC | Age: 58
End: 2023-06-09
Attending: INTERNAL MEDICINE
Payer: COMMERCIAL

## 2023-06-09 DIAGNOSIS — E78.00 HIGH CHOLESTEROL: ICD-10-CM

## 2023-06-09 DIAGNOSIS — Z12.31 ENCOUNTER FOR SCREENING MAMMOGRAM FOR BREAST CANCER: ICD-10-CM

## 2023-06-09 LAB
CHOLEST SERPL-MCNC: 203 MG/DL
HDLC SERPL-MCNC: 65 MG/DL
LDLC SERPL CALC-MCNC: 119 MG/DL
NONHDLC SERPL-MCNC: 138 MG/DL
TRIGL SERPL-MCNC: 94 MG/DL

## 2023-06-09 PROCEDURE — 36415 COLL VENOUS BLD VENIPUNCTURE: CPT | Performed by: PATHOLOGY

## 2023-06-09 PROCEDURE — 77067 SCR MAMMO BI INCL CAD: CPT | Mod: GC | Performed by: RADIOLOGY

## 2023-06-09 PROCEDURE — 77063 BREAST TOMOSYNTHESIS BI: CPT | Mod: GC | Performed by: RADIOLOGY

## 2023-06-09 PROCEDURE — 80061 LIPID PANEL: CPT | Performed by: PATHOLOGY

## 2023-06-15 ENCOUNTER — ANCILLARY PROCEDURE (OUTPATIENT)
Dept: MAMMOGRAPHY | Facility: CLINIC | Age: 58
End: 2023-06-15
Attending: INTERNAL MEDICINE
Payer: COMMERCIAL

## 2023-06-15 DIAGNOSIS — R92.8 ABNORMAL MAMMOGRAM OF LEFT BREAST: ICD-10-CM

## 2023-06-15 PROCEDURE — G0279 TOMOSYNTHESIS, MAMMO: HCPCS | Performed by: STUDENT IN AN ORGANIZED HEALTH CARE EDUCATION/TRAINING PROGRAM

## 2023-06-15 PROCEDURE — 77065 DX MAMMO INCL CAD UNI: CPT | Mod: LT | Performed by: STUDENT IN AN ORGANIZED HEALTH CARE EDUCATION/TRAINING PROGRAM

## 2023-06-15 PROCEDURE — 76642 ULTRASOUND BREAST LIMITED: CPT | Mod: LT | Performed by: STUDENT IN AN ORGANIZED HEALTH CARE EDUCATION/TRAINING PROGRAM

## 2023-06-18 NOTE — PROGRESS NOTES
HPI:    She has some nasal drainage and possibly this causes coughing and some reflux? She has night time reflux and sometimes food comes up at night. She has several episodes of lower abdominal pain before urination, that is resolved shortly after urinating. No hematuria and no sxs. Of a UTI. No other HEENT, cardiopulmonary, abdominal, , GYN, neurological, systemic, psychiatric, lymphatic, endocrine, vascular complaints.     Past Medical History:   Diagnosis Date     Breast pain, left 11/2013     Choroidal nevus of right eye      Constipation      Dysphonia      Gastroesophageal reflux disease      Genital herpes      Hemorrhoids      Hoarseness      Menorrhagia      Migraines    '  Past Surgical History:   Procedure Laterality Date     COLONOSCOPY N/A 10/31/2022    Procedure: COLONOSCOPY, WITH POLYPECTOMY AND BIOPSY;  Surgeon: Emperatriz Peterson MD;  Location: [facility] [place]      HYSTEROSCOPY, SURGICAL; W/ ENDOMETRIAL ABLATION, ANY METHOD  2011     PE:    Vitals noted, gen, nad, cooperative, alert, neck supple nl rom, lungs with good air movement, RRR, S1, S2, no MRG, abdomen, no acute findings. No acute findings on neurological exam.     Results for orders placed or performed in visit on 06/19/23   UA with Microscopic reflex to Culture - lab collect     Status: Abnormal    Specimen: Urine, Clean Catch   Result Value Ref Range    Color Urine Straw Colorless, Straw, Light Yellow, Yellow    Appearance Urine Clear Clear    Glucose Urine Negative Negative mg/dL    Bilirubin Urine Negative Negative    Ketones Urine 10 (A) Negative mg/dL    Specific Gravity Urine 1.008 1.003 - 1.035    Blood Urine Negative Negative    pH Urine 5.5 5.0 - 7.0    Protein Albumin Urine Negative Negative mg/dL    Urobilinogen Urine Normal Normal, 2.0 mg/dL    Nitrite Urine Negative Negative    Leukocyte Esterase Urine Small (A) Negative    RBC Urine 1 <=2 /HPF    WBC Urine 2 <=5 /HPF    Squamous Epithelials Urine <1 <=1 /HPF    Transitional  Epithelials Urine <1 <=1 /HPF    Narrative    Urine Culture not indicated         A/P:    1. Immunizations; she has completed the Shingrix vaccine series.  Pfizer COVID vaccination x 5 (bi-valent 1/6/2023).  Tdap 11/13/3013.   2. Reflux/throat complaints; EGD 11/17/2017. She remains on famotidine. She saw Dr. Pisano last 5/3/2022. She has nocturnal coughing and reflux and ordered EGD  3. Dermatology; Telemedicine with  Dr. Eduardo. 10/14/2021 in Care Everywhere.   4. Mammogram; done 6/15/2023. Given her sister's h/o breast cancer (she is in [facility]), other family members with malignancies, ordered genetic counseling.   5. Colonoscopy; 10/31/2022 and repeat in 7-10 years .   6. Lipids checked 9/20/2022 with  and HDL 76, TG's 64. Repeat 6/9/2023; TG's 94, HDL 65 and .   7. Seen in GYN 8/12/2022. She was also seen by Dr. Powell, GN 6/29/2022  8. Seen 5/11/2022 by Dr. Rawls ENT for nasal fracture. She had 11/16/2022 follow up with Dr. Ruggiero  9. Hemorrhoids seen by Ms. Gonzalez, Colorectal  10/10/2022  10. Nutrition referral for weight gain was placed 9/21/2022  11. R arm X-rays 9/21/2022; no acute findings Consider PT and if persists R arm/shoulder MRI and orthopedic evaluation. Recommend MRI imagining if worse but she wants to wait on this  12. L upper quadrant pain. Non-contrast CT scan was done 12/21/2022; no acute findings. Cholelithiasis.   13. Headaches. She had 5/22/2023 Neurology appt. With Dr. Little. She has an 11/20/2023 follow up appt. She is on Rizatriptan and this is effective  14. Nasal drainage allergic? Sent in Rx. For flonase  15. Lower abdominal pain before urination. She had hematuria in the past. Ordered UA. As above, she had CT imaging done 12/21/2022 that did not show any abnormalities. If worse, hematuria, persists, recommend Urology and probable cystoscopy.     40 minutes spent on the date of the encounter doing chart review, history and exam, documentation and  further activities as noted above exclusive of procedures and other billable interpretations

## 2023-06-19 ENCOUNTER — OFFICE VISIT (OUTPATIENT)
Dept: INTERNAL MEDICINE | Facility: CLINIC | Age: 58
End: 2023-06-19
Payer: COMMERCIAL

## 2023-06-19 VITALS
HEIGHT: 67 IN | OXYGEN SATURATION: 99 % | SYSTOLIC BLOOD PRESSURE: 132 MMHG | BODY MASS INDEX: 23.79 KG/M2 | HEART RATE: 67 BPM | DIASTOLIC BLOOD PRESSURE: 83 MMHG | WEIGHT: 151.6 LBS

## 2023-06-19 DIAGNOSIS — J34.89 NASAL DRAINAGE: ICD-10-CM

## 2023-06-19 DIAGNOSIS — K21.9 LARYNGOPHARYNGEAL REFLUX: ICD-10-CM

## 2023-06-19 DIAGNOSIS — R10.84 ABDOMINAL PAIN, GENERALIZED: ICD-10-CM

## 2023-06-19 DIAGNOSIS — Z80.0 FAMILY HISTORY OF COLON CANCER: Primary | ICD-10-CM

## 2023-06-19 LAB
ALBUMIN UR-MCNC: NEGATIVE MG/DL
APPEARANCE UR: CLEAR
BILIRUB UR QL STRIP: NEGATIVE
COLOR UR AUTO: ABNORMAL
GLUCOSE UR STRIP-MCNC: NEGATIVE MG/DL
HGB UR QL STRIP: NEGATIVE
KETONES UR STRIP-MCNC: 10 MG/DL
LEUKOCYTE ESTERASE UR QL STRIP: ABNORMAL
NITRATE UR QL: NEGATIVE
PH UR STRIP: 5.5 [PH] (ref 5–7)
RBC URINE: 1 /HPF
SP GR UR STRIP: 1.01 (ref 1–1.03)
SQUAMOUS EPITHELIAL: <1 /HPF
TRANSITIONAL EPI: <1 /HPF
UROBILINOGEN UR STRIP-MCNC: NORMAL MG/DL
WBC URINE: 2 /HPF

## 2023-06-19 PROCEDURE — 81001 URINALYSIS AUTO W/SCOPE: CPT | Performed by: PATHOLOGY

## 2023-06-19 PROCEDURE — 99215 OFFICE O/P EST HI 40 MIN: CPT | Performed by: INTERNAL MEDICINE

## 2023-06-19 RX ORDER — FLUTICASONE PROPIONATE 110 UG/1
1 AEROSOL, METERED RESPIRATORY (INHALATION) 2 TIMES DAILY
Qty: 12 G | Refills: 1 | Status: SHIPPED | OUTPATIENT
Start: 2023-06-19

## 2023-06-19 NOTE — NURSING NOTE
Tatiana Paredes is a 57 year old female patient that presents today in clinic for the following:    Chief Complaint   Patient presents with     Follow Up     The patient's allergies and medications were reviewed as noted. A set of vitals were recorded as noted without incident. The patient does not have any other questions for the provider.    Karissa Coleman, EMT at 4:18 PM on 6/19/2023

## 2023-06-20 ENCOUNTER — PATIENT OUTREACH (OUTPATIENT)
Dept: ONCOLOGY | Facility: CLINIC | Age: 58
End: 2023-06-20
Payer: COMMERCIAL

## 2023-06-20 NOTE — PROGRESS NOTES
Writer received Cancer Risk Management Program referral, referred for:     strong family h/o of malignancy.     Reviewed for appropriate plan, and sent to New Patient Scheduling for completion.

## 2023-08-18 ENCOUNTER — TELEPHONE (OUTPATIENT)
Dept: GASTROENTEROLOGY | Facility: CLINIC | Age: 58
End: 2023-08-18
Payer: COMMERCIAL

## 2023-08-18 NOTE — TELEPHONE ENCOUNTER
"Endoscopy Scheduling Screen    Have you had a positive Covid test in the last 14 days?  No    Are you active on MyChart?   Yes    What insurance is in the chart?  Other:  [employer]     Ordering/Referring Provider:     Cruz Prather MD in [facility]      (If ordering provider performs procedure, schedule with ordering provider unless otherwise instructed. )    BMI: Estimated body mass index is 23.74 kg/m  as calculated from the following:    Height as of 6/19/23: 1.702 m (5' 7\").    Weight as of 6/19/23: 68.8 kg (151 lb 9.6 oz).     Sedation Ordered  moderate sedation.   If patient BMI > 50 do not schedule in ASC.    Are you taking any prescription medications for pain?   No    Are you taking methadone or Suboxone?  No    Do you have a history of malignant hyperthermia or adverse reaction to anesthesia?  No    (Females) Are you currently pregnant?   No     Have you been diagnosed or told you have pulmonary hypertension?   No    Do you have an LVAD?  No    Have you been told you have moderate to severe sleep apnea?  No    Have you been told you have COPD, asthma, or any other lung disease?  No    Do you have any heart conditions?  No     Have you ever had or are you awaiting a heart or lung transplant?   No    Have you had a stroke or transient ischemic attack (TIA aka \"mini stroke\" in the last 6 months?   No    Have you been diagnosed with or been told you have cirrhosis of the liver?   No    Are you currently on dialysis?   No    Do you need assistance transferring?   No    BMI: Estimated body mass index is 23.74 kg/m  as calculated from the following:    Height as of 6/19/23: 1.702 m (5' 7\").    Weight as of 6/19/23: 68.8 kg (151 lb 9.6 oz).     Is patients BMI > 40 and scheduling location [facility]?  No    Do you take the medication Phentermine, Ozempic or Wegovy?  No    Do you take the medication Naltrexone?  No    Do you take blood thinners?  No      Prep   Are you currently on dialysis or do " you have chronic kidney disease?  No    Do you have a diagnosis of diabetes?  No    Do you have a diagnosis of cystic fibrosis (CF)?  No    On a regular basis do you go 3 -5 days between bowel movements?  No    BMI > 40?  No    Preferred Pharmacy:    [id number] [place] [facility] [address] [place], [place] [address] [facility] [address]  Phone: [phone] Fax: [phone]      Final Scheduling Details   Colonoscopy prep sent?      Procedure scheduled  Upper endoscopy (EGD)    Surgeon:  Leventhal     Date of procedure:  09/13/2023     Schedule PAC:   No    Location  [facility]    Sedation   Moderate Sedation    Patient Reminders:   You will receive a call from a Nurse to review instructions and health history.  This assessment must be completed prior to your procedure.  Failure to complete the Nurse assessment may result in the procedure being cancelled.      On the day of your procedure, please designate an adult(s) who can drive you home stay with you for the next 24 hours. The medicines used in the exam will make you sleepy. You will not be able to drive.      You cannot take public transportation, ride share services, or non-medical taxi service without a responsible caregiver.  Medical transport services are allowed with the requirement that a responsible caregiver will receive you at your destination.  We require that drivers and caregivers are confirmed prior to your procedure.

## 2023-08-28 ENCOUNTER — TELEPHONE (OUTPATIENT)
Dept: GASTROENTEROLOGY | Facility: CLINIC | Age: 58
End: 2023-08-28
Payer: COMMERCIAL

## 2023-08-28 NOTE — TELEPHONE ENCOUNTER
Pre visit planning completed.      Procedure details:    Patient scheduled for Upper endoscopy (EGD) on 9/13/23.     Arrival time: 0745. Procedure time 0845    Pre op exam needed? N/A    Facility location: [facility]; [address] [place] [address], [address] Floor, [place] [address]    Sedation type: Conscious sedation     Indication for procedure: worse reflux      Chart review:     Electronic implanted devices? No    Diabetic? No      Medication review:    Anticoagulants? No    NSAIDS? No    Other medication HOLDING recommendations:  N/A      Prep for procedure:     Bowel prep recommendation: N/A     Prep instructions sent via [employer]       Leydi Maya RN  Endoscopy Procedure Pre Assessment RN  [phone] option 4

## 2023-08-29 NOTE — TELEPHONE ENCOUNTER
Pre assessment completed for upcoming procedure.      Procedure details:    Patient scheduled for Upper endoscopy (EGD) on 9/13/23.     Arrival time: 0745. Procedure time 0845     Pre op exam needed? N/A     Facility location: [facility]; [address] [place] [address], [address] Floor, [place] [address]     Sedation type: Conscious sedation    COVID policy reviewed.    Designated  policy reviewed. Instructed to have someone stay 6 hours post procedure.       Prep for procedure:     Reviewed procedure prep instructions.     Patient verbalized understanding and had no questions or concerns at this time.        Leydi Vaca RN  Endoscopy Procedure Pre Assessment RN  [phone] option 4

## 2023-09-13 ENCOUNTER — HOSPITAL ENCOUNTER (OUTPATIENT)
Facility: AMBULATORY SURGERY CENTER | Age: 58
Discharge: HOME | End: 2023-09-13
Attending: INTERNAL MEDICINE | Admitting: INTERNAL MEDICINE
Payer: COMMERCIAL

## 2023-09-13 VITALS
OXYGEN SATURATION: 100 % | HEIGHT: 67 IN | SYSTOLIC BLOOD PRESSURE: 112 MMHG | TEMPERATURE: 97.6 F | WEIGHT: 144 LBS | HEART RATE: 72 BPM | RESPIRATION RATE: 16 BRPM | BODY MASS INDEX: 22.6 KG/M2 | DIASTOLIC BLOOD PRESSURE: 75 MMHG

## 2023-09-13 DIAGNOSIS — K21.00 GASTROESOPHAGEAL REFLUX DISEASE WITH ESOPHAGITIS, UNSPECIFIED WHETHER HEMORRHAGE: Primary | ICD-10-CM

## 2023-09-13 LAB — UPPER GI ENDOSCOPY: NORMAL

## 2023-09-13 PROCEDURE — 88305 TISSUE EXAM BY PATHOLOGIST: CPT | Mod: 26 | Performed by: PATHOLOGY

## 2023-09-13 PROCEDURE — 88305 TISSUE EXAM BY PATHOLOGIST: CPT | Mod: TC | Performed by: INTERNAL MEDICINE

## 2023-09-13 PROCEDURE — 43239 EGD BIOPSY SINGLE/MULTIPLE: CPT | Performed by: INTERNAL MEDICINE

## 2023-09-13 RX ORDER — ONDANSETRON 2 MG/ML
4 INJECTION INTRAMUSCULAR; INTRAVENOUS
Status: DISCONTINUED | OUTPATIENT
Start: 2023-09-13 | End: 2023-09-13 | Stop reason: HOSPADM

## 2023-09-13 RX ORDER — PROCHLORPERAZINE MALEATE 10 MG
10 TABLET ORAL EVERY 6 HOURS PRN
Status: DISCONTINUED | OUTPATIENT
Start: 2023-09-13 | End: 2023-09-14 | Stop reason: HOSPADM

## 2023-09-13 RX ORDER — LIDOCAINE 40 MG/G
CREAM TOPICAL
Status: DISCONTINUED | OUTPATIENT
Start: 2023-09-13 | End: 2023-09-13 | Stop reason: HOSPADM

## 2023-09-13 RX ORDER — ONDANSETRON 4 MG/1
4 TABLET, ORALLY DISINTEGRATING ORAL EVERY 6 HOURS PRN
Status: DISCONTINUED | OUTPATIENT
Start: 2023-09-13 | End: 2023-09-14 | Stop reason: HOSPADM

## 2023-09-13 RX ORDER — NALOXONE HYDROCHLORIDE 0.4 MG/ML
0.4 INJECTION, SOLUTION INTRAMUSCULAR; INTRAVENOUS; SUBCUTANEOUS
Status: DISCONTINUED | OUTPATIENT
Start: 2023-09-13 | End: 2023-09-14 | Stop reason: HOSPADM

## 2023-09-13 RX ORDER — ONDANSETRON 2 MG/ML
4 INJECTION INTRAMUSCULAR; INTRAVENOUS EVERY 6 HOURS PRN
Status: DISCONTINUED | OUTPATIENT
Start: 2023-09-13 | End: 2023-09-14 | Stop reason: HOSPADM

## 2023-09-13 RX ORDER — SODIUM CHLORIDE, SODIUM LACTATE, POTASSIUM CHLORIDE, CALCIUM CHLORIDE 600; 310; 30; 20 MG/100ML; MG/100ML; MG/100ML; MG/100ML
INJECTION, SOLUTION INTRAVENOUS CONTINUOUS
Status: DISCONTINUED | OUTPATIENT
Start: 2023-09-13 | End: 2023-09-14 | Stop reason: HOSPADM

## 2023-09-13 RX ORDER — NALOXONE HYDROCHLORIDE 0.4 MG/ML
0.2 INJECTION, SOLUTION INTRAMUSCULAR; INTRAVENOUS; SUBCUTANEOUS
Status: DISCONTINUED | OUTPATIENT
Start: 2023-09-13 | End: 2023-09-14 | Stop reason: HOSPADM

## 2023-09-13 RX ORDER — FLUMAZENIL 0.1 MG/ML
0.2 INJECTION, SOLUTION INTRAVENOUS
Status: ACTIVE | OUTPATIENT
Start: 2023-09-13 | End: 2023-09-13

## 2023-09-13 RX ORDER — FENTANYL CITRATE 50 UG/ML
INJECTION, SOLUTION INTRAMUSCULAR; INTRAVENOUS PRN
Status: DISCONTINUED | OUTPATIENT
Start: 2023-09-13 | End: 2023-09-13 | Stop reason: HOSPADM

## 2023-09-13 RX ADMIN — SODIUM CHLORIDE, SODIUM LACTATE, POTASSIUM CHLORIDE, CALCIUM CHLORIDE: 600; 310; 30; 20 INJECTION, SOLUTION INTRAVENOUS at 08:30

## 2023-09-13 NOTE — H&P
Tatiana Paredes  [id number]  female  57 year old      Reason for procedure/surgery: EGD for reflux, chronic cough, abdominal pain    Patient Active Problem List   Diagnosis    External hemorrhoids    Dysphonia    Choroidal nevus of right eye    Myopia    Hallux valgus of right foot    Knee instability    Pes planus of both feet    Pain of foot    Vocal fold paresis, right    Closed fracture of nasal bone, initial encounter    Acquired nasal deformity       Past Surgical History:    Past Surgical History:   Procedure Laterality Date    COLONOSCOPY N/A 10/31/2022    Procedure: COLONOSCOPY, WITH POLYPECTOMY AND BIOPSY;  Surgeon: Emperatriz Peterson MD;  Location[place] [facility]     HYSTEROSCOPY, SURGICAL; W/ ENDOMETRIAL ABLATION, ANY METHOD  2011       Past Medical History:   Past Medical History:   Diagnosis Date    Breast pain, left 11/2013    Choroidal nevus of right eye     Constipation     Dysphonia     Gastroesophageal reflux disease     Genital herpes     Hemorrhoids     Hoarseness     Menorrhagia     Migraines        Social History:   Social History     Tobacco Use    Smoking status: Never    Smokeless tobacco: Never   Substance Use Topics    Alcohol use: Yes     Alcohol/week: 0.8 - 4.2 standard drinks of alcohol     Comment: 1-3 times a week       Family History:   Family History   Problem Relation Age of Onset    Breast Cancer Sister 52    Cancer - colorectal Maternal Grandmother 70    Migraines Mother     Unknown/Adopted Mother     Stomach Problem Mother     Stomach Cancer Mother     Prostate Cancer Father     Cancer Father     Glaucoma No family hx of     Macular Degeneration No family hx of     Retinal detachment No family hx of     Amblyopia No family hx of     Strabismus No family hx of     Glasses (<7 y/o) No family hx of     Nystagmus No family hx of        Allergies:   Allergies   Allergen Reactions    Contrast Dye Itching     CT contrast dye       Active Medications:   Current Outpatient Medications  "  Medication Sig Dispense Refill    carboxymethylcellul-glycerin (REFRESH OPTIVE) 0.5-0.9 % SOLN ophthalmic solution Place 1 drop into both eyes 2 times daily 1 Bottle 3    famotidine (PEPCID) 20 MG tablet Take 20 mg by mouth nightly as needed      fluticasone (FLOVENT HFA) 110 MCG/ACT inhaler Inhale 1 puff into the lungs 2 times daily 12 g 1    nitroFURantoin macrocrystal-monohydrate (MACROBID) 100 MG capsule Take 1 capsule (100 mg) by mouth once as needed (intercourse) 30 capsule 11    ondansetron (ZOFRAN ODT) 4 MG ODT tab Take 1 tablet (4 mg) by mouth every 6 hours as needed for nausea 10 tablet 11    rizatriptan (MAXALT) 10 MG tablet Take 1 tablet (10 mg) by mouth at onset of headache for migraine May repeat in 2 hours. Max 3 tablets/24 hours. 9 tablet 11    valACYclovir (VALTREX) 500 MG tablet Take 1 tablet (500 mg) by mouth 2 times daily 28 tablet 2    rizatriptan (MAXALT-MLT) 10 MG ODT Take 1 tablet (10 mg) by mouth at onset of headache for migraine May repeat in 2 hours. Max 3 tablets/24 hours. 9 tablet 11       Systemic Review:   CONSTITUTIONAL: NEGATIVE for fever, chills, change in weight  ENT/MOUTH: NEGATIVE for ear, mouth and throat problems  RESP: NEGATIVE for significant cough or SOB  CV: NEGATIVE for chest pain, palpitations or peripheral edema    Physical Examination:   Vital Signs: /80 (BP Location: Right arm)   Pulse 75   Temp 97.3  F (36.3  C) (Temporal)   Resp 18   Ht 1.702 m (5' 7\")   Wt 65.3 kg (144 lb)   LMP 07/18/2016 (Exact Date)   SpO2 100%   BMI 22.55 kg/m    GENERAL: healthy, alert and no distress  RESP: Normal respiratory excursion   CV: regular rates and rhythm and no peripheral edema  ABDOMEN: bowel sounds normal  MS: no gross musculoskeletal defects noted, no edema    Plan: Appropriate to proceed as scheduled.      Thomas M. Leventhal, MD  9/13/2023    PCP:  Cruz Prather"

## 2023-09-20 ENCOUNTER — TELEPHONE (OUTPATIENT)
Dept: INTERNAL MEDICINE | Facility: CLINIC | Age: 58
End: 2023-09-20

## 2023-09-20 ENCOUNTER — OFFICE VISIT (OUTPATIENT)
Dept: INTERNAL MEDICINE | Facility: CLINIC | Age: 58
End: 2023-09-20
Payer: COMMERCIAL

## 2023-09-20 VITALS
BODY MASS INDEX: 22.93 KG/M2 | HEART RATE: 70 BPM | WEIGHT: 146.4 LBS | DIASTOLIC BLOOD PRESSURE: 76 MMHG | SYSTOLIC BLOOD PRESSURE: 113 MMHG | OXYGEN SATURATION: 98 %

## 2023-09-20 DIAGNOSIS — J32.9 SINUSITIS, UNSPECIFIED CHRONICITY, UNSPECIFIED LOCATION: Primary | ICD-10-CM

## 2023-09-20 PROCEDURE — 99214 OFFICE O/P EST MOD 30 MIN: CPT | Performed by: INTERNAL MEDICINE

## 2023-09-20 NOTE — PROGRESS NOTES
HPI:    Last visit with us 6/19/2023. Overall stable. She has some chronic nasal complaints and possibly post nasal drip. She has not started taking flonase. No worse reflux sxs. She will get the flu shot at work. No other HEENT, cardiopulmonary, abdominal, , GYN, neurological, systemic, psychiatric, lymphatic, endocrine, vascular complaints.     Past Medical History:   Diagnosis Date    Breast pain, left 11/2013    Choroidal nevus of right eye     Constipation     Dysphonia     Gastroesophageal reflux disease     Genital herpes     Hemorrhoids     Hoarseness     Menorrhagia     Migraines      Past Surgical History:   Procedure Laterality Date    COLONOSCOPY N/A 10/31/2022    Procedure: COLONOSCOPY, WITH POLYPECTOMY AND BIOPSY;  Surgeon: Emperatriz Peterson MD;  Location: [facility] OR    ESOPHAGOSCOPY, GASTROSCOPY, DUODENOSCOPY (EGD), COMBINED N/A 9/13/2023    Procedure: Esophagoscopy, gastroscopy, duodenoscopy (EGD), combined with Biopsy;  Surgeon: Leventhal, Thomas Michael, MD;  [place] [facility]     HYSTEROSCOPY, SURGICAL; W/ ENDOMETRIAL ABLATION, ANY METHOD  2011     PE:    Vitals noted, gen, nad, cooperative, alert, neck supple nl rom, lungs with good air movement, RRR, S1, S2, no MRG, abdomen no acute findings. No tenderness to palpation L upper quadrant. Grossly normal neurological exam.     A/P:    1. Immunizations; she has completed the Shingrix vaccine series.  Pfizer COVID vaccination x 5 (bi-valent 1/6/2023).  Tdap 11/13/3013.   2. Reflux/throat complaints; EGD 11/17/2017. She remains on famotidine. She saw Dr. Pisano last 5/3/2022. EGD was repeated 9/13/2023 findings suggestive of post nasal drip posterior pharynx   3. Dermatology; Telemedicine with  Dr. Eduardo. 10/14/2021 in Care Everywhere.   4. Mammogram; done 6/15/2023. Given her sister's h/o breast cancer (she is in [facility]), other family members with malignancies. She has Cancer Risk Management appt. 12/29/2023. .   5. Colonoscopy; 10/31/2022 and  repeat in 7-10 years .   6. Lipids checked 9/20/2022 with  and HDL 76, TG's 64. Repeat 6/9/2023; TG's 94, HDL 65 and .   7. Seen in GYN 8/12/2022. She was also seen by MORRO Mancilla 6/29/2022  8. Seen 5/11/2022 by Dr. Rawls ENT for nasal fracture. She had 11/16/2022 follow up with Dr. Ruggiero  9. Hemorrhoids seen by Ms. Gonzalez, Colorectal  10/10/2022  10. Nutrition referral for weight gain was placed 9/21/2022  11. R arm X-rays 9/21/2022; no acute findings Consider PT and if persists R arm/shoulder MRI and orthopedic evaluation. Recommend MRI imagining if worse but she wants to wait on this  12. L upper quadrant pain. Non-contrast CT scan was done 12/21/2022; no acute findings. Cholelithiasis.   13. Headaches. She had 5/22/2023 Neurology appt. With Dr. Little. She has an 11/20/2023 follow up appt. She is on Rizatriptan and this is effective  14. Nasal drainage allergic? Sent in Rx. For flonase. Ordered sinus CT 9/20/2023.   15. Lower abdominal pain before urination. She had hematuria in the past. UA 6/19/2023, unremarkable.  She had CT imaging done 12/21/2022 that did not show any abnormalities. If worse, hematuria, persists, recommend Urology and probable cystoscopy.      30 minutes spent on the date of the encounter doing chart review, history and exam, documentation and further activities as noted above exclusive of procedures and other billable interpretations

## 2023-09-20 NOTE — NURSING NOTE
Tatiana Paredes is a 57 year old female patient that presents today in clinic for the following:    Chief Complaint   Patient presents with    Follow Up     3 month follow up      The patient's allergies and medications were reviewed as noted. A set of vitals were recorded as noted without incident: /76 (BP Location: Right arm, Patient Position: Sitting, Cuff Size: Adult Regular)   Pulse 70   Wt 66.4 kg (146 lb 6.4 oz)   LMP 07/18/2016 (Exact Date)   SpO2 98%   BMI 22.93 kg/m  . The patient does not have any other questions for the provider.    Luisana Godinez, EMT at 7:51 AM on 9/20/2023

## 2023-10-05 ENCOUNTER — ANCILLARY PROCEDURE (OUTPATIENT)
Dept: CT IMAGING | Facility: CLINIC | Age: 58
End: 2023-10-05
Attending: INTERNAL MEDICINE
Payer: COMMERCIAL

## 2023-10-05 DIAGNOSIS — J32.9 SINUSITIS, UNSPECIFIED CHRONICITY, UNSPECIFIED LOCATION: ICD-10-CM

## 2023-10-05 PROCEDURE — 70486 CT MAXILLOFACIAL W/O DYE: CPT | Mod: GC | Performed by: RADIOLOGY

## 2023-11-11 ENCOUNTER — HEALTH MAINTENANCE LETTER (OUTPATIENT)
Age: 58
End: 2023-11-11

## 2023-11-19 ASSESSMENT — HEADACHE IMPACT TEST (HIT 6)
WHEN YOU HAVE A HEADACHE HOW OFTEN DO YOU WISH YOU COULD LIE DOWN: SOMETIMES
HOW OFTEN HAVE YOU FELT FED UP OR IRRITATED BECAUSE OF YOUR HEADACHES: RARELY
HOW OFTEN HAVE YOU FELT TOO TIRED TO WORK BECAUSE OF YOUR HEADACHES: RARELY
HOW OFTEN DID HEADACHS LIMIT CONCENTRATION ON WORK OR DAILY ACTIVITY: RARELY
WHEN YOU HAVE HEADACHES HOW OFTEN IS THE PAIN SEVERE: SOMETIMES
HIT6 TOTAL SCORE: 52
HOW OFTEN DO HEADACHES LIMIT YOUR DAILY ACTIVITIES: RARELY

## 2023-11-20 ENCOUNTER — OFFICE VISIT (OUTPATIENT)
Dept: NEUROLOGY | Facility: CLINIC | Age: 58
End: 2023-11-20
Payer: COMMERCIAL

## 2023-11-20 VITALS
OXYGEN SATURATION: 99 % | RESPIRATION RATE: 16 BRPM | HEART RATE: 67 BPM | SYSTOLIC BLOOD PRESSURE: 119 MMHG | DIASTOLIC BLOOD PRESSURE: 80 MMHG

## 2023-11-20 DIAGNOSIS — G43.909 EPISODIC MIGRAINE: ICD-10-CM

## 2023-11-20 PROCEDURE — 99213 OFFICE O/P EST LOW 20 MIN: CPT | Mod: GC | Performed by: PSYCHIATRY & NEUROLOGY

## 2023-11-20 RX ORDER — ONDANSETRON 4 MG/1
4 TABLET, ORALLY DISINTEGRATING ORAL EVERY 6 HOURS PRN
Qty: 10 TABLET | Refills: 11 | Status: SHIPPED | OUTPATIENT
Start: 2023-11-20 | End: 2023-11-20

## 2023-11-20 RX ORDER — RIZATRIPTAN BENZOATE 10 MG/1
10 TABLET, ORALLY DISINTEGRATING ORAL
Qty: 36 TABLET | Refills: 3 | Status: SHIPPED | OUTPATIENT
Start: 2023-11-20

## 2023-11-20 RX ORDER — ONDANSETRON 4 MG/1
4 TABLET, ORALLY DISINTEGRATING ORAL EVERY 6 HOURS PRN
Qty: 30 TABLET | Refills: 3 | Status: SHIPPED | OUTPATIENT
Start: 2023-11-20

## 2023-11-20 RX ORDER — RIZATRIPTAN BENZOATE 10 MG/1
10 TABLET, ORALLY DISINTEGRATING ORAL
Qty: 12 TABLET | Refills: 11 | Status: SHIPPED | OUTPATIENT
Start: 2023-11-20 | End: 2023-11-20

## 2023-11-20 ASSESSMENT — PAIN SCALES - GENERAL: PAINLEVEL: NO PAIN (0)

## 2023-11-20 NOTE — LETTER
11/20/2023       RE: Tatiana Paredes  [address]  [facility] [id number]     Dear Colleague,    Thank you for referring your patient, Tatiana Paredes, to the [facility] [place] [facility]. Please see a copy of my visit note below.    [facility]    Headache Neurology Progress Note  November 20, 2023    Subjective:    Tatiana Paredes returns for follow up of migraine.     Has ~8-9 migraine attacks a month since the last visit. No change in quality.    The rizatriptan has been quite effective and aborts the headache after 2 hours the vast majority of the time. 3 times since the last visit she had a migraine that required 2 Maxalt. She used exactly the number of pills that she had and did not need more.     No side effects from the medication.       Objective:    Vitals: /80   Pulse 67   Resp 16   LMP 07/18/2016 (Exact Date)   SpO2 99%   General: Cooperative, NAD  Neurologic:  Mental Status: Fully alert, attentive and oriented. Speech clear and fluent.   Cranial Nerves: Facial movements symmetric.   Motor: No abnormal movements.      Assessment/Plan:   Tatiana Paredes is a 58 year old who returns for follow up of episodic migraine without aura.  She has had previous head imaging, most recently in 2018, that was unrevealing for secondary causes. Migraine attacks are well treated with rizatriptan acutely; with current frequency, she would also qualify for preventative treatment, although we held off on this today.     -She will continue avoiding triggers  -I recommend she continue rizatriptan 10 mg oral dissolvable tablet taken at the onset of headache, with a repeat dose in 2 hours if needed.  This should not exceed more than 9 days/month to avoid medication overuse.  -I recommend ondansetron 4 mg oral dissolvable tablet as needed for nausea.     In the future, if her attacks  continue to occur once a week or more, a preventative treatment could be considered.    -These could include propranolol, amitriptyline, or a CGRP inhibitor.    -Does have a history of probable kidney stone so would avoid taking topiramate.   -She does not want to take a preventative today but will consider one at the next visit.    Patient seen and discussed with attending neurologist, Dr. Little.    Hamilton Gallego MD  PGY-4 Neurology Resident    Physician Attestation  I, Renee Little MD, saw this patient and agree with the findings and plan of care as documented in the note.      Episodic migraine well treated acutely with rizatriptan, although no change in frequency.  She would qualify for preventative treatment, we will continue to consider this at follow-up visits.  Topiramate will be avoided due to possible kidney stone in the past.      Again, thank you for allowing me to participate in the care of your patient.      Sincerely,    Renee Little MD

## 2023-11-20 NOTE — PROGRESS NOTES
[facility]    Headache Neurology Progress Note  November 20, 2023    Subjective:    Tatiana Paredes returns for follow up of migraine.     Has ~8-9 migraine attacks a month since the last visit. No change in quality.    The rizatriptan has been quite effective and aborts the headache after 2 hours the vast majority of the time. 3 times since the last visit she had a migraine that required 2 Maxalt. She used exactly the number of pills that she had and did not need more.     No side effects from the medication.       Objective:    Vitals: /80   Pulse 67   Resp 16   LMP 07/18/2016 (Exact Date)   SpO2 99%   General: Cooperative, NAD  Neurologic:  Mental Status: Fully alert, attentive and oriented. Speech clear and fluent.   Cranial Nerves: Facial movements symmetric.   Motor: No abnormal movements.      Assessment/Plan:   Tatiana Paredes is a 58 year old who returns for follow up of episodic migraine without aura.  She has had previous head imaging, most recently in 2018, that was unrevealing for secondary causes. Migraine attacks are well treated with rizatriptan acutely; with current frequency, she would also qualify for preventative treatment, although we held off on this today.     -She will continue avoiding triggers  -I recommend she continue rizatriptan 10 mg oral dissolvable tablet taken at the onset of headache, with a repeat dose in 2 hours if needed.  This should not exceed more than 9 days/month to avoid medication overuse.  -I recommend ondansetron 4 mg oral dissolvable tablet as needed for nausea.     In the future, if her attacks continue to occur once a week or more, a preventative treatment could be considered.    -These could include propranolol, amitriptyline, or a CGRP inhibitor.    -Does have a history of probable kidney stone so would avoid taking topiramate.   -She does not want to take a preventative today but will consider one at the next  visit.    Patient seen and discussed with attending neurologist, Dr. Little.    Hamilton Gallego MD  PGY-4 Neurology Resident    Physician Attestation   I, Renee Little MD, saw this patient and agree with the findings and plan of care as documented in the note.      Episodic migraine well treated acutely with rizatriptan, although no change in frequency.  She would qualify for preventative treatment, we will continue to consider this at follow-up visits.  Topiramate will be avoided due to possible kidney stone in the past.    Renee Little MD  Neurology

## 2023-12-28 NOTE — PROGRESS NOTES
Virtual Visit Details  Type of service:  Telephone Visit   Phone call duration: 53 minutes     12/29/2023    Referring Provider: Cruz Prather MD     Presenting Information:   I met with Tatiana Paredes today for her telephone genetic counseling visit through the Cancer Risk Management Program to discuss her family history of breast, prostate, stomach, and colon cancer. She is here today to review this history, cancer screening recommendations, and available genetic testing options.    Personal History:  Tatiana is a 58 year old female. She does not have any personal history of cancer. In her hormonal-based medical history, she had her first menstrual period at age 15, her first child at age 27, and underwent menopause in her 50's. Tatiana has her ovaries, fallopian tubes and uterus in place, and reports no ovarian cancer screening to date. She reports no history of hormone replacement therapy or oral contraceptive use.       Tatiana has annual clinical breast exams and mammograms; her most recent mammogram in June 2023 which found possible asymmetry in the left breast. On 6/15/2023, a diagnostic mammogram and ultrasound showed normal breast tissue without suspicious finding. Tatiana began having colonoscopies at the age of 51. Her most recent colonoscopy in October 2022 found one 5 mm tubular adenoma in the cecum. Follow-up was recommended in 7-10 years. Tatiana reports a history of dermatological exams. She does not regularly do any other cancer screening at this time. Tatiana reported no history of smoking and occasional alcohol use.    Family History: (Please see scanned pedigree for detailed family history information)  Tatiana's sister was diagnosed with breast cancer at age 53. Treatment included surgery and chemotherapy. It was reported that she has not undergone genetic testing. She is currently 62.  Tatiana's mother was diagnosed with stomach cancer at age 79. She passed away at age 82.   Maternal uncle was diagnosed with brain  cancer in his 40's and passed away.   Maternal grandmother was diagnosed with colon cancer in her 70's. She passed away in her 80's.   Tatiana's father was diagnosed with prostate cancer at age 59. He passed away at age 68.   Her maternal and paternal ethnicity is [place]. There is no known Ashkenazi Yazidi ancestry on either side of her family. There is no reported consanguinity.    Discussion:  Tatiana's family history of colon, stomach, and brain cancer is suggestive of a hereditary cancer syndrome.  We reviewed the features of sporadic, familial, and hereditary cancers.   The vast majority of cancers are considered sporadic and not primarily due to an inherited factor. Individuals can develop cancer due to aging, chance events, environmental exposures or lifestyles.  Features typically seen in high risk families include: cancers diagnosed under age 50, multiple relatives with similar cancers on the same side of the family, cancers in multiple generations, and relatives with certain rare cancers (i.e., male breast cancer).   We discussed the natural history and genetics of several hereditary cancer syndromes, including Palmer syndrome.   Palmer syndrome can be caused by a mutation in one of five genes:  MLH1, MSH2, MSH6, PMS2, and EPCAM. A single mutation in one of the Palmer Syndrome genes increases the risk for several cancers, such as colon cancer, endometrial/uterine cancer, gastric cancer, and ovarian cancer. Other cancers have also been reported in some families with Palemr Syndrome include pancreatic, bladder, biliary tract, urothelial, small bowel, prostate, breast and brain cancers.  A detailed handout regarding Palmer syndrome and the information we discussed will be provided to Tatiana via [email or web address] and can be found in the after visit summary. Topics included: inheritance pattern, cancer risks, cancer screening recommendations, and also risks, benefits and limitations of testing.  In looking at Tatiana's personal and  family history, it is possible that a cancer susceptibility gene is present due to her mother diagnosed with stomach cancer at age 79, maternal uncle diagnosed with brain cancer in his 40's, and a maternal grandmother diagnosed with colon cancer in her 70's.  Based on her personal and family history, Tatiana meets current National Comprehensive Cancer Network (NCCN) criteria for genetic testing of Palmer syndrome genes (i.e. EPCAM, MLH1, MSH2, MSH6, and PMS2).   After our discussion, Tatiana decided she wanted to think more about the information we discussed before deciding whether to pursue genetic testing.  Tatiana plans to reach out to me if she decides to proceed with testing in which case we would set up another appointment to discuss testing logistics.   Screening recommendations based upon personal/family history:     Based on her personal and family history, Tatiana has a 17.6% lifetime risk of developing breast cancer based on the CARLOS model. Therefore, Tatiana does not meet current National Comprehensive Cancer Network (NCCN) guidelines for high risk breast screening, which is offered to women with a 20% lifetime risk or higher. However, it is still important for Tatiana to continue with routine breast screening under the care of her physicians. Breast cancer screening is generally recommended to begin approximately 10 years younger than the earliest age of breast cancer diagnosis in the family, or at age 40, whichever comes first. In this family, screening may begin at age 40. Tatiana is encouraged to discuss breast screening with her physicians.   Other population cancer screening options, such as those recommended by the American Cancer Society and the National Comprehensive Cancer Network (NCCN), are also appropriate for Tatiana and her family. These screening recommendations may change if there are changes to Tatiana's personal and/or family history of cancer.  Final screening recommendations should be made by each individual's  managing physician.   Of note, these screening recommendations may change should Tatiana elect to pursue genetic testing in the future.  Medical Management: If Tatiana were to proceed with genetic testing in the future it may determine:  additional cancer screening for which Tatiana may qualify (i.e. mammogram and breast MRI, more frequent colonoscopies, more frequent dermatologic exams, etc.),  options for risk reducing surgeries Tatiana could consider (i.e. bilateral mastectomy, surgery to remove her ovaries and/or uterus, etc.),    and targeted chemotherapies if she were to develop certain cancers in the future (i.e. immunotherapy for individuals with Palmer syndrome, PARP inhibitors, etc.).   These recommendations and possible targeted chemotherapies would be discussed in detail if Tatiana decided to purse genetic testing.     Plan  1) After today's discussion, Tatiana decided to think about the information we discussed and about whether or not she wants to pursue genetic testing. Therefore, no genetic testing was ordered today.   2) Tatiana plans to reach out to me if she decides to purse with genetic testing. If this is the case, we will schedule an appointment to review testing options, logistics surrounding testing, and review the consent form. My contact information was provided.   3) Information regarding recommended screening, based upon the family history, was reviewed for Tatiana.  4) Tatiana will contact me regularly and/or if the family or personal history changes.     Tatiana is 58 year old and is being evaluated via a billable telephone visit.    Time spent over the phone: 53 minutes    Suni Watson MS, [facility]  Licensed, Certified Genetic Counselor  Phone: [phone]

## 2023-12-29 ENCOUNTER — VIRTUAL VISIT (OUTPATIENT)
Dept: ONCOLOGY | Facility: CLINIC | Age: 58
End: 2023-12-29
Attending: INTERNAL MEDICINE
Payer: COMMERCIAL

## 2023-12-29 DIAGNOSIS — Z80.42 FAMILY HISTORY OF PROSTATE CANCER: ICD-10-CM

## 2023-12-29 DIAGNOSIS — Z80.0 FAMILY HISTORY OF STOMACH CANCER: ICD-10-CM

## 2023-12-29 DIAGNOSIS — Z80.0 FAMILY HISTORY OF COLON CANCER: Primary | ICD-10-CM

## 2023-12-29 DIAGNOSIS — Z80.3 FAMILY HISTORY OF MALIGNANT NEOPLASM OF BREAST: ICD-10-CM

## 2023-12-29 DIAGNOSIS — Z80.8 FAMILY HISTORY OF BRAIN CANCER: ICD-10-CM

## 2023-12-29 PROCEDURE — 96040 HC GENETIC COUNSELING, EACH 30 MINUTES: CPT | Mod: TEL

## 2023-12-29 NOTE — LETTER
Cancer Risk Management  Program Locations    [facility]  [facility]  [facility]  [facility]  [facility]  Mailing Address  Cancer Risk Management Program  [address]  [place] [address]    New patient appointments  [phone]    December 29, 2023    Tatiana Paredes  [address] [facility] [address]    Dear Tatiana,    It was a pleasure talking with you via your virtual genetic counseling visit on 12/29/2023.  Below is a copy of the progress note from that recent visit through the Cancer Risk Management Program.  If you have any additional questions, please feel free to contact me.      Referring Provider: Cruz Prather MD     Presenting Information:   I met with Tatiana Reggie today for her telephone genetic counseling visit through the Cancer Risk Management Program to discuss her family history of breast, prostate, stomach, and colon cancer. She is here today to review this history, cancer screening recommendations, and available genetic testing options.    Personal History:  Tatiana is a 58 year old female. She does not have any personal history of cancer. In her hormonal-based medical history, she had her first menstrual period at age 15, her first child at age 27, and underwent menopause in her 50's. Tatiana has her ovaries, fallopian tubes and uterus in place, and reports no ovarian cancer screening to date. She reports no history of hormone replacement therapy or oral contraceptive use.       Tatiana has annual clinical breast exams and mammograms; her most recent mammogram in June 2023 which found possible asymmetry in the left breast. On 6/15/2023, a diagnostic mammogram and ultrasound showed normal breast tissue without suspicious finding. Tatiana began having colonoscopies at the age of 51. Her most recent colonoscopy in October  2022 found one 5 mm tubular adenoma in the cecum. Follow-up was recommended in 7-10 years. Tatiana reports a history of dermatological exams. She does not regularly do any other cancer screening at this time. Tatiana reported no history of smoking and occasional alcohol use.    Family History: (Please see scanned pedigree for detailed family history information)  Tatiana's sister was diagnosed with breast cancer at age 53. Treatment included surgery and chemotherapy. It was reported that she has not undergone genetic testing. She is currently 62.  Tatiana's mother was diagnosed with stomach cancer at age 79. She passed away at age 82.   Maternal uncle was diagnosed with brain cancer in his 40's and passed away.   Maternal grandmother was diagnosed with colon cancer in her 70's. She passed away in her 80's.   Tatiana's father was diagnosed with prostate cancer at age 59. He passed away at age 68.   Her maternal and paternal ethnicity is [place]. There is no known Ashkenazi Protestant ancestry on either side of her family. There is no reported consanguinity.    Discussion:  Tatiana's family history of colon, stomach, and brain cancer is suggestive of a hereditary cancer syndrome.  We reviewed the features of sporadic, familial, and hereditary cancers.   The vast majority of cancers are considered sporadic and not primarily due to an inherited factor. Individuals can develop cancer due to aging, chance events, environmental exposures or lifestyles.  Features typically seen in high risk families include: cancers diagnosed under age 50, multiple relatives with similar cancers on the same side of the family, cancers in multiple generations, and relatives with certain rare cancers (i.e., male breast cancer).   We discussed the natural history and genetics of several hereditary cancer syndromes, including Palmer syndrome.   Palmer syndrome can be caused by a mutation in one of five genes:  MLH1, MSH2, MSH6, PMS2, and EPCAM. A single mutation in  one of the Palmer Syndrome genes increases the risk for several cancers, such as colon cancer, endometrial/uterine cancer, gastric cancer, and ovarian cancer. Other cancers have also been reported in some families with Palmer Syndrome include pancreatic, bladder, biliary tract, urothelial, small bowel, prostate, breast and brain cancers.  A detailed handout regarding Palmer syndrome and the information we discussed will be provided to Tatiana via [employer] and can be found in the after visit summary. Topics included: inheritance pattern, cancer risks, cancer screening recommendations, and also risks, benefits and limitations of testing.  In looking at Tatiana's personal and family history, it is possible that a cancer susceptibility gene is present due to her mother diagnosed with stomach cancer at age 79, maternal uncle diagnosed with brain cancer in his 40's, and a maternal grandmother diagnosed with colon cancer in her 70's.  Based on her personal and family history, Tatiana meets current National Comprehensive Cancer Network (NCCN) criteria for genetic testing of Palmer syndrome genes (i.e. EPCAM, MLH1, MSH2, MSH6, and PMS2).   After our discussion, Tatiana decided she wanted to think more about the information we discussed before deciding whether to pursue genetic testing.  Tatiana plans to reach out to me if she decides to proceed with testing in which case we would set up another appointment to discuss testing logistics.   Screening recommendations based upon personal/family history:     Based on her personal and family history, Tatiana has a 17.6% lifetime risk of developing breast cancer based on the CARLOS model. Therefore, Tatiana does not meet current National Comprehensive Cancer Network (NCCN) guidelines for high risk breast screening, which is offered to women with a 20% lifetime risk or higher. However, it is still important for Tatiana to continue with routine breast screening under the care of her physicians. Breast cancer  screening is generally recommended to begin approximately 10 years younger than the earliest age of breast cancer diagnosis in the family, or at age 40, whichever comes first. In this family, screening may begin at age 40. Tatiana is encouraged to discuss breast screening with her physicians.   Other population cancer screening options, such as those recommended by the American Cancer Society and the National Comprehensive Cancer Network (NCCN), are also appropriate for Tatiana and her family. These screening recommendations may change if there are changes to Tatiana's personal and/or family history of cancer.  Final screening recommendations should be made by each individual's managing physician.   Of note, these screening recommendations may change should Tatiana elect to pursue genetic testing in the future.  Medical Management: If Tatiana were to proceed with genetic testing in the future it may determine:  additional cancer screening for which Tatiana may qualify (i.e. mammogram and breast MRI, more frequent colonoscopies, more frequent dermatologic exams, etc.),  options for risk reducing surgeries Tatiana could consider (i.e. bilateral mastectomy, surgery to remove her ovaries and/or uterus, etc.),    and targeted chemotherapies if she were to develop certain cancers in the future (i.e. immunotherapy for individuals with Palmer syndrome, PARP inhibitors, etc.).   These recommendations and possible targeted chemotherapies would be discussed in detail if Tatiana decided to purse genetic testing.     Plan  1) After today's discussion, Tatiana decided to think about the information we discussed and about whether or not she wants to pursue genetic testing. Therefore, no genetic testing was ordered today.   2) Tatiana plans to reach out to me if she decides to purse with genetic testing. If this is the case, we will schedule an appointment to review testing options, logistics surrounding testing, and review the consent form. My contact  information was provided.   3) Information regarding recommended screening, based upon the family history, was reviewed for Tatiana.  4) Tatiana will contact me regularly and/or if the family or personal history changes.     Tatiana is 58 year old and is being evaluated via a billable telephone visit.    Time spent over the phone: 53 minutes    Suni Watson MS, [facility] [place]  Licensed, Certified Genetic Counselor  Phone: [phone]

## 2023-12-29 NOTE — NURSING NOTE
Is the patient currently in the state of [place]? YES    Visit mode:VIDEO    If the visit is dropped, the patient can be reconnected by: TELEPHONE VISIT: Phone number: [phone]    Will anyone else be joining the visit? NO  (If patient encounters technical issues they should call [phone] :[id number])    How would you like to obtain your AVS? MyChart    Are changes needed to the allergy or medication list? No    Reason for visit: No chief complaint on file.    Danelle SNELLF

## 2023-12-29 NOTE — Clinical Note
12/29/2023         RE: Tatiana Paredes  [address]  [facility] [place] [facility] [address]        Dear Colleague,    Thank you for referring your patient, Tatiana Paredes, to the [facility]. Please see a copy of my visit note below.    Virtual Visit Details  Type of service:  Telephone Visit   Phone call duration: *** minutes     12/29/2023    Referring Provider: Cruz Prather MD     Presenting Information:   I met with Tatiana Paredes today for her telephone genetic counseling visit through the Cancer Risk Management Program to discuss her family history of breast, prostate, and colon cancer. She is here today to review this history, cancer screening recommendations, and available genetic testing options.    Personal History:  Tatiana is a 58 year old female. She does not have any personal history of cancer. In her hormonal-based medical history, she had her first menstrual period at age 15, her first child at age 27, and underwent menopause in her 50's. Tatiana has her ovaries, fallopian tubes and uterus in place, and reports no ovarian cancer screening to date. She reports no history of hormone replacement therapy or oral contraceptive use.       Tatiana has annual clinical breast exams and mammograms; her most recent mammogram in June 2023 which found possible asymmetry in the left breast. On 6/15/2023, a diagnostic mammogram and ultrasound showed normal breast tissue without suspicious finding. Tatiana began having colonoscopies at the age of 51. Her most recent colonoscopy in October 2022 found one 5 mm tubular adenoma in the cecum. Follow-up was recommended in 7-10 years. Tatiana reports a history of dermatological exams. She does not regularly do any other cancer screening at this time. Tatiana reported no history of smoking and occasional alcohol use.    Family History: (Please see scanned pedigree for detailed family history information)  Tatiana's sister was diagnosed with breast cancer  at age 53.   Tatiana's mother was diagnosed with stomach cancer at age 79. She passed away at age 82.   Maternal grandmother was diagnosed with colon cancer at age 70.  Tatiana's father was diagnosed with ***prostate cancer.   Her maternal and paternal ethnicity is [place]. There is no known Ashkenazi Confucianism ancestry on either side of her family. There is no reported consanguinity.    Discussion:  Tatiana's personal and family history of *** cancer is suggestive of a hereditary cancer syndrome.  We reviewed the features of sporadic, familial, and hereditary cancers.   The vast majority of cancers are considered sporadic and not primarily due to an inherited factor. Individuals can develop cancer due to aging, chance events, environmental exposures or lifestyles.  Features typically seen in high risk families include: cancers diagnosed under age 50, multiple relatives with similar cancers on the same side of the family, cancers in multiple generations, and relatives with certain rare cancers (i.e., male breast cancer).   We discussed the natural history and genetics of several hereditary cancer syndromes, including Palmer syndrome.   Palmer syndrome can be caused by a mutation in one of five genes:  MLH1, MSH2, MSH6, PMS2, and EPCAM. A single mutation in one of the Palmer Syndrome genes increases the risk for several cancers, such as colon cancer, endometrial/uterine cancer, gastric cancer, and ovarian cancer. Other cancers have also been reported in some families with Palmer Syndrome include pancreatic, bladder, biliary tract, urothelial, small bowel, prostate, breast and brain cancers.  A detailed handout regarding Palmer syndrome and the information we discussed will be provided to Tatiana via [employer] ***mailed to Tatiana*** and can be found in the after visit summary. Topics included: inheritance pattern, cancer risks, cancer screening recommendations, and also risks, benefits and limitations of testing.  In looking at Samias  personal and family history, it is possible that a cancer susceptibility gene is present due ***.  Based on her personal and family history, Tatiana meets current National Comprehensive Cancer Network (NCCN) criteria for genetic testing of Palmer syndrome genes (i.e. EPCAM, MLH1, MSH2, MSH6, and PMS2).   We discussed that there are additional genes that could cause increased risk for ***colon cancer. As many of these genes present with overlapping features in a family and accurate cancer risk cannot always be established based upon the pedigree analysis alone, it would be reasonable for Tatiana to consider panel genetic testing to analyze multiple genes at once.  We reviewed genetic testing options for Tatiana based on her personal and family history: a panel of genes associated with an increased risk for ***hereditary colorectal cancers, or larger panel options to include genes associated with increased risk for multiple different cancer types. Tatiana expressed interest in *** panel.         Tatiana would like to submit a blood sample for her genetic testing. She will go to her [facility] at her earliest convenience to get her blood drawn for her genetic testing.   Verbal consent was given over the phone and written on the consent form. Turnaround time is approximately 3-4 weeks once the lab receives the sample.  Medical Management: For Tatiana, we reviewed that the information from genetic testing may determine:  additional cancer screening for which Tatiana may qualify (i.e. mammogram and breast MRI, more frequent colonoscopies, more frequent dermatologic exams, etc.),  options for risk reducing surgeries Tatiana could consider (i.e. bilateral mastectomy, surgery to remove her ovaries and/or uterus, etc.),    and targeted chemotherapies if she were to develop certain cancers in the future (i.e. immunotherapy for individuals with Palmer syndrome, PARP inhibitors, etc.).   These recommendations and possible targeted  chemotherapies will be discussed in detail once genetic testing is completed.     Plan:  1) Today Tatiana elected to proceed with ***.  2) This information should be available in 4-6*** weeks.  3) Tatiana will return to clinic to discuss the results.    Tatiana is 58 year old and is being evaluated via a billable telephone visit.    Time spent over the phone: 53 minutes    Suni Watson MS, [facility]  Licensed, Certified Genetic Counselor  Phone: [phone]      Again, thank you for allowing me to participate in the care of your patient.        Sincerely,        Suni Watson GC

## 2024-01-02 NOTE — PATIENT INSTRUCTIONS
Assessing Cancer Risk  Cancer is a common diagnosis which impacts many families.  Individuals may develop cancer due to environmental factors (such as exposures and lifestyle), aging, genetic predisposition, or a combination of these factors.      Approximately 5-10% of cancer diagnoses are thought to be caused by inherited risk factors.  These families often have:  Several people with the same or related types of cancer  Cancers diagnosed at a young age (before age 50)  Individuals with more than one primary cancer  Multiple generations of the family affected with cancer    Palmer Syndrome Genes  Many of the genes we are born with impact our risk of cancer.  When one of these genes is not working properly due to a mistake (known as a  mutation  or  variant ), this may lead to an increased risk of developing certain types of cancer.      There are currently five genes known to cause Palmer Syndrome: MLH1, MSH2, MSH6, PMS2, and EPCAM.  A single mutation in one of the Palmer Syndrome genes increases the risk of developing several types of cancers, including colon, endometrial, ovarian, and stomach.  Other cancers that can be seen in some families include pancreatic, bladder, biliary tract, urothelial, small bowel, prostate, breast and brain cancers.  The table below lists the chance that a person with Palmer syndrome would develop cancer in their lifetime1.  Of note, exact risks differ between each gene.        Lifetime Cancer Risks    General Population Palmer Syndrome   Colon 4.2% 8.7-61%   Endometrial 3.1% 13-57%   Stomach <1% up to 16%   Ovarian 1.3% up-38%   Additional cancer risks may include renal pelvis, ureter, bladder, small bowel, pancreas, biliary tract, prostate, breast, brain, skin      Inheriting a mutation does not mean a person will develop cancer, but it does significantly increase their risk of certain cancers above the general population risk.    Medical Management  If a harmful mutation is found in  a Palmer syndrome gene, there may be increased cancer surveillance or risk reducing surgeries that can be offered. While cancer screening and medical management recommendations are based on genetic, personal and family history factors, the following guidelines may be recommended for some families with Palmer syndrome 1:  Colorectal: Colonoscopy screening may start as early as age 20-25 (or earlier based on family history), and repeated every 1-3 years.  The age to start and frequency of screening depends on the specific gene.     Endometrial: Hysterectomy (removal of the uterus) can be considered to reduce the risk of endometrial cancer. Screening via endometrial biopsy every 1-2 years (beginning at age 30-35) can be considered. In post-menopausal women, transvaginal ultrasound may also be considered.  Ovarian: Bilateral salpingo-oophorectomy (removal of both ovaries and fallopian tubes) may reduce the chance to develop ovarian cancer, and may be considered depending on the specific Palmer syndrome gene mutation. While there currently are no effective screening method for ovarian cancer, transvaginal ultrasound and a CA-125 blood test may be considered at the discretion of each individual's clinician.   Stomach: Upper gastrointestinal surveillance, including upper endoscopy (EGD) with extended duodenoscopy, may be performed in conjunction with colonoscopy.  This screening may start at age 30-40, and repeated every 2-4 years. This recommendation is largely dependent on family history, genetic, and other factors.  Urinary Tract: There is limited evidence to suggest a screening strategy for urothelial/urinary tract cancers. Annual urinalysis starting at age 30-35 can be considered. Consideration for screening is largely dependent upon personal and family history factors, as well as the specific Palmer Syndrome mutation.   Brain: Individuals at risk for brain cancer are encouraged to be aware of the signs and symptoms of  neurologic cancer, and should promptly report abnormal symptoms to their managing physicians.     Pancreas: Pancreatic cancer screening may be considered for some families.  This screening is typically done with contrast-enhanced MRI/ magnetic resonance cholangiopancreatography (MRCP) and/or endoscopic ultrasound (EUS).   Prostate: There is limited evidence to recommend early or more frequent screening for prostate cancer. However, individuals at risk for prostate cancer should follow screening as recommended in the NCCN Guidelines for Prostate Early Detection. This screening should be discussed with each individual's medical provider.  Skin: Due to the increased prevalence of both malignant and benign skin tumors in some Palmer syndrome genes (such as sebaceous adenocarcinomas and keratoacanthomas), skin exams may be considered every 1-2 years.     These recommendations vary depending on the specific gene identified, as cancer risks differ between each Palmer syndrome gene.  These recommendations should be discussed with an individuals genetic counselor and managing providers.      Inheritance   Palmer Syndrome mutations are inherited in an autosomal dominant pattern.  This means that if a parent has a mutation, each of their children will have a 50% chance of inheriting that same mutation.  Therefore, each child--male or female--would have a 50% chance of being at increased risk for developing cancer.    In rare situations in which both parents have a mutation in the same Palmer syndrome gene, their children each have a 25% risk for constitutional mismatch repair deficiency syndrome (CMMRD). CMMRD is a rare autosomal recessive disorder associated with cafe-au-lait spots and risk for childhood cancers. For individuals of childbearing age with Palmer syndrome mutations, genetic counseling and genetic testing may be advised for their partners.    Tumor Screening for Palmer Syndrome  There are two different tests that can  be done on a person s colon or endometrial tumor as an initial screen for Palmer Syndrome. These tests are called IHC (immunohistochemistry) and MSI (microsatellite instability). Tumors that show an absent protein on IHC or an unstable MSI result could be due to a mutation in one of the known Palmer Syndrome genes. The IHC results can also guide further genetic testing for Palmer Syndrome by indicating which gene should be tested.     Genetic Testing  For some families, genetic testing may help to explain why their cancer developed, provide tailored management options, and clarify the risk of developing cancer in the future.      Genetic testing involves a simple blood or saliva test and will look at the genetic information in select genes for variants associated with cancer risk.  This testing may include analysis of a single gene due to a known variant in the family, multiple genes most associated with the cancers in a family, or an expanded panel of genes related to many types of cancers.    Results  There are several possible genetic test results, including:   Positive--a harmful mutation (also known as a  pathogenic  or  likely pathogenic  variant) was identified in a gene associated with increased cancer risk.  These risks, as well as medical management options, depend on the specific genetic variant identified.    Negative--no variants were identified in the genes analyzed   Variant of unknown significance--a variant was identified in one or more genes, though it is currently unclear how this impacts cancer risk in the family.  Genetic testing labs are working to collect evidence about these uncertain variants and may provide updates in the future.    Genetic Information Nondiscrimination Act (JUICE)  The Genetic Information Nondiscrimination Act of 2008 (JUICE) is a federal law that protects individuals from health insurance or employment discrimination based on a genetic test result alone (with some exceptions,  including employers with fewer than 15 employees, and ).  However, JUICE's protection does not cover life insurance, long term care, or disability insurances.  The National Human Genome Research [place] is a great resource to learn more.    Questions to Think About Regarding Genetic Testing  What effect will the test result have on me and my relationship with my family members if I have an inherited gene mutation?  If I don t have a gene mutation?  Should I share my test results, and how will my family react to this news, which may also affect them?  Are my children ready to learn new information that may one day affect their own health?    Resources  Palmer Syndrome International lynchcancersiKnowl   Palmer Syndrome Screening Network lynchscreening.net   American Cancer Society (ACS) cancer.org   National Cancer [place] (NCI) cancer.gov     Please call us if you have any questions or concerns.   Cancer Risk Management Program (Appointments: [phone])  Ke Amezcua, MS [facility]  [phone]  Suni Watson, MS, [facility] [phone]  Adalgisa Israel MS, [facility]  [phone]  Lissy Funez, MS, [facility]  [phone]  Jennifer Benitez, MS, [facility]  [phone]  Elvira Powell, MS, [facility] [phone]  Lillian Baird, MS, [facility] [phone]    References  National Comprehensive Cancer Network. Clinical practice guidelines in oncology, colorectal cancer screening. Available online (registration required). 2022.

## 2024-03-07 DIAGNOSIS — A60.9 HSV (HERPES SIMPLEX VIRUS) ANOGENITAL INFECTION: ICD-10-CM

## 2024-03-13 RX ORDER — VALACYCLOVIR HYDROCHLORIDE 500 MG/1
500 TABLET, FILM COATED ORAL 2 TIMES DAILY
Qty: 28 TABLET | Refills: 2 | Status: SHIPPED | OUTPATIENT
Start: 2024-03-13

## 2024-03-13 NOTE — TELEPHONE ENCOUNTER
VALACYCLOVIR  MG TABLET       Take 1 tablet (500 mg) by mouth 2 times daily - Oral        Last Written Prescription Date:  12-14-22  Last Fill Quantity: 28,   # refills: 2  Last Office Visit : 9-20-23  Future Office visit:  3-20-24    Routing refill request to provider for review/approval because:  Gap in RF  FYI overdue lab: [facility]

## 2024-03-19 NOTE — PROGRESS NOTES
HPI:    Last note with us was 6/19/2023 and additional information in that note. She still has nasal drainage. She has used flonase in the past. She has new carpet and feels she has more sxs./sneezing at home. She feels that flonase could also make her voice a little worse. No other HEENT, cardiopulmonary, abdominal, , GYN, neurological, systemic, psychiatric, lymphatic, endocrine, vascular complaints.     Past Medical History:   Diagnosis Date    Breast pain, left 11/2013    Choroidal nevus of right eye     Constipation     Dysphonia     Gastroesophageal reflux disease     Genital herpes     Hemorrhoids     Hoarseness     Menorrhagia     Migraines      Past Surgical History:   Procedure Laterality Date    COLONOSCOPY N/A 10/31/2022    Procedure: COLONOSCOPY, WITH POLYPECTOMY AND BIOPSY;  Surgeon: Emperatriz Peterson MD;  [place] [facility] [place]    ESOPHAGOSCOPY, GASTROSCOPY, DUODENOSCOPY (EGD), COMBINED N/A 9/13/2023    Procedure: Esophagoscopy, gastroscopy, duodenoscopy (EGD), combined with Biopsy;  Surgeon: Leventhal, Thomas Michael, MD;  Location: [facility] [place]     HYSTEROSCOPY, SURGICAL; W/ ENDOMETRIAL ABLATION, ANY METHOD  2011     PE:    Vitals noted, gen, nad, cooperative, alert, neck, supple nl rom, lungs with good air movement, RRR, S1, S2, no MRG, abdomen, no acute findings. Grossly normal neurological exam.       A/P:    1. Immunizations; she has completed the Shingrix vaccine series.  Pfizer COVID vaccination x 6.  Tdap 11/13/3013. She has completed the Shingrix vaccine series. Influenza vaccine 11/3/2023. Tdap today 3/20/2024.   2. Reflux/throat complaints; EGD 11/17/2017. She remains on famotidine. She saw Dr. Pisano last 5/3/2022. EGD was repeated 9/13/2023 findings suggestive of post nasal drip posterior pharynx   3. Dermatology; Telemedicine with  Dr. Eduardo. 10/14/2021 in Care Everywhere.   4. Mammogram; done 6/15/2023. Given her sister's h/o breast cancer (she is in [facility]), other family members with  malignancies. She had Cancer Risk Management appt. On 12/29/2023. Discussion regarding Palmer Syndrome gene mutation. She will think about testing and possibly consider breast MRI screening?   5. Colonoscopy; 10/31/2022 and repeat in 7-10 years .   6. Lipids checked 9/20/2022 with  and HDL 76, TG's 64. Repeat 6/9/2023; TG's 94, HDL 65 and .   7. Seen in GYN 8/12/2022. She was also seen by Dr. Powell, GN 6/29/2022  8. Seen 5/11/2022 by Dr. Rawls ENT for nasal fracture. She had 11/16/2022 follow up with Dr. Ruggiero  9. Hemorrhoids seen by Ms. Gonzalez, Colorectal  10/10/2022  10. L upper quadrant pain. Non-contrast CT scan was done 12/21/2022; no acute findings. Cholelithiasis.   11. Headaches. She had a 11/20/2023 Neurology appt. With Dr. Little. She has a 5/20/2024 follow up appt. She is on Rizatriptan and this is effective   12. Nasal drainage allergic? Sent in Rx. For flonase. She had a sinus CT scan 10/5/2023. Discussed she could try nasal Atrovent? But she wants to wait. Recommended she follow up again in ENT and she will think about his.          30 minutes spent on the date of the encounter doing chart review, history and exam, documentation and further activities as noted above exclusive of procedures and other billable interpretations

## 2024-03-20 ENCOUNTER — OFFICE VISIT (OUTPATIENT)
Dept: INTERNAL MEDICINE | Facility: CLINIC | Age: 59
End: 2024-03-20
Payer: COMMERCIAL

## 2024-03-20 VITALS
OXYGEN SATURATION: 98 % | HEART RATE: 77 BPM | WEIGHT: 148 LBS | DIASTOLIC BLOOD PRESSURE: 81 MMHG | SYSTOLIC BLOOD PRESSURE: 118 MMHG | BODY MASS INDEX: 23.18 KG/M2

## 2024-03-20 DIAGNOSIS — J34.89 NASAL DRAINAGE: Primary | ICD-10-CM

## 2024-03-20 PROCEDURE — 90471 IMMUNIZATION ADMIN: CPT | Performed by: INTERNAL MEDICINE

## 2024-03-20 PROCEDURE — 90715 TDAP VACCINE 7 YRS/> IM: CPT | Performed by: INTERNAL MEDICINE

## 2024-03-20 PROCEDURE — 99214 OFFICE O/P EST MOD 30 MIN: CPT | Mod: 25 | Performed by: INTERNAL MEDICINE

## 2024-03-20 NOTE — PROGRESS NOTES
Tatiana is a 58 year old that presents in clinic today for the following:     Chief Complaint   Patient presents with    Follow Up     Answers submitted by the patient for this visit:  General Questionnaire (Submitted on 3/20/2024)  Chief Complaint: Chronic problems general questions HPI Form  What is the reason for your visit today? : Post nasal drip  How many servings of fruits and vegetables do you eat daily?: 2-3  On average, how many sweetened beverages do you drink each day (Examples: soda, juice, sweet tea, etc.  Do NOT count diet or artificially sweetened beverages)?: 0  How many minutes a day do you exercise enough to make your heart beat faster?: 9 or less  How many days a week do you exercise enough to make your heart beat faster?: 3 or less  How many days per week do you miss taking your medication?: 0        3/20/2024     7:53 AM   Additional Questions   Roomed by YW     Screenings as of today     PHQ-2 Total Score (Adult) - Positive if 3 or more points; Administer   PHQ-9 if positive 0        TURNER Hannon at 7:57 AM on 3/20/2024

## 2024-05-19 ASSESSMENT — HEADACHE IMPACT TEST (HIT 6)
HIT6 TOTAL SCORE: 50
HOW OFTEN DO HEADACHES LIMIT YOUR DAILY ACTIVITIES: RARELY
WHEN YOU HAVE HEADACHES HOW OFTEN IS THE PAIN SEVERE: RARELY
HOW OFTEN DID HEADACHS LIMIT CONCENTRATION ON WORK OR DAILY ACTIVITY: RARELY
HOW OFTEN HAVE YOU FELT TOO TIRED TO WORK BECAUSE OF YOUR HEADACHES: RARELY
HOW OFTEN HAVE YOU FELT FED UP OR IRRITATED BECAUSE OF YOUR HEADACHES: RARELY
WHEN YOU HAVE A HEADACHE HOW OFTEN DO YOU WISH YOU COULD LIE DOWN: SOMETIMES

## 2024-05-19 ASSESSMENT — MIGRAINE DISABILITY ASSESSMENT (MIDAS)
HOW OFTEN WERE SOCIAL ACTIVITIES MISSED DUE TO HEADACHES: 1
HOW MANY DAYS WAS YOUR PRODUCTIVITY CUT IN HALF BECAUSE OF HEADACHES: 0
HOW MANY DAYS WAS HOUSEWORK PRODUCTIVITY CUT IN HALF DUE TO HEADACHES: 0
HOW MANY DAYS IN THE PAST 3 MONTHS HAVE YOU HAD A HEADACHE: 7
TOTAL SCORE: 2
ON A SCALE FROM 0-10 ON AVERAGE HOW PAINFUL WERE HEADACHES: 5
HOW MANY DAYS DID YOU NOT DO HOUSEWORK BECAUSE OF HEADACHES: 1
HOW MANY DAYS DID YOU MISS WORK OR SCHOOL BECAUSE OF HEADACHES: 0

## 2024-05-20 ENCOUNTER — OFFICE VISIT (OUTPATIENT)
Dept: NEUROLOGY | Facility: CLINIC | Age: 59
End: 2024-05-20
Payer: COMMERCIAL

## 2024-05-20 VITALS
DIASTOLIC BLOOD PRESSURE: 81 MMHG | BODY MASS INDEX: 23.49 KG/M2 | WEIGHT: 150 LBS | SYSTOLIC BLOOD PRESSURE: 125 MMHG | OXYGEN SATURATION: 100 % | HEART RATE: 68 BPM

## 2024-05-20 DIAGNOSIS — G43.909 EPISODIC MIGRAINE: ICD-10-CM

## 2024-05-20 PROCEDURE — G2211 COMPLEX E/M VISIT ADD ON: HCPCS | Performed by: PSYCHIATRY & NEUROLOGY

## 2024-05-20 PROCEDURE — 99213 OFFICE O/P EST LOW 20 MIN: CPT | Performed by: PSYCHIATRY & NEUROLOGY

## 2024-05-20 NOTE — LETTER
5/20/2024       RE: Tatiana Paredes  [address] [address] [place] [facility] [address]     Dear Colleague,    Thank you for referring your patient, Tatiana Paredes, to the [facility] [place] [facility]. Please see a copy of my visit note below.    [facility]    Headache Neurology Progress Note  May 20, 2024    Subjective:    Ttaiana Paredes returns for follow up of episodic migraine.    She uses 2-3 rizatriptan per month. She rarely uses a second dose of rizatriptan. One is usually effective within 2 hours.    She has headaches that are mild and respond to ibuprofen and eating. She takes rizatriptan for stronger headaches.    Mild headaches are occasional, more severe headaches are 2-3 times per month.    Some difficulty with name recall for people she has seen or worked with in the past; for example, Tomeka Krueger from a movie or a colleague she worked on with projects.    Vocal cord paralysis 11 years ago, worsened over 6 years. No other weakness or numbness.    Objective:    Vitals: /81 (BP Location: Right arm, Patient Position: Sitting, Cuff Size: Adult Regular)   Pulse 68   Wt 68 kg (150 lb)   LMP 07/18/2016 (Exact Date)   SpO2 100%   BMI 23.49 kg/m    General: Cooperative, NAD  Neurologic:  Mental Status: Fully alert, attentive and oriented. Speech clear and fluent. Mini-cog 5/5.  Cranial Nerves: Facial movements symmetric.   Motor: No abnormal movements.      Pertinent Investigations:          5/21/2023     8:28 PM 11/19/2023     8:00 PM 5/19/2024    10:35 PM   HIT-6   When you have headaches, how often is the pain severe 13 10 8   How often do headaches limit your ability to do usual daily activities including household work, work, school, or social activities? 13 8 8   When you have a headache, how often do you wish you could lie down? 13 10 10   In the past 4 weeks, how often have  you felt too tired to do work or daily activities because of your headaches 6 8 8   In the past 4 weeks, how often have you felt fed up or irritated because of your headaches 6 8 8   In the past 4 weeks, how often did headaches limit your ability to concentrate on work or daily activities 6 8 8   HIT-6 Total Score 57 52 50           11/19/2023     8:04 PM 5/19/2024    10:39 PM   MIDAS - in the past three months:   On how many days did you miss work or school because of your headaches? 1 0   How many days was your productivity at work or school reduced by half or more because of your headaches? 0 0   On how many days did you not do household work because of your headaches? 0 1   How many days was your productivity in household work reduced by half or more because of your headaches? 0 0   On how many days did you miss family, social, or leisure activities because of your headaches? 0 1   On how many days did you have a headache? 17 7   On a scale of 0-10, on average how painful were these headaches? 5 5   MIDAS Score 1 (I - Little or No Disability) 2 (I - Little or No Disability)        Assessment/Plan:   Tatiana Paredes is a 58 year old woman who returns for follow up of episodic migraine without aura.  She has had previous head imaging, most recently in 2018, that was unrevealing for secondary causes. Migraine attacks are well treated with rizatriptan acutely.  She also has history of unilateral vocal cord paralysis and rare difficulty with name recall (5/5 mini-cog); at this time, these do not appear to be part of a larger neurologic disorder.     For symptomatic management of episodic migraine, she will continue the following:  -I recommend she continue rizatriptan 10 mg oral dissolvable tablet taken at the onset of headache, with a repeat dose in 2 hours if needed.  This should not exceed more than 9 days/month to avoid medication overuse.  -I recommend ondansetron 4 mg oral dissolvable tablet as needed for  nausea.     In the future, if her attacks worsened to be once a week or more, a preventative treatment could be considered.    -These could include propranolol, amitriptyline, or a CGRP inhibitor.    -Does have a history of probable kidney stone so would avoid taking topiramate.     The longitudinal plan of care for Tatiana was addressed during this visit. Due to the added complexity in care, I will continue to support Tatiana in the subsequent management of this condition(s) and with the ongoing continuity of care of this condition(s).  I will see her back in 6 months to monitor progress.        Again, thank you for allowing me to participate in the care of your patient.      Sincerely,    Renee Little MD

## 2024-05-20 NOTE — PROGRESS NOTES
[facility]    Headache Neurology Progress Note  May 20, 2024    Subjective:    Tatiana Paredes returns for follow up of episodic migraine.    She uses 2-3 rizatriptan per month. She rarely uses a second dose of rizatriptan. One is usually effective within 2 hours.    She has headaches that are mild and respond to ibuprofen and eating. She takes rizatriptan for stronger headaches.    Mild headaches are occasional, more severe headaches are 2-3 times per month.    Some difficulty with name recall for people she has seen or worked with in the past; for example, Tomeka Krueger from a movie or a colleague she worked on with projects.    Vocal cord paralysis 11 years ago, worsened over 6 years. No other weakness or numbness.    Objective:    Vitals: /81 (BP Location: Right arm, Patient Position: Sitting, Cuff Size: Adult Regular)   Pulse 68   Wt 68 kg (150 lb)   LMP 07/18/2016 (Exact Date)   SpO2 100%   BMI 23.49 kg/m    General: Cooperative, NAD  Neurologic:  Mental Status: Fully alert, attentive and oriented. Speech clear and fluent. Mini-cog 5/5.  Cranial Nerves: Facial movements symmetric.   Motor: No abnormal movements.      Pertinent Investigations:          5/21/2023     8:28 PM 11/19/2023     8:00 PM 5/19/2024    10:35 PM   HIT-6   When you have headaches, how often is the pain severe 13 10 8   How often do headaches limit your ability to do usual daily activities including household work, work, school, or social activities? 13 8 8   When you have a headache, how often do you wish you could lie down? 13 10 10   In the past 4 weeks, how often have you felt too tired to do work or daily activities because of your headaches 6 8 8   In the past 4 weeks, how often have you felt fed up or irritated because of your headaches 6 8 8   In the past 4 weeks, how often did headaches limit your ability to concentrate on work or daily activities 6 8 8   HIT-6 Total Score 57 52 50            11/19/2023     8:04 PM 5/19/2024    10:39 PM   MIDAS - in the past three months:   On how many days did you miss work or school because of your headaches? 1 0   How many days was your productivity at work or school reduced by half or more because of your headaches? 0 0   On how many days did you not do household work because of your headaches? 0 1   How many days was your productivity in household work reduced by half or more because of your headaches? 0 0   On how many days did you miss family, social, or leisure activities because of your headaches? 0 1   On how many days did you have a headache? 17 7   On a scale of 0-10, on average how painful were these headaches? 5 5   MIDAS Score 1 (I - Little or No Disability) 2 (I - Little or No Disability)        Assessment/Plan:   Tatiana Paredes is a 58 year old woman who returns for follow up of episodic migraine without aura.  She has had previous head imaging, most recently in 2018, that was unrevealing for secondary causes. Migraine attacks are well treated with rizatriptan acutely.  She also has history of unilateral vocal cord paralysis and rare difficulty with name recall (5/5 mini-cog); at this time, these do not appear to be part of a larger neurologic disorder.     For symptomatic management of episodic migraine, she will continue the following:  -I recommend she continue rizatriptan 10 mg oral dissolvable tablet taken at the onset of headache, with a repeat dose in 2 hours if needed.  This should not exceed more than 9 days/month to avoid medication overuse.  -I recommend ondansetron 4 mg oral dissolvable tablet as needed for nausea.     In the future, if her attacks worsened to be once a week or more, a preventative treatment could be considered.    -These could include propranolol, amitriptyline, or a CGRP inhibitor.    -Does have a history of probable kidney stone so would avoid taking topiramate.     The longitudinal plan of care for Tatiana  was addressed during this visit. Due to the added complexity in care, I will continue to support Tatiana in the subsequent management of this condition(s) and with the ongoing continuity of care of this condition(s).  I will see her back in 6 months to monitor progress.    Renee Little MD  Neurology

## 2024-05-28 ENCOUNTER — TELEPHONE (OUTPATIENT)
Dept: NEUROLOGY | Facility: CLINIC | Age: 59
End: 2024-05-28
Payer: COMMERCIAL

## 2024-05-28 NOTE — TELEPHONE ENCOUNTER
Left Voicemail (1st Attempt) and Sent Mychart (1st Attempt) for the patient to call back and schedule the following:    Appointment type: Return headache  Provider: Sidney  Return date: around 11/20/2024   Specialty phone number: [phone]  Additional appointment(s) needed:   Additonal Notes:       Kusum Horton on 5/28/2024 at 4:27 PM

## 2024-05-31 NOTE — TELEPHONE ENCOUNTER
Left Voicemail (2nd Attempt) for the patient to call back and schedule the following:    Appointment type: Return Headache  Provider: Sidney  Return date: around 11/20/2024  Specialty phone number: [phone]  Additional appointment(s) needed:   Additonal Notes:       Kusum Horton on 5/31/2024 at 10:37 AM

## 2024-06-19 ENCOUNTER — ANCILLARY PROCEDURE (OUTPATIENT)
Dept: MAMMOGRAPHY | Facility: CLINIC | Age: 59
End: 2024-06-19
Attending: INTERNAL MEDICINE
Payer: COMMERCIAL

## 2024-06-19 DIAGNOSIS — Z12.31 VISIT FOR SCREENING MAMMOGRAM: ICD-10-CM

## 2024-06-19 PROCEDURE — 77067 SCR MAMMO BI INCL CAD: CPT | Mod: GC | Performed by: RADIOLOGY

## 2024-06-19 PROCEDURE — 77063 BREAST TOMOSYNTHESIS BI: CPT | Mod: GC | Performed by: RADIOLOGY

## 2024-08-05 ENCOUNTER — MYC MEDICAL ADVICE (OUTPATIENT)
Dept: INTERNAL MEDICINE | Facility: CLINIC | Age: 59
End: 2024-08-05
Payer: COMMERCIAL

## 2024-08-05 ENCOUNTER — TELEPHONE (OUTPATIENT)
Dept: INTERNAL MEDICINE | Facility: CLINIC | Age: 59
End: 2024-08-05
Payer: COMMERCIAL

## 2024-09-29 SDOH — HEALTH STABILITY: PHYSICAL HEALTH: ON AVERAGE, HOW MANY MINUTES DO YOU ENGAGE IN EXERCISE AT THIS LEVEL?: 30 MIN

## 2024-09-29 SDOH — HEALTH STABILITY: PHYSICAL HEALTH: ON AVERAGE, HOW MANY DAYS PER WEEK DO YOU ENGAGE IN MODERATE TO STRENUOUS EXERCISE (LIKE A BRISK WALK)?: 5 DAYS

## 2024-09-29 ASSESSMENT — SOCIAL DETERMINANTS OF HEALTH (SDOH): HOW OFTEN DO YOU GET TOGETHER WITH FRIENDS OR RELATIVES?: ONCE A WEEK

## 2024-09-29 NOTE — PROGRESS NOTES
HPI:    Ms. Paredes comes in for a physical today. She does not smoke nor abuse EtOH. Mother with h/o osteoporosis. She has some back pain more L sided with a sharp pain. Some times with L buttocks pain. She has used lidocaine patches that reduces her pain. She would like to see Dermatology for a skin check. Her throat complaints are stable. No other HEENT, cardiopulmonary, abdominal, , GYN, neurological, systemic, psychiatric, lymphatic, endocrine, vascular complaints.     Past Medical History:   Diagnosis Date    Breast pain, left 11/2013    Choroidal nevus of right eye     Constipation     Dysphonia     Gastroesophageal reflux disease     Genital herpes     Hemorrhoids     Hoarseness     Menorrhagia     Migraines      Past Surgical History:   Procedure Laterality Date    COLONOSCOPY N/A 10/31/2022    Procedure: COLONOSCOPY, WITH POLYPECTOMY AND BIOPSY;  Surgeon: Emperatriz Peterson MD;  Location: [facility] OR    ESOPHAGOSCOPY, GASTROSCOPY, DUODENOSCOPY (EGD), COMBINED N/A 9/13/2023    Procedure: Esophagoscopy, gastroscopy, duodenoscopy (EGD), combined with Biopsy;  Surgeon: Leventhal, Thomas Michael, MD;  Location: [facility] OR     HYSTEROSCOPY, SURGICAL; W/ ENDOMETRIAL ABLATION, ANY METHOD  2011     PE:    Vitals noted, gen, nad, cooperative, alert, neck supple nl rom, lungs with good air movement, RRR, S1, S2, no MRG, abdomen, no acute findings. She has tenderness L lower back area.     Results for orders placed or performed in visit on 09/30/24   XR Pelvis and Hip Bilateral 2 Views     Status: None    Narrative    3 views pelvis/hip radiograph(s) 9/30/2024 8:27 AM    History: Vitamin D deficiency; At risk for decreased bone density;  Skin cancer screening; Back pain, unspecified back location,  unspecified back pain laterality, unspecified chronicity    Comparison: CT abdomen and pelvis 12/21/2022, MRI of pelvis 4/23/2021,  multiple priors    Findings:    And AP pelvis and bilateral frog-leg views were obtained     No  acute osseous abnormality. There are scattered degenerative changes  of the hips including subchondral sclerosis and early osteophyte  formation. The joint spaces are relatively preserved. Diffuse  osteopenic changes of the hips.    Sacrum and innominate bones are partially obscured by overlying bowel  gas/fecal content.    Pelvic phlebolith. Unremarkable bowel gas pattern.      Impression    Impression:  1. No acute osseous abnormality.  2. Mild degenerative arthritic changes of the hips bilaterally.    I have personally reviewed the examination and initial interpretation  and I agree with the findings.    IBRAHIMA RICHMOND MD         SYSTEM ID:  [id number]   Results for orders placed or performed in visit on 09/30/24   Lipid panel reflex to direct LDL Fasting     Status: Abnormal   Result Value Ref Range    Cholesterol 190 <200 mg/dL    Triglycerides 109 <150 mg/dL    Direct Measure HDL 64 >=50 mg/dL    LDL Cholesterol Calculated 104 (H) <100 mg/dL    Non HDL Cholesterol 126 <130 mg/dL    Patient Fasting > 8hrs? Yes     Narrative    Cholesterol  Desirable: < 200 mg/dL  Borderline High: 200 - 239 mg/dL  High: >= 240 mg/dL    Triglycerides  Normal: < 150 mg/dL  Borderline High: 150 - 199 mg/dL  High: 200-499 mg/dL  Very High: >= 500 mg/dL    Direct Measure HDL  Female: >= 50 mg/dL   Male: >= 40 mg/dL    LDL Cholesterol  Desirable: < 100 mg/dL  Above Desirable: 100 - 129 mg/dL   Borderline High: 130 - 159 mg/dL   High:  160 - 189 mg/dL   Very High: >= 190 mg/dL    Non HDL Cholesterol  Desirable: < 130 mg/dL  Above Desirable: 130 - 159 mg/dL  Borderline High: 160 - 189 mg/dL  High: 190 - 219 mg/dL  Very High: >= 220 mg/dL   Comprehensive metabolic panel     Status: Abnormal   Result Value Ref Range    Sodium 142 135 - 145 mmol/L    Potassium 3.7 3.4 - 5.3 mmol/L    Carbon Dioxide (CO2) 25 22 - 29 mmol/L    Anion Gap 9 7 - 15 mmol/L    Urea Nitrogen 19.7 6.0 - 20.0 mg/dL    Creatinine 0.71 0.51 - 0.95 mg/dL    GFR Estimate  >90 >60 mL/min/1.73m2    Calcium 9.7 8.8 - 10.4 mg/dL    Chloride 108 (H) 98 - 107 mmol/L    Glucose 96 70 - 99 mg/dL    Alkaline Phosphatase 65 40 - 150 U/L    AST 20 0 - 45 U/L    ALT 12 0 - 50 U/L    Protein Total 7.2 6.4 - 8.3 g/dL    Albumin 4.7 3.5 - 5.2 g/dL    Bilirubin Total 0.4 <=1.2 mg/dL    Patient Fasting > 8hrs? Yes    CBC with platelets and differential     Status: None   Result Value Ref Range    WBC Count 6.9 4.0 - 11.0 10e3/uL    RBC Count 4.20 3.80 - 5.20 10e6/uL    Hemoglobin 12.5 11.7 - 15.7 g/dL    Hematocrit 37.8 35.0 - 47.0 %    MCV 90 78 - 100 fL    MCH 29.8 26.5 - 33.0 pg    MCHC 33.1 31.5 - 36.5 g/dL    RDW 12.7 10.0 - 15.0 %    Platelet Count 259 150 - 450 10e3/uL    % Neutrophils 55 %    % Lymphocytes 35 %    % Monocytes 8 %    % Eosinophils 2 %    % Basophils 0 %    % Immature Granulocytes 0 %    NRBCs per 100 WBC 0 <1 /100    Absolute Neutrophils 3.8 1.6 - 8.3 10e3/uL    Absolute Lymphocytes 2.4 0.8 - 5.3 10e3/uL    Absolute Monocytes 0.6 0.0 - 1.3 10e3/uL    Absolute Eosinophils 0.1 0.0 - 0.7 10e3/uL    Absolute Basophils 0.0 0.0 - 0.2 10e3/uL    Absolute Immature Granulocytes 0.0 <=0.4 10e3/uL    Absolute NRBCs 0.0 10e3/uL   CBC with platelets and differential     Status: None    Narrative    The following orders were created for panel order CBC with platelets and differential.  Procedure                               Abnormality         Status                     ---------                               -----------         ------                     CBC with platelets and d...[[id number]]                      Final result                 Please view results for these tests on the individual orders.         A/P:    1. Immunizations; she has completed the Shingrix vaccine series.  Pfizer COVID vaccination x 6.  Tdap 3/20/2024. She has completed the Shingrix vaccine series.   2. Reflux/throat complaints; EGD 11/17/2017. She remains on famotidine. She saw Dr. Pisano last 5/3/2022. EGD was  repeated 9/13/2023 findings suggestive of post nasal drip posterior pharynx   3. Dermatology; Telemedicine with  Dr. Eduardo. 10/14/2021 in Care Everywhere. Placed Dermatology today 9/30/2024.   4. Mammogram; done 6/19/2024. Given her sister's h/o breast cancer (she is in \[facility]"), other family members with malignancies. She had Cancer Risk Management appt. On 12/29/2023. Discussion regarding Palmer Syndrome gene mutation. She will think about testing and possibly consider breast MRI screening?   5. Colonoscopy; 10/31/2022 and repeat in 7-10 years .   6. Lipids checked 9/20/2022 with  and HDL 76, TG's 64. Repeat 6/9/2023; TG's 94, HDL 65 and .   7. Seen in GYN 8/12/2022. She was also seen by Dr. Powell, GN 6/29/2022  8. Seen 5/11/2022 by Dr. Rawls ENT for nasal fracture. She had 11/16/2022 follow up with Dr. Ruggiero  9. Hemorrhoids seen by Ms. Gonzalez, Colorectal  10/10/2022  10. L upper quadrant pain. Non-contrast CT scan was done 12/21/2022; no acute findings. Cholelithiasis.   11. Headaches. She had a 5/20/2024 Neurology appt. With Dr. Little. She has a 11/11/2024  follow up appt. She is on Rizatriptan and this is effective   12. Nasal drainage allergic? Sent in Rx. For flonase. She had a sinus CT scan 10/5/2023. Discussed she could try nasal Atrovent? But she wants to wait. Recommended she follow up again in ENT and she will think about his.   13. Back pain Ordered lumbar and hip films. She will do home PT and see me in 1-2 months and if worse. MRI lumbar imaging.   14. Ordered vitamin D and DEXA scan for bone density evaluation.

## 2024-09-30 ENCOUNTER — OFFICE VISIT (OUTPATIENT)
Dept: INTERNAL MEDICINE | Facility: CLINIC | Age: 59
End: 2024-09-30
Payer: COMMERCIAL

## 2024-09-30 ENCOUNTER — ANCILLARY PROCEDURE (OUTPATIENT)
Dept: GENERAL RADIOLOGY | Facility: CLINIC | Age: 59
End: 2024-09-30
Attending: INTERNAL MEDICINE
Payer: COMMERCIAL

## 2024-09-30 ENCOUNTER — LAB (OUTPATIENT)
Dept: LAB | Facility: CLINIC | Age: 59
End: 2024-09-30
Payer: COMMERCIAL

## 2024-09-30 VITALS
TEMPERATURE: 97.4 F | OXYGEN SATURATION: 99 % | DIASTOLIC BLOOD PRESSURE: 81 MMHG | HEART RATE: 71 BPM | SYSTOLIC BLOOD PRESSURE: 127 MMHG | WEIGHT: 150.9 LBS | BODY MASS INDEX: 23.63 KG/M2

## 2024-09-30 DIAGNOSIS — E55.9 VITAMIN D DEFICIENCY: ICD-10-CM

## 2024-09-30 DIAGNOSIS — E55.9 VITAMIN D DEFICIENCY: Primary | ICD-10-CM

## 2024-09-30 DIAGNOSIS — Z12.83 SKIN CANCER SCREENING: ICD-10-CM

## 2024-09-30 DIAGNOSIS — M54.9 BACK PAIN, UNSPECIFIED BACK LOCATION, UNSPECIFIED BACK PAIN LATERALITY, UNSPECIFIED CHRONICITY: ICD-10-CM

## 2024-09-30 DIAGNOSIS — Z91.89 AT RISK FOR DECREASED BONE DENSITY: ICD-10-CM

## 2024-09-30 LAB
ALBUMIN SERPL BCG-MCNC: 4.7 G/DL (ref 3.5–5.2)
ALP SERPL-CCNC: 65 U/L (ref 40–150)
ALT SERPL W P-5'-P-CCNC: 12 U/L (ref 0–50)
ANION GAP SERPL CALCULATED.3IONS-SCNC: 9 MMOL/L (ref 7–15)
AST SERPL W P-5'-P-CCNC: 20 U/L (ref 0–45)
BASOPHILS # BLD AUTO: 0 10E3/UL (ref 0–0.2)
BASOPHILS NFR BLD AUTO: 0 %
BILIRUB SERPL-MCNC: 0.4 MG/DL
BUN SERPL-MCNC: 19.7 MG/DL (ref 6–20)
CALCIUM SERPL-MCNC: 9.7 MG/DL (ref 8.8–10.4)
CHLORIDE SERPL-SCNC: 108 MMOL/L (ref 98–107)
CHOLEST SERPL-MCNC: 190 MG/DL
CREAT SERPL-MCNC: 0.71 MG/DL (ref 0.51–0.95)
EGFRCR SERPLBLD CKD-EPI 2021: >90 ML/MIN/1.73M2
EOSINOPHIL # BLD AUTO: 0.1 10E3/UL (ref 0–0.7)
EOSINOPHIL NFR BLD AUTO: 2 %
ERYTHROCYTE [DISTWIDTH] IN BLOOD BY AUTOMATED COUNT: 12.7 % (ref 10–15)
FASTING STATUS PATIENT QL REPORTED: YES
FASTING STATUS PATIENT QL REPORTED: YES
GLUCOSE SERPL-MCNC: 96 MG/DL (ref 70–99)
HCO3 SERPL-SCNC: 25 MMOL/L (ref 22–29)
HCT VFR BLD AUTO: 37.8 % (ref 35–47)
HDLC SERPL-MCNC: 64 MG/DL
HGB BLD-MCNC: 12.5 G/DL (ref 11.7–15.7)
IMM GRANULOCYTES # BLD: 0 10E3/UL
IMM GRANULOCYTES NFR BLD: 0 %
LDLC SERPL CALC-MCNC: 104 MG/DL
LYMPHOCYTES # BLD AUTO: 2.4 10E3/UL (ref 0.8–5.3)
LYMPHOCYTES NFR BLD AUTO: 35 %
MCH RBC QN AUTO: 29.8 PG (ref 26.5–33)
MCHC RBC AUTO-ENTMCNC: 33.1 G/DL (ref 31.5–36.5)
MCV RBC AUTO: 90 FL (ref 78–100)
MONOCYTES # BLD AUTO: 0.6 10E3/UL (ref 0–1.3)
MONOCYTES NFR BLD AUTO: 8 %
NEUTROPHILS # BLD AUTO: 3.8 10E3/UL (ref 1.6–8.3)
NEUTROPHILS NFR BLD AUTO: 55 %
NONHDLC SERPL-MCNC: 126 MG/DL
NRBC # BLD AUTO: 0 10E3/UL
NRBC BLD AUTO-RTO: 0 /100
PLATELET # BLD AUTO: 259 10E3/UL (ref 150–450)
POTASSIUM SERPL-SCNC: 3.7 MMOL/L (ref 3.4–5.3)
PROT SERPL-MCNC: 7.2 G/DL (ref 6.4–8.3)
RBC # BLD AUTO: 4.2 10E6/UL (ref 3.8–5.2)
SODIUM SERPL-SCNC: 142 MMOL/L (ref 135–145)
TRIGL SERPL-MCNC: 109 MG/DL
VIT D+METAB SERPL-MCNC: 23 NG/ML (ref 20–50)
WBC # BLD AUTO: 6.9 10E3/UL (ref 4–11)

## 2024-09-30 PROCEDURE — 90656 IIV3 VACC NO PRSV 0.5 ML IM: CPT | Performed by: INTERNAL MEDICINE

## 2024-09-30 PROCEDURE — 73522 X-RAY EXAM HIPS BI 3-4 VIEWS: CPT | Mod: GC | Performed by: RADIOLOGY

## 2024-09-30 PROCEDURE — 80053 COMPREHEN METABOLIC PANEL: CPT | Performed by: PATHOLOGY

## 2024-09-30 PROCEDURE — 36415 COLL VENOUS BLD VENIPUNCTURE: CPT | Performed by: PATHOLOGY

## 2024-09-30 PROCEDURE — 90480 ADMN SARSCOV2 VAC 1/ONLY CMP: CPT | Performed by: INTERNAL MEDICINE

## 2024-09-30 PROCEDURE — 72110 X-RAY EXAM L-2 SPINE 4/>VWS: CPT | Performed by: STUDENT IN AN ORGANIZED HEALTH CARE EDUCATION/TRAINING PROGRAM

## 2024-09-30 PROCEDURE — 99000 SPECIMEN HANDLING OFFICE-LAB: CPT | Performed by: PATHOLOGY

## 2024-09-30 PROCEDURE — 85025 COMPLETE CBC W/AUTO DIFF WBC: CPT | Performed by: PATHOLOGY

## 2024-09-30 PROCEDURE — 91320 SARSCV2 VAC 30MCG TRS-SUC IM: CPT | Performed by: INTERNAL MEDICINE

## 2024-09-30 PROCEDURE — 99396 PREV VISIT EST AGE 40-64: CPT | Mod: 25 | Performed by: INTERNAL MEDICINE

## 2024-09-30 PROCEDURE — 80061 LIPID PANEL: CPT | Performed by: PATHOLOGY

## 2024-09-30 PROCEDURE — 82306 VITAMIN D 25 HYDROXY: CPT | Performed by: INTERNAL MEDICINE

## 2024-09-30 PROCEDURE — 90471 IMMUNIZATION ADMIN: CPT | Performed by: INTERNAL MEDICINE

## 2024-09-30 NOTE — PROGRESS NOTES
Tatiana is a 58 year old, presenting for the following:  Physical (Pt here for physical; pt reports low back pain for several months)        9/30/2024     7:21 AM   Additional Questions   Roomed by Karissa ALTAMIRANO          9/29/2024   General Health   How would you rate your overall physical health? Good   Feel stress (tense, anxious, or unable to sleep) To some extent      (!) STRESS CONCERN      9/29/2024   Nutrition   Three or more servings of calcium each day? (!) NO   Diet: Low fat/cholesterol    Carbohydrate counting   How many servings of fruit and vegetables per day? (!) 2-3   How many sweetened beverages each day? 0-1       Multiple values from one day are sorted in reverse-chronological order         9/29/2024   Exercise   Days per week of moderate/strenous exercise 5 days   Average minutes spent exercising at this level 30 min          9/29/2024   Social Factors   Frequency of gathering with friends or relatives Once a week   Worry food won't last until get money to buy more No   Food not last or not have enough money for food? No   Do you have housing? (Housing is defined as stable permanent housing and does not include staying ouside in a car, in a tent, in an abandoned building, in an overnight shelter, or couch-surfing.) Yes   Are you worried about losing your housing? No   Lack of transportation? No   Unable to get utilities (heat,electricity)? No          9/29/2024   Fall Risk   Fallen 2 or more times in the past year? No   Trouble with walking or balance? No             9/29/2024   Dental   Dentist two times every year? Yes          9/29/2024   TB Screening   Were you born outside of the [place]? Yes          9/29/2024   Substance Use   Alcohol more than 3/day or more than 7/wk No   Do you use any other substances recreationally? No      Social History     Tobacco Use    Smoking status: Never    Smokeless tobacco: Never   Substance Use Topics    Alcohol use: Yes     Alcohol/week: 0.8 - 4.2 standard drinks of  alcohol     Comment: 1-3 times a week    Drug use: No         9/29/2024   One time HIV Screening   Previous HIV test? I don't know          9/29/2024   STI Screening   New sexual partner(s) since last STI/HIV test? No

## 2024-09-30 NOTE — PATIENT INSTRUCTIONS
You should receive a call to schedule your dexa bone density scan. However, the central imaging number is [phone] if you need it.

## 2024-11-05 ENCOUNTER — ANCILLARY PROCEDURE (OUTPATIENT)
Dept: BONE DENSITY | Facility: CLINIC | Age: 59
End: 2024-11-05
Attending: INTERNAL MEDICINE
Payer: COMMERCIAL

## 2024-11-05 DIAGNOSIS — Z91.89 AT RISK FOR DECREASED BONE DENSITY: ICD-10-CM

## 2024-11-05 DIAGNOSIS — E55.9 VITAMIN D DEFICIENCY: ICD-10-CM

## 2024-11-05 PROCEDURE — 77080 DXA BONE DENSITY AXIAL: CPT | Performed by: INTERNAL MEDICINE

## 2024-11-08 ASSESSMENT — MIGRAINE DISABILITY ASSESSMENT (MIDAS)
HOW MANY DAYS WAS YOUR PRODUCTIVITY CUT IN HALF BECAUSE OF HEADACHES: 0
HOW MANY DAYS WAS HOUSEWORK PRODUCTIVITY CUT IN HALF DUE TO HEADACHES: 0
HOW MANY DAYS DID YOU MISS WORK OR SCHOOL BECAUSE OF HEADACHES: 2
HOW MANY DAYS IN THE PAST 3 MONTHS HAVE YOU HAD A HEADACHE: 10
TOTAL SCORE: 6
HOW OFTEN WERE SOCIAL ACTIVITIES MISSED DUE TO HEADACHES: 2
HOW MANY DAYS DID YOU NOT DO HOUSEWORK BECAUSE OF HEADACHES: 2
ON A SCALE FROM 0-10 ON AVERAGE HOW PAINFUL WERE HEADACHES: 5

## 2024-11-08 ASSESSMENT — HEADACHE IMPACT TEST (HIT 6)
HOW OFTEN DO HEADACHES LIMIT YOUR DAILY ACTIVITIES: SOMETIMES
WHEN YOU HAVE A HEADACHE HOW OFTEN DO YOU WISH YOU COULD LIE DOWN: SOMETIMES
HIT6 TOTAL SCORE: 54
HOW OFTEN HAVE YOU FELT FED UP OR IRRITATED BECAUSE OF YOUR HEADACHES: RARELY
HOW OFTEN DID HEADACHS LIMIT CONCENTRATION ON WORK OR DAILY ACTIVITY: RARELY
HOW OFTEN HAVE YOU FELT TOO TIRED TO WORK BECAUSE OF YOUR HEADACHES: RARELY
WHEN YOU HAVE HEADACHES HOW OFTEN IS THE PAIN SEVERE: SOMETIMES

## 2024-11-11 ENCOUNTER — OFFICE VISIT (OUTPATIENT)
Dept: NEUROLOGY | Facility: CLINIC | Age: 59
End: 2024-11-11
Payer: COMMERCIAL

## 2024-11-11 VITALS — SYSTOLIC BLOOD PRESSURE: 112 MMHG | HEART RATE: 73 BPM | OXYGEN SATURATION: 98 % | DIASTOLIC BLOOD PRESSURE: 79 MMHG

## 2024-11-11 DIAGNOSIS — M54.50 CHRONIC LEFT-SIDED LOW BACK PAIN WITHOUT SCIATICA: Primary | ICD-10-CM

## 2024-11-11 DIAGNOSIS — G43.909 EPISODIC MIGRAINE: ICD-10-CM

## 2024-11-11 DIAGNOSIS — G89.29 CHRONIC LEFT-SIDED LOW BACK PAIN WITHOUT SCIATICA: Primary | ICD-10-CM

## 2024-11-11 PROCEDURE — 99214 OFFICE O/P EST MOD 30 MIN: CPT | Performed by: PSYCHIATRY & NEUROLOGY

## 2024-11-11 PROCEDURE — G2211 COMPLEX E/M VISIT ADD ON: HCPCS | Performed by: PSYCHIATRY & NEUROLOGY

## 2024-11-11 RX ORDER — RIZATRIPTAN BENZOATE 10 MG/1
10 TABLET, ORALLY DISINTEGRATING ORAL
Qty: 36 TABLET | Refills: 3 | Status: SHIPPED | OUTPATIENT
Start: 2024-11-11

## 2024-11-11 ASSESSMENT — PAIN SCALES - GENERAL: PAINLEVEL_OUTOF10: NO PAIN (0)

## 2024-11-11 NOTE — PROGRESS NOTES
[facility]    Headache Neurology Progress Note  November 11, 2024      Assessment/Plan:   Tatiana Paredes is a 59 year old woman who returns for follow up of episodic migraine without aura.  She has had previous head imaging, most recently in 2018, that was unrevealing for secondary causes. Migraine attacks are well treated with rizatriptan acutely.  She also has history of unilateral vocal cord paralysis and rare difficulty with name recall (5/5 mini-cog); at this time, these do not appear to be part of a larger neurologic disorder. Today, we discussed recent left lower back pain that developed over the summer.     For symptomatic management of episodic migraine, she will continue the following:  -I recommend she continue rizatriptan 10 mg oral dissolvable tablet taken at the onset of headache, with a repeat dose in 2 hours if needed.  This should not exceed more than 9 days/month to avoid medication overuse.  -I recommend ondansetron 4 mg oral dissolvable tablet as needed for nausea. She has not been needing these.     In the future, if her attacks worsened to be once a week or more, a preventative treatment could be considered.    -These could include propranolol, amitriptyline, or a CGRP inhibitor.    -Does have a history of probable kidney stone so would avoid taking topiramate.     Regarding low back pain, SLT negative, no neurologic deficits are appreciated on exam. She reports no numbness, weakness, incontinence symptoms, injury/trauma, fevers.   -Xray was reviewed; degenerative changes seen at L5-S1.  -Recommend PT for back pain/lumbar spondylosis without myelopathy, order placed.  -Ok to continue lidocaine patches, NSAIDs as needed, and let me know if a script for gabapentin is desired.  -MRI could be considered if not improving with the above; she is planning to follow up with Dr. Prather.    The longitudinal plan of care for Tatiana was addressed during this visit. Due to  the added complexity in care, I will continue to support Tatiana in the subsequent management of this condition(s) and with the ongoing continuity of care of this condition(s). Return in 6 months.    Renee Little MD  Neurology     Subjective:    Tatiana Paredes returns for follow up of episodic migraine.    She used around 36 tablets of rizatriptan ODT in the last year. These remain effective. One dose is effective.  She notices more attacks around travel.    Around June 2024, she started to have new back pain. She started having left lower back pain. She had a really sharp pain lasting 2-3 days before traveling to [place], then decreased. She couldn't sleep during this. She still feels stiff in her back now.     No provoking activity. No weakness or numbness. No radiating pain.    She changed her exercising (stopped dancing) to yoga a few times per week.    Cancer history in family.    Objective:    Vitals: /79 (BP Location: Left arm, Patient Position: Sitting, Cuff Size: Adult Regular)   Pulse 73   LMP 07/18/2016 (Exact Date)   SpO2 98%   General: Cooperative, NAD  Neurologic:  Mental Status: Fully alert, attentive and oriented. Speech clear and fluent.   Cranial Nerves: Facial movements symmetric.   Motor: No abnormal movements. Able to hold leg extended over 5 seconds. Able to stand without use of arms. No foot drop.  Sensory: Intact to light touch in lower extremities bilaterally.  Reflexes: 2+ at patella, 1+ achilles, symmetric  Gait: normal casual gait.     Pertinent Investigations:    MRI brain wo and w contrast (11/20/2018):  No acute normality. Normal appearance of brainstem and  visualized cranial nerve cisternal segments.    Lumbar xray (9/30/2024):  1.  No acute osseous abnormality.  2.  Mild multilevel degenerative changes most pronounced at L5-S1.        11/19/2023     8:00 PM 5/19/2024    10:35 PM 11/8/2024    11:46 PM   HIT-6   When you have headaches, how often is the pain severe 10   8  10    How often do headaches limit your ability to do usual daily activities including household work, work, school, or social activities? 8  8  10    When you have a headache, how often do you wish you could lie down? 10  10  10    In the past 4 weeks, how often have you felt too tired to do work or daily activities because of your headaches 8  8  8    In the past 4 weeks, how often have you felt fed up or irritated because of your headaches 8  8  8    In the past 4 weeks, how often did headaches limit your ability to concentrate on work or daily activities 8  8  8    HIT-6 Total Score 52 50 54        Patient-reported           11/19/2023     8:04 PM 5/19/2024    10:39 PM 11/8/2024    11:50 PM   MIDAS - in the past three months:   On how many days did you miss work or school because of your headaches? 1 0 2   How many days was your productivity at work or school reduced by half or more because of your headaches? 0 0 0   On how many days did you not do household work because of your headaches? 0 1 2   How many days was your productivity in household work reduced by half or more because of your headaches? 0 0 0   On how many days did you miss family, social, or leisure activities because of your headaches? 0 1 2   On how many days did you have a headache? 17 7 10   On a scale of 0-10, on average how painful were these headaches? 5 5 5   MIDAS Score 1 (I - Little or No Disability) 2 (I - Little or No Disability) 6 (II - Mild Disability)

## 2024-11-11 NOTE — LETTER
11/11/2024       RE: Tatiana Paredes  [address]  [facility] [address]     Dear Colleague,    Thank you for referring your patient, Tatiana Paredes, to the [facility] [place] [facility]. Please see a copy of my visit note below.    [facility]    Headache Neurology Progress Note  November 11, 2024      Assessment/Plan:   Tatiana Paredes is a 59 year old woman who returns for follow up of episodic migraine without aura.  She has had previous head imaging, most recently in 2018, that was unrevealing for secondary causes. Migraine attacks are well treated with rizatriptan acutely.  She also has history of unilateral vocal cord paralysis and rare difficulty with name recall (5/5 mini-cog); at this time, these do not appear to be part of a larger neurologic disorder. Today, we discussed recent left lower back pain that developed over the summer.     For symptomatic management of episodic migraine, she will continue the following:  -I recommend she continue rizatriptan 10 mg oral dissolvable tablet taken at the onset of headache, with a repeat dose in 2 hours if needed.  This should not exceed more than 9 days/month to avoid medication overuse.  -I recommend ondansetron 4 mg oral dissolvable tablet as needed for nausea. She has not been needing these.     In the future, if her attacks worsened to be once a week or more, a preventative treatment could be considered.    -These could include propranolol, amitriptyline, or a CGRP inhibitor.    -Does have a history of probable kidney stone so would avoid taking topiramate.     Regarding low back pain, SLT negative, no neurologic deficits are appreciated on exam. She reports no numbness, weakness, incontinence symptoms, injury/trauma, fevers.   -Xray was reviewed; degenerative changes seen at L5-S1.  -Recommend PT for back pain/lumbar spondylosis  without myelopathy, order placed.  -Ok to continue lidocaine patches, NSAIDs as needed, and let me know if a script for gabapentin is desired.  -MRI could be considered if not improving with the above; she is planning to follow up with Dr. Prather.    The longitudinal plan of care for Tatiana was addressed during this visit. Due to the added complexity in care, I will continue to support Tatiana in the subsequent management of this condition(s) and with the ongoing continuity of care of this condition(s). Return in 6 months.    Renee Little MD  Neurology     Subjective:    Tatiana Paredes returns for follow up of episodic migraine.    She used around 36 tablets of rizatriptan ODT in the last year. These remain effective. One dose is effective.  She notices more attacks around travel.    Around June 2024, she started to have new back pain. She started having left lower back pain. She had a really sharp pain lasting 2-3 days before traveling to [place], then decreased. She couldn't sleep during this. She still feels stiff in her back now.     No provoking activity. No weakness or numbness. No radiating pain.    She changed her exercising (stopped dancing) to yoga a few times per week.    Cancer history in family.    Objective:    Vitals: /79 (BP Location: Left arm, Patient Position: Sitting, Cuff Size: Adult Regular)   Pulse 73   LMP 07/18/2016 (Exact Date)   SpO2 98%   General: Cooperative, NAD  Neurologic:  Mental Status: Fully alert, attentive and oriented. Speech clear and fluent.   Cranial Nerves: Facial movements symmetric.   Motor: No abnormal movements. Able to hold leg extended over 5 seconds. Able to stand without use of arms. No foot drop.  Sensory: Intact to light touch in lower extremities bilaterally.  Reflexes: 2+ at patella, 1+ achilles, symmetric  Gait: normal casual gait.     Pertinent Investigations:    MRI brain wo and w contrast (11/20/2018):  No acute normality. Normal appearance of  brainstem and  visualized cranial nerve cisternal segments.    Lumbar xray (9/30/2024):  1.  No acute osseous abnormality.  2.  Mild multilevel degenerative changes most pronounced at L5-S1.        11/19/2023     8:00 PM 5/19/2024    10:35 PM 11/8/2024    11:46 PM   HIT-6   When you have headaches, how often is the pain severe 10  8  10    How often do headaches limit your ability to do usual daily activities including household work, work, school, or social activities? 8  8  10    When you have a headache, how often do you wish you could lie down? 10  10  10    In the past 4 weeks, how often have you felt too tired to do work or daily activities because of your headaches 8  8  8    In the past 4 weeks, how often have you felt fed up or irritated because of your headaches 8  8  8    In the past 4 weeks, how often did headaches limit your ability to concentrate on work or daily activities 8  8  8    HIT-6 Total Score 52 50 54        Patient-reported           11/19/2023     8:04 PM 5/19/2024    10:39 PM 11/8/2024    11:50 PM   MIDAS - in the past three months:   On how many days did you miss work or school because of your headaches? 1 0 2   How many days was your productivity at work or school reduced by half or more because of your headaches? 0 0 0   On how many days did you not do household work because of your headaches? 0 1 2   How many days was your productivity in household work reduced by half or more because of your headaches? 0 0 0   On how many days did you miss family, social, or leisure activities because of your headaches? 0 1 2   On how many days did you have a headache? 17 7 10   On a scale of 0-10, on average how painful were these headaches? 5 5 5   MIDAS Score 1 (I - Little or No Disability) 2 (I - Little or No Disability) 6 (II - Mild Disability)          Again, thank you for allowing me to participate in the care of your patient.      Sincerely,    Renee Little MD

## 2024-11-15 ENCOUNTER — THERAPY VISIT (OUTPATIENT)
Dept: PHYSICAL THERAPY | Facility: REHABILITATION | Age: 59
End: 2024-11-15
Attending: PSYCHIATRY & NEUROLOGY
Payer: COMMERCIAL

## 2024-11-15 DIAGNOSIS — G89.29 CHRONIC LEFT-SIDED LOW BACK PAIN WITHOUT SCIATICA: Primary | ICD-10-CM

## 2024-11-15 DIAGNOSIS — M54.50 CHRONIC LEFT-SIDED LOW BACK PAIN WITHOUT SCIATICA: Primary | ICD-10-CM

## 2024-11-15 PROCEDURE — 97110 THERAPEUTIC EXERCISES: CPT | Mod: GP | Performed by: PHYSICAL THERAPIST

## 2024-11-15 PROCEDURE — 97161 PT EVAL LOW COMPLEX 20 MIN: CPT | Mod: GP | Performed by: PHYSICAL THERAPIST

## 2024-11-15 NOTE — PROGRESS NOTES
"PHYSICAL THERAPY EVALUATION  Type of Visit: Evaluation              Subjective  Patient thinks pain started from sitting at work prolonged periods, sometimes 10-12 hours per day. Sitting is still mildly painful. She can sleep now. Has been doing yoga more recently. Having pain with bending, feels stiffer in the morning. Trying to do yoga every day.     Yoga: Supine hamstring and piriformis stretch, bridge, lower trunk rotation, long sitting trunk rotation - takes about 30 minutes. \"Feels good\". Will have left low back pain with supine alternating leg lowering.         Presenting condition or subjective complaint: (Patient-Rptd) Low back pain sinceMay/June. Back in August I had several days with severe pain - sharp pain. I couldn t sleep. Now I feel better but still have some pain. Yoga is helping me.  Date of onset: 11/11/24    Relevant medical history:     Dates & types of surgery: (Patient-Rptd) None    Prior diagnostic imaging/testing results: (Patient-Rptd) X-ray; Bone scan     Prior therapy history for the same diagnosis, illness or injury: (Patient-Rptd) No      Prior Level of Function  Transfers:   Ambulation:   ADL:   IADL:     Living Environment  Social support: (Patient-Rptd) With a significant other or spouse   Type of home: (Patient-Rptd) House; 2-story; Basement   Stairs to enter the home: (Patient-Rptd) Yes (Patient-Rptd) 2 Is there a railing: (Patient-Rptd) Yes     Ramp: (Patient-Rptd) No   Stairs inside the home: (Patient-Rptd) Yes (Patient-Rptd) 2 Is there a railing: (Patient-Rptd) Yes     Help at home: (Patient-Rptd) None  Equipment owned:       Employment: (Patient-Rptd) Yes    Hobbies/Interests:      Patient goals for therapy: (Patient-Rptd) Bend all the way down without pain.    Pain assessment:      Objective   LUMBAR SPINE EVALUATION  PAIN:   INTEGUMENTARY (edema, incisions):   POSTURE:   GAIT:   Weightbearing Status:   Assistive Device(s):   Gait Deviations: WNL  BALANCE/PROPRIOCEPTION: "   WEIGHTBEARING ALIGNMENT:   NON-WEIGHTBEARING ALIGNMENT:    ROM:   (Degrees) Left AROM Left PROM  Right AROM Right PROM   Hip Flexion       Hip Extension       Hip Abduction       Hip Adduction       Hip Internal Rotation       Hip External Rotation       Knee Flexion       Knee Extension       Lumbar Side glide     Lumbar Flexion WNL   Lumbar Extension Limited by 25%   Pain:   End feel:   PELVIC/SI SCREEN:   STRENGTH:  supine sit up 3/5    MYOTOMES: WNL  DTR S: WNL  CORD SIGNS:   DERMATOMES:   NEURAL TENSION:   FLEXIBILITY:   LUMBAR/HIP Special Tests:    PELVIS/SI SPECIAL TESTS:   FUNCTIONAL TESTS:   PALPATION:   SPINAL SEGMENTAL CONCLUSIONS:       Assessment & Plan   CLINICAL IMPRESSIONS  Medical Diagnosis: M54.50, G89.29 (ICD-10-CM) - Chronic left-sided low back pain without sciatica    Treatment Diagnosis: low back pain with mobility deficits, core muscle weakness   Impression/Assessment: Patient is a 59 year old female with low back pain complaints.  The following significant findings have been identified: Pain, Decreased ROM/flexibility, Decreased strength, Impaired muscle performance, and Decreased activity tolerance. These impairments interfere with their ability to perform self care tasks, work tasks, recreational activities, and household chores as compared to previous level of function. Patient will benefit from skilled physical therapy to address impairments and functional limitations in order to achieve their goals.       Clinical Decision Making (Complexity):  Clinical Presentation: Stable/Uncomplicated  Clinical Presentation Rationale: based on medical and personal factors listed in PT evaluation  Clinical Decision Making (Complexity): Low complexity    PLAN OF CARE  Treatment Interventions:  Interventions: Manual Therapy, Neuromuscular Re-education, Therapeutic Activity, Therapeutic Exercise, Self-Care/Home Management    Long Term Goals     PT Goal 1  Goal Identifier: HEP  Goal Description: Patient  will demonstrate at least 50% compliance in their HEP to support progress towards functional and patient specific goals  Target Date: 02/08/25      Frequency of Treatment: every other week  Duration of Treatment: 12 weeks    Recommended Referrals to Other Professionals:   Education Assessment:   Learner/Method: Patient    Risks and benefits of evaluation/treatment have been explained.   Patient/Family/caregiver agrees with Plan of Care.     Evaluation Time:     PT Eval, Low Complexity Minutes (88140): 25       Signing Clinician: Ford Barnett PT

## 2024-11-23 ENCOUNTER — OFFICE VISIT (OUTPATIENT)
Dept: INTERNAL MEDICINE | Facility: CLINIC | Age: 59
End: 2024-11-23
Payer: COMMERCIAL

## 2024-11-23 VITALS
DIASTOLIC BLOOD PRESSURE: 76 MMHG | BODY MASS INDEX: 23.63 KG/M2 | WEIGHT: 150.9 LBS | OXYGEN SATURATION: 99 % | SYSTOLIC BLOOD PRESSURE: 117 MMHG | HEART RATE: 70 BPM

## 2024-11-23 DIAGNOSIS — M85.9: Primary | ICD-10-CM

## 2024-11-23 PROCEDURE — 99215 OFFICE O/P EST HI 40 MIN: CPT | Performed by: INTERNAL MEDICINE

## 2024-11-23 ASSESSMENT — PAIN SCALES - GENERAL: PAINLEVEL_OUTOF10: NO PAIN (0)

## 2024-11-23 NOTE — PROGRESS NOTES
HPI:    Overall stable. She states her back pain sxs are less and she is doing yoga for sciatic. She is also getting PT. No radicular sxs. We discussed her vitamin D level. She states her mother had osteoporosis. No other HEENT, cardiopulmonary, abdominal, , GYN, neurological, systemic, psychiatric, lymphatic, endocrine, vascular complaints.     Past Medical History:   Diagnosis Date    Breast pain, left 11/2013    Choroidal nevus of right eye     Constipation     Dysphonia     Gastroesophageal reflux disease     Genital herpes     Hemorrhoids     Hoarseness     Menorrhagia     Migraines        PE:    Vitals noted, gen, nad, cooperative, alert neck supple nl rom, lungs with good air movement, RRR, S1, S2, no MRG, abdomen, no acute findings. Grossly normal neurological exam. No tenderness to palpation of lower spinous area.     DX Bone Density  Images from the original result were not included.    [address] [address]  Phone: [phone]   Fax: [phone]    Impression  The most negative and valid T-score of -1.9 at the level of the left   femoral neck corresponds with low bone density according to WHO criteria   for postmenopausal females and men age 50 and over.     Results   Lumbar spine   T-score -1.2 , BMD 1.036 g/cm2.     Left Femoral  neck  T-score -1.9 , BMD 0.769 g/cm2.  Left Total hip  T-score -1.8 , BMD 0.775 g/cm2.    Right Femoral neck  T-score -1.3, BMD  0.864 g/cm2.  Right Total hip  T-score -1.7 , BMD  0.798 g/cm2.    Interval change  No prior study available for comparison.    Fracture risk   The estimated 10-year risk for a major osteoporotic fracture is 8.5% and   for a hip fracture is 1.0%. FRAX was calculated based on information   provided by the patient on the DXA questionnaire.   FRAX may not accurately predict risk in patient on bisphosphonate and   should not be used to assess the reduction in fracture risk in  patients on   treatment   The risk of osteoporotic fracture increases approximately 2-fold for each   1.0 SD decrease in T-score.  Low bone density is not the only risk factor for fracture; consider   factors such as patient's age, fall risk, injury risk, previous   osteoporotic fracture, family history of osteoporosis, etc.      Repeat  For patients eligible for Medicare, routine testing is allowed once every   2 years.   Testing frequency can be increased for patients on corticosteroids.    Clinical correlation recommended     Technical quality  Satisfactory.     Principal result :  John Coleman MD, [facility]  Division of Endocrinology and Diabetes    Department of Medicine    References:  Ref. 1. WHO categories:          T-score > -1.0  = normal             .                                T-score -1.0 to -2.5 = low bone density  T-score < -2.5 = osteoporosis        .     Ref. 2. 2015 ISCD official position statements:  www.iscd.org.     Ref. 3.  Today's examination is compared to the technically similar prior   study of the total hip and femur if available. Only changes deemed likely   to be significant based on historical data are reported.  According to the ISCD position statements, total hip rather than femoral   neck regions are to be compared because larger areas give better   precision.  LSC = least significant changes at the [facility] (historical   data)   AP spine =  0.032 g/cm2  (6/26/2007).  Precision assessment for   combinations of fewer than 4 vertebrae cannot presently be calculated   Left hip = 0.029 g/cm2  (6/15/2007)   Right hip = 0.018 g/cm2  (6/15/2007)   Left mid radius = 0.043 g/cm2  (6/26/2007)   Bilateral hip LSC is not known   Please note that the differential diagnosis of increase in bone density at   the lumbar spine includes improvement due to pharmacotherapy vs   inter-current progression of spine degeneration or fracture.      Ref. 4 Fracture risk is  calculated in patients aged 40 to 90 years old   with low bone density not on osteoporosis treatment. A 10 year fracture   risk of 3% and higher for hip fracture and 20% and higher for major   osteoporotic fracture is considered higher than acceptable risk and might   be an indication for medical treatment.     Ref. 5.  By definition, osteoporosis may be diagnosed in the presence or   with the history of a low trauma or fragility fracture.  Fragility and low   trauma fracture is defined as a fracture resulting from the force of a   fall from a standing height or less or a bone that breaks under conditions   that would not cause a normal bone to break.       Ref. 6. NOF Physician's Guideline Website address:  www.nof.org.        A/P:    1. Immunizations; she has completed the Shingrix vaccine series.  Pfizer COVID vaccination x 7.  Tdap 3/20/2024. She has completed the Shingrix vaccine series. Influenza vaccine 9/30/2024.   2. Reflux/throat complaints; EGD 11/17/2017. She remains on famotidine. She saw Dr. Pisano last 5/3/2022. EGD was repeated 9/13/2023 findings suggestive of post nasal drip posterior pharynx   3. Dermatology; Telemedicine with  Dr. Eduardo. 10/14/2021 in Care Everywhere. Placed Dermatology last vist 9/30/2024.  Future 5/20/2025  4. Mammogram; done 6/19/2024. Given her sister's h/o breast cancer (she is in [facility]), other family members with malignancies. She had Cancer Risk Management appt. On 12/29/2023. Discussion regarding Palmer Syndrome gene mutation. She will think about testing and possibly consider breast MRI screening?   5. Colonoscopy; 10/31/2022 and repeat in 7-10 years .   6. Lipids checked 9/20/2022 with  and HDL 76, TG's 64. Repeat 6/9/2023; TG's 94, HDL 65 and .   7. Seen in GYN 8/12/2022. She was also seen by MORRO Mancilla 6/29/2022  8. Seen 5/11/2022 by Dr. Rawls ENT for nasal fracture. She had 11/16/2022 follow up with Dr. Ruggiero  9. Hemorrhoids seen by Ms. Farooq  Tera Daniel, Colorectal  10/10/2022  10. L upper quadrant pain. Non-contrast CT scan was done 12/21/2022; no acute findings. Cholelithiasis.   11. Headaches. She had a 11/11/2024 Neurology appt. With Dr. Little. She has a 5/19/2025  follow up appt. She is on Rizatriptan and this is effective   12. Nasal drainage allergic? Sent in Rx. For flonase. She had a sinus CT scan 10/5/2023. Discussed she could try nasal Atrovent? But she wants to wait. Recommended she follow up again in ENT and she will think about this.   13. Back pain; she had pelvic and lumbar spine imaging  9/30/2024; clinically better doing Yoga and PT. If worse consider additional imaging   14. DEXA scan 11/5/2024 with lowest T score -1.8 and she has an endocrinology appt. 8/4/2025. Vitam D level 23 on 9/30/2024 and recommend she start 2000 U daily and possibly some calcium     40 minutes spent on the date of the encounter doing chart review, history and exam, documentation and further activities as noted above exclusive of procedures and other billable interpretations

## 2024-11-23 NOTE — NURSING NOTE
Chief Complaint   Patient presents with    RECHECK     6 week follow up       Sidra Wilkins CMA, EMT at 10:06 AM on 11/23/2024.

## 2025-02-28 NOTE — PROGRESS NOTES
Assessment & Plan     Chronic left lumbar pain  and  Left leg numbness    Tatiana has had ongoing left lower back pain since last spring that has not improved since starting PT exercises last November.  X-rays done in September showed mild degeneration in the hips and spine most prominent at L5-S1.  She is having intermittent numbness in the left leg down to the foot, so recommend further evaluation with MRI and likely spine clinic follow-up.     - MR Lumbar Spine w/o Contrast; Future    Pain of both breasts  And  Weight gain    Tatiana has been having bilateral breast tenderness and mild swelling since last June.  She had a mammogram a few weeks after the symptoms started and this was normal.  Pain may be due to ligament stretching as she has noted pain improves when wearing a bra.  She has not noted any galactorrhea; she has had some weight gain.  She is worried about an ovarian etiology.  We'll check a few labs as below and recommend follow-up with gynecology as scheduled.     - Estradiol; Future  - Prolactin; Future  - TSH with free T4 reflex; Future    Pneumonia vaccine today    Subjective   Tatiana is a 59 year old, presenting for the following health issues:  Follow Up (Follow up on lower back pain.)    History of Present Illness       Back Pain:  She presents for follow up of back pain. Patient's back pain is a chronic problem.  Location of back pain:  Left buttock  Description of back pain: sharp  Back pain spreads: left buttocks    Since patient first noticed back pain, pain is: always present, but gets better and worse  Does back pain interfere with her job:  No       She eats 2-3 servings of fruits and vegetables daily.She consumes 0 sweetened beverage(s) daily.She exercises with enough effort to increase her heart rate 9 or less minutes per day.  She exercises with enough effort to increase her heart rate 3 or less days per week.   She is taking medications regularly.       Back Pain     Duration: since spring  2024 but developed new radiating pain in the summer and then saw her PCP in September        Specific cause: none  Description:   Location of pain: left low back/buttock   Character of pain: sharp  Pain radiation: does not radiating to the left  New numbness or weakness in legs, not attributed to pain:  has some occasional left leg numbness down to the foot    History:   Pain interferes with job: No.  Does a lot of computer work  History of back problems: yes, see below  Any previous MRI or X-rays: yes- x-rays done in September showed mild degeneration in the hips and spine most prominent at L5-S1  Sees a specialist for back pain:  referred to PT for back pain last fall and she has been doing the exercises at home at least 5 times per week.   Alleviating factors:   Improved by: sitting and stretch    Precipitating factors:  Worsened by: certain twisting movements  Has not taken medications for the pain    Accompanying Signs & Symptoms:  Risk of Fracture:  osteopenia, no recent trauma  Risk of Cauda Equina:  None  Risk of Infection:  None  Risk of Cancer:  None  Risk of Ankylosing Spondylitis:  Onset at age <35, male, AND morning back stiffness. No       She has noticed some bilateral breast tenderness and fullness since a few weeks before she had a normal mammogram in June. No redness or lumps or discharge.  She doesn't wear a bra at home.  Wearing a bra when out in public does help reduce the tenderness.  Has had a bit of weight gain, but did weigh more when heavier in the past.        Wt Readings from Last 4 Encounters:   03/03/25 69.9 kg (154 lb 1.6 oz)   03/03/25 69.9 kg (154 lb)   11/23/24 68.4 kg (150 lb 14.4 oz)   09/30/24 68.4 kg (150 lb 14.4 oz)         Constitutional, MSK and endo systems are negative, except as otherwise noted.       Objective    /80 (BP Location: Right arm, Patient Position: Sitting, Cuff Size: Adult Regular)   Pulse 69   Temp 98  F (36.7  C) (Oral)   Resp 18   Ht 1.702 m (5'  "7\")   Wt 69.9 kg (154 lb 1.6 oz)   LMP 07/18/2016 (Exact Date)   SpO2 99%   BMI 24.14 kg/m    Body mass index is 24.14 kg/m .  Physical Exam     GENERAL: healthy, alert and no distress  BACK: No pain to palpation over lumbar spine, no paraspinal tenderness, normal ROM without any provoked pain  NEURO: Normal strength and tone in legs, normal light touch sensation            Signed Electronically by: Dean Mcdonald MD  "

## 2025-03-03 ENCOUNTER — OFFICE VISIT (OUTPATIENT)
Dept: SURGERY | Facility: CLINIC | Age: 60
End: 2025-03-03
Payer: COMMERCIAL

## 2025-03-03 ENCOUNTER — OFFICE VISIT (OUTPATIENT)
Dept: INTERNAL MEDICINE | Facility: CLINIC | Age: 60
End: 2025-03-03
Payer: COMMERCIAL

## 2025-03-03 VITALS
DIASTOLIC BLOOD PRESSURE: 80 MMHG | RESPIRATION RATE: 18 BRPM | WEIGHT: 154.1 LBS | BODY MASS INDEX: 24.19 KG/M2 | HEART RATE: 69 BPM | SYSTOLIC BLOOD PRESSURE: 126 MMHG | HEIGHT: 67 IN | OXYGEN SATURATION: 99 % | TEMPERATURE: 98 F

## 2025-03-03 VITALS
WEIGHT: 154 LBS | BODY MASS INDEX: 24.17 KG/M2 | HEART RATE: 77 BPM | DIASTOLIC BLOOD PRESSURE: 82 MMHG | HEIGHT: 67 IN | OXYGEN SATURATION: 100 % | SYSTOLIC BLOOD PRESSURE: 133 MMHG

## 2025-03-03 DIAGNOSIS — K64.4 EXTERNAL HEMORRHOIDS: Primary | ICD-10-CM

## 2025-03-03 DIAGNOSIS — N64.4 PAIN OF BOTH BREASTS: ICD-10-CM

## 2025-03-03 DIAGNOSIS — R20.0 LEFT LEG NUMBNESS: ICD-10-CM

## 2025-03-03 DIAGNOSIS — R63.5 WEIGHT GAIN: ICD-10-CM

## 2025-03-03 DIAGNOSIS — M54.50 LEFT LUMBAR PAIN: Primary | ICD-10-CM

## 2025-03-03 PROCEDURE — 1126F AMNT PAIN NOTED NONE PRSNT: CPT | Performed by: NURSE PRACTITIONER

## 2025-03-03 PROCEDURE — 3079F DIAST BP 80-89 MM HG: CPT | Performed by: INTERNAL MEDICINE

## 2025-03-03 PROCEDURE — 90471 IMMUNIZATION ADMIN: CPT | Performed by: INTERNAL MEDICINE

## 2025-03-03 PROCEDURE — 3079F DIAST BP 80-89 MM HG: CPT | Performed by: NURSE PRACTITIONER

## 2025-03-03 PROCEDURE — 90677 PCV20 VACCINE IM: CPT | Performed by: INTERNAL MEDICINE

## 2025-03-03 PROCEDURE — 3074F SYST BP LT 130 MM HG: CPT | Performed by: INTERNAL MEDICINE

## 2025-03-03 PROCEDURE — 99213 OFFICE O/P EST LOW 20 MIN: CPT | Performed by: NURSE PRACTITIONER

## 2025-03-03 PROCEDURE — 99214 OFFICE O/P EST MOD 30 MIN: CPT | Mod: 25 | Performed by: INTERNAL MEDICINE

## 2025-03-03 PROCEDURE — 3075F SYST BP GE 130 - 139MM HG: CPT | Performed by: NURSE PRACTITIONER

## 2025-03-03 ASSESSMENT — PAIN SCALES - GENERAL: PAINLEVEL_OUTOF10: NO PAIN (0)

## 2025-03-03 NOTE — NURSING NOTE
"Chief Complaint   Patient presents with    Follow Up    Hemorrhoids       Vitals:    03/03/25 1528   BP: 133/82   BP Location: Left arm   Patient Position: Sitting   Cuff Size: Adult Regular   Pulse: 77   SpO2: 100%   Weight: 154 lb   Height: 5' 7\"       Body mass index is 24.12 kg/m .    Maria Del Carmen Mattson, EMT  "

## 2025-03-03 NOTE — PROGRESS NOTES
"Colon and Rectal Surgery Follow-Up Clinic Note    RE: Tatiana Paredes  : 1965  GIUSEPPE: 3/3/2025    Tatiana Paredes is a very pleasant 59 year old female here for follow-up of hemorrhoids.    Interval history: I have seen Znoia in the past with hemorrhoid and mucosal prolapse with banding, last in 10/2022. She developed a painful external hemorrhoid about 3 weeks ago. This has gotten smaller and is no longer painful but has not completely resolved. No bleeding. No constipation or straining.    Colonoscopy 10/31/22   Impression:            - Perianal skin tags found on perianal exam.                          - The examined portion of the ileum was normal.                          - One 5 mm polyp in the cecum, removed with a cold                          snare. Resected and retrieved.                          - Non-bleeding internal hemorrhoids.                          - Scar in the distal rectum.                          - Biopsies were taken with a cold forceps from the                          right colon and left colon for evaluation of                          microscopic colitis.   A.  COLON, CECUM, POLYP:  - Tubular adenoma  - No high-grade dysplasia or malignancy identified    B.  COLON, RIGHT, BIOPSY:  - Unremarkable colonic mucosa  - No significant acute cryptitis, chronic changes, or microscopic colitis identified    C.  COLON, LEFT, BIOPSY:  - Unremarkable colonic mucosa  - No significant acute cryptitis, chronic changes, or microscopic colitis identified       Physical Examination: Exam was chaperoned by Maria Del Carmen Mattson, EMT   /82 (BP Location: Left arm, Patient Position: Sitting, Cuff Size: Adult Regular)   Pulse 77   Ht 5' 7\"   Wt 154 lb   LMP 2016 (Exact Date)   SpO2 100%   BMI 24.12 kg/m    General: alert, oriented, in no acute distress, sitting comfortably  HEENT:mucous membranes moist  Perianal external examination:  Perianal skin: Intact with no excoriation or " lichenification.  Lesions: No evidence of an external lesion, nodularity, or induration in the perianal region.  Eversion of buttocks: There was not evidence of an anal fissure. Details: N/A.  Skin tags or external hemorrhoids: Small engorged hemorrhoid in the left lateral position without bleeding or necrosis.    Digital rectal examination: Was deferred.    Anoscopy: Was deferred.    Assessment/Plan: 56 year old female with what appears to be a resolving thrombosed external hemorrhoid or engorged external hemorrhoid. No concerning findings. No further pain. Avoid constipation and straining and this should resolve with time. Can follow up as needed.    Medical history:  Past Medical History:   Diagnosis Date    Breast pain, left 11/2013    Choroidal nevus of right eye     Constipation     Dysphonia     Gastroesophageal reflux disease     Genital herpes     Hemorrhoids     Hoarseness     Menorrhagia     Migraines        Surgical history:  Past Surgical History:   Procedure Laterality Date    COLONOSCOPY N/A 10/31/2022    Procedure: COLONOSCOPY, WITH POLYPECTOMY AND BIOPSY;  Surgeon: Emperatriz Peterson MD;  Location: [facility] OR    ESOPHAGOSCOPY, GASTROSCOPY, DUODENOSCOPY (EGD), COMBINED N/A 9/13/2023    Procedure: Esophagoscopy, gastroscopy, duodenoscopy (EGD), combined with Biopsy;  Surgeon: Leventhal, Thomas Michael, MD;  Location: [facility] OR     HYSTEROSCOPY, SURGICAL; W/ ENDOMETRIAL ABLATION, ANY METHOD  2011       Problem list:    Patient Active Problem List    Diagnosis Date Noted    Closed fracture of nasal bone, initial encounter 11/16/2022     Priority: Medium    Acquired nasal deformity 11/16/2022     Priority: Medium    Vocal fold paresis, 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 Provider to review      Knee instability 06/05/2012     Priority: Medium       Medications:  Current Outpatient Medications   Medication Sig Dispense Refill    carboxymethylcellul-glycerin (REFRESH OPTIVE) 0.5-0.9 % SOLN ophthalmic solution Place 1 drop into both eyes 2 times daily 1 Bottle 3    famotidine (PEPCID) 20 MG tablet Take 20 mg by mouth nightly as needed      nitroFURantoin macrocrystal-monohydrate (MACROBID) 100 MG capsule Take 1 capsule (100 mg) by mouth once as needed (intercourse) 30 capsule 11    ondansetron (ZOFRAN ODT) 4 MG ODT tab Take 1 tablet (4 mg) by mouth every 6 hours as needed for nausea 30 tablet 3    rizatriptan (MAXALT-MLT) 10 MG ODT Take 1 tablet (10 mg) by mouth at onset of headache for migraine. May repeat in 2 hours. Max 3 tablets/24 hours. 36 tablet 3    valACYclovir (VALTREX) 500 MG tablet TAKE 1 TABLET BY MOUTH TWICE A DAY 28 tablet 2       Allergies:  Allergies   Allergen Reactions    Contrast Dye Itching     CT contrast dye       Family history:  Family History   Problem Relation Age of Onset    Breast Cancer Sister 52    Cancer - colorectal Maternal Grandmother 70    Migraines Mother     Unknown/Adopted Mother     Stomach Problem Mother     Stomach Cancer Mother     Prostate Cancer Father     Cancer Father     Glaucoma No family hx of     Macular Degeneration No family hx of     Retinal detachment No family hx of     Amblyopia No family hx of     Strabismus No family hx of     Glasses (<7 y/o) No family hx of     Nystagmus No family hx of        Social history:  Social History     Tobacco Use    Smoking status: Never    Smokeless tobacco: Never   Substance Use Topics    Alcohol use: Yes     Alcohol/week: 0.8 - 4.2 standard drinks of alcohol     Comment: 1-3 times a week     Marital status:  ".    Nursing Notes:   Maria Del Carmen Mattson  3/3/2025  3:30 PM  Signed  Chief Complaint   Patient presents with    Follow Up    Hemorrhoids       Vitals:    03/03/25 1528   BP: 133/82   BP Location: Left arm   Patient Position: Sitting   Cuff Size: Adult Regular   Pulse: 77   SpO2: 100%   Weight: 154 lb   Height: 5' 7\"       Body mass index is 24.12 kg/m .    TURNER Dangelo    15 minutes spent on the date of encounter performing chart review, history and exam, documentation and further activities as noted above     Queta Finch NP-C  Colon and Rectal Surgery  [facility]    "

## 2025-03-03 NOTE — LETTER
3/3/2025       RE: Tatiana Paredes  [address]  [facility] [address]     Dear Colleague,    Thank you for referring your patient, Tatiana Paredes, to the [facility] COLON [facility] [place] [facility]. Please see a copy of my visit note below.    Colon and Rectal Surgery Follow-Up Clinic Note    RE: Tatiana Paredes  : 1965  GIUSEPPE: 3/3/2025    Tatiana Paredes is a very pleasant 59 year old female here for follow-up of hemorrhoids.    Interval history: I have seen Zonia in the past with hemorrhoid and mucosal prolapse with banding, last in 10/2022. She developed a painful external hemorrhoid about 3 weeks ago. This has gotten smaller and is no longer painful but has not completely resolved. No bleeding. No constipation or straining.    Colonoscopy 10/31/22   Impression:            - Perianal skin tags found on perianal exam.                          - The examined portion of the ileum was normal.                          - One 5 mm polyp in the cecum, removed with a cold                          snare. Resected and retrieved.                          - Non-bleeding internal hemorrhoids.                          - Scar in the distal rectum.                          - Biopsies were taken with a cold forceps from the                          right colon and left colon for evaluation of                          microscopic colitis.   A.  COLON, CECUM, POLYP:  - Tubular adenoma  - No high-grade dysplasia or malignancy identified    B.  COLON, RIGHT, BIOPSY:  - Unremarkable colonic mucosa  - No significant acute cryptitis, chronic changes, or microscopic colitis identified    C.  COLON, LEFT, BIOPSY:  - Unremarkable colonic mucosa  - No significant acute cryptitis, chronic changes, or microscopic colitis identified       Physical Examination: Exam was chaperoned by Maria Del Carmen Mattson, EMT   /82 (BP Location: Left  "arm, Patient Position: Sitting, Cuff Size: Adult Regular)   Pulse 77   Ht 5' 7\"   Wt 154 lb   LMP 07/18/2016 (Exact Date)   SpO2 100%   BMI 24.12 kg/m    General: alert, oriented, in no acute distress, sitting comfortably  HEENT:mucous membranes moist  Perianal external examination:  Perianal skin: Intact with no excoriation or lichenification.  Lesions: No evidence of an external lesion, nodularity, or induration in the perianal region.  Eversion of buttocks: There was not evidence of an anal fissure. Details: N/A.  Skin tags or external hemorrhoids: Small engorged hemorrhoid in the left lateral position without bleeding or necrosis.    Digital rectal examination: Was deferred.    Anoscopy: Was deferred.    Assessment/Plan: 56 year old female with what appears to be a resolving thrombosed external hemorrhoid or engorged external hemorrhoid. No concerning findings. No further pain. Avoid constipation and straining and this should resolve with time. Can follow up as needed.    Medical history:  Past Medical History:   Diagnosis Date     Breast pain, left 11/2013     Choroidal nevus of right eye      Constipation      Dysphonia      Gastroesophageal reflux disease      Genital herpes      Hemorrhoids      Hoarseness      Menorrhagia      Migraines        Surgical history:  Past Surgical History:   Procedure Laterality Date     COLONOSCOPY N/A 10/31/2022    Procedure: COLONOSCOPY, WITH POLYPECTOMY AND BIOPSY;  Surgeon: Emperatriz Peterson MD;  Location: [facility] OR     ESOPHAGOSCOPY, GASTROSCOPY, DUODENOSCOPY (EGD), COMBINED N/A 9/13/2023    Procedure: Esophagoscopy, gastroscopy, duodenoscopy (EGD), combined with Biopsy;  Surgeon: Leventhal, Thomas Michael, MD;  Location: [facility] OR      HYSTEROSCOPY, SURGICAL; W/ ENDOMETRIAL ABLATION, ANY METHOD  2011       Problem list:    Patient Active Problem List    Diagnosis Date Noted     Closed fracture of nasal bone, initial encounter 11/16/2022     Priority: Medium     " Acquired nasal deformity 11/16/2022     Priority: Medium     Vocal fold paresis, 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 Provider to review       Knee instability 06/05/2012     Priority: Medium       Medications:  Current Outpatient Medications   Medication Sig Dispense Refill     carboxymethylcellul-glycerin (REFRESH OPTIVE) 0.5-0.9 % SOLN ophthalmic solution Place 1 drop into both eyes 2 times daily 1 Bottle 3     famotidine (PEPCID) 20 MG tablet Take 20 mg by mouth nightly as needed       nitroFURantoin macrocrystal-monohydrate (MACROBID) 100 MG capsule Take 1 capsule (100 mg) by mouth once as needed (intercourse) 30 capsule 11     ondansetron (ZOFRAN ODT) 4 MG ODT tab Take 1 tablet (4 mg) by mouth every 6 hours as needed for nausea 30 tablet 3     rizatriptan (MAXALT-MLT) 10 MG ODT Take 1 tablet (10 mg) by mouth at onset of headache for migraine. May repeat in 2 hours. Max 3 tablets/24 hours. 36 tablet 3     valACYclovir (VALTREX) 500 MG tablet TAKE 1 TABLET BY MOUTH TWICE A DAY 28 tablet 2       Allergies:  Allergies   Allergen Reactions     Contrast Dye Itching     CT contrast dye       Family history:  Family History   Problem Relation Age of Onset     Breast Cancer Sister 52     Cancer - colorectal Maternal Grandmother 70     Migraines Mother      Unknown/Adopted Mother      Stomach Problem Mother      Stomach Cancer Mother      Prostate Cancer Father      Cancer Father      Glaucoma No family hx of      Macular Degeneration No family hx of      Retinal detachment No family hx of   "    Amblyopia No family hx of      Strabismus No family hx of      Glasses (<7 y/o) No family hx of      Nystagmus No family hx of        Social history:  Social History     Tobacco Use     Smoking status: Never     Smokeless tobacco: Never   Substance Use Topics     Alcohol use: Yes     Alcohol/week: 0.8 - 4.2 standard drinks of alcohol     Comment: 1-3 times a week     Marital status: .    Nursing Notes:   Maria Del Carmen Mattson  3/3/2025  3:30 PM  Signed  Chief Complaint   Patient presents with     Follow Up     Hemorrhoids       Vitals:    03/03/25 1528   BP: 133/82   BP Location: Left arm   Patient Position: Sitting   Cuff Size: Adult Regular   Pulse: 77   SpO2: 100%   Weight: 154 lb   Height: 5' 7\"       Body mass index is 24.12 kg/m .    TURNER Dangelo    15 minutes spent on the date of encounter performing chart review, history and exam, documentation and further activities as noted above     SOCORRO FalconC  Colon and Rectal Surgery  [facility]      Again, thank you for allowing me to participate in the care of your patient.      Sincerely,    Queta Gonzalez, APRN CNP    "

## 2025-03-04 ENCOUNTER — MYC REFILL (OUTPATIENT)
Dept: INTERNAL MEDICINE | Facility: CLINIC | Age: 60
End: 2025-03-04
Payer: COMMERCIAL

## 2025-03-04 DIAGNOSIS — A60.9 HSV (HERPES SIMPLEX VIRUS) ANOGENITAL INFECTION: ICD-10-CM

## 2025-03-04 NOTE — PATIENT INSTRUCTIONS
The following imaging has been ordered: low back MRI.  You can call [phone] to schedule or set it up on [facility].      Set up a lab appointment the same day.

## 2025-03-06 RX ORDER — VALACYCLOVIR HYDROCHLORIDE 500 MG/1
500 TABLET, FILM COATED ORAL 2 TIMES DAILY
Qty: 28 TABLET | Refills: 2 | Status: SHIPPED | OUTPATIENT
Start: 2025-03-06

## 2025-03-06 NOTE — TELEPHONE ENCOUNTER
Last Written Prescription:  valACYclovir (VALTREX) 500 MG tablet 28 tablet 2 3/13/2024     ----------------------  Last Visit Date: 3/3/25  Future Visit Date: 6/2/25  ----------------------        Refill decision: Medication unable to be refilled by RN due to: Other:    Requires approval    Request from pharmacy:  Requested Prescriptions   Pending Prescriptions Disp Refills    valACYclovir (VALTREX) 500 MG tablet 28 tablet 2     Sig: Take 1 tablet (500 mg) by mouth 2 times daily.       Antivirals Passed - 3/6/2025  9:33 AM        Passed - Patient is age 12 or older        Passed - Medication is active on med list and the sig matches. RN to manually verify dose and sig if red X/fail.     If the protocol passes (green check), you do not need to verify med dose and sig.    A prescription matches if they are the same clinical intention.    For Example: once daily and every morning are the same.    The protocol can not identify upper and lower case letters as matching and will fail.     For Example: Take 1 tablet (50 mg) by mouth daily     TAKE 1 TABLET (50 MG) BY MOUTH DAILY    For all fails (red x), verify dose and sig.    If the refill does match what is on file, the RN can still proceed to approve the refill request.       If they do not match, route to the appropriate provider.             Passed - Recent (12 mo) or future (90 days) visit within the authorizing provider's specialty     The patient must have completed an in-person or virtual visit within the past 12 months or has a future visit scheduled within the next 90 days with the authorizing provider s specialty.  Urgent care and e-visits do not qualify as an office visit for this protocol.          Passed - Medication indicated for associated diagnosis     Medication is associated with one or more of the following diagnoses:     Genital herpes simplex   Herpes simples   Herpes zoster, shingles   Varicella   Recurrent herpes simplex labialis   HIV  infection   Varicella-zoster virus infection, prophylaxis

## 2025-03-13 ENCOUNTER — LAB (OUTPATIENT)
Dept: LAB | Facility: CLINIC | Age: 60
End: 2025-03-13
Payer: COMMERCIAL

## 2025-03-13 DIAGNOSIS — R63.5 WEIGHT GAIN: ICD-10-CM

## 2025-03-13 DIAGNOSIS — N64.4 PAIN OF BOTH BREASTS: ICD-10-CM

## 2025-03-13 LAB — TSH SERPL DL<=0.005 MIU/L-ACNC: 2.11 UIU/ML (ref 0.3–4.2)

## 2025-03-13 PROCEDURE — 82670 ASSAY OF TOTAL ESTRADIOL: CPT | Performed by: INTERNAL MEDICINE

## 2025-03-13 PROCEDURE — 99000 SPECIMEN HANDLING OFFICE-LAB: CPT | Performed by: PATHOLOGY

## 2025-03-13 PROCEDURE — 84146 ASSAY OF PROLACTIN: CPT | Performed by: INTERNAL MEDICINE

## 2025-03-15 LAB
ESTRADIOL SERPL-MCNC: <5 PG/ML
PROLACTIN SERPL 3RD IS-MCNC: 8 NG/ML (ref 5–23)

## 2025-03-27 ENCOUNTER — ANCILLARY PROCEDURE (OUTPATIENT)
Dept: MRI IMAGING | Facility: CLINIC | Age: 60
End: 2025-03-27
Attending: INTERNAL MEDICINE
Payer: COMMERCIAL

## 2025-03-27 DIAGNOSIS — R20.0 LEFT LEG NUMBNESS: ICD-10-CM

## 2025-03-27 DIAGNOSIS — M54.50 LEFT LUMBAR PAIN: ICD-10-CM

## 2025-03-27 PROCEDURE — 72148 MRI LUMBAR SPINE W/O DYE: CPT | Performed by: RADIOLOGY

## 2025-03-31 ENCOUNTER — PATIENT OUTREACH (OUTPATIENT)
Dept: CARE COORDINATION | Facility: CLINIC | Age: 60
End: 2025-03-31
Payer: COMMERCIAL

## 2025-04-17 ENCOUNTER — ANCILLARY PROCEDURE (OUTPATIENT)
Dept: ULTRASOUND IMAGING | Facility: CLINIC | Age: 60
End: 2025-04-17
Attending: OBSTETRICS & GYNECOLOGY
Payer: COMMERCIAL

## 2025-04-17 DIAGNOSIS — Z78.0 MENOPAUSE: ICD-10-CM

## 2025-04-17 PROCEDURE — 76856 US EXAM PELVIC COMPLETE: CPT | Mod: 26 | Performed by: STUDENT IN AN ORGANIZED HEALTH CARE EDUCATION/TRAINING PROGRAM

## 2025-04-17 PROCEDURE — 76830 TRANSVAGINAL US NON-OB: CPT | Mod: 26 | Performed by: STUDENT IN AN ORGANIZED HEALTH CARE EDUCATION/TRAINING PROGRAM

## 2025-04-17 PROCEDURE — 76830 TRANSVAGINAL US NON-OB: CPT

## 2025-05-07 NOTE — CONFIDENTIAL NOTE
RECORDS RECEIVED FROM: internal    DATE RECEIVED:  8/4/25    NOTES (FOR ALL VISITS) STATUS DETAILS   OFFICE NOTES from referring provider internal  Cruz Prather MD    MEDICATION LIST internal     IMAGING      DEXASCAN internal  11.5.24   LABS     DIABETES: HBGA1C, CREATININE, FASTING LIPIDS, MICROALBUMIN URINE, POTASSIUM, TSH, T4    THYROID: TSH, T4, CBC, THYRODLONULIN, TOTAL T3, FREE T4, CALCITONIN, CEA internal  TSH/T4- 3.13.25   Prolactin- 3.13.25   CMP- 9.30.24   CBC- 9.30.24  Lipid- 9.30.24

## 2025-05-12 ENCOUNTER — OFFICE VISIT (OUTPATIENT)
Dept: DERMATOLOGY | Facility: CLINIC | Age: 60
End: 2025-05-12
Attending: INTERNAL MEDICINE
Payer: COMMERCIAL

## 2025-05-12 DIAGNOSIS — D49.2 NEOPLASM OF UNSPECIFIED BEHAVIOR OF BONE, SOFT TISSUE, AND SKIN: ICD-10-CM

## 2025-05-12 DIAGNOSIS — Z12.83 ENCOUNTER FOR SCREENING FOR MALIGNANT NEOPLASM OF SKIN: Primary | ICD-10-CM

## 2025-05-12 DIAGNOSIS — L82.1 SEBORRHEIC KERATOSES: ICD-10-CM

## 2025-05-12 DIAGNOSIS — L81.4 LENTIGINES: ICD-10-CM

## 2025-05-12 DIAGNOSIS — D22.9 MULTIPLE NEVI: ICD-10-CM

## 2025-05-12 DIAGNOSIS — D18.01 CHERRY ANGIOMA: ICD-10-CM

## 2025-05-12 PROCEDURE — 88305 TISSUE EXAM BY PATHOLOGIST: CPT | Mod: 26 | Performed by: PATHOLOGY

## 2025-05-12 PROCEDURE — 88305 TISSUE EXAM BY PATHOLOGIST: CPT | Mod: TC | Performed by: PHYSICIAN ASSISTANT

## 2025-05-12 ASSESSMENT — PAIN SCALES - GENERAL: PAINLEVEL_OUTOF10: NO PAIN (0)

## 2025-05-12 NOTE — NURSING NOTE
Dermatology Rooming Note    Tatiana Paredes's goals for this visit include:   Chief Complaint   Patient presents with    Skin Check     FBSE - reports spots on back and R cheek     Mariya Lucia CA

## 2025-05-12 NOTE — PROGRESS NOTES
[facility] Dermatology Note  Encounter Date: May 12, 2025  Office Visit     Reviewed patients past medical history and pertinent chart review prior to patients visit today.     Dermatology Problem List:  # NUB, left posterior shoulder, shave biopsy 5/12/2025     No personal history of skin cancer.  No family history of skin cancer.  ____________________________________________    Assessment & Plan:     # Neoplasm of uncertain behavior:  left posterior shoulder  DDx includes ISK vs BCC. Shave biopsy today.    Procedure Note: Biopsy by shave technique  The risks and benefits of the procedure were described to the patient. These include but are not limited to bleeding, infection, scar, incomplete removal, and non-diagnostic biopsy. Verbal informed consent was obtained. The above site(s) was cleansed with an alcohol pad and injected with 1% lidocaine with epinephrine. Once anesthesia was obtained, a biopsy(ies) was performed with Gilette blade. The tissue(s) was placed in a labeled container(s) with formalin and sent to pathology. Hemostasis was achieved with aluminum chloride. Vaseline and a bandage were applied to the wound(s). The patient tolerated the procedure well and was given post biopsy care instructions.    # Multiple nevi, trunk and extremities  # Solar lentigines  - No concerning features on dermoscopy. We discussed the importance of self exams at home. ABCDE criteria and importance of SPF 30+ sunscreen and photoprotective clothing reviewed.     # Cherry angiomas  # Seborrheic keratoses  - The patient's lesions of concern involving the right lower back and left inguinal crease are consistent with seborrheic keratoses.   - We discussed the benign nature of the skin lesions. No treatment required. Continued observation recommended. Follow up with any concerns.      Follow-up:  Annual for follow up full body skin exam, as needed for new or changing lesions or new concerns    All risks,  benefits and alternatives were discussed with patient.  Patient is in agreement and understands the assessment and plan.  All questions were answered.  Inessa Mccallum PA-C  [facility] Dermatology    ____________________________________________    CC: Skin Check (FBSE - reports spots on back and R cheek)    HPI:  Ms. Tatiana Paredes is a(n) 59 year old female who presents today as a new patient for a full body skin cancer screening. The patient requests evaluation of lesions on the left hip crease and right lower back. The lesions are longstanding and asymptomatic. No other specific cutaneous concerns today. The patient reports trying to be diligent with photoprotection.      Physical Exam:  Vitals: LMP 07/18/2016 (Exact Date)   SKIN: Total skin excluding the genitalia areas was performed. The exam included the scalp, face, neck, bilateral arms, chest, back, abdomen, bilateral legs, digits, mons pubis, buttocks, and nails.   - Coe II.  - The left posterior shoulder demonstrates a 0.5 x 0.5 cm shiny macule with peripheral pigment granules.   - Multiple tan/brown macules and papules scattered throughout exam, consistent with benign nevi. No concerning features on dermoscopy.   - Scattered tan, homogenous macules scattered on sun exposed skin, consistent with solar lentigines.   - Scattered waxy, stuck on appearing papules and patches, consistent with seborrheic keratoses.    - Several 1-2 mm red dome shaped symmetric papules, consistent with cherry angiomas.     Medications:  Current Outpatient Medications   Medication Sig Dispense Refill    famotidine (PEPCID) 20 MG tablet Take 20 mg by mouth nightly as needed      valACYclovir (VALTREX) 500 MG tablet Take 1 tablet (500 mg) by mouth 2 times daily. (Patient taking differently: Take 500 mg by mouth 2 times daily as needed.) 28 tablet 2    Vitamin D, Cholecalciferol, 25 MCG (1000 UT) CAPS Take by mouth.      Bioflavonoid Products (VITAMIN C) CHEW  Take by mouth.       No current facility-administered medications for this visit.      Past Medical History:   Patient Active Problem List   Diagnosis    External hemorrhoids    Dysphonia    Choroidal nevus of right eye    Myopia    Hallux valgus of right foot    Knee instability    Pes planus of both feet    Pain of foot    Vocal fold paresis, right    Closed fracture of nasal bone, initial encounter    Acquired nasal deformity    Left lumbar pain     Past Medical History:   Diagnosis Date    Breast pain, left 11/2013    Choroidal nevus of right eye     Constipation     Dysphonia     Gastroesophageal reflux disease     Genital herpes     Hemorrhoids     Hoarseness     Menorrhagia     Migraines        CC Cruz Prather MD  [address] [id number] on close of this encounter.

## 2025-05-12 NOTE — NURSING NOTE
Lidocaine-epinephrine 1-1:935419 % injection   0.2 mL once for one use, starting 5/12/2025 ending 5/12/2025,  2mL disp, R-0, injection  Injected by Lukas LÓPEZ CMA

## 2025-05-12 NOTE — LETTER
5/12/2025       RE: Tatiana Paredes  [address]  [facility] [address]     Dear Colleague,    Thank you for referring your patient, Tatiana Paredes, to the [facility]ERMATOLOGY [facility] [place] at [facility]. Please see a copy of my visit note below.    [facility] Dermatology Note  Encounter Date: May 12, 2025  Office Visit     Reviewed patients past medical history and pertinent chart review prior to patients visit today.     Dermatology Problem List:  # NUB, left posterior shoulder, shave biopsy 5/12/2025     No personal history of skin cancer.  No family history of skin cancer.  ____________________________________________    Assessment & Plan:     # Neoplasm of uncertain behavior:  left posterior shoulder  DDx includes ISK vs BCC. Shave biopsy today.    Procedure Note: Biopsy by shave technique  The risks and benefits of the procedure were described to the patient. These include but are not limited to bleeding, infection, scar, incomplete removal, and non-diagnostic biopsy. Verbal informed consent was obtained. The above site(s) was cleansed with an alcohol pad and injected with 1% lidocaine with epinephrine. Once anesthesia was obtained, a biopsy(ies) was performed with Gilette blade. The tissue(s) was placed in a labeled container(s) with formalin and sent to pathology. Hemostasis was achieved with aluminum chloride. Vaseline and a bandage were applied to the wound(s). The patient tolerated the procedure well and was given post biopsy care instructions.    # Multiple nevi, trunk and extremities  # Solar lentigines  - No concerning features on dermoscopy. We discussed the importance of self exams at home. ABCDE criteria and importance of SPF 30+ sunscreen and photoprotective clothing reviewed.     # Cherry angiomas  # Seborrheic keratoses  - The patient's lesions of concern involving the right lower back and left  inguinal crease are consistent with seborrheic keratoses.   - We discussed the benign nature of the skin lesions. No treatment required. Continued observation recommended. Follow up with any concerns.      Follow-up:  Annual for follow up full body skin exam, as needed for new or changing lesions or new concerns    All risks, benefits and alternatives were discussed with patient.  Patient is in agreement and understands the assessment and plan.  All questions were answered.  Inessa Mccallum PA-C  [facility] Dermatology    ____________________________________________    CC: Skin Check (FBSE - reports spots on back and R cheek)    HPI:  Ms. Tatiana Paredes is a(n) 59 year old female who presents today as a new patient for a full body skin cancer screening. The patient requests evaluation of lesions on the left hip crease and right lower back. The lesions are longstanding and asymptomatic. No other specific cutaneous concerns today. The patient reports trying to be diligent with photoprotection.      Physical Exam:  Vitals: LMP 07/18/2016 (Exact Date)   SKIN: Total skin excluding the genitalia areas was performed. The exam included the scalp, face, neck, bilateral arms, chest, back, abdomen, bilateral legs, digits, mons pubis, buttocks, and nails.   - Coe II.  - The left posterior shoulder demonstrates a 0.5 x 0.5 cm shiny macule with peripheral pigment granules.   - Multiple tan/brown macules and papules scattered throughout exam, consistent with benign nevi. No concerning features on dermoscopy.   - Scattered tan, homogenous macules scattered on sun exposed skin, consistent with solar lentigines.   - Scattered waxy, stuck on appearing papules and patches, consistent with seborrheic keratoses.    - Several 1-2 mm red dome shaped symmetric papules, consistent with cherry angiomas.     Medications:  Current Outpatient Medications   Medication Sig Dispense Refill     famotidine (PEPCID) 20 MG tablet  Take 20 mg by mouth nightly as needed       valACYclovir (VALTREX) 500 MG tablet Take 1 tablet (500 mg) by mouth 2 times daily. (Patient taking differently: Take 500 mg by mouth 2 times daily as needed.) 28 tablet 2     Vitamin D, Cholecalciferol, 25 MCG (1000 UT) CAPS Take by mouth.       Bioflavonoid Products (VITAMIN C) CHEW Take by mouth.       No current facility-administered medications for this visit.      Past Medical History:   Patient Active Problem List   Diagnosis     External hemorrhoids     Dysphonia     Choroidal nevus of right eye     Myopia     Hallux valgus of right foot     Knee instability     Pes planus of both feet     Pain of foot     Vocal fold paresis, right     Closed fracture of nasal bone, initial encounter     Acquired nasal deformity     Left lumbar pain     Past Medical History:   Diagnosis Date     Breast pain, left 11/2013     Choroidal nevus of right eye      Constipation      Dysphonia      Gastroesophageal reflux disease      Genital herpes      Hemorrhoids      Hoarseness      Menorrhagia      Migraines        CC Cruz Prather MD  [address] [address] on close of this encounter.    Again, thank you for allowing me to participate in the care of your patient.      Sincerely,    Inessa Mccallum PA-C

## 2025-05-12 NOTE — PATIENT INSTRUCTIONS
Patient Education        Proper skin care from [facility]:     -Eliminate harsh soaps as they strip the natural oils from the skin, often resulting in dry itchy skin ( i.e. Dial, Zest, Urdu Spring)  -Use mild soaps such as Cetaphil or Dove Sensitive Skin in the shower. You do not need to use soap on arms, legs, and trunk every time you shower unless visibly soiled.   -Avoid hot or cold showers.  -After showering, lightly dry off and apply moisturizing within 2-3 minutes. This will help trap moisture in the skin.   -Aggressive use of a moisturizer at least 1-2 times a day to the entire body (including -Vanicream, Cetaphil, Aquaphor or Cerave) and moisturize hands after every washing.  -We recommend using moisturizers that come in a tub that needs to be scooped out, not a pump. This has more of an oil base. It will hold moisture in your skin much better than a water base moisturizer. The above recommended are non-pore clogging.        Wear a sunscreen with at least SPF 30 on your face, ears, neck and V of the chest daily. Wear sunscreen on other areas of the body if those areas are exposed to the sun throughout the day. Sunscreens can contain physical and/or chemical blockers. Physical blockers are less likely to clog pores, these include zinc oxide and titanium dioxide. Reapply every two hour and after swimming.      Sunscreen examples: https://www.ewg.org/sunscreen/     UV radiation  UVA radiation remains constant throughout the day and throughout the year. It is a longer wavelength than UVB and therefore penetrates deeper into the skin leading to immediate and delayed tanning, photoaging, and skin cancer. 70-80% of UVA and UVB radiation occurs between the hours of 10am-2pm.  UVB radiation  UVB radiation causes the most harmful effects and is more significant during the summer months. However, snow and ice can reflect UVB radiation leading to skin damage during the winter months as well. UVB radiation is  responsible for tanning, burning, inflammation, delayed erythema (pinkness), pigmentation (brown spots), and skin cancer.      I recommend self monthly full body exams and yearly full body exams with a dermatology provider. If you develop a new or changing lesion please follow up for examination. Most skin cancers are pink and scaly or pink and pearly. However, we do see blue/brown/black skin cancers.  Consider the ABCDEs of melanoma when giving yourself your monthly full body exam ( don't forget the groin, buttocks, feet, toes, etc). A-asymmetry, B-borders, C-color, D-diameter, E-elevation or evolving. If you see any of these changes please follow up in clinic. If you cannot see your back I recommend purchasing a hand held mirror to use with a larger wall mirror.       Checking for Skin Cancer  You can find cancer early by checking your skin each month. There are 3 kinds of skin cancer. They are melanoma, basal cell carcinoma, and squamous cell carcinoma. Doing monthly skin checks is the best way to find new marks or skin changes. Follow the instructions below for checking your skin.   The ABCDEs of checking moles for melanoma   Check your moles or growths for signs of melanoma using ABCDE:   Asymmetry: the sides of the mole or growth don t match  Border: the edges are ragged, notched, or blurred  Color: the color within the mole or growth varies  Diameter: the mole or growth is larger than 6 mm (size of a pencil eraser)  Evolving: the size, shape, or color of the mole or growth is changing (evolving is not shown in the images below)    Checking for other types of skin cancer  Basal cell carcinoma or squamous cell carcinoma have symptoms such as:      A spot or mole that looks different from all other marks on your skin  Changes in how an area feels, such as itching, tenderness, or pain  Changes in the skin's surface, such as oozing, bleeding, or scaliness  A sore that does not heal  New swelling or redness beyond  the border of a mole     Who s at risk?  Anyone can get skin cancer. But you are at greater risk if you have:   Fair skin, light-colored hair, or light-colored eyes  Many moles or abnormal moles on your skin  A history of sunburns from sunlight or tanning beds  A family history of skin cancer  A history of exposure to radiation or chemicals  A weakened immune system  If you have had skin cancer in the past, you are at risk for recurring skin cancer.   How to check your skin  Do your monthly skin checkups in front of a full-length mirror. Check all parts of your body, including your:   Head (ears, face, neck, and scalp)  Torso (front, back, and sides)  Arms (tops, undersides, upper, and lower armpits)  Hands (palms, backs, and fingers, including under the nails)  Buttocks and genitals  Legs (front, back, and sides)  Feet (tops, soles, toes, including under the nails, and between toes)  If you have a lot of moles, take digital photos of them each month. Make sure to take photos both up close and from a distance. These can help you see if any moles change over time.   Most skin changes are not cancer. But if you see any changes in your skin, call your doctor right away. Only he or she can diagnose a problem. If you have skin cancer, seeing your doctor can be the first step toward getting the treatment that could save your life.   [employer] last reviewed this educational content on 4/1/2019 2000-2020 [employer] [address] [place] [address], [place] [address]. All rights reserved. This information is not intended as a substitute for professional medical care. Always follow your healthcare professional's instructions.        When should I call my doctor?  If you are worsening or not improving, please, contact us or seek urgent care as noted below.      Who should I call with questions (adults)?  [facility] (adult and pediatric): [phone]  [facility]  [place] (adult): [phone]  [facility] ([place], [place], [place] and [place]) [phone]  For urgent needs outside of business hours call the [facility] at [phone] and ask for the dermatology resident on call to be paged  If this is a medical emergency and you are unable to reach an ER, Call 911        If you need a prescription refill, please contact your pharmacy. Refills are approved or denied by our Physicians during normal business hours, Monday through Fridays  Per office policy, refills will not be granted if you have not been seen within the past year (or sooner depending on your child's condition)     Wound Care After a Biopsy    What is a skin biopsy?  A skin biopsy allows the doctor to examine a very small piece of tissue under the microscope to determine the diagnosis and the best treatment for the skin condition. A local anesthetic (numbing medicine) is injected with a very small needle into the skin area to be tested. A small piece of skin is taken from the area. Sometimes a suture (stitch) is used.     What are the risks of a skin biopsy?  I will experience scar, bleeding, swelling, pain, crusting and redness. I may experience incomplete removal or recurrence. Risks of this procedure are excessive bleeding, bruising, infection, nerve damage, numbness, thick (hypertrophic or keloidal) scar and non-diagnostic biopsy.    How should I care for my wound for the first 24 hours?  Keep the wound dry and covered for 24 hours  If it bleeds, hold direct pressure on the area for 15 minutes. If bleeding does not stop, call us or go to the emergency room  Avoid strenuous exercise the first 1-2 days or as your doctor instructs you    How should I care for the wound after 24 hours?  After 24 hours, remove the bandage  You may bathe or shower as normal  If you had a scalp biopsy, you can shampoo as usual and can use shower water to clean the biopsy site  daily  Clean the wound once a day with gentle soap and water  Do not scrub, be gentle  Apply white petroleum/Vaseline after cleaning the wound with a cotton swab or a clean finger, and keep the site covered with a Bandaid /bandage. Bandages are not necessary with a scalp biopsy  If you are unable to cover the site with a Bandaid /bandage, re-apply ointment 2-3 times a day to keep the site moist. Moisture will help with healing  Avoid strenuous activity for first 1-2 days  Avoid lakes, rivers, pools, and oceans until the stitches are removed or the site is healed    How do I clean my wound?  Wash hands thoroughly with soap or use hand  before all wound care  Clean the wound with gentle soap and water  Apply white petroleum/Vaseline  to wound after it is clean  Replace the Bandaid /bandage to keep the wound covered for the first few days or as instructed by your doctor  If you had a scalp biopsy, warm shower water to the area on a daily basis should suffice    What should I use to clean my wound?   Cotton-tipped applicators (Qtips )  White petroleum jelly (Vaseline ). Use a clean new container and use Q-tips to apply.  Bandaids  as needed  Gentle soap     How should I care for my wound long term?  Do not get your wound dirty  Keep up with wound care for one week or until the area is healed.  If you have stitches, stitches need to be removed in 14 days. You may return to our clinic for this or you may have it done locally at your doctor s office.  A small scab will form and fall off by itself when the area is completely healed. The area will be red and will become pink in color as it heals. Sun protection is very important for how your scar will turn out. Sunscreen with an SPF 30 or greater is recommended once the area is healed.  You should have some soreness but it should be mild and slowly go away over several days. Talk to your doctor about using tylenol for pain,    When should I call my doctor?  If you  have increased:   Pain or swelling  Pus or drainage (clear or slightly yellow drainage is ok)  Temperature over 100F  Spreading redness or warmth around wound    When will I hear about my results?  The biopsy results can take 2 weeks to come back.  Your results will automatically release to [facility] before your provider has even reviewed them.  The clinic will call you with the results, send you a [facility] message, or have you schedule a follow-up clinic or phone time to discuss the results.  Contact our clinics if you do not hear from us in 2 weeks.    Who should I call with questions?  [facility]: [phone]  [facility]: [phone]  For urgent needs outside of business hours call the [facility] at [phone] and ask for the dermatology resident on call

## 2025-05-13 LAB
PATH REPORT.COMMENTS IMP SPEC: NORMAL
PATH REPORT.COMMENTS IMP SPEC: NORMAL
PATH REPORT.FINAL DX SPEC: NORMAL
PATH REPORT.GROSS SPEC: NORMAL
PATH REPORT.MICROSCOPIC SPEC OTHER STN: NORMAL
PATH REPORT.RELEVANT HX SPEC: NORMAL

## 2025-05-15 ENCOUNTER — RESULTS FOLLOW-UP (OUTPATIENT)
Dept: DERMATOLOGY | Facility: CLINIC | Age: 60
End: 2025-05-15

## 2025-05-27 ENCOUNTER — OFFICE VISIT (OUTPATIENT)
Dept: DERMATOLOGY | Facility: CLINIC | Age: 60
End: 2025-05-27
Payer: COMMERCIAL

## 2025-05-27 DIAGNOSIS — C44.91 SUPERFICIAL BASAL CELL CARCINOMA: Primary | ICD-10-CM

## 2025-05-27 ASSESSMENT — PAIN SCALES - GENERAL: PAINLEVEL_OUTOF10: NO PAIN (0)

## 2025-05-27 NOTE — NURSING NOTE
Dermatology Rooming Note    Tatiana Paredes's goals for this visit include:   Chief Complaint   Patient presents with    Derm Problem     Tatiana is here today for an ED&C     Collins QURESHI CMA

## 2025-05-27 NOTE — PATIENT INSTRUCTIONS
Wound Care After a Biopsy    What is a skin biopsy?  A skin biopsy allows the doctor to examine a very small piece of tissue under the microscope to determine the diagnosis and the best treatment for the skin condition. A local anesthetic (numbing medicine) is injected with a very small needle into the skin area to be tested. A small piece of skin is taken from the area. Sometimes a suture (stitch) is used.     What are the risks of a skin biopsy?  I will experience scar, bleeding, swelling, pain, crusting and redness. I may experience incomplete removal or recurrence. Risks of this procedure are excessive bleeding, bruising, infection, nerve damage, numbness, thick (hypertrophic or keloidal) scar and non-diagnostic biopsy.    How should I care for my wound for the first 24 hours?  Keep the wound dry and covered for 24 hours  If it bleeds, hold direct pressure on the area for 15 minutes. If bleeding does not stop, call us or go to the emergency room  Avoid strenuous exercise the first 1-2 days or as your doctor instructs you    How should I care for the wound after 24 hours?  After 24 hours, remove the bandage  You may bathe or shower as normal  If you had a scalp biopsy, you can shampoo as usual and can use shower water to clean the biopsy site daily  Clean the wound once a day with gentle soap and water  Do not scrub, be gentle  Apply white petroleum/Vaseline after cleaning the wound with a cotton swab or a clean finger, and keep the site covered with a Bandaid /bandage. Bandages are not necessary with a scalp biopsy  If you are unable to cover the site with a Bandaid /bandage, re-apply ointment 2-3 times a day to keep the site moist. Moisture will help with healing  Avoid strenuous activity for first 1-2 days  Avoid lakes, rivers, pools, and oceans until the stitches are removed or the site is healed    How do I clean my wound?  Wash hands thoroughly with soap or use hand  before all wound care  Clean  the wound with gentle soap and water  Apply white petroleum/Vaseline  to wound after it is clean  Replace the Bandaid /bandage to keep the wound covered for the first few days or as instructed by your doctor  If you had a scalp biopsy, warm shower water to the area on a daily basis should suffice    What should I use to clean my wound?   Cotton-tipped applicators (Qtips )  White petroleum jelly (Vaseline ). Use a clean new container and use Q-tips to apply.  Bandaids  as needed  Gentle soap     How should I care for my wound long term?  Do not get your wound dirty  Keep up with wound care for one week or until the area is healed.  If you have stitches, stitches need to be removed in 14 days. You may return to our clinic for this or you may have it done locally at your doctor s office.  A small scab will form and fall off by itself when the area is completely healed. The area will be red and will become pink in color as it heals. Sun protection is very important for how your scar will turn out. Sunscreen with an SPF 30 or greater is recommended once the area is healed.  You should have some soreness but it should be mild and slowly go away over several days. Talk to your doctor about using tylenol for pain,    When should I call my doctor?  If you have increased:   Pain or swelling  Pus or drainage (clear or slightly yellow drainage is ok)  Temperature over 100F  Spreading redness or warmth around wound    When will I hear about my results?  The biopsy results can take 2 weeks to come back.  Your results will automatically release to [facility] [employer] [facility] before your provider has even reviewed them.  The clinic will call you with the results, send you a [facility] [employer] [facility] message, or have you schedule a follow-up clinic or phone time to discuss the results.  Contact our clinics if you do not hear from us in 2 weeks.    Who should I call with questions?  [facility]: [phone]  [facility]  [facility]: [phone]  For urgent needs outside of business hours call the [facility] at [phone] and ask for the dermatology resident on call

## 2025-05-27 NOTE — PROGRESS NOTES
DERMATOLOGIC ELECTRODESICCATION AND CURETTAGE PROCEDURE NOTE   Dermatology Problem List:  # Hx NMSC  - sBCC, left posterior shoulder, s/p ED&C 5/27/2025       NAME OF PROCEDURE: Electrodesiccation and curettage of biopsy confirmed superficial basal cell carcinoma  PRE-OPERATIVE DIAGNOSIS:  superficial basal cell carcinoma  POST-OPERATIVE DIAGNOSIS: Same   FINAL SIZE: 1.0 x 0.6 cm  INDICATIONS: The patient presented with a 0.7 x 0.7 cm superficial basal cell carcinoma of the left posterior shoulder. ED&C was indicated.   We discussed the principles of treatment and possible complications including bleeding, infection, scarring, alteration in skin color and sensation, or recurrence of the lesion or disease. We reviewed that on occasion, after healing, a secondary procedure or revision may be recommended in order to obtain the best cosmetic or functional result.     PROCEDURE: The left posterior shoulder was cleansed with an alcohol pad and injected with 1% lidocaine with epinephrine. Once anesthesia was obtained, the lesion was curetted with a 3mm margin in 3 directions and this was followed by electrodessication.  This process was repeated three times.  Vaseline and a bandage were applied to the wound. The patient tolerated the procedure well and was given post care instructions.    All risks, benefits and alternatives were discussed with patient.  Patient is in agreement and understands the assessment and plan.  All questions were answered.  Inessa Mccallum PA-C  [facility]    PATIENT EDUCATION    Ready to learn.  No apparent learning barriers were identified.  Learning preferences include listening.  Explained diagnosis and treatment plan; patient/guardian of patient expressed understanding of the content.

## 2025-06-03 ENCOUNTER — THERAPY VISIT (OUTPATIENT)
Dept: PHYSICAL THERAPY | Facility: REHABILITATION | Age: 60
End: 2025-06-03
Attending: NURSE PRACTITIONER
Payer: COMMERCIAL

## 2025-06-03 DIAGNOSIS — G89.29 CHRONIC LEFT-SIDED LOW BACK PAIN, UNSPECIFIED WHETHER SCIATICA PRESENT: Primary | ICD-10-CM

## 2025-06-03 DIAGNOSIS — R20.2 TINGLING: ICD-10-CM

## 2025-06-03 DIAGNOSIS — M54.50 CHRONIC LEFT-SIDED LOW BACK PAIN, UNSPECIFIED WHETHER SCIATICA PRESENT: Primary | ICD-10-CM

## 2025-06-03 PROCEDURE — 97110 THERAPEUTIC EXERCISES: CPT | Mod: GP

## 2025-06-03 PROCEDURE — 97161 PT EVAL LOW COMPLEX 20 MIN: CPT | Mod: GP

## 2025-06-03 PROCEDURE — 97112 NEUROMUSCULAR REEDUCATION: CPT | Mod: GP

## 2025-06-03 NOTE — PROGRESS NOTES
"PHYSICAL THERAPY EVALUATION  Type of Visit: Evaluation       Fall Risk Screen:  Have you fallen 2 or more times in the past year?: No  Have you fallen and had an injury in the past year?: No    Subjective         Presenting condition or subjective complaint: Low back pain  Date of onset: 04/25/25    Relevant medical history:     Dates & types of surgery:      Prior diagnostic imaging/testing results: MRI; X-ray; Bone scan       MRI 3/27/25: L1-2: No canal stenosis. No significant foraminal narrowing.     L2-3: No canal stenosis. Mild facet disease and ligamentum flavum hypertrophy. Minimal broad-based disc bulge without canal stenosis or foraminal narrowing.     L3-4: There is a broad-based disc bulge without canal stenosis. Facet disease.  Mild bilateral foraminal narrowing.     L4-5: Broad-based disc bulge with central protrusion and mild canal stenosis. Facet disease. Mild bilateral foraminal narrowing.     L5-S1: Broad-based disc bulge. No canal stenosis. Facet disease. Mild bilateral foraminal narrowing.    Prior therapy history for the same diagnosis, illness or injury: Yes      Prior Level of Function  Transfers: Independent  Ambulation: Independent  ADL: Independent  IADL: Driving, Finances, Housekeeping, Laundry, Meal preparation, Medication management, School, Work    Living Environment  Social support: With a significant other or spouse   Type of home: House   Stairs to enter the home: Yes 3 Is there a railing: No     Ramp: No   Stairs inside the home: Yes 15 Is there a railing: Yes     Help at home: None  Equipment owned:       Employment: Yes    Hobbies/Interests:      Patient goals for therapy:      Subjective: Pt reports that she has been dealing with low back pain for a few years now. She saw [facility] here at the end of last year and made progress, but she did not relieve pain completely. She arrives with lingering symptoms.     Per H&P: \" She was seen today in the clinic. She describes left-sided low " "back pain which started atraumatically around 1 year ago in April 2024.  The pain was at its worst in August when she was having trouble sleeping.  She was using diclofenac patches at the time to manage the pain.  Over time the pain level has improved.  Currently she does have pain-free days and then will have intermittent flares of pain which can take a few days to resolve.  Most recently she had a flare of pain when she bent over to get something out of the  and had left-sided low back pain.  It took a number of days for the pain to resolve.  She does a 20-minute exercise program at the end of the day which includes some exercises which she got from a physical therapy session in November of last year.  She enjoys dancing but stopped doing so because it seemed to aggravate the pain.     Today, she says that while sitting here in the clinic she has no pain.\"       Objective   LUMBAR SPINE EVALUATION  PAIN: Pain Level at Rest: 0/10  Pain Level with Use: 5/10  Pain Location: lumbar spine and hip  Pain Quality: Aching, Dull, and Tingling  Pain Frequency: intermittent  Pain is Worst: daytime  Pain is Exacerbated By: Sitting in the car   Pain is Relieved By: stretch  Pain Progression: Improved    Aggravating factors include: Sitting, exercise, dancing, laying on her right side and massaging the left low back  Relieving factors include: Lying down, exercise, sitting with a pillow behind her back if watching TV    ROM:   (Degrees) Left AROM Left PROM  Right AROM Right PROM   Hip Flexion       Hip Extension       Hip Abduction       Hip Adduction       Hip Internal Rotation       Hip External Rotation       Knee Flexion       Knee Extension       Lumbar Side glide Nil loss, 2/10 Nill loss, 0/10   Lumbar Flexion Fingers to toes, 5/10 in the L glute   Lumbar Extension Mod loss, 0/10     STRENGTH:   HIP L R   Flex 4- 4-   Ext 4- 4-   ABD 3+ 4-   ADD 5 5   ER 5 5   IR 5 5   KNEE     Flex 5 5   Ext 5 5     DTR S: " WNL  CORD SIGNS: WNL  DERMATOMES:    Left Right   T12  Normal (light touch) Normal (light touch)   L1 Normal (light touch) Normal (light touch)   L2 Normal (light touch) Normal (light touch)   L3 Normal (light touch) Normal (light touch)   L4 Normal (light touch) Normal (light touch)   L5 Hypo (light touch) Normal (light touch)   S1 Hypo (light touch) Normal (light touch)   Symptoms not present today, but has had tingling in the L5-S1 area in the past.     PALPATION: Soreness in the L SIJ, L glute and piriformis    Assessment & Plan   CLINICAL IMPRESSIONS  Medical Diagnosis: R20.2 (ICD-10-CM) - Tingling  M54.50, G89.29 (ICD-10-CM) - Chronic left-sided low back pain, unspecified whether sciatica present    Treatment Diagnosis: Low back pain, LE Weakness   Impression/Assessment: Patient is a 59 year old female with Low back pain and L sided glute/SIJ pain complaints.  The following significant findings have been identified: Pain, Decreased ROM/flexibility, Decreased joint mobility, Decreased strength, Impaired balance, Impaired sensation, Impaired gait, Impaired muscle performance, Decreased activity tolerance, and Impaired posture. These impairments interfere with their ability to perform self care tasks, work tasks, recreational activities, household chores, driving , household mobility, and community mobility as compared to previous level of function.     Clinical Decision Making (Complexity):  Clinical Presentation: Stable/Uncomplicated  Clinical Presentation Rationale: based on medical and personal factors listed in PT evaluation  Clinical Decision Making (Complexity): Low complexity    PLAN OF CARE  Treatment Interventions:  Interventions: Gait Training, Manual Therapy, Neuromuscular Re-education, Therapeutic Activity, Therapeutic Exercise, Self-Care/Home Management    Long Term Goals     PT Goal 1  Goal Description: Pt will improve Lumbar SB and ROT by 15 degrees in order to be able to look over their shoulder  while driving  Target Date: 08/26/25  PT Goal 2  Goal Description: Pt will improve hip abduction and extension strength to > 4/5 in order to improve pelvic stability while walking  Target Date: 08/26/25  PT Goal 3  Goal Description: Pt will demonstrate appropriate adherence to HEP as evidenced by improved control and muscular coordination with prescribed exercises.  Target Date: 08/26/25  PT Goal 4  Goal Description: Pt will demonstrate improved motion with repeated extension to locked elbows without any pain increase in order to tolerate walking for more than 15 minutes.  Target Date: 07/01/25      Frequency of Treatment: 1x/wk for the first 4 weeks, then reducing to every other week  Duration of Treatment: 12 weeks    Education Assessment:   Learner/Method: Patient;Listening;Reading;Demonstration  Education Comments: Education provided regarding appropriate response to exercise, the difference between pain and soreness, as well as the importance of time, effort and consistency with HEP.    Risks and benefits of evaluation/treatment have been explained.   Patient/Family/caregiver agrees with Plan of Care.     Evaluation Time:     PT Eval, Low Complexity Minutes (09919): 15     Signing Clinician: Manny Johnson PT

## 2025-06-23 ENCOUNTER — ANCILLARY PROCEDURE (OUTPATIENT)
Dept: MAMMOGRAPHY | Facility: CLINIC | Age: 60
End: 2025-06-23
Attending: INTERNAL MEDICINE
Payer: COMMERCIAL

## 2025-06-23 DIAGNOSIS — Z12.31 VISIT FOR SCREENING MAMMOGRAM: ICD-10-CM

## 2025-06-23 PROCEDURE — 77067 SCR MAMMO BI INCL CAD: CPT | Mod: GC | Performed by: STUDENT IN AN ORGANIZED HEALTH CARE EDUCATION/TRAINING PROGRAM

## 2025-06-23 PROCEDURE — 77063 BREAST TOMOSYNTHESIS BI: CPT | Mod: GC | Performed by: STUDENT IN AN ORGANIZED HEALTH CARE EDUCATION/TRAINING PROGRAM

## 2025-08-04 ENCOUNTER — PRE VISIT (OUTPATIENT)
Dept: ENDOCRINOLOGY | Facility: CLINIC | Age: 60
End: 2025-08-04

## (undated) DEVICE — SYR 30ML SLIP TIP W/O NDL 302833

## (undated) DEVICE — SOL WATER IRRIG 500ML BOTTLE 2F7113

## (undated) DEVICE — ENDO FORCEP BX CAPTURA PRO SPIKE G50696

## (undated) DEVICE — KIT ENDO TURNOVER/PROCEDURE CARRY-ON 101822

## (undated) DEVICE — SUCTION MANIFOLD NEPTUNE 2 SYS 1 PORT 702-025-000

## (undated) DEVICE — KIT ENDO FIRST STEP DISINFECTANT 200ML W/POUCH EP-4

## (undated) DEVICE — TUBING SUCTION 12"X1/4" N612

## (undated) DEVICE — SPECIMEN CONTAINER 3OZ W/FORMALIN 59901

## (undated) DEVICE — SNARE CAPIVATOR ROUND COLD SNR BX10 M00561101

## (undated) DEVICE — GOWN IMPERVIOUS 2XL BLUE

## (undated) DEVICE — ENDO BITE BLOCK ADULT OMNI-BLOC

## (undated) DEVICE — TUBING SUCTION MEDI-VAC 1/4"X20' N620A

## (undated) DEVICE — SUCTION CATH AIRLIFE TRI-FLO W/CONTROL PORT 14FR  T60C

## (undated) DEVICE — ENDO SNARE EXACTO COLD 9MM LOOP 2.4MMX230CM 00711115

## (undated) RX ORDER — DIPHENHYDRAMINE HYDROCHLORIDE 50 MG/ML
INJECTION INTRAMUSCULAR; INTRAVENOUS
Status: DISPENSED
Start: 2023-09-13

## (undated) RX ORDER — FENTANYL CITRATE 50 UG/ML
INJECTION, SOLUTION INTRAMUSCULAR; INTRAVENOUS
Status: DISPENSED
Start: 2023-09-13

## (undated) RX ORDER — LIDOCAINE HYDROCHLORIDE AND EPINEPHRINE 10; 10 MG/ML; UG/ML
INJECTION, SOLUTION INFILTRATION; PERINEURAL
Status: DISPENSED
Start: 2018-07-05

## (undated) RX ORDER — LIDOCAINE HYDROCHLORIDE 20 MG/ML
INJECTION, SOLUTION INFILTRATION; PERINEURAL
Status: DISPENSED
Start: 2019-01-31

## (undated) RX ORDER — ONDANSETRON 2 MG/ML
INJECTION INTRAMUSCULAR; INTRAVENOUS
Status: DISPENSED
Start: 2023-09-13

## (undated) RX ORDER — LIDOCAINE HYDROCHLORIDE AND EPINEPHRINE 10; 10 MG/ML; UG/ML
INJECTION, SOLUTION INFILTRATION; PERINEURAL
Status: DISPENSED
Start: 2019-01-31